# Patient Record
Sex: MALE | Race: WHITE | NOT HISPANIC OR LATINO | Employment: OTHER | URBAN - METROPOLITAN AREA
[De-identification: names, ages, dates, MRNs, and addresses within clinical notes are randomized per-mention and may not be internally consistent; named-entity substitution may affect disease eponyms.]

---

## 2017-05-01 ENCOUNTER — OFFICE VISIT (OUTPATIENT)
Dept: UROLOGY | Facility: CLINIC | Age: 82
End: 2017-05-01
Payer: MEDICARE

## 2017-05-01 VITALS
DIASTOLIC BLOOD PRESSURE: 78 MMHG | HEIGHT: 69 IN | BODY MASS INDEX: 31.38 KG/M2 | HEART RATE: 78 BPM | SYSTOLIC BLOOD PRESSURE: 135 MMHG | WEIGHT: 211.88 LBS

## 2017-05-01 DIAGNOSIS — N40.1 BPH WITH URINARY OBSTRUCTION: Primary | ICD-10-CM

## 2017-05-01 DIAGNOSIS — N13.8 BPH WITH URINARY OBSTRUCTION: Primary | ICD-10-CM

## 2017-05-01 PROCEDURE — 99214 OFFICE O/P EST MOD 30 MIN: CPT | Mod: S$PBB,,, | Performed by: UROLOGY

## 2017-05-01 PROCEDURE — 99999 PR PBB SHADOW E&M-EST. PATIENT-LVL III: CPT | Mod: PBBFAC,,, | Performed by: UROLOGY

## 2017-05-01 PROCEDURE — 99213 OFFICE O/P EST LOW 20 MIN: CPT | Mod: PBBFAC | Performed by: UROLOGY

## 2017-05-01 RX ORDER — TESTOSTERONE CYPIONATE 200 MG/ML
200 INJECTION, SOLUTION INTRAMUSCULAR
Qty: 10 ML | Refills: 1 | Status: SHIPPED | OUTPATIENT
Start: 2017-05-01 | End: 2018-01-08 | Stop reason: SDUPTHER

## 2017-05-01 RX ORDER — GABAPENTIN 300 MG/1
300 CAPSULE ORAL 3 TIMES DAILY
COMMUNITY
End: 2017-08-21

## 2017-05-01 NOTE — MR AVS SNAPSHOT
[facility] - Urology 4th Floor  [address]  [place] [address]  Phone: [phone]                  Artemio Colon .   2017 8:45 AM   Office Visit    Description:  Male : 1929   Provider:  Russel Castañeda Jr., MD   Department:  [facility] - Urology 4th Floor           Reason for Visit     hypogonadism in male.           Diagnoses this Visit        Comments    BPH with urinary obstruction    -  Primary            To Do List           Goals (5 Years of Data)     None       These Medications        Disp Refills Start End    testosterone cypionate (DEPOTESTOTERONE CYPIONATE) 200 mg/mL injection 10 mL 1 2017 10/30/2017    Inject 1 mL (200 mg total) into the muscle every 28 days. - Intramuscular    Pharmacy: [facility] - [place], [place] - [address] [facility] #: [phone]         [facility] On Call     [facility] On Call Nurse Care Line -  Assistance  Unless otherwise directed by your provider, please contact [facility], our nurse care line that is available for  assistance.     Registered nurses in the [facility] provide: appointment scheduling, clinical advisement, health education, and other advisory services.  Call: [phone] (toll free)               Medications           Message regarding Medications     Verify the changes and/or additions to your medication regime listed below are the same as discussed with your clinician today.  If any of these changes or additions are incorrect, please notify your healthcare provider.        START taking these NEW medications        Refills    testosterone cypionate (DEPOTESTOTERONE CYPIONATE) 200 mg/mL injection 1    Sig: Inject 1 mL (200 mg total) into the muscle every 28 days.    Class: Print    Route: Intramuscular           Verify that the below list of medications is an accurate representation of the medications you are currently taking.  If none reported, the list may be blank. If incorrect, please  "contact your healthcare provider. Carry this list with you in case of emergency.           Current Medications     aspirin (ECOTRIN) 81 MG EC tablet Take 81 mg by mouth once daily.    gabapentin (NEURONTIN) 300 MG capsule Take 300 mg by mouth 3 (three) times daily.    hydrocodone-acetaminophen  (LORTAB)  mg per tablet Take 1 tablet by mouth every 6 (six) hours as needed for Pain.    tamsulosin (FLOMAX) 0.4 mg Cp24 Take 0.4 mg by mouth once daily.    tamsulosin (FLOMAX) 0.4 mg Cp24 Take 1 capsule (0.4 mg total) by mouth once daily.    testosterone cypionate (DEPOTESTOTERONE CYPIONATE) 200 mg/mL injection Inject 1 mL (200 mg total) into the muscle every 28 days.           Clinical Reference Information           Your Vitals Were     BP Pulse Height Weight BMI    135/78 78 5' 9" (1.753 m) 96.1 kg (211 lb 13.8 oz) 31.29 kg/m2      Blood Pressure          Most Recent Value    BP  135/78      Allergies as of 5/1/2017     Pcn [Penicillins]      Immunizations Administered on Date of Encounter - 5/1/2017     None      Orders Placed During Today's Visit     Future Labs/Procedures Expected by Expires    CBC with Auto Differential  5/1/2017 6/30/2018    Hepatic Function Panel  5/1/2017 6/30/2018    PSA  5/1/2017 6/30/2018    Testosterone  5/1/2017 6/30/2018      Language Assistance Services     ATTENTION: Language assistance services are available, free of charge. Please call [phone].      ATENCIÓN: Si habla español, tiene a brice disposición servicios gratuitos de asistencia lingüística. Llame al [phone].     [facility] Ý: N?u b?n nói Ti?ng Vi?t, có các d?ch v? h? tr? ngôn ng? mi?n phí dành cho b?n. G?i s? [phone].         [facility] 4th Floor complies with applicable Federal civil rights laws and does not discriminate on the basis of race, color, national origin, age, disability, or sex.        "

## 2017-05-01 NOTE — PROGRESS NOTES
Subjective:       Patient ID: Artemio Colon Sr. is a 88 y.o. male.    Chief Complaint: hypogonadism in male. (pt state he had sharp pain in the prostate but he was in  the hospital and was treat with x3 antibotic that seem to help the prostate he have some burning issue when voiding . he need rx  on  testosterone medication.)    HPI patient is here for prostate check.  He is voiding well.  He was recently bitten by CAT had to go through that series of shots.  He is also here for refill of IM testosterone.  He is 200 mg IM every month.  He needs his lab work done    Past Medical History:   Diagnosis Date    BPH (benign prostatic hyperplasia)     Erectile dysfunction     Pacemaker        Past Surgical History:   Procedure Laterality Date    CHOLECYSTECTOMY         History reviewed. No pertinent family history.    Social History     Social History    Marital status:      Spouse name: N/A    Number of children: N/A    Years of education: N/A     Occupational History    Not on file.     Social History Main Topics    Smoking status: Never Smoker    Smokeless tobacco: Not on file    Alcohol use No    Drug use: No    Sexual activity: Yes     Other Topics Concern    Not on file     Social History Narrative       Allergies:  Pcn [penicillins]    Medications:    Current Outpatient Prescriptions:     aspirin (ECOTRIN) 81 MG EC tablet, Take 81 mg by mouth once daily., Disp: , Rfl:     gabapentin (NEURONTIN) 300 MG capsule, Take 300 mg by mouth 3 (three) times daily., Disp: , Rfl:     hydrocodone-acetaminophen  (LORTAB)  mg per tablet, Take 1 tablet by mouth every 6 (six) hours as needed for Pain., Disp: , Rfl:     tamsulosin (FLOMAX) 0.4 mg Cp24, Take 0.4 mg by mouth once daily., Disp: , Rfl:     tamsulosin (FLOMAX) 0.4 mg Cp24, Take 1 capsule (0.4 mg total) by mouth once daily., Disp: 30 capsule, Rfl: 12    Review of Systems   Constitutional: Negative for activity change, appetite  change, chills, diaphoresis, fatigue, fever and unexpected weight change.   HENT: Negative for congestion, dental problem, hearing loss, mouth sores, postnasal drip, rhinorrhea, sinus pressure and trouble swallowing.    Eyes: Negative for pain, discharge and itching.   Respiratory: Negative for apnea, cough, choking, chest tightness, shortness of breath and wheezing.    Cardiovascular: Negative for chest pain, palpitations and leg swelling.   Gastrointestinal: Negative for abdominal distention, abdominal pain, anal bleeding, blood in stool, constipation, diarrhea, nausea, rectal pain and vomiting.   Endocrine: Negative for polydipsia and polyuria.   Genitourinary: Negative for decreased urine volume, difficulty urinating, discharge, dysuria, enuresis, flank pain, frequency, genital sores, hematuria, penile pain, penile swelling, scrotal swelling, testicular pain and urgency.   Musculoskeletal: Negative for arthralgias, back pain and myalgias.   Skin: Negative for color change, rash and wound.   Neurological: Negative for dizziness, syncope, speech difficulty, light-headedness and headaches.   Hematological: Negative for adenopathy. Does not bruise/bleed easily.   Psychiatric/Behavioral: Negative for behavioral problems, confusion, hallucinations and sleep disturbance.       Objective:      Physical Exam   Constitutional: He appears well-developed.   HENT:   Head: Normocephalic.   Cardiovascular: Normal rate.    Pulmonary/Chest: Effort normal.   Abdominal: Soft.   Genitourinary: Prostate normal.   Neurological: He is alert.   Skin: Skin is warm.     Psychiatric: He has a normal mood and affect.       Assessment:       1. BPH with urinary obstruction        Plan:       Artemio was seen today for hypogonadism in male..    Diagnoses and all orders for this visit:    BPH with urinary obstruction        associated with    BPH with urinary obstruction  -     Testosterone; Future; Expected date: 5/1/17  -     PSA; Future;  Expected date: 5/1/17  -     CBC with Auto Differential; Future; Expected date: 5/1/17  -     Hepatic Function Panel; Future; Expected date: 5/1/17    Other orders  -     testosterone cypionate (DEPOTESTOTERONE CYPIONATE) 200 mg/mL injection; Inject 1 mL (200 mg total) into the muscle every 28 days.  Dispense: 10 mL; Refill: 1     return clinic 6 months with lab work

## 2017-06-20 ENCOUNTER — OFFICE VISIT (OUTPATIENT)
Dept: CARDIOLOGY | Facility: CLINIC | Age: 82
End: 2017-06-20
Payer: MEDICARE

## 2017-06-20 ENCOUNTER — HOSPITAL ENCOUNTER (OUTPATIENT)
Dept: CARDIOLOGY | Facility: CLINIC | Age: 82
Discharge: HOME | End: 2017-06-20
Payer: MEDICARE

## 2017-06-20 ENCOUNTER — TELEPHONE (OUTPATIENT)
Dept: ELECTROPHYSIOLOGY | Facility: CLINIC | Age: 82
End: 2017-06-20

## 2017-06-20 VITALS
SYSTOLIC BLOOD PRESSURE: 127 MMHG | DIASTOLIC BLOOD PRESSURE: 71 MMHG | HEART RATE: 74 BPM | HEIGHT: 70 IN | BODY MASS INDEX: 29.29 KG/M2 | WEIGHT: 204.56 LBS

## 2017-06-20 DIAGNOSIS — Z95.0 CARDIAC PACEMAKER IN SITU: ICD-10-CM

## 2017-06-20 DIAGNOSIS — I25.10 CORONARY ARTERY DISEASE INVOLVING NATIVE CORONARY ARTERY OF NATIVE HEART WITHOUT ANGINA PECTORIS: Chronic | ICD-10-CM

## 2017-06-20 DIAGNOSIS — Z95.0 CARDIAC PACEMAKER IN SITU: Primary | ICD-10-CM

## 2017-06-20 DIAGNOSIS — I48.91 ATRIAL FIBRILLATION, UNSPECIFIED TYPE: ICD-10-CM

## 2017-06-20 DIAGNOSIS — I48.0 PAF (PAROXYSMAL ATRIAL FIBRILLATION): ICD-10-CM

## 2017-06-20 PROCEDURE — 1125F AMNT PAIN NOTED PAIN PRSNT: CPT | Mod: ,,, | Performed by: INTERNAL MEDICINE

## 2017-06-20 PROCEDURE — 93010 ELECTROCARDIOGRAM REPORT: CPT | Mod: S$PBB,,, | Performed by: INTERNAL MEDICINE

## 2017-06-20 PROCEDURE — 99214 OFFICE O/P EST MOD 30 MIN: CPT | Mod: PBBFAC,25 | Performed by: INTERNAL MEDICINE

## 2017-06-20 PROCEDURE — 99204 OFFICE O/P NEW MOD 45 MIN: CPT | Mod: S$PBB,,, | Performed by: INTERNAL MEDICINE

## 2017-06-20 PROCEDURE — 1159F MED LIST DOCD IN RCRD: CPT | Mod: ,,, | Performed by: INTERNAL MEDICINE

## 2017-06-20 PROCEDURE — 99999 PR PBB SHADOW E&M-EST. PATIENT-LVL IV: CPT | Mod: PBBFAC,,, | Performed by: INTERNAL MEDICINE

## 2017-06-20 RX ORDER — HYDROCODONE BITARTRATE AND ACETAMINOPHEN 10; 325 MG/1; MG/1
TABLET ORAL EVERY 4 HOURS PRN
COMMUNITY
End: 2024-03-20

## 2017-06-20 RX ORDER — FENTANYL 25 UG/1
1 PATCH TRANSDERMAL
COMMUNITY

## 2017-06-20 NOTE — PATIENT INSTRUCTIONS
.Mediteranian diet recommended  Attempt Graded exercise for 30 minutes a day at least 5 days a week .

## 2017-06-20 NOTE — PROGRESS NOTES
"Subjective:   Patient ID:  Artemio Colon Sr. is a 88 y.o. male who presents for  Pacemaker Check and Establish Care      HPI:   Artemio Colon Sr.presents to establish cardiovascular care having been treated at [facility] and [facility] in [place]. There is a past history of atrial fibrillation and underwent an ablation at [facility] and  has had a PPM apparently since 2002. He had a generator change in 2011.  . Near that time he had cardiac cath performed with high grade lesions in a first diagonal with moderate disease in his mid LAD. EF on several echos has been in normal range ( records scanned ). Artemio Colon Sr. is not exercising due to knee pain but has renovated houses. Artemio Colon Sr. denies chest pain, shortness of breath, palpitations, presyncope , or syncope. He takes aspirin but no statin with his cholesterol " always good ". ..  Review of Systems   Constitution: Negative for weakness, malaise/fatigue, weight gain and weight loss.   HENT: Negative for headaches.    Eyes: Negative for blurred vision.        Glaucoma right eye   Cardiovascular: Negative for chest pain, claudication, cyanosis, dyspnea on exertion, irregular heartbeat, leg swelling, near-syncope, orthopnea, palpitations, paroxysmal nocturnal dyspnea and syncope.   Respiratory: Negative for cough, shortness of breath and wheezing.    Musculoskeletal: Positive for joint pain. Negative for falls and myalgias.   Gastrointestinal: Positive for constipation and nausea. Negative for abdominal pain, heartburn and vomiting.   Genitourinary: Positive for nocturia.   Neurological: Negative for brief paralysis, dizziness, focal weakness, numbness and paresthesias.   Psychiatric/Behavioral: Negative for altered mental status.       Current Outpatient Prescriptions   Medication Sig    aspirin (ECOTRIN) 81 MG EC tablet Take 81 mg by mouth once daily.    fentaNYL (DURAGESIC) 25 mcg/hr Place 1 patch onto the skin every 72 hours. " "   gabapentin (NEURONTIN) 300 MG capsule Take 300 mg by mouth 3 (three) times daily.    hydrocodone-acetaminophen 10-325mg (NORCO)  mg Tab Take by mouth every 4 (four) hours as needed for Pain.    tamsulosin (FLOMAX) 0.4 mg Cp24 Take 1 capsule (0.4 mg total) by mouth once daily.    testosterone cypionate (DEPOTESTOTERONE CYPIONATE) 200 mg/mL injection Inject 1 mL (200 mg total) into the muscle every 28 days.    travoprost, benzalkonium, (TRAVATAN) 0.004 % ophthalmic solution Place 1 drop into both eyes every evening.     No current facility-administered medications for this visit.      Objective:   Physical Exam   Constitutional: He is oriented to person, place, and time. He appears well-developed. No distress.   /71 (BP Location: Left arm, Patient Position: Sitting, BP Method: Automatic)   Pulse 74   Ht 5' 10" (1.778 m)   Wt 92.8 kg (204 lb 9.4 oz)   BMI 29.36 kg/m²    HENT:   Head: Normocephalic.   Right Ear: External ear normal.   Left Ear: External ear normal.   Eyes: EOM are normal. Pupils are equal, round, and reactive to light. No scleral icterus.   Neck: Neck supple. No JVD present. No thyromegaly present.   Cardiovascular: Normal rate, regular rhythm, normal heart sounds and intact distal pulses.  PMI is not displaced.  Exam reveals no gallop and no friction rub.    No murmur heard.  Pulmonary/Chest: Effort normal and breath sounds normal. No respiratory distress. He has no wheezes. He has no rales.    Permanent pacemaker.   Abdominal: Soft. He exhibits no distension. There is no hepatosplenomegaly. There is no tenderness.   Musculoskeletal: He exhibits no edema or tenderness.   Gait normal   Neurological: He is alert and oriented to person, place, and time.   Skin: Skin is warm and dry. No rash noted.   Psychiatric: He has a normal mood and affect. His behavior is normal.       Lab Results   Component Value Date    AST 20 12/01/2016    ALT 15 12/01/2016    ALBUMIN 3.7 12/01/2016    " PROT 6.7 12/01/2016    BILITOT 0.8 12/01/2016    WBC 6.50 12/01/2016    HGB 16.1 12/01/2016    HCT 47.5 12/01/2016    MCV 94 12/01/2016     12/01/2016       Assessment:     1. Coronary artery disease involving native coronary artery of native heart without angina pectoris: Stable    2. PAF (paroxysmal atrial fibrillation) : S/P Ablation at [facility]   3. Cardiac pacemaker in situ        Plan:     Artemio was seen today for pacemaker check and establish care.    Diagnoses and all orders for this visit:    Coronary artery disease involving native coronary artery of native heart without angina pectoris  -     Lipid panel; Future; Expected date: 06/20/2017  Most likely will add low dose statin  PAF (paroxysmal atrial fibrillation)    Cardiac pacemaker in situ  -     Ambulatory Referral to Electrophysiology for follow up    Other orders  -     hydrocodone-acetaminophen 10-325mg (NORCO)  mg Tab; Take by mouth every 4 (four) hours as needed for Pain.  -     travoprost, benzalkonium, (TRAVATAN) 0.004 % ophthalmic solution; Place 1 drop into both eyes every evening.  -     fentaNYL (DURAGESIC) 25 mcg/hr; Place 1 patch onto the skin every 72 hours.

## 2017-06-21 ENCOUNTER — TELEPHONE (OUTPATIENT)
Dept: CARDIOLOGY | Facility: CLINIC | Age: 82
End: 2017-06-21

## 2017-06-21 ENCOUNTER — OFFICE VISIT (OUTPATIENT)
Dept: ELECTROPHYSIOLOGY | Facility: CLINIC | Age: 82
End: 2017-06-21
Payer: MEDICARE

## 2017-06-21 ENCOUNTER — CLINICAL SUPPORT (OUTPATIENT)
Dept: ELECTROPHYSIOLOGY | Facility: CLINIC | Age: 82
End: 2017-06-21
Payer: MEDICARE

## 2017-06-21 ENCOUNTER — HOSPITAL ENCOUNTER (OUTPATIENT)
Dept: CARDIOLOGY | Facility: CLINIC | Age: 82
Discharge: HOME | End: 2017-06-21
Payer: MEDICARE

## 2017-06-21 VITALS
HEART RATE: 82 BPM | HEIGHT: 70 IN | BODY MASS INDEX: 29.32 KG/M2 | WEIGHT: 204.81 LBS | DIASTOLIC BLOOD PRESSURE: 82 MMHG | SYSTOLIC BLOOD PRESSURE: 118 MMHG

## 2017-06-21 DIAGNOSIS — Z95.0 CARDIAC PACEMAKER IN SITU: ICD-10-CM

## 2017-06-21 DIAGNOSIS — I44.2 COMPLETE AV BLOCK: ICD-10-CM

## 2017-06-21 DIAGNOSIS — I48.0 PAF (PAROXYSMAL ATRIAL FIBRILLATION): Primary | ICD-10-CM

## 2017-06-21 DIAGNOSIS — I48.91 ATRIAL FIBRILLATION, UNSPECIFIED TYPE: ICD-10-CM

## 2017-06-21 DIAGNOSIS — I25.10 CORONARY ARTERY DISEASE INVOLVING NATIVE CORONARY ARTERY OF NATIVE HEART WITHOUT ANGINA PECTORIS: Primary | Chronic | ICD-10-CM

## 2017-06-21 PROCEDURE — 93005 ELECTROCARDIOGRAM TRACING: CPT | Mod: PBBFAC | Performed by: INTERNAL MEDICINE

## 2017-06-21 PROCEDURE — 93010 ELECTROCARDIOGRAM REPORT: CPT | Mod: S$PBB,,, | Performed by: INTERNAL MEDICINE

## 2017-06-21 PROCEDURE — 99214 OFFICE O/P EST MOD 30 MIN: CPT | Mod: S$PBB,,, | Performed by: INTERNAL MEDICINE

## 2017-06-21 PROCEDURE — 1126F AMNT PAIN NOTED NONE PRSNT: CPT | Mod: ,,, | Performed by: INTERNAL MEDICINE

## 2017-06-21 PROCEDURE — 93280 PM DEVICE PROGR EVAL DUAL: CPT | Mod: 26,S$PBB,, | Performed by: INTERNAL MEDICINE

## 2017-06-21 PROCEDURE — 99213 OFFICE O/P EST LOW 20 MIN: CPT | Mod: PBBFAC | Performed by: INTERNAL MEDICINE

## 2017-06-21 PROCEDURE — 99999 PR PBB SHADOW E&M-EST. PATIENT-LVL III: CPT | Mod: PBBFAC,,, | Performed by: INTERNAL MEDICINE

## 2017-06-21 PROCEDURE — 1159F MED LIST DOCD IN RCRD: CPT | Mod: ,,, | Performed by: INTERNAL MEDICINE

## 2017-06-21 RX ORDER — PRAVASTATIN SODIUM 40 MG/1
40 TABLET ORAL DAILY
Qty: 90 TABLET | Refills: 3 | Status: SHIPPED | OUTPATIENT
Start: 2017-06-21 | End: 2019-07-02

## 2017-06-21 NOTE — TELEPHONE ENCOUNTER
Artemio Colon Sr. called and results of lab tests reviewed. LDL in the 80s with known CAD. Will add moderate intensity statin .

## 2017-06-21 NOTE — PROGRESS NOTES
Subjective:    Patient ID:  Artemio Colon Sr. is a 88 y.o. male who presents for evaluation of Atrial Fibrillation and Pacemaker Check    Referring Cardiologist: Nicholas Tee MD    HPI  I had the pleasure of seeing Mr. Colon today in our electrophysiology clinic to establish care for his pacemaker and for his atrial fibrillation.  He recently moved here from [place] and established care in the cardiology clinic.  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 I do not know if he had a PVI or AVN ablation as he has complete AV block.  He feels well, exercises regularly.  He denies any chest pain, shortness of breath, palpitations, orthopnea or PND.  He is not on anticoagulation and when I brought it up as a discussion he stated he does not want to be on it and rathered not discuss it any further.    Device interrogation revealed stable lead parameters, underlying rhythm is sinus rhythm with complete AV block, A-paced 56%, V-paced 100%, AMS burden <0.1% but with 28 AF episodes and longest episode lasting 5 minutes and 1 NSVT episode of 4 seconds. He has 3.5 years of battery life left.    My interpretation of today's in clinic electrocardiogram is AV-paced rhythm    Review of Systems   Constitution: Negative. Negative for weakness and malaise/fatigue.   HENT: Negative.  Negative for congestion and headaches.    Eyes: Negative.  Negative for blurred vision and visual disturbance.   Cardiovascular: Negative.  Negative for chest pain, dyspnea on exertion, irregular heartbeat, near-syncope, orthopnea, palpitations, paroxysmal nocturnal dyspnea and syncope.   Respiratory: Negative.  Negative for cough and wheezing.    Endocrine: Negative.    Hematologic/Lymphatic:  Negative.  Negative for bleeding problem. Does not bruise/bleed easily.   Skin: Negative.    Musculoskeletal: Positive for arthritis and joint pain.   Gastrointestinal: Negative.    Genitourinary: Negative.    Neurological: Negative.  Negative for dizziness, focal weakness and light-headedness.   Psychiatric/Behavioral: Negative.    Allergic/Immunologic: Negative.         Objective:    Physical Exam   Constitutional: He is oriented to person, place, and time. He appears well-developed and well-nourished. No distress.   HENT:   Head: Normocephalic and atraumatic.   Eyes: Conjunctivae and EOM are normal. Right eye exhibits no discharge. Left eye exhibits no discharge.   Neck: Neck supple. No JVD present.   Cardiovascular: Normal rate, regular rhythm and normal heart sounds.  Exam reveals no gallop and no friction rub.    No murmur heard.  Pulmonary/Chest: Effort normal and breath sounds normal. No respiratory distress. He has no wheezes.   Device pocket well healed   Abdominal: Soft. Bowel sounds are normal. He exhibits no distension. There is no tenderness. There is no rebound.   Musculoskeletal: He exhibits no edema.   Neurological: He is alert and oriented to person, place, and time.   Skin: Skin is warm and dry. He is not diaphoretic.   Psychiatric: He has a normal mood and affect. His behavior is normal. Judgment and thought content normal.   Vitals reviewed.        Assessment:       1. PAF (paroxysmal atrial fibrillation)    2. Cardiac pacemaker in situ    3. Complete AV block         Plan:       In summary, Mr. 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 He is in complete AV block.  He is not on anticoagulation by choice and would not discuss it any further.  Will perform  remote device checks every 3 months.  Follow-up with me in 1 year, sooner if needed.    Thank you for allowing me to participate in the care of this patient. Please do not hesitate to call me with any questions or concerns.    Tawanda Adler MD, PhD  Cardiac Electrophysiology

## 2017-06-21 NOTE — LETTER
June 21, 2017      Nicholas Tee MD  [address]  [facility] [address]           [facility]  [address]  [facility] [address]  Phone: [phone]  Fax: [phone]          Patient: Artemio Colon Sr.   MR Number: [id number]   YOB: 1929   Date of Visit: 6/21/2017       Dear Dr. Nicholas Tee:    Thank you for referring Artemio Colon to me for evaluation. Attached you will find relevant portions of my assessment and plan of care.    If you have questions, please do not hesitate to call me. I look forward to following Artemio Colon along with you.    Sincerely,    Tawanda Adler MD    Enclosure  CC:  No Recipients    If you would like to receive this communication electronically, please contact [email or web address] or [phone] to request more information on [facility] access.    For providers and/or their staff who would like to refer a patient to [facility], please contact us through our one-stop-shop provider referral line, [facility], at [phone].    If you feel you have received this communication in error or would no longer like to receive these types of communications, please e-mail [email or web address]

## 2017-06-22 DIAGNOSIS — I48.0 PAF (PAROXYSMAL ATRIAL FIBRILLATION): Primary | ICD-10-CM

## 2017-07-27 ENCOUNTER — OFFICE VISIT (OUTPATIENT)
Dept: ALLERGY | Facility: CLINIC | Age: 82
End: 2017-07-27
Payer: MEDICARE

## 2017-07-27 VITALS
DIASTOLIC BLOOD PRESSURE: 72 MMHG | WEIGHT: 208.31 LBS | BODY MASS INDEX: 29.16 KG/M2 | SYSTOLIC BLOOD PRESSURE: 124 MMHG | HEIGHT: 71 IN

## 2017-07-27 DIAGNOSIS — K90.49 FOOD INTOLERANCE IN ADULT: ICD-10-CM

## 2017-07-27 DIAGNOSIS — J31.0 CHRONIC RHINITIS, UNSPECIFIED TYPE: Primary | ICD-10-CM

## 2017-07-27 DIAGNOSIS — G47.33 OSA (OBSTRUCTIVE SLEEP APNEA): ICD-10-CM

## 2017-07-27 DIAGNOSIS — Z91.038 HISTORY OF INSECT STING ALLERGY: ICD-10-CM

## 2017-07-27 DIAGNOSIS — Z88.0 HISTORY OF PENICILLIN ALLERGY: ICD-10-CM

## 2017-07-27 PROCEDURE — 1159F MED LIST DOCD IN RCRD: CPT | Mod: ,,, | Performed by: ALLERGY & IMMUNOLOGY

## 2017-07-27 PROCEDURE — 99999 PR PBB SHADOW E&M-EST. PATIENT-LVL III: CPT | Mod: PBBFAC,,, | Performed by: ALLERGY & IMMUNOLOGY

## 2017-07-27 PROCEDURE — 99204 OFFICE O/P NEW MOD 45 MIN: CPT | Mod: S$PBB,,, | Performed by: ALLERGY & IMMUNOLOGY

## 2017-07-27 PROCEDURE — 1125F AMNT PAIN NOTED PAIN PRSNT: CPT | Mod: ,,, | Performed by: ALLERGY & IMMUNOLOGY

## 2017-07-27 PROCEDURE — 3008F BODY MASS INDEX DOCD: CPT | Mod: ,,, | Performed by: ALLERGY & IMMUNOLOGY

## 2017-07-27 PROCEDURE — 99213 OFFICE O/P EST LOW 20 MIN: CPT | Mod: PBBFAC | Performed by: ALLERGY & IMMUNOLOGY

## 2017-07-27 RX ORDER — EPINEPHRINE 0.3 MG/.3ML
1 INJECTION SUBCUTANEOUS ONCE
Qty: 2 DEVICE | Refills: 2 | Status: SHIPPED | OUTPATIENT
Start: 2017-07-27 | End: 2017-08-21

## 2017-07-27 RX ORDER — FLUTICASONE PROPIONATE 50 MCG
2 SPRAY, SUSPENSION (ML) NASAL DAILY
Qty: 1 BOTTLE | Refills: 6 | Status: SHIPPED | OUTPATIENT
Start: 2017-07-27 | End: 2020-12-31

## 2017-07-27 NOTE — PROGRESS NOTES
Subjective:       Patient ID: Artemio Colon Sr. is a 88 y.o. male.    Chief Complaint:  Allergies (PCN allergy) and Sore Throat (nasal drip)  est care    HPI    Pt w hx chronic rhinitis, JAEL, food intolerance.  Pt w hx mult negative food immunoCAP, but intolerance (GI upset) to corn starch and corn syrup.  Also w distant hx PCN allergy. ~65 yrs ago recalls hives and neck swelling while on PCN. Has been avoiding since. Denies recurrent sinusitis or lower resp infections.    Re rhinitis sx's, he is a chronic mouth breather. Has freq c/o  Hoarseness, sinus pressure, PND, nasal irritation, assoc HA  Nasal irritation    He is on CPAP. Needs to est care w sleep med/pulm to review settings.    Denies GERD  No hx asthma    Reports distant hx resp distress w suspected wasp sting. Never rx'd epipen. Not interested in immunotherapy      Review of Systems   Constitutional: Negative for activity change, appetite change, fatigue, fever and unexpected weight change.   HENT: Positive for postnasal drip, rhinorrhea, sinus pressure and voice change. Negative for congestion, ear pain and sneezing.    Eyes: Negative for discharge, redness and itching.   Respiratory: Negative for cough, chest tightness, shortness of breath and wheezing.    Cardiovascular: Negative for chest pain.   Gastrointestinal: Negative for abdominal pain, constipation, diarrhea, nausea and vomiting.   Genitourinary: Negative for difficulty urinating.   Musculoskeletal: Negative for arthralgias, joint swelling and myalgias.   Skin: Negative for rash.   Neurological: Negative for headaches.   Hematological: Negative for adenopathy. Does not bruise/bleed easily.   Psychiatric/Behavioral: Negative for behavioral problems and sleep disturbance.        Objective:    Physical Exam   Constitutional: He is oriented to person, place, and time. He appears well-developed and well-nourished. No distress.   HENT:   Head: Normocephalic and atraumatic.   Right Ear:  External ear normal.   Left Ear: External ear normal.   Nose: Nose normal.   Mouth/Throat: Oropharynx is clear and moist. No oropharyngeal exudate.   Eyes: Conjunctivae are normal. Right eye exhibits no discharge. Left eye exhibits no discharge. No scleral icterus.   Neck: Neck supple. No thyromegaly present.   Cardiovascular: Normal rate and regular rhythm.    Pulmonary/Chest: Effort normal and breath sounds normal. No respiratory distress. He has no wheezes.   Abdominal: Soft. He exhibits no distension. There is no tenderness. There is no rebound and no guarding.   Musculoskeletal: Normal range of motion. He exhibits no edema.   Lymphadenopathy:     He has no cervical adenopathy.   Neurological: He is alert and oriented to person, place, and time.   Skin: Skin is warm. No rash noted. He is not diaphoretic. No erythema.   Psychiatric: He has a normal mood and affect. His behavior is normal. Thought content normal.       Laboratory:   none performed      Outside labs reviewed, incl neg food immunoCAPs, nl spirometry 2012    Assessment:       1. Chronic rhinitis, unspecified type    2. Food intolerance in adult    3. History of penicillin allergy    4. JAEL (obstructive sleep apnea)    5. History of insect sting allergy         Plan:       1. flonase 2 sen   2. Refer to pulm/sleep medicine   3. epipen given hx poss wasp allergy. Pt not interested in testing, immunotherapy  4. Discussed PCN skin testing. Pt defers at this time  5. Ok minimize corn exposure. Doubt that strict avoidance is necessary

## 2017-08-04 ENCOUNTER — TELEPHONE (OUTPATIENT)
Dept: NEUROLOGY | Facility: CLINIC | Age: 82
End: 2017-08-04

## 2017-08-04 NOTE — TELEPHONE ENCOUNTER
Adi called spoke to Mr. Colon regarding an appt he wants with . Adi stated to him the  doesn't see patient for neuropathy, she mostly see patients with movement disorders which is not neuropathy. Mr. Colon stated back that he looked  up the [facility] web site and liked what read about her. Adi stated that he can recommend another provider that can help him with leg neuropathy. He stated that he will do his research again and find someone on his own

## 2017-08-04 NOTE — TELEPHONE ENCOUNTER
----- Message from Dorene Holt sent at 8/4/2017  9:54 AM CDT -----  Contact: Patient cell [phone] or [phone]  Patient is calling to get a new patient appt for leg Neuropathy. Please call

## 2017-08-17 NOTE — LETTER
"              After Visit Summary   8/17/2017    Suresh Ellis    MRN: [id number]           Patient Information     Date Of Birth          1937        Visit Information        Provider Department      8/17/2017 9:00 AM Danita Bowen PA-C [facility]        Today's Diagnoses     Essential hypertension with goal blood pressure less than 140/90    -  1    Hyperlipidemia LDL goal <100        CKD (chronic kidney disease) stage 3, GFR 30-59 ml/min        Subclinical hypothyroidism          Care Instructions    Labs updated today.  Continue healthy diet and exercise.  Refills sent to pharmacy.  Return to clinic with any worsening or changes in symptoms and follow up for routine care.           Follow-ups after your visit        Follow-up notes from your care team     Return in about 6 months (around 2/17/2018), or if symptoms worsen or fail to improve.      Who to contact     If you have questions or need follow up information about today's clinic visit or your schedule please contact [facility] directly at [phone].  Normal or non-critical lab and imaging results will be communicated to you by zeroboundhart, letter or phone within 4 business days after the clinic has received the results. If you do not hear from us within 7 days, please contact the clinic through Theoremt or phone. If you have a critical or abnormal lab result, we will notify you by phone as soon as possible.  Submit refill requests through [email or web address] or call your pharmacy and they will forward the refill request to us. Please allow 3 business days for your refill to be completed.          Additional Information About Your Visit        zeroboundharT[employer] Technologies Information     [email or web address] lets you send messages to your doctor, view your test results, renew your prescriptions, schedule appointments and more. To sign up, go to [email or web address] . Click on \"Log in\" on the left side of the screen, which will take you to the Welcome " June 20, 2017      Osiel Stone MD  [address]  [facility] [address]           [facility]  [address]  [facility] [address]  Phone: [phone]          Patient: Artemio Colon Sr.   MR Number: [id number]   YOB: 1929   Date of Visit: 6/20/2017       Dear Dr. Osiel Stone:    Thank you for referring Artemio Colon to me for evaluation. Attached you will find relevant portions of my assessment and plan of care.    If you have questions, please do not hesitate to call me. I look forward to following Artemio Colon along with you.    Sincerely,    Nicholas Tee MD    Enclosure  CC:  No Recipients    If you would like to receive this communication electronically, please contact [email or web address] or [phone] to request more information on [employer] Link access.    For providers and/or their staff who would like to refer a patient to [facility], please contact us through our one-stop-shop provider referral line, [facility] , at [phone].    If you feel you have received this communication in error or would no longer like to receive these types of communications, please e-mail [email or web address]          "[email or web address]. Then click on \"Sign up Now\" on the right side of the page.     You will be asked to enter the access code listed below, as well as some personal information. Please follow the directions to create your username and password.     Your access code is: [email or web address]  Expires: 11/15/2017  9:07 AM     Your access code will  in 90 days. If you need help or a new code, please call your [place] clinic or [phone].        Care EveryWhere ID     This is your Care EveryWhere ID. This could be used by other organizations to access your [place] medical records  [id number]        Your Vitals Were     Pulse Temperature Respirations Height Pulse Oximetry BMI (Body Mass Index)    80 97.1  F (36.2  C) (Tympanic) 15 5' 10.25\" (1.784 m) 95% 22.08 kg/m2       Blood Pressure from Last 3 Encounters:   17 142/64   16 136/60   09/17/15 136/60    Weight from Last 3 Encounters:   17 155 lb (70.3 kg)   16 160 lb (72.6 kg)   09/17/15 165 lb 4 oz (75 kg)              We Performed the Following     Albumin Random Urine Quantitative     CBC with platelets differential     Comprehensive metabolic panel     Lipid panel reflex to direct LDL     TSH with free T4 reflex          Today's Medication Changes          These changes are accurate as of: 17  9:07 AM.  If you have any questions, ask your nurse or doctor.               These medicines have changed or have updated prescriptions.        Dose/Directions    amLODIPine 10 MG tablet   Commonly known as:  NORVASC   This may have changed:  See the new instructions.   Used for:  Essential hypertension with goal blood pressure less than 140/90   Changed by:  Danita Bowen PA-C        TAKE 1 TABLET(10 MG) BY MOUTH DAILY   Quantity:  90 tablet   Refills:  3       hydrochlorothiazide 25 MG tablet   Commonly known as:  HYDRODIURIL   This may have changed:  See the new instructions.   Used for:  Essential hypertension with goal blood pressure less " than 140/90   Changed by:  Danita Bowen PA-C        TAKE 1 TABLET(25 MG) BY MOUTH DAILY   Quantity:  90 tablet   Refills:  3            Where to get your medicines      These medications were sent to [facility] [address] [address] AT [facility] & [address] [place] [address]    Hours:  24-hours Phone:  [phone]     amLODIPine 10 MG tablet    hydrochlorothiazide 25 MG tablet                Primary Care Provider Office Phone # Fax #    Danita Bowen PA-C [phone]       [address]  [facility] [address]        Equal Access to Services     FAITH HSU : Hadii duarte rutledge hadasho Soomaali, waaxda luqadaha, qaybta kaalmada adeegyada, dustin mccurdyin tyrell ramírez . So [facility] [phone].    ATENCIÓN: Si habla español, tiene a barrientos disposición servicios gratuitos de asistencia lingüística. Llame al [phone].    We comply with applicable federal civil rights laws and [place] laws. We do not discriminate on the basis of race, color, national origin, age, disability sex, sexual orientation or gender identity.            Thank you!     Thank you for choosing [facility]  for your care. Our goal is always to provide you with excellent care. Hearing back from our patients is one way we can continue to improve our services. Please take a few minutes to complete the written survey that you may receive in the mail after your visit with us. Thank you!             Your Updated Medication List - Protect others around you: Learn how to safely use, store and throw away your medicines at www.disposemymeds.org.          This list is accurate as of: 8/17/17  9:07 AM.  Always use your most recent med list.                   Brand Name Dispense Instructions for use Diagnosis    amLODIPine 10 MG tablet    NORVASC    90 tablet    TAKE 1 TABLET(10 MG) BY MOUTH DAILY    Essential hypertension with  goal blood pressure less than 140/90       hydrochlorothiazide 25 MG tablet    HYDRODIURIL    90 tablet    TAKE 1 TABLET(25 MG) BY MOUTH DAILY    Essential hypertension with goal blood pressure less than 140/90       OMEGA-3 FISH OIL PO           vitamin D 2000 UNITS tablet     100 tablet    Take 2,000 Units by mouth daily    Vitamin D deficiency disease

## 2017-08-20 ENCOUNTER — PATIENT MESSAGE (OUTPATIENT)
Dept: CARDIOLOGY | Facility: CLINIC | Age: 82
End: 2017-08-20

## 2017-08-21 ENCOUNTER — OFFICE VISIT (OUTPATIENT)
Dept: INTERNAL MEDICINE | Facility: CLINIC | Age: 82
End: 2017-08-21
Payer: MEDICARE

## 2017-08-21 VITALS
WEIGHT: 210.13 LBS | DIASTOLIC BLOOD PRESSURE: 68 MMHG | SYSTOLIC BLOOD PRESSURE: 120 MMHG | BODY MASS INDEX: 30.08 KG/M2 | HEIGHT: 70 IN | HEART RATE: 68 BPM

## 2017-08-21 DIAGNOSIS — M48.061 LUMBAR SPINAL STENOSIS: ICD-10-CM

## 2017-08-21 DIAGNOSIS — N28.1 BILATERAL RENAL CYSTS: ICD-10-CM

## 2017-08-21 DIAGNOSIS — R22.9 SKIN NODULE: ICD-10-CM

## 2017-08-21 DIAGNOSIS — M48.02 CERVICAL STENOSIS OF SPINE: ICD-10-CM

## 2017-08-21 DIAGNOSIS — B35.1 ONYCHOMYCOSIS: ICD-10-CM

## 2017-08-21 DIAGNOSIS — I25.10 CORONARY ARTERY DISEASE INVOLVING NATIVE CORONARY ARTERY OF NATIVE HEART WITHOUT ANGINA PECTORIS: Chronic | ICD-10-CM

## 2017-08-21 DIAGNOSIS — H40.10X4: ICD-10-CM

## 2017-08-21 DIAGNOSIS — M79.2 NEUROPATHIC PAIN: Primary | ICD-10-CM

## 2017-08-21 PROCEDURE — 99999 PR PBB SHADOW E&M-EST. PATIENT-LVL IV: CPT | Mod: PBBFAC,,, | Performed by: INTERNAL MEDICINE

## 2017-08-21 PROCEDURE — 99214 OFFICE O/P EST MOD 30 MIN: CPT | Mod: PBBFAC | Performed by: INTERNAL MEDICINE

## 2017-08-21 PROCEDURE — 1126F AMNT PAIN NOTED NONE PRSNT: CPT | Mod: ,,, | Performed by: INTERNAL MEDICINE

## 2017-08-21 PROCEDURE — 99204 OFFICE O/P NEW MOD 45 MIN: CPT | Mod: S$PBB,,, | Performed by: INTERNAL MEDICINE

## 2017-08-21 PROCEDURE — 1159F MED LIST DOCD IN RCRD: CPT | Mod: ,,, | Performed by: INTERNAL MEDICINE

## 2017-08-21 RX ORDER — GABAPENTIN 300 MG/1
300 CAPSULE ORAL 2 TIMES DAILY
Qty: 180 CAPSULE | Refills: 3 | Status: SHIPPED | OUTPATIENT
Start: 2017-08-21 | End: 2018-02-02 | Stop reason: ALTCHOICE

## 2017-08-21 NOTE — PROGRESS NOTES
Subjective:       Patient ID: Artemio Colon Sr. is a 88 y.o. male.    Chief Complaint: Establish Care    HPI    Neuropathic pain in bilateral lower extremities. Chronic pain, has appointment for Neurology tomorrow. Had been living in [place], last Neurology gave gabapentin. Believed secondary to lumbar spinal stenosis per outside Neurology, L3-L5 stenosis. Skin is sensitive around ankles. No gout, no Rheumatoid Arthritis.    Cyst in skin in medial right leg above ankle.  No pain or tenderness.  It does not interfere with ambulation.  No overlying skin change.    Chronic pain from cervical and lumbar spinal stenosis.  Patient is following with outside pain management clinic.  Currently controlled on fentanyl and Norco as needed.    Long-standing onychomycosis of bilateral toenails.  Patient has tried over-the-counter medications but has not been using them consistently.  He has not used oral medications in the past.    Continues to follow-up with cardiology for management of his atrial fibrillation and coronary artery disease.  He does have a pacemaker in place.    testosterone treatment managed by Urology. BPH symptoms well controlled on Flomax.  Prior diagnosis of bilateral renal cysts, he has not noted any recent change in urination or is having any problems with pain.    Review of Systems   Constitutional: Negative for activity change and unexpected weight change.   HENT: Positive for hearing loss and rhinorrhea. Negative for trouble swallowing.    Eyes: Positive for visual disturbance. Negative for discharge.   Respiratory: Negative for chest tightness and wheezing.    Cardiovascular: Negative for chest pain and palpitations.   Gastrointestinal: Positive for constipation. Negative for blood in stool, diarrhea and vomiting.   Endocrine: Negative for polydipsia and polyuria.   Genitourinary: Positive for urgency. Negative for difficulty urinating and hematuria.   Musculoskeletal: Positive for arthralgias and  "neck pain. Negative for joint swelling.   Neurological: Negative for weakness and headaches.   Psychiatric/Behavioral: Negative for confusion and dysphoric mood.       Objective:      Physical Exam   Constitutional: He is oriented to person, place, and time. No distress.   HENT:   Head: Atraumatic.   Right Ear: Tympanic membrane normal.   Left Ear: Tympanic membrane normal.   Mouth/Throat: Oropharynx is clear and moist. No oropharyngeal exudate.   bilateral hearing aids   Eyes: Pupils are equal, round, and reactive to light. Right eye exhibits no discharge. Left eye exhibits no discharge.   Neck: Normal range of motion. No thyromegaly present.   Cardiovascular: Normal rate, regular rhythm and normal heart sounds.    Pulmonary/Chest: Effort normal and breath sounds normal. He has no wheezes. He has no rales.   Abdominal: Soft. There is no tenderness. There is no rigidity and no guarding.   Musculoskeletal: He exhibits no edema or tenderness.   Lymphadenopathy:     He has no cervical adenopathy.   Neurological: He is alert and oriented to person, place, and time.   Skin: Skin is warm and dry. No rash noted.   Nodule medial R ankle proximal to ankle, about 1 cm in size, no overlying skin change. onychomycosis of toenails bilaterally    Psychiatric: He has a normal mood and affect. His behavior is normal.   Nursing note and vitals reviewed.      Vitals:    08/21/17 1131   BP: 120/68   BP Location: Right arm   Patient Position: Sitting   BP Method: Large (Manual)   Pulse: 68   Weight: 95.3 kg (210 lb 1.6 oz)   Height: 5' 10" (1.778 m)     Body mass index is 30.15 kg/m².    RESULTS: Reviewed labs from last 12 months    Assessment:       1. Neuropathic pain    2. Onychomycosis    3. Open-angle glaucoma of right eye, indeterminate stage, unspecified open-angle glaucoma type    4. Lumbar spinal stenosis    5. Cervical stenosis of spine    6. Skin nodule    7. Bilateral renal cysts    8. Coronary artery disease involving " "native coronary artery of native heart without angina pectoris        Plan:   Artemio was seen today for establish care.    Diagnoses and all orders for this visit:    Neuropathic pain:  Some improvement with gabapentin.  Patient has appointment with neurology tomorrow.  We discussed increasing the dose during his clinic visit and the risks versus benefits.  Patient will discuss further with neurology tomorrow to determine if gabapentin dose should be changed.  -     gabapentin (NEURONTIN) 300 MG capsule; Take 1 capsule (300 mg total) by mouth 2 (two) times daily.    Onychomycosis:  Long-standing problem, patient is looking to treat for cosmetic benefit.  Discussed oral terbinafine and risks and benefits.  Patient would like to try consistent application of topical therapies first.  Provided printed prescription for Jublia, if patient is unable to fill he was given instructions to use over-the-counter medications:  "Try filling the prescription for efinaconazole (Jublia) at the pharmacy.  If this is not covered by insurance, I recommend Kerasal Nail for over-the-counter topical treatment for onychomycosis.  Use daily for at least 6 months and avoid applying nail polish and getting pedicures.  Kerasal is available at the major pharmacies and there's an online coupon for a $5 discount.  It is about $18 at [facility].  The "Fungi Nail" does not work on toenails.  "  -     efinaconazole 10 % Werner; Apply to affected toenails once daily    Open-angle glaucoma of right eye, indeterminate stage, unspecified open-angle glaucoma type:  Prior diagnosis, patient is currently on eyedrops.  I will refer him to optometry for further evaluation and recommendations for ophthalmology.  -     Ambulatory Referral to Optometry    Lumbar spinal stenosis:  Continue follow-up with outside pain management for control oozing fentanyl and Norco.  Likely the cause of his neuropathic pain, follow-up with neurology.    Cervical stenosis of spine:  " Pain is stable, continue follow up with Pain Mgmt.    Skin nodule:  Soft nodule in RLE, most likely sebaceous cyst, possible tendon nodule.  Not symptomatic at this time.  Patient will continue to monitor for changes, notify office if problems develop.    Bilateral renal cysts:  Prior diagnosis, kidney function has remained stable.  No plans for further surveillance at this time, notify office if any new symptoms develop.    Coronary artery disease involving native coronary artery of native heart without angina pectoris:  Long-standing problem, patient continues to follow-up with cardiology.  Continue blood pressure and cholesterol control per their service.    Return in about 6 months (around 2/21/2018). Requested outside records from prior PCP to determine if health maintenance is up to date.  Osiel Stone MD  Internal Medicine    Portions of this note were completed using Stealth10 dictation software. Please excuse typographical or syntax errors.

## 2017-08-21 NOTE — PATIENT INSTRUCTIONS
"Try filling the prescription for efinaconazole (Jublia) at the pharmacy.  If this is not covered by insurance, I recommend Kerasal Nail for over-the-counter topical treatment for onychomycosis.  Use daily for at least 6 months and avoid applying nail polish and getting pedicures.  Kerasal is available at the major pharmacies and there's an online coupon for a $5 discount.  It is about $18 at [facility].  The "Fungi Nail" does not work on toenails.    "

## 2017-08-22 ENCOUNTER — OFFICE VISIT (OUTPATIENT)
Dept: NEUROLOGY | Facility: CLINIC | Age: 82
End: 2017-08-22
Payer: MEDICARE

## 2017-08-22 VITALS
SYSTOLIC BLOOD PRESSURE: 134 MMHG | HEIGHT: 70 IN | DIASTOLIC BLOOD PRESSURE: 83 MMHG | BODY MASS INDEX: 30.02 KG/M2 | WEIGHT: 209.69 LBS | HEART RATE: 85 BPM

## 2017-08-22 DIAGNOSIS — G62.9 NEUROPATHY: ICD-10-CM

## 2017-08-22 PROCEDURE — 1126F AMNT PAIN NOTED NONE PRSNT: CPT | Mod: ,,, | Performed by: PSYCHIATRY & NEUROLOGY

## 2017-08-22 PROCEDURE — 99213 OFFICE O/P EST LOW 20 MIN: CPT | Mod: PBBFAC | Performed by: PSYCHIATRY & NEUROLOGY

## 2017-08-22 PROCEDURE — 99999 PR PBB SHADOW E&M-EST. PATIENT-LVL III: CPT | Mod: PBBFAC,,, | Performed by: PSYCHIATRY & NEUROLOGY

## 2017-08-22 PROCEDURE — 99204 OFFICE O/P NEW MOD 45 MIN: CPT | Mod: S$PBB,,, | Performed by: PSYCHIATRY & NEUROLOGY

## 2017-08-22 PROCEDURE — 1159F MED LIST DOCD IN RCRD: CPT | Mod: ,,, | Performed by: PSYCHIATRY & NEUROLOGY

## 2017-08-22 RX ORDER — LIDOCAINE 50 MG/G
1 PATCH TOPICAL DAILY
Qty: 10 PATCH | Refills: 0 | Status: SHIPPED | OUTPATIENT
Start: 2017-08-22 | End: 2018-06-25 | Stop reason: ALTCHOICE

## 2017-08-22 RX ORDER — GABAPENTIN 600 MG/1
600 TABLET ORAL DAILY
Qty: 30 TABLET | Refills: 3 | Status: SHIPPED | OUTPATIENT
Start: 2017-08-22 | End: 2018-12-20 | Stop reason: SDUPTHER

## 2017-08-22 NOTE — LETTER
August 22, 2017      Osiel Stone MD  [address]  [place] [address]           [facility]  [address]  [place] [address]  Phone: [phone]          Patient: Artemio Colon Sr.   MR Number: [id number]   YOB: 1929   Date of Visit: 8/22/2017       Dear Dr. Osiel Stone:    Thank you for referring Artemio Colon to me for evaluation. Attached you will find relevant portions of my assessment and plan of care.    If you have questions, please do not hesitate to call me. I look forward to following Artemio Colon along with you.    Sincerely,    Vic Hylton MD    Enclosure  CC:  No Recipients    If you would like to receive this communication electronically, please contact [email or web address] or [phone] to request more information on [employer] Link access.    For providers and/or their staff who would like to refer a patient to [facility], please contact us through our one-stop-shop provider referral line, [facility], at [phone].    If you feel you have received this communication in error or would no longer like to receive these types of communications, please e-mail [email or web address]

## 2017-08-23 ENCOUNTER — OFFICE VISIT (OUTPATIENT)
Dept: PULMONOLOGY | Facility: CLINIC | Age: 82
End: 2017-08-23
Payer: MEDICARE

## 2017-08-23 VITALS
WEIGHT: 210.31 LBS | OXYGEN SATURATION: 95 % | HEIGHT: 70 IN | DIASTOLIC BLOOD PRESSURE: 70 MMHG | BODY MASS INDEX: 30.11 KG/M2 | SYSTOLIC BLOOD PRESSURE: 116 MMHG | HEART RATE: 72 BPM

## 2017-08-23 DIAGNOSIS — M54.30 SCIATICA, UNSPECIFIED LATERALITY: ICD-10-CM

## 2017-08-23 DIAGNOSIS — G47.33 OSA (OBSTRUCTIVE SLEEP APNEA): Primary | ICD-10-CM

## 2017-08-23 PROCEDURE — 1159F MED LIST DOCD IN RCRD: CPT | Mod: ,,, | Performed by: INTERNAL MEDICINE

## 2017-08-23 PROCEDURE — 99204 OFFICE O/P NEW MOD 45 MIN: CPT | Mod: S$PBB,,, | Performed by: INTERNAL MEDICINE

## 2017-08-23 PROCEDURE — 99213 OFFICE O/P EST LOW 20 MIN: CPT | Mod: PBBFAC | Performed by: INTERNAL MEDICINE

## 2017-08-23 PROCEDURE — 1126F AMNT PAIN NOTED NONE PRSNT: CPT | Mod: ,,, | Performed by: INTERNAL MEDICINE

## 2017-08-23 PROCEDURE — 99999 PR PBB SHADOW E&M-EST. PATIENT-LVL III: CPT | Mod: PBBFAC,,, | Performed by: INTERNAL MEDICINE

## 2017-08-23 NOTE — PROGRESS NOTES
Artemio Colon Sr. is a 88 y.o. year old male that  presents for evaluation of neuropathy.     HPI:  I had the pleasure of seeing Mr Artemio Colon in consultation today.  Mr Colon has a medical history significant for BPH, ED, and also carries a diagnosis of lumbar stenosis, lumbar polyradiculopathy, and neuropathy.  Patient said that he was under the care of a neurologist when he ws living in [facility] who diagnosed him with neuropathy and radiculopathy several years ago. He brought with him part of his medical records which I reviewed and essentially demonstrated inconclusive findings to confirm neuropathy. Serological investigations included normal TSH, B12, and vitamin D.  Stated that he has been on gabapentin 300 mg BID for the last 6 or 7 years, but thinks that at the current dose he is noticing significant pain control. He also wears a fentanyl patch every 72  hours that provides fairly good low back pain control.  He endorses daily, severe,  mainly nocturnal feet burning pain that precludes a good night sleep. No numbness-tingling or weakness of he lower extremities.  Further, denies unsteadiness, falls, or bladder impairment. No HA, vertigo, double vision, focal weakness or numbness, slurred speech, language or vision impairment.     Past Medical History:   Diagnosis Date    BPH (benign prostatic hyperplasia)     Erectile dysfunction     Neuropathy 8/22/2017    Pacemaker      Social History     Social History    Marital status:      Spouse name: N/A    Number of children: N/A    Years of education: N/A     Occupational History    Not on file.     Social History Main Topics    Smoking status: Never Smoker    Smokeless tobacco: Not on file    Alcohol use 0.6 oz/week     1 Glasses of wine per week      Comment: weekly with meal    Drug use: No    Sexual activity: Yes     Other Topics Concern    Not on file     Social History Narrative    No narrative on file     Past  "Surgical History:   Procedure Laterality Date    CHOLECYSTECTOMY      INSERT / REPLACE / REMOVE PACEMAKER  2012    changed     No family history on file.        Review of Systems  General ROS: negative for chills, fever or weight loss  Psychological ROS: negative for hallucination, depression or suicidal ideation  Ophthalmic ROS: negative for blurry vision, photophobia or eye pain  ENT ROS: negative for epistaxis, sore throat or rhinorrhea  Respiratory ROS: no cough, shortness of breath, or wheezing  Cardiovascular ROS: no chest pain or dyspnea on exertion  Gastrointestinal ROS: no abdominal pain, change in bowel habits, or black/ bloody stools  Genito-Urinary ROS: no dysuria, trouble voiding, or hematuria  Musculoskeletal ROS: negative for gait disturbance or muscular weakness  Neurological ROS: no syncope or seizures; no ataxia  Dermatological ROS: negative for pruritis, rash and jaundice      Physical Exam:  /83   Pulse 85   Ht 5' 10" (1.778 m)   Wt 95.1 kg (209 lb 10.5 oz)   BMI 30.08 kg/m²   General appearance: alert, cooperative, no distress  Constitutional:Oriented to person, place, and time.appears well-developed and well-nourished.   HEENT: Normocephalic, atraumatic, neck symmetrical, no nasal discharge   Eyes: conjunctivae/corneas clear, PERRL, EOM's intact  Lungs: clear to auscultation bilaterally, no dullness to percussion bilaterally  Heart: regular rate and rhythm without rub; no displacement of the PMI   Abdomen: soft, non-tender; bowel sounds normoactive; no organomegaly  Extremities: extremities symmetric; no clubbing, cyanosis, or edema  Integument: Skin color, texture, turgor normal; no rashes; hair distrubution normal  Neurologic:   Mental status: alert and awake, oriented x 4, comprehension, naming, and repetition intact. No right to left confusion. Performs serial 7's without difficulty .No dysarthria.  CN 2-12: pupils 4 mm bilaterally, reactive to light. Fundi without papilledema. " Visual fields full to confrontation. EOM full without nystagmus. Face sensation normal in all distributions. Face symmetric. Hearing grossly intact. Palate elevates well. Tongue midline without atrophy or fasciculations.  Motor: 5/5 all over  Sensory: intact in all modalities.  DTR's: 1+ all over except absent ankle jerks.  Plantars: no tested.  Coordination: finger to nose and heel-knee-shin intact.  Gait: no ataxia or bradykinesia  Psychiatric: no pressured speech; normal affect; no evidence of impaired cognition     LABS:    Complete Blood Count  Lab Results   Component Value Date    RBC 5.04 12/01/2016    HGB 16.1 12/01/2016    HCT 47.5 12/01/2016    MCV 94 12/01/2016    MCH 31.9 (H) 12/01/2016    MCHC 33.9 12/01/2016    RDW 14.9 (H) 12/01/2016     12/01/2016    MPV 10.2 12/01/2016    GRAN 3.5 12/01/2016    GRAN 53.2 12/01/2016    LYMPH 1.9 12/01/2016    LYMPH 29.4 12/01/2016    MONO 0.9 12/01/2016    MONO 14.5 12/01/2016    EOS 0.2 12/01/2016    BASO 0.02 12/01/2016    EOSINOPHIL 2.3 12/01/2016    BASOPHIL 0.3 12/01/2016    DIFFMETHOD Automated 12/01/2016       Comprehensive Metabolic Panel  Lab Results   Component Value Date    PROT 6.7 12/01/2016    ALBUMIN 3.7 12/01/2016    BILITOT 0.8 12/01/2016    AST 20 12/01/2016    ALKPHOS 71 12/01/2016    ALT 15 12/01/2016       TSH  No results found for: TSH      Assessment:  87 y/o who carries a diagnosis of lumbar stenosis, lumbar polyradiculopathy, and neuropathy, presents with persistent neuropathic feet pain despite gabapentin 300 mg BID.  Neuro-exam unimpressive.      ICD-10-CM ICD-9-CM    1. Neuropathy G62.9 355.9 lidocaine (LIDODERM) 5 %      gabapentin (NEURONTIN) 600 MG tablet     The encounter diagnosis was Neuropathy.      Plan:  Options for better neuropathic pain control discussed at length with patient and decided to try a 600 mg gabapentin at night as he is not bother by neuropathic pain during the day. Also, trial of Lidocaine patch locally Q  12 hours.  Follow up 3 months.  No orders of the defined types were placed in this encounter.          Vic Hylton MD

## 2017-08-23 NOTE — LETTER
August 23, 2017      Christiano Hunter MD  [address]  [place] [address]           [facility]  [address]  [facility] [address]  Phone: [phone]          Patient: Artemio Colon Sr.   MR Number: [id number]   YOB: 1929   Date of Visit: 8/23/2017       Dear Dr. Christiano Hunter:    Thank you for referring Artemio Colon to me for evaluation. Attached you will find relevant portions of my assessment and plan of care.    If you have questions, please do not hesitate to call me. I look forward to following Artemio Colon along with you.    Sincerely,    Chuy Amador MD    Enclosure  CC:  No Recipients    If you would like to receive this communication electronically, please contact [email or web address] or [phone] to request more information on [facility] Link access.    For providers and/or their staff who would like to refer a patient to [facility], please contact us through our one-stop-shop provider referral line, [facility], at [phone].    If you feel you have received this communication in error or would no longer like to receive these types of communications, please e-mail [email or web address]

## 2017-08-23 NOTE — PATIENT INSTRUCTIONS
1.  NeilMed Sinus Rinse Regular Kit    Recipe 1/4 teaspoon of salt and 1/4 teaspoon of baking soda in distilled water.  Be sure to tilt head forward as shown in picture.            flonase or nasonex over the counter.  2 sprays per nostril daily. Be sure to tilt head forward when dispensing medication.

## 2017-08-23 NOTE — PROGRESS NOTES
Artemio Colon  was seen as a new patient at the request of  Christiano Hunter for the evaluation of forrest.    CHIEF COMPLAINT:    Chief Complaint   Patient presents with    Sleep Apnea       HISTORY OF PRESENT ILLNESS: Artemio Colon Sr. is a 88 y.o. male is here for sleep evaluation.   Patient was diagnosed with forrest 2008.  Patient is doing well with cpap at 11 cm H20.      With cpap, patient denied snoring or apnea.  Feeling rested upon awake.  No daytime somnolence.  No cataplexy.  No parasomnia.     Chronic pain in back of leg.  Worse at night or with prolonged sitting.  +chronic back pain.      [place] Sleepiness Scale score during initial sleep evaluation was 4.    SLEEP ROUTINE:  Activity the hour prior to sleep: watch tv    Bed partner:  wife  Time to bed:  10 pm   Lights off:  off  Sleep onset latency:  5 minutes        Disruptions or awakenings:    2 times (no difficulty going back to sleep)    Wakeup time:      5 am   Perceived sleep quality:  rested       Daytime naps:      60 minutes in afternoon  Weekend sleep routine:      10 pm to 5 am   Caffeine use: 1 cup of coffee in am   exercise habit:   Not regimented      PAST MEDICAL HISTORY:    Active Ambulatory Problems     Diagnosis Date Noted    Coronary artery disease involving native coronary artery of native heart without angina pectoris 06/20/2017    PAF (paroxysmal atrial fibrillation) 06/20/2017    Cardiac pacemaker in situ 06/20/2017    Complete AV block 06/21/2017    Neuropathic pain 08/21/2017    Lumbar spinal stenosis 08/21/2017    Cervical stenosis of spine 08/21/2017    Open-angle glaucoma of right eye, indeterminate stage 08/21/2017    Bilateral renal cysts 08/21/2017    Onychomycosis 08/21/2017    Neuropathy 08/22/2017     Resolved Ambulatory Problems     Diagnosis Date Noted    No Resolved Ambulatory Problems     Past Medical History:   Diagnosis Date    BPH (benign prostatic hyperplasia)     Chronic rhinitis      Erectile dysfunction     Neuropathy 8/22/2017    JAEL (obstructive sleep apnea)     Pacemaker     Symptomatic bradycardia                 PAST SURGICAL HISTORY:    Past Surgical History:   Procedure Laterality Date    CHOLECYSTECTOMY      COLECTOMY      cecum    INSERT / REPLACE / REMOVE PACEMAKER  2012    changed         FAMILY HISTORY:                History reviewed. No pertinent family history.    SOCIAL HISTORY:          Tobacco:   History   Smoking Status    Never Smoker   Smokeless Tobacco    Never Used       alcohol use:    History   Alcohol Use    0.6 oz/week    1 Glasses of wine per week     Comment: weekly with meal                 Occupation:  Former medical equipment salesman    ALLERGIES:    Review of patient's allergies indicates:   Allergen Reactions    Pcn [penicillins] Hives       CURRENT MEDICATIONS:    Current Outpatient Prescriptions   Medication Sig Dispense Refill    aspirin (ECOTRIN) 81 MG EC tablet Take 81 mg by mouth once daily.      efinaconazole 10 % Werner Apply to affected toenails once daily 8 mL 11    fentaNYL (DURAGESIC) 25 mcg/hr Place 1 patch onto the skin every 72 hours.      fluticasone (FLONASE) 50 mcg/actuation nasal spray 2 sprays by Each Nare route once daily. 1 Bottle 6    gabapentin (NEURONTIN) 300 MG capsule Take 1 capsule (300 mg total) by mouth 2 (two) times daily. 180 capsule 3    gabapentin (NEURONTIN) 600 MG tablet Take 1 tablet (600 mg total) by mouth once daily. 30 tablet 3    hydrocodone-acetaminophen 10-325mg (NORCO)  mg Tab Take by mouth every 4 (four) hours as needed for Pain.      lidocaine (LIDODERM) 5 % Place 1 patch onto the skin once daily. Remove & Discard patch within 12 hours or as directed by MD 10 patch 0    pravastatin (PRAVACHOL) 40 MG tablet Take 1 tablet (40 mg total) by mouth once daily. 90 tablet 3    tamsulosin (FLOMAX) 0.4 mg Cp24 Take 1 capsule (0.4 mg total) by mouth once daily. 30 capsule 12    testosterone  "cypionate (DEPOTESTOTERONE CYPIONATE) 200 mg/mL injection Inject 1 mL (200 mg total) into the muscle every 28 days. 10 mL 1    travoprost, benzalkonium, (TRAVATAN) 0.004 % ophthalmic solution Place 1 drop into both eyes every evening.       No current facility-administered medications for this visit.                   REVIEW OF SYSTEMS:     Sleep related symptoms as per HPI.  CONST:Denies weight gain    HEENT: Denies sinus congestion; hearing loss  PULM: Denies dyspnea  CARD:  Denies palpitations   GI:  Denies acid reflux  : Denies polyuria  NEURO: Denies headaches  PSYCH: Denies mood disturbance  HEME: Denies anemia   Otherwise, a balance of systems reviewed is negative.          PHYSICAL EXAM:  Vitals:    08/23/17 1326   BP: 116/70   Pulse: 72   SpO2: 95%   Weight: 95.4 kg (210 lb 5.1 oz)   Height: 5' 10" (1.778 m)   PainSc: 0-No pain     Body mass index is 30.18 kg/m².     GENERAL: Normal development, well groomed  HEENT:  Conjunctivae are non-erythematous; Pupils equal, round, and reactive to light; Nose is symmetrical; Nasal mucosa is pink and moist; Septum is midline; Inferior turbinates are normal; Nasal airflow is congestion; Posterior pharynx is pink; Modified Mallampati: 2; Posterior palate is normal; Tonsils +1; Uvula is normal and pink;Tongue is normal; Dentition is fair; No TMJ tenderness; Jaw opening and protrusion without click and without discomfort.  NECK: Supple. Neck circumference is 18 inches. No thyromegaly. No palpable nodes.     SKIN: On face and neck: No abrasions, no rashes, no lesions.  No subcutaneous nodules are palpable.  RESPIRATORY: Chest is clear to auscultation.  Normal chest expansion and non-labored breathing at rest.  CARDIOVASCULAR: Normal S1, S2.  No murmurs, gallops or rubs. No carotid bruits bilaterally.  EXTREMITIES: No edema. No clubbing. No cyanosis. Station normal. Gait normal.        NEURO/PSYCH: Oriented to time, place and person. Normal attention span and " concentration. Affect is full. Mood is normal.                                              DATA   psg 7/20/08 ahi of 62.2  8/2/08 optimal cpap of 11 cm H20    No results found for: TSH    ASSESSMENT    ICD-10-CM ICD-9-CM    1. JAEL (obstructive sleep apnea) G47.33 327.23 CPAP/BIPAP SUPPLIES      CANCELED: CPAP FOR HOME USE   2. Sciatica, unspecified laterality M54.30 724.3        PLAN:    Sleep Apnea - currently on cpap of 11 cm H20.  Patient requested new machine with heated tubing.  Will scan [facility] to [facility].  Patient would like to try nasal mask with chin strap to see if it will improve dry mouth.  Optimize nasal congestion.     Sciatica - proper sleep position d/w patient.      Nasal congestion - sinus irrigation and nasal steroid.      Education: During our discussion today, we talked about the etiology of obstructive sleep apnea as well as the potential ramifications of untreated sleep apnea, which could include daytime sleepiness, hypertension, heart disease and/or stroke.     Precautions: The patient was advised to abstain from driving should they feel sleepy or drowsy.       Thank you for allowing me the opportunity to participate in the care of your patient.    Patient will No Follow-up on file. with md/np.    Please cc note to  Christiano Hunter.    This is 45 minutes visit, over 50% of time spent in direct consultation with patient.

## 2017-09-05 ENCOUNTER — TELEPHONE (OUTPATIENT)
Dept: PULMONOLOGY | Facility: CLINIC | Age: 82
End: 2017-09-05

## 2017-09-05 NOTE — TELEPHONE ENCOUNTER
Spoken with dorothy from [facility] she stated that she will resend pt orders for CPAP machine and supplies to [facility]

## 2017-09-05 NOTE — TELEPHONE ENCOUNTER
----- Message from Felicia Parada sent at 9/5/2017  9:15 AM CDT -----  Contact: Self   [phone]  [place]   -   Patient needs to speak with the Dr in regards  To getting an prescription for his C Pap machine   Call back number [phone]  Thanks,

## 2017-09-10 ENCOUNTER — PATIENT MESSAGE (OUTPATIENT)
Dept: PULMONOLOGY | Facility: CLINIC | Age: 82
End: 2017-09-10

## 2017-09-10 DIAGNOSIS — G47.33 OSA (OBSTRUCTIVE SLEEP APNEA): Primary | ICD-10-CM

## 2017-09-22 ENCOUNTER — TELEPHONE (OUTPATIENT)
Dept: ELECTROPHYSIOLOGY | Facility: CLINIC | Age: 82
End: 2017-09-22

## 2017-09-22 NOTE — TELEPHONE ENCOUNTER
I called patient to have him send his remote pacemaker transmission. No answer. I left a voice message asking him to send a transmission or to give me a call.

## 2017-09-25 ENCOUNTER — CLINICAL SUPPORT (OUTPATIENT)
Dept: ELECTROPHYSIOLOGY | Facility: CLINIC | Age: 82
End: 2017-09-25
Payer: MEDICARE

## 2017-09-25 DIAGNOSIS — I48.91 ATRIAL FIBRILLATION, UNSPECIFIED TYPE: ICD-10-CM

## 2017-09-25 DIAGNOSIS — Z95.0 CARDIAC PACEMAKER IN SITU: ICD-10-CM

## 2017-09-25 PROCEDURE — 93296 REM INTERROG EVL PM/IDS: CPT | Mod: PBBFAC | Performed by: INTERNAL MEDICINE

## 2017-10-03 ENCOUNTER — PATIENT MESSAGE (OUTPATIENT)
Dept: PULMONOLOGY | Facility: CLINIC | Age: 82
End: 2017-10-03

## 2017-10-04 ENCOUNTER — DOCUMENTATION ONLY (OUTPATIENT)
Dept: INTERNAL MEDICINE | Facility: CLINIC | Age: 82
End: 2017-10-04

## 2017-10-04 NOTE — PROGRESS NOTES
Received outside records from patient, chart updated as appropriate, results will be scanned into EPIC.    Briefly, sees multiple outside providers. Results of labs of prior allergy tests are included.    Osiel Stone MD  Internal Medicine    Portions of this note were completed using [employer] dictation software. Please excuse typographical or syntax errors.

## 2017-10-10 ENCOUNTER — TELEPHONE (OUTPATIENT)
Dept: SLEEP MEDICINE | Facility: CLINIC | Age: 82
End: 2017-10-10

## 2017-10-10 NOTE — TELEPHONE ENCOUNTER
----- Message from José Manuel Gil sent at 10/10/2017  9:02 AM CDT -----  _X  1st Request  _  2nd Request  _  3rd Request        Who: Nikia([facility])    Why: Rep states she needs chart notes from 8/23 signed off.     What Number to Call Back:[phone]    When to Expect a call back: (Within 24 hours)    Please return the call at earliest convenience. Thanks!

## 2017-10-17 ENCOUNTER — PATIENT MESSAGE (OUTPATIENT)
Dept: PULMONOLOGY | Facility: CLINIC | Age: 82
End: 2017-10-17

## 2017-11-10 ENCOUNTER — PATIENT MESSAGE (OUTPATIENT)
Dept: PULMONOLOGY | Facility: CLINIC | Age: 82
End: 2017-11-10

## 2017-12-28 ENCOUNTER — TELEPHONE (OUTPATIENT)
Dept: ELECTROPHYSIOLOGY | Facility: CLINIC | Age: 82
End: 2017-12-28

## 2017-12-28 ENCOUNTER — CLINICAL SUPPORT (OUTPATIENT)
Dept: ELECTROPHYSIOLOGY | Facility: CLINIC | Age: 82
End: 2017-12-28
Attending: INTERNAL MEDICINE
Payer: MEDICARE

## 2017-12-28 DIAGNOSIS — I48.91 ATRIAL FIBRILLATION, UNSPECIFIED TYPE: ICD-10-CM

## 2017-12-28 DIAGNOSIS — Z95.0 CARDIAC PACEMAKER IN SITU: ICD-10-CM

## 2017-12-28 PROCEDURE — 93294 REM INTERROG EVL PM/LDLS PM: CPT | Mod: ,,, | Performed by: INTERNAL MEDICINE

## 2017-12-28 PROCEDURE — 93296 REM INTERROG EVL PM/IDS: CPT | Mod: PBBFAC | Performed by: INTERNAL MEDICINE

## 2017-12-28 NOTE — TELEPHONE ENCOUNTER
Patient was due to send a remote pacemaker transmission yesterday. I called because I did not receive his pacemaker transmission yesterday. I asked him to please send a transmission today. Patient stated he will send it today.

## 2018-01-05 ENCOUNTER — LAB VISIT (OUTPATIENT)
Dept: LAB | Facility: HOSPITAL | Age: 83
End: 2018-01-05
Attending: UROLOGY
Payer: MEDICARE

## 2018-01-05 DIAGNOSIS — N13.8 BPH WITH URINARY OBSTRUCTION: ICD-10-CM

## 2018-01-05 DIAGNOSIS — N40.1 BPH WITH URINARY OBSTRUCTION: ICD-10-CM

## 2018-01-05 LAB
ALBUMIN SERPL BCP-MCNC: 4.5 G/DL
ALP SERPL-CCNC: 79 U/L
ALT SERPL W/O P-5'-P-CCNC: 29 U/L
AST SERPL-CCNC: 29 U/L
BASOPHILS # BLD AUTO: 0.04 K/UL
BASOPHILS NFR BLD: 0.5 %
BILIRUB DIRECT SERPL-MCNC: 0.6 MG/DL
BILIRUB SERPL-MCNC: 1.4 MG/DL
COMPLEXED PSA SERPL-MCNC: 4.7 NG/ML
DIFFERENTIAL METHOD: ABNORMAL
EOSINOPHIL # BLD AUTO: 0.2 K/UL
EOSINOPHIL NFR BLD: 2.4 %
ERYTHROCYTE [DISTWIDTH] IN BLOOD BY AUTOMATED COUNT: 14.3 %
HCT VFR BLD AUTO: 53.6 %
HGB BLD-MCNC: 17.8 G/DL
LYMPHOCYTES # BLD AUTO: 3 K/UL
LYMPHOCYTES NFR BLD: 39.8 %
MCH RBC QN AUTO: 32 PG
MCHC RBC AUTO-ENTMCNC: 33.2 G/DL
MCV RBC AUTO: 96 FL
MONOCYTES # BLD AUTO: 0.9 K/UL
MONOCYTES NFR BLD: 11.7 %
NEUTROPHILS # BLD AUTO: 3.4 K/UL
NEUTROPHILS NFR BLD: 45.2 %
PLATELET # BLD AUTO: 190 K/UL
PMV BLD AUTO: 10.6 FL
PROT SERPL-MCNC: 7.8 G/DL
RBC # BLD AUTO: 5.57 M/UL
TESTOST SERPL-MCNC: 395 NG/DL
WBC # BLD AUTO: 7.51 K/UL

## 2018-01-05 PROCEDURE — 84153 ASSAY OF PSA TOTAL: CPT

## 2018-01-05 PROCEDURE — 84403 ASSAY OF TOTAL TESTOSTERONE: CPT

## 2018-01-05 PROCEDURE — 80076 HEPATIC FUNCTION PANEL: CPT

## 2018-01-05 PROCEDURE — 36415 COLL VENOUS BLD VENIPUNCTURE: CPT

## 2018-01-05 PROCEDURE — 85025 COMPLETE CBC W/AUTO DIFF WBC: CPT

## 2018-01-08 ENCOUNTER — OFFICE VISIT (OUTPATIENT)
Dept: UROLOGY | Facility: CLINIC | Age: 83
End: 2018-01-08
Payer: MEDICARE

## 2018-01-08 VITALS
WEIGHT: 210.13 LBS | BODY MASS INDEX: 30.08 KG/M2 | HEIGHT: 70 IN | HEART RATE: 91 BPM | DIASTOLIC BLOOD PRESSURE: 78 MMHG | SYSTOLIC BLOOD PRESSURE: 125 MMHG

## 2018-01-08 DIAGNOSIS — N13.8 BPH WITH URINARY OBSTRUCTION: ICD-10-CM

## 2018-01-08 DIAGNOSIS — E29.1 MALE HYPOGONADISM: Primary | ICD-10-CM

## 2018-01-08 DIAGNOSIS — N40.1 BPH WITH URINARY OBSTRUCTION: ICD-10-CM

## 2018-01-08 DIAGNOSIS — E78.5 DYSLIPIDEMIA: ICD-10-CM

## 2018-01-08 PROCEDURE — 99999 PR PBB SHADOW E&M-EST. PATIENT-LVL III: CPT | Mod: PBBFAC,,, | Performed by: NURSE PRACTITIONER

## 2018-01-08 PROCEDURE — 99213 OFFICE O/P EST LOW 20 MIN: CPT | Mod: PBBFAC | Performed by: NURSE PRACTITIONER

## 2018-01-08 PROCEDURE — 99214 OFFICE O/P EST MOD 30 MIN: CPT | Mod: S$PBB,,, | Performed by: NURSE PRACTITIONER

## 2018-01-08 RX ORDER — TESTOSTERONE CYPIONATE 200 MG/ML
200 INJECTION, SOLUTION INTRAMUSCULAR
Qty: 10 ML | Refills: 1 | Status: SHIPPED | OUTPATIENT
Start: 2018-01-08 | End: 2019-02-14

## 2018-01-08 RX ORDER — TAMSULOSIN HYDROCHLORIDE 0.4 MG/1
0.4 CAPSULE ORAL DAILY
Qty: 30 CAPSULE | Refills: 12 | Status: SHIPPED | OUTPATIENT
Start: 2018-01-08 | End: 2018-12-20 | Stop reason: SDUPTHER

## 2018-01-08 NOTE — PROGRESS NOTES
Subjective:       Patient ID: Artemio Colon Sr. is a 88 y.o. male.    Chief Complaint: Follow-up    Artemio Colon Sr. is a 88 y.o. Male with history of BPH and low T.  He takes Testosterone 200mg IM every 28 days at home.   He was last seen in clinic with Dr. Castañeda 05/01/2017.    He is here today for medication refill.  He states he ran out; he has noticed a decrease in his energy level.  He denies any urinary complaints as long as he takes his Flomax.      Normal CBC; LFT's on 01/05/2018                    PSA                  4.7 (H)             01/05/2018                  Past Medical History:  No date: BPH (benign prostatic hyperplasia)  No date: Chronic rhinitis  No date: Erectile dysfunction  8/22/2017: Neuropathy  No date: JAEL (obstructive sleep apnea)  No date: Pacemaker  No date: Symptomatic bradycardia      Comment: s/p pacemaker    Past Surgical History:  No date: CHOLECYSTECTOMY  No date: COLECTOMY      Comment: cecum  2012: INSERT / REPLACE / REMOVE PACEMAKER      Comment: changed    No family history on file.      Social History    Marital status:              Spouse name:                       Years of education:                 Number of children:               Occupational History    None on file    Social History Main Topics    Smoking status: Never Smoker                                                                Smokeless tobacco: Never Used                        Alcohol use: Yes           0.6 oz/week       Glasses of wine: 1 per week       Comment: weekly with meal    Drug use: No              Sexual activity: Yes                  Other Topics            Concern    None on file    Social History Narrative    None on file        Allergies:  Pcn (penicillins)    Medications:  Current Outpatient Prescriptions:   aspirin (ECOTRIN) 81 MG EC tablet, Take 81 mg by mouth once daily., Disp: , Rfl:   efinaconazole 10 % Werner, Apply to affected toenails once daily, Disp: 8  mL, Rfl: 11  fentaNYL (DURAGESIC) 25 mcg/hr, Place 1 patch onto the skin every 72 hours., Disp: , Rfl:   fluticasone (FLONASE) 50 mcg/actuation nasal spray, 2 sprays by Each Nare route once daily., Disp: 1 Bottle, Rfl: 6  gabapentin (NEURONTIN) 300 MG capsule, Take 1 capsule (300 mg total) by mouth 2 (two) times daily., Disp: 180 capsule, Rfl: 3  gabapentin (NEURONTIN) 600 MG tablet, Take 1 tablet (600 mg total) by mouth once daily., Disp: 30 tablet, Rfl: 3  hydrocodone-acetaminophen 10-325mg (NORCO)  mg Tab, Take by mouth every 4 (four) hours as needed for Pain., Disp: , Rfl:   lidocaine (LIDODERM) 5 %, Place 1 patch onto the skin once daily. Remove & Discard patch within 12 hours or as directed by MD, Disp: 10 patch, Rfl: 0  pravastatin (PRAVACHOL) 40 MG tablet, Take 1 tablet (40 mg total) by mouth once daily., Disp: 90 tablet, Rfl: 3  tamsulosin (FLOMAX) 0.4 mg Cp24, Take 1 capsule (0.4 mg total) by mouth once daily., Disp: 30 capsule, Rfl: 12  testosterone cypionate (DEPOTESTOTERONE CYPIONATE) 200 mg/mL injection, Inject 1 mL (200 mg total) into the muscle every 28 days., Disp: 10 mL, Rfl: 1  travoprost, benzalkonium, (TRAVATAN) 0.004 % ophthalmic solution, Place 1 drop into both eyes every evening., Disp: , Rfl:           Review of Systems   Constitutional: Negative.  Negative for activity change, appetite change and fever.        Decreased energy level     HENT: Negative.  Negative for facial swelling and trouble swallowing.    Eyes: Negative.    Respiratory: Negative.  Negative for shortness of breath.    Cardiovascular: Negative.  Negative for chest pain and palpitations.   Gastrointestinal: Negative for abdominal pain, constipation, diarrhea, nausea and vomiting.   Genitourinary: Negative for difficulty urinating, dysuria, enuresis, flank pain, frequency, genital sores, hematuria, nocturia, penile pain, scrotal swelling, testicular pain and urgency.        FOS is good.  Pleased with how he  urinates.     Musculoskeletal: Negative for back pain, gait problem and neck stiffness.   Skin: Negative.  Negative for wound.   Neurological: Negative for dizziness, tremors, seizures, syncope, speech difficulty, light-headedness and headaches.   Hematological: Does not bruise/bleed easily.   Psychiatric/Behavioral: Negative for confusion and hallucinations. The patient is not nervous/anxious.        Objective:      Physical Exam   Nursing note and vitals reviewed.  Constitutional: He is oriented to person, place, and time. He appears well-developed and well-nourished.  Non-toxic appearance. He does not have a sickly appearance.   Urine dipped clear of infection in the office today.     HENT:   Head: Normocephalic and atraumatic.   Right Ear: External ear normal.   Left Ear: External ear normal.   Nose: Nose normal.   Mouth/Throat: Mucous membranes are normal.   Eyes: Conjunctivae and lids are normal. No scleral icterus.   Neck: Trachea normal, normal range of motion and full passive range of motion without pain. Neck supple. No JVD present. No tracheal deviation present.   Cardiovascular: Normal rate, regular rhythm, S1 normal and S2 normal.    Pulmonary/Chest: Effort normal and breath sounds normal. No respiratory distress. He exhibits no tenderness.   Abdominal: Soft. Normal appearance and bowel sounds are normal. There is no hepatosplenomegaly. There is no tenderness. There is no rebound, no guarding and no CVA tenderness.   Genitourinary: Rectum normal, testes normal and penis normal. Rectal exam shows no external hemorrhoid, no mass and no tenderness. Prostate is enlarged. Prostate is not tender. Right testis shows no swelling and no tenderness. Left testis shows no swelling and no tenderness. No penile tenderness.   Musculoskeletal: Normal range of motion.   Neurological: He is alert and oriented to person, place, and time. He has normal strength.   Skin: Skin is warm, dry and intact.     Psychiatric: He has  a normal mood and affect. His behavior is normal. Judgment and thought content normal.       Assessment:       1. Male hypogonadism    2. BPH with urinary obstruction    3. Dyslipidemia        Plan:         I spent 30 minutes with the patient of which more than half was spent in direct consultation with the patient in regards to our treatment and plan.    Education and recommendations of today's plan of care including home remedies.  We discussed diet modifications.  Injection technique.  Discussed giving blood to help prevent polycythemia.  Reviewed his labs.  Refilled his Testosterone  Continue f/u with PCP  RTC 6 months with full labs and exam

## 2018-01-16 ENCOUNTER — OFFICE VISIT (OUTPATIENT)
Dept: ELECTROPHYSIOLOGY | Facility: CLINIC | Age: 83
End: 2018-01-16
Payer: MEDICARE

## 2018-01-16 ENCOUNTER — CLINICAL SUPPORT (OUTPATIENT)
Dept: ELECTROPHYSIOLOGY | Facility: CLINIC | Age: 83
End: 2018-01-16
Attending: INTERNAL MEDICINE
Payer: MEDICARE

## 2018-01-16 VITALS
WEIGHT: 209.69 LBS | BODY MASS INDEX: 29.35 KG/M2 | DIASTOLIC BLOOD PRESSURE: 66 MMHG | HEIGHT: 71 IN | SYSTOLIC BLOOD PRESSURE: 114 MMHG

## 2018-01-16 DIAGNOSIS — I44.2 COMPLETE AV BLOCK: Primary | ICD-10-CM

## 2018-01-16 DIAGNOSIS — I48.0 PAF (PAROXYSMAL ATRIAL FIBRILLATION): ICD-10-CM

## 2018-01-16 DIAGNOSIS — Z95.0 CARDIAC PACEMAKER IN SITU: ICD-10-CM

## 2018-01-16 DIAGNOSIS — R06.02 SHORTNESS OF BREATH: ICD-10-CM

## 2018-01-16 DIAGNOSIS — I48.91 ATRIAL FIBRILLATION, UNSPECIFIED TYPE: ICD-10-CM

## 2018-01-16 DIAGNOSIS — G47.33 OSA (OBSTRUCTIVE SLEEP APNEA): ICD-10-CM

## 2018-01-16 DIAGNOSIS — I25.10 CORONARY ARTERY DISEASE INVOLVING NATIVE CORONARY ARTERY OF NATIVE HEART WITHOUT ANGINA PECTORIS: Chronic | ICD-10-CM

## 2018-01-16 PROCEDURE — 93280 PM DEVICE PROGR EVAL DUAL: CPT | Mod: PBBFAC | Performed by: INTERNAL MEDICINE

## 2018-01-16 PROCEDURE — 99213 OFFICE O/P EST LOW 20 MIN: CPT | Mod: PBBFAC | Performed by: INTERNAL MEDICINE

## 2018-01-16 PROCEDURE — 99999 PR PBB SHADOW E&M-EST. PATIENT-LVL III: CPT | Mod: PBBFAC,,, | Performed by: INTERNAL MEDICINE

## 2018-01-16 PROCEDURE — 99214 OFFICE O/P EST MOD 30 MIN: CPT | Mod: S$PBB,,, | Performed by: INTERNAL MEDICINE

## 2018-01-16 PROCEDURE — 93010 ELECTROCARDIOGRAM REPORT: CPT | Mod: ,,, | Performed by: INTERNAL MEDICINE

## 2018-01-16 PROCEDURE — 93005 ELECTROCARDIOGRAM TRACING: CPT | Mod: PBBFAC,59 | Performed by: INTERNAL MEDICINE

## 2018-01-16 NOTE — PROGRESS NOTES
Subjective:    Patient ID:  Artemio Colon Sr. is a 88 y.o. male who presents for follow-up of PAF (paroxysmal atrial fibrillation)    Referring Cardiologist: Nicholas Tee MD    HPI  Prior Hx:  I had the pleasure of seeing Mr. Colon today in our electrophysiology clinic for follow-up for his pacemaker and for his atrial fibrillation.  He recently moved here from [place] and established care in the cardiology clinic.  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 He has CAD (cath 2012 rx with medical therapy) and preserved EF (60-65% 1/2015). I do not know if he had a PVI or AVN ablation as he has complete AV block.  He feels well, exercises regularly.  He denies any chest pain, shortness of breath, palpitations, orthopnea or PND.  He is not on anticoagulation and when I brought it up as a discussion he stated he does not want to be on it and rathered not discuss it any further.     Device interrogation revealed stable lead parameters, underlying rhythm is sinus rhythm with complete AV block, A-paced 56%, V-paced 100%, AMS burden <0.1% but with 28 AF episodes and longest episode lasting 5 minutes and 1 NSVT episode of 4 seconds. He has 3.5 years of battery life left.    Interim Hx:  Mr. Colon presents today to discuss what he perceives are arrhythmias. Noted shortness of breath and low energy for a few days ~1 week ago but resolved on its own. Noted some orthopnea. No light-headedness.. Recent remote interrogation noted <0.1% AMS burden with longest episode 3 min and 52 seconds. No AF noted in the past 2 weeks. He is 61% A and 100% V paced. His V-threshold was 1.75 @ 0.4 on first visit with me. Recent remote auto-capture threshold was 2.25 @ 0.4. It has slowly  increased over the past year and a half with stable impedance. Checked in clinic with bipolar threshold of 1.5 @ 0.4 sitting up and unipolar threshold of 1.25@ 0.4 also sitting up. Lying down his capture threshold was 2.25 @ 0.4. Battery life still ~2 years. Of note he also takes testosterone for low-T. He also has JAEL and is rx with CPAP.     My interpretation of today's in clinic electrocardiogram is AV-paced rhythm with PACs    Review of Systems   Constitution: Negative for fever, weakness and malaise/fatigue.   HENT: Negative.  Negative for congestion.    Eyes: Negative for blurred vision and visual disturbance.   Cardiovascular: Positive for orthopnea (resolved). Negative for chest pain, dyspnea on exertion, irregular heartbeat, leg swelling, near-syncope, palpitations, paroxysmal nocturnal dyspnea and syncope.   Respiratory: Negative for cough and shortness of breath.    Hematologic/Lymphatic: Negative for bleeding problem.   Skin: Negative.    Musculoskeletal: Negative.    Gastrointestinal: Negative for bloating, abdominal pain, hematochezia and melena.   Neurological: Negative for dizziness and light-headedness.        Objective:    Physical Exam   Constitutional: He is oriented to person, place, and time. He appears well-developed and well-nourished. No distress.   HENT:   Head: Normocephalic and atraumatic.   Eyes: Conjunctivae are normal. Right eye exhibits no discharge.   Neck: Neck supple. No JVD present.   Cardiovascular: Normal rate, regular rhythm and normal heart sounds.  Exam reveals no gallop and no friction rub.    No murmur heard.  Pulmonary/Chest: Effort normal and breath sounds normal. No respiratory distress. He has no wheezes. He has no rales.   PPM pocket well healed. No obvious collaterals around pocket.   Abdominal: Soft. Bowel sounds are normal. He exhibits no distension. There is no tenderness.   Musculoskeletal: He exhibits no edema.   Neurological: He is alert and oriented to person,  place, and time.   Skin: Skin is warm and dry. He is not diaphoretic.   Psychiatric: He has a normal mood and affect. His behavior is normal. Judgment and thought content normal.         Assessment:       1. Complete AV block    2. Cardiac pacemaker in situ    3. Coronary artery disease involving native coronary artery of native heart without angina pectoris    4. PAF (paroxysmal atrial fibrillation)    5. JAEL (obstructive sleep apnea)    6. Shortness of breath         Plan:       In summary, Mr. 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 He is in complete AV block.  He also has JAEL, low testosterone, and CAD with preserved LVEF by ECHO in 2015. He is not on anticoagulation by choice and would not discuss it any further. His arrhythmia burden is exceedingly low. I do not think that is the cause of his symptoms. Could be related to low-T or JAEL. I do think it is worthwhile updating his ECHO to see if he could have developed a cardiomyopathy since 2015. If his EF<50% will need to discuss upgrade to CRT-P at which point would also need to discuss potential RV lead revision. Will get updated remote check every month for 3 months to see capture threshold.  Will call with results and arrange follow-up as needed. If EF is low would also order stress test. Otherwise RTC in 6 months.     Thank you for allowing me to participate in the care of this patient. Please do not hesitate to call me with any questions or concerns.     Tawanda Adler MD, PhD  Cardiac Electrophysiology

## 2018-01-22 ENCOUNTER — HOSPITAL ENCOUNTER (OUTPATIENT)
Dept: CARDIOLOGY | Facility: CLINIC | Age: 83
Discharge: HOME | End: 2018-01-22
Attending: INTERNAL MEDICINE
Payer: MEDICARE

## 2018-01-22 DIAGNOSIS — R06.02 SHORTNESS OF BREATH: ICD-10-CM

## 2018-01-22 DIAGNOSIS — I44.2 COMPLETE AV BLOCK: ICD-10-CM

## 2018-01-22 DIAGNOSIS — Z95.0 CARDIAC PACEMAKER IN SITU: ICD-10-CM

## 2018-01-22 LAB
AORTIC VALVE REGURGITATION: ABNORMAL
DIASTOLIC DYSFUNCTION: YES
ESTIMATED PA SYSTOLIC PRESSURE: 29.83
MITRAL VALVE MOBILITY: NORMAL
MITRAL VALVE REGURGITATION: ABNORMAL
RETIRED EF AND QEF - SEE NOTES: 40 (ref 55–65)
TRICUSPID VALVE REGURGITATION: ABNORMAL

## 2018-01-22 PROCEDURE — 93306 TTE W/DOPPLER COMPLETE: CPT | Mod: PBBFAC | Performed by: INTERNAL MEDICINE

## 2018-01-23 ENCOUNTER — TELEPHONE (OUTPATIENT)
Dept: ELECTROPHYSIOLOGY | Facility: CLINIC | Age: 83
End: 2018-01-23

## 2018-01-23 DIAGNOSIS — I50.22 CHRONIC SYSTOLIC CONGESTIVE HEART FAILURE: ICD-10-CM

## 2018-01-23 NOTE — TELEPHONE ENCOUNTER
Discussed ECHO result with patient. He has a newly depressed LVEF of 40-45% with NYHA class II symptoms in setting of known CAD medically treated and 100% RV pacing. Recommend SPECT stress test and left arm venogram. Will have clinic staff help arrange.

## 2018-01-24 ENCOUNTER — TELEPHONE (OUTPATIENT)
Dept: ELECTROPHYSIOLOGY | Facility: CLINIC | Age: 83
End: 2018-01-24

## 2018-01-24 ENCOUNTER — PATIENT MESSAGE (OUTPATIENT)
Dept: ELECTROPHYSIOLOGY | Facility: CLINIC | Age: 83
End: 2018-01-24

## 2018-01-24 DIAGNOSIS — I25.10 CORONARY ARTERY DISEASE INVOLVING NATIVE CORONARY ARTERY OF NATIVE HEART WITHOUT ANGINA PECTORIS: Primary | ICD-10-CM

## 2018-01-29 ENCOUNTER — TELEPHONE (OUTPATIENT)
Dept: ELECTROPHYSIOLOGY | Facility: CLINIC | Age: 83
End: 2018-01-29

## 2018-01-29 DIAGNOSIS — R06.02 SHORTNESS OF BREATH: ICD-10-CM

## 2018-01-29 DIAGNOSIS — I50.22 CHRONIC SYSTOLIC CONGESTIVE HEART FAILURE: Primary | ICD-10-CM

## 2018-01-30 ENCOUNTER — TELEPHONE (OUTPATIENT)
Dept: ELECTROPHYSIOLOGY | Facility: CLINIC | Age: 83
End: 2018-01-30

## 2018-01-30 ENCOUNTER — PATIENT MESSAGE (OUTPATIENT)
Dept: ELECTROPHYSIOLOGY | Facility: CLINIC | Age: 83
End: 2018-01-30

## 2018-01-30 DIAGNOSIS — R06.02 SHORTNESS OF BREATH: Primary | ICD-10-CM

## 2018-01-30 NOTE — TELEPHONE ENCOUNTER
Dr. Adler has recommended that you have a Venogram of the blood vessels in your chest.  This is a test that requires you to have an IV catheter placed in a vein in one or both of your arms. IV contrast dye will be injected into the IVs while you are under an X-Ray machine in the EP lab. This allows the doctor to visualize any blockages in the blood vessels in your chest prior to performing any procedures.    You have been scheduled for a Venogram on 2/2/2018.   Please report to the Cardiology Waiting Area on the 3rd floor of the HOSPITAL at 2 PM. (Do not report to the clinic)    Directions for Reporting to Cardiology Waiting Area in the Hospital  If you park in the Parking Garage:  Take elevators to the 2nd floor  Walk up ramp and turn right by Gold Elevators  Take elevator to the 3rd floor  Upon exiting the elevator, turn away from the clinic areas  Walk long kumar around to front of hospital to area with windows overlooking [facility]  Check in at Reception Desk  OR  If family is dropping you off:  Have them drop you off at the front of the Hospital  (Near the ER, where all the flags are hung).  Take the E elevators to the 3rd floor.  Check in at the Reception Desk in the waiting room.    To prepare for the procedure:  You may take your usual morning medication.    What to expect after the procedure:  You will be returned to your room after the procedure. You will be watched for any reactions to the IV contrast. As long as you continue to do well, your IV(s) will be removed from your arm(s). You will be able to eat and drink, and will be discharged within the hour after the procedure is complete.    For further information, questions, or concerns, please contact the Arrhythmia Clinic at [phone]

## 2018-01-31 ENCOUNTER — TELEPHONE (OUTPATIENT)
Dept: CARDIOLOGY | Facility: CLINIC | Age: 83
End: 2018-01-31

## 2018-01-31 ENCOUNTER — TELEPHONE (OUTPATIENT)
Dept: ELECTROPHYSIOLOGY | Facility: CLINIC | Age: 83
End: 2018-01-31

## 2018-01-31 ENCOUNTER — PATIENT MESSAGE (OUTPATIENT)
Dept: MEDSURG UNIT | Facility: HOSPITAL | Age: 83
End: 2018-01-31

## 2018-01-31 RX ORDER — SODIUM CHLORIDE 9 MG/ML
INJECTION, SOLUTION INTRAVENOUS CONTINUOUS
Status: CANCELLED | OUTPATIENT
Start: 2018-01-31

## 2018-01-31 NOTE — TELEPHONE ENCOUNTER
----- Message from Radha Ratliff sent at 1/31/2018 11:54 AM CST -----  Contact: patient  Please call pt at [phone]. Patient has questions regarding his future Venogram with Dr Adler    Thank you

## 2018-02-01 ENCOUNTER — CLINICAL SUPPORT (OUTPATIENT)
Dept: CARDIOLOGY | Facility: CLINIC | Age: 83
End: 2018-02-01
Payer: MEDICARE

## 2018-02-01 ENCOUNTER — LAB VISIT (OUTPATIENT)
Dept: LAB | Facility: HOSPITAL | Age: 83
End: 2018-02-01
Attending: INTERNAL MEDICINE
Payer: MEDICARE

## 2018-02-01 DIAGNOSIS — R06.02 SHORTNESS OF BREATH: ICD-10-CM

## 2018-02-01 LAB
ANION GAP SERPL CALC-SCNC: 8 MMOL/L
BUN SERPL-MCNC: 18 MG/DL
CALCIUM SERPL-MCNC: 8.7 MG/DL
CHLORIDE SERPL-SCNC: 107 MMOL/L
CO2 SERPL-SCNC: 26 MMOL/L
CREAT SERPL-MCNC: 0.9 MG/DL
DIASTOLIC DYSFUNCTION: NO
EST. GFR  (AFRICAN AMERICAN): >60 ML/MIN/1.73 M^2
EST. GFR  (NON AFRICAN AMERICAN): >60 ML/MIN/1.73 M^2
GLUCOSE SERPL-MCNC: 88 MG/DL
POTASSIUM SERPL-SCNC: 4.1 MMOL/L
SODIUM SERPL-SCNC: 141 MMOL/L

## 2018-02-01 PROCEDURE — 78452 HT MUSCLE IMAGE SPECT MULT: CPT | Mod: PBBFAC | Performed by: INTERNAL MEDICINE

## 2018-02-01 PROCEDURE — 93016 CV STRESS TEST SUPVJ ONLY: CPT | Mod: S$PBB,,, | Performed by: INTERNAL MEDICINE

## 2018-02-01 PROCEDURE — 93018 CV STRESS TEST I&R ONLY: CPT | Mod: S$PBB,,, | Performed by: INTERNAL MEDICINE

## 2018-02-01 PROCEDURE — 36415 COLL VENOUS BLD VENIPUNCTURE: CPT

## 2018-02-01 PROCEDURE — 80048 BASIC METABOLIC PNL TOTAL CA: CPT

## 2018-02-02 ENCOUNTER — HOSPITAL ENCOUNTER (OUTPATIENT)
Facility: HOSPITAL | Age: 83
Discharge: HOME | End: 2018-02-02
Attending: INTERNAL MEDICINE | Admitting: INTERNAL MEDICINE
Payer: MEDICARE

## 2018-02-02 ENCOUNTER — ANESTHESIA (OUTPATIENT)
Dept: MEDSURG UNIT | Facility: HOSPITAL | Age: 83
End: 2018-02-02

## 2018-02-02 ENCOUNTER — ANESTHESIA EVENT (OUTPATIENT)
Dept: MEDSURG UNIT | Facility: HOSPITAL | Age: 83
End: 2018-02-02

## 2018-02-02 ENCOUNTER — OFFICE VISIT (OUTPATIENT)
Dept: OPHTHALMOLOGY | Facility: CLINIC | Age: 83
End: 2018-02-02
Payer: MEDICARE

## 2018-02-02 DIAGNOSIS — Z96.1 PSEUDOPHAKIA OF BOTH EYES: ICD-10-CM

## 2018-02-02 DIAGNOSIS — I50.9 CHF (CONGESTIVE HEART FAILURE): ICD-10-CM

## 2018-02-02 DIAGNOSIS — H40.1134 PRIMARY OPEN ANGLE GLAUCOMA (POAG) OF BOTH EYES, INDETERMINATE STAGE: Primary | ICD-10-CM

## 2018-02-02 DIAGNOSIS — I50.22 CHRONIC SYSTOLIC CONGESTIVE HEART FAILURE: ICD-10-CM

## 2018-02-02 DIAGNOSIS — H21.561 AFFERENT PUPILLARY DEFECT, RIGHT: ICD-10-CM

## 2018-02-02 DIAGNOSIS — Z95.0 CARDIAC PACEMAKER IN SITU: Primary | ICD-10-CM

## 2018-02-02 PROCEDURE — 25500020 PHARM REV CODE 255

## 2018-02-02 PROCEDURE — 99999 PR PBB SHADOW E&M-EST. PATIENT-LVL I: CPT | Mod: PBBFAC,,, | Performed by: OPHTHALMOLOGY

## 2018-02-02 PROCEDURE — 76514 ECHO EXAM OF EYE THICKNESS: CPT | Mod: 26,S$PBB,, | Performed by: OPHTHALMOLOGY

## 2018-02-02 PROCEDURE — 36005 INJECTION EXT VENOGRAPHY: CPT | Mod: LT,,, | Performed by: INTERNAL MEDICINE

## 2018-02-02 PROCEDURE — 92004 COMPRE OPH EXAM NEW PT 1/>: CPT | Mod: S$PBB,,, | Performed by: OPHTHALMOLOGY

## 2018-02-02 PROCEDURE — 92020 GONIOSCOPY: CPT | Mod: PBBFAC | Performed by: OPHTHALMOLOGY

## 2018-02-02 PROCEDURE — 76514 ECHO EXAM OF EYE THICKNESS: CPT | Mod: PBBFAC | Performed by: OPHTHALMOLOGY

## 2018-02-02 PROCEDURE — 75820 VEIN X-RAY ARM/LEG: CPT | Mod: 26,,, | Performed by: INTERNAL MEDICINE

## 2018-02-02 PROCEDURE — 36005 INJECTION EXT VENOGRAPHY: CPT

## 2018-02-02 PROCEDURE — 92250 FUNDUS PHOTOGRAPHY W/I&R: CPT | Mod: PBBFAC | Performed by: OPHTHALMOLOGY

## 2018-02-02 PROCEDURE — 75820 VEIN X-RAY ARM/LEG: CPT

## 2018-02-02 PROCEDURE — 99211 OFF/OP EST MAY X REQ PHY/QHP: CPT | Mod: PBBFAC,25 | Performed by: OPHTHALMOLOGY

## 2018-02-02 PROCEDURE — 92020 GONIOSCOPY: CPT | Mod: S$PBB,,, | Performed by: OPHTHALMOLOGY

## 2018-02-02 RX ORDER — TIMOLOL MALEATE 5 MG/ML
1 SOLUTION/ DROPS OPHTHALMIC DAILY
COMMUNITY
End: 2018-02-02

## 2018-02-02 RX ORDER — TRAVOPROST OPHTHALMIC SOLUTION 0.04 MG/ML
1 SOLUTION OPHTHALMIC NIGHTLY
COMMUNITY
End: 2018-02-02 | Stop reason: SDUPTHER

## 2018-02-02 RX ORDER — BRIMONIDINE TARTRATE AND TIMOLOL MALEATE 2; 5 MG/ML; MG/ML
1 SOLUTION OPHTHALMIC 2 TIMES DAILY
Qty: 15 ML | Refills: 3 | Status: SHIPPED | OUTPATIENT
Start: 2018-02-02 | End: 2018-02-06

## 2018-02-02 RX ORDER — TRAVOPROST OPHTHALMIC SOLUTION 0.04 MG/ML
1 SOLUTION OPHTHALMIC NIGHTLY
Qty: 7.5 ML | Refills: 3 | Status: SHIPPED | OUTPATIENT
Start: 2018-02-02 | End: 2021-05-06

## 2018-02-02 NOTE — PROGRESS NOTES
HPI     Glaucoma    Additional comments: glaucoma eval           Comments   Pt here for glaucoma evaluation. Pt recently moved here from        Last edited by Lexy Johnson MA on 2/2/2018  8:30 AM. (History)            Assessment /Plan     For exam results, see Encounter Report.    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 Intol to a different PG gtt - burning         LASERS    none        GLAUCOMA SURGERIES    none        OTHER EYE SURGERIES    PC IOL ou - in [place] about 2013         CDR    0.95 od // 0.75 w/ sup notch os         Tbase    ? Off gtts   (outside notes on gtts 10-19 od // 10-18 os )         Tmax    ? Off gtts  (( on gtts outside notes 19 od // 18 os)            Ttarget    ?             HVF    ? test 20? to  20? - ? od // ? os        Gonio    +2-3 od // +3 os         CCT    494 / 500 (thin ou)         OCT    ? test 20? to 20/ - RNFL - ? od // ? os        HRT    ? test 20? to 20? - MR - ? od // ? os        Disc photos    2018     - Ttoday    18/14  - Test done today    Establish care / gonio / DFE / photos     2. Trace APD od     3. PC IOL ou    2013 in [place]     PLAN   IOP too high od for CDR   Change timolol to combigan bid ou   Cont travatan ou   Photos today     F/U with HVF / DFE / OCT (start with 24-2 ss ou - but may need to  adjust program )

## 2018-02-02 NOTE — INTERVAL H&P NOTE
The patient has been examined and the H&P has been reviewed:    I concur with the findings and no changes have occurred since H&P was written. Patient presents for left and right arm venograms in preparation for upgrade to CRT-P from dcPPM for pacing mediated cardiomyopathy. Recent stress test showed no ischemia.    Anesthesia/Surgery risks, benefits and alternative options discussed and understood by patient/family.          There are no hospital problems to display for this patient.

## 2018-02-02 NOTE — H&P (VIEW-ONLY)
Subjective:    Patient ID:  Artemio Colon Sr. is a 88 y.o. male who presents for follow-up of PAF (paroxysmal atrial fibrillation)    Referring Cardiologist: Nicholas Tee MD    HPI  Prior Hx:  I had the pleasure of seeing Mr. Colon today in our electrophysiology clinic for follow-up for his pacemaker and for his atrial fibrillation.  He recently moved here from [place] and established care in the cardiology clinic.  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 He has CAD (cath 2012 rx with medical therapy) and preserved EF (60-65% 1/2015). I do not know if he had a PVI or AVN ablation as he has complete AV block.  He feels well, exercises regularly.  He denies any chest pain, shortness of breath, palpitations, orthopnea or PND.  He is not on anticoagulation and when I brought it up as a discussion he stated he does not want to be on it and rathered not discuss it any further.     Device interrogation revealed stable lead parameters, underlying rhythm is sinus rhythm with complete AV block, A-paced 56%, V-paced 100%, AMS burden <0.1% but with 28 AF episodes and longest episode lasting 5 minutes and 1 NSVT episode of 4 seconds. He has 3.5 years of battery life left.    Interim Hx:  Mr. Colon presents today to discuss what he perceives are arrhythmias. Noted shortness of breath and low energy for a few days ~1 week ago but resolved on its own. Noted some orthopnea. No light-headedness.. Recent remote interrogation noted <0.1% AMS burden with longest episode 3 min and 52 seconds. No AF noted in the past 2 weeks. He is 61% A and 100% V paced. His V-threshold was 1.75 @ 0.4 on first visit with me. Recent remote auto-capture threshold was 2.25 @ 0.4. It has slowly  increased over the past year and a half with stable impedance. Checked in clinic with bipolar threshold of 1.5 @ 0.4 sitting up and unipolar threshold of 1.25@ 0.4 also sitting up. Lying down his capture threshold was 2.25 @ 0.4. Battery life still ~2 years. Of note he also takes testosterone for low-T. He also has JAEL and is rx with CPAP.     My interpretation of today's in clinic electrocardiogram is AV-paced rhythm with PACs    Review of Systems   Constitution: Negative for fever, weakness and malaise/fatigue.   HENT: Negative.  Negative for congestion.    Eyes: Negative for blurred vision and visual disturbance.   Cardiovascular: Positive for orthopnea (resolved). Negative for chest pain, dyspnea on exertion, irregular heartbeat, leg swelling, near-syncope, palpitations, paroxysmal nocturnal dyspnea and syncope.   Respiratory: Negative for cough and shortness of breath.    Hematologic/Lymphatic: Negative for bleeding problem.   Skin: Negative.    Musculoskeletal: Negative.    Gastrointestinal: Negative for bloating, abdominal pain, hematochezia and melena.   Neurological: Negative for dizziness and light-headedness.        Objective:    Physical Exam   Constitutional: He is oriented to person, place, and time. He appears well-developed and well-nourished. No distress.   HENT:   Head: Normocephalic and atraumatic.   Eyes: Conjunctivae are normal. Right eye exhibits no discharge.   Neck: Neck supple. No JVD present.   Cardiovascular: Normal rate, regular rhythm and normal heart sounds.  Exam reveals no gallop and no friction rub.    No murmur heard.  Pulmonary/Chest: Effort normal and breath sounds normal. No respiratory distress. He has no wheezes. He has no rales.   PPM pocket well healed. No obvious collaterals around pocket.   Abdominal: Soft. Bowel sounds are normal. He exhibits no distension. There is no tenderness.   Musculoskeletal: He exhibits no edema.   Neurological: He is alert and oriented to person,  place, and time.   Skin: Skin is warm and dry. He is not diaphoretic.   Psychiatric: He has a normal mood and affect. His behavior is normal. Judgment and thought content normal.         Assessment:       1. Complete AV block    2. Cardiac pacemaker in situ    3. Coronary artery disease involving native coronary artery of native heart without angina pectoris    4. PAF (paroxysmal atrial fibrillation)    5. JAEL (obstructive sleep apnea)    6. Shortness of breath         Plan:       In summary, Mr. 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 He is in complete AV block.  He also has JAEL, low testosterone, and CAD with preserved LVEF by ECHO in 2015. He is not on anticoagulation by choice and would not discuss it any further. His arrhythmia burden is exceedingly low. I do not think that is the cause of his symptoms. Could be related to low-T or JAEL. I do think it is worthwhile updating his ECHO to see if he could have developed a cardiomyopathy since 2015. If his EF<50% will need to discuss upgrade to CRT-P at which point would also need to discuss potential RV lead revision. Will get updated remote check every month for 3 months to see capture threshold.  Will call with results and arrange follow-up as needed. If EF is low would also order stress test. Otherwise RTC in 6 months.     Thank you for allowing me to participate in the care of this patient. Please do not hesitate to call me with any questions or concerns.     Tawanda Adler MD, PhD  Cardiac Electrophysiology

## 2018-02-02 NOTE — DISCHARGE SUMMARY
[facility]  Discharge Note  Short Stay    Admit Date: 2/2/2018    Discharge Date and Time: 2/2/2018     Attending Physician: Tawanda Adler MD     Discharge Provider: Tawanda Adler    Diagnoses:  Active Hospital Problems    Diagnosis  POA    Cardiac pacemaker in situ [Z95.0]  Yes      Resolved Hospital Problems    Diagnosis Date Resolved POA   No resolved problems to display.       Discharged Condition: good    Hospital Course: Patient was admitted for an outpatient procedure and tolerated the procedure well with no complications. Left subclavian and axillary veins widely patent.    Final Diagnoses: Same as principal problem.     Follow-up: Will be arranged after his device upgrade is performed.    Disposition: Home or Self Care    Follow up/Patient Instructions:    Medications:  Reconciled Home Medications:   Current Discharge Medication List      CONTINUE these medications which have NOT CHANGED    Details   aspirin (ECOTRIN) 81 MG EC tablet Take 81 mg by mouth once daily.      brimonidine-timolol (COMBIGAN) 0.2-0.5 % Drop Place 1 drop into both eyes 2 (two) times daily. This replaces th timolol  Qty: 15 mL, Refills: 3      efinaconazole 10 % Werner Apply to affected toenails once daily  Qty: 8 mL, Refills: 11    Associated Diagnoses: Onychomycosis      fentaNYL (DURAGESIC) 25 mcg/hr Place 1 patch onto the skin every 72 hours.      fluticasone (FLONASE) 50 mcg/actuation nasal spray 2 sprays by Each Nare route once daily.  Qty: 1 Bottle, Refills: 6      gabapentin (NEURONTIN) 600 MG tablet Take 1 tablet (600 mg total) by mouth once daily.  Qty: 30 tablet, Refills: 3    Associated Diagnoses: Neuropathy      hydrocodone-acetaminophen 10-325mg (NORCO)  mg Tab Take by mouth every 4 (four) hours as needed for Pain.      lidocaine (LIDODERM) 5 % Place 1 patch onto the skin once daily. Remove & Discard patch within 12 hours or as directed by MD  Qty: 10 patch, Refills: 0    Associated Diagnoses: Neuropathy       pravastatin (PRAVACHOL) 40 MG tablet Take 1 tablet (40 mg total) by mouth once daily.  Qty: 90 tablet, Refills: 3    Associated Diagnoses: Coronary artery disease involving native coronary artery of native heart without angina pectoris      tamsulosin (FLOMAX) 0.4 mg Cp24 Take 1 capsule (0.4 mg total) by mouth once daily.  Qty: 30 capsule, Refills: 12    Associated Diagnoses: BPH with urinary obstruction; Male hypogonadism      testosterone cypionate (DEPOTESTOTERONE CYPIONATE) 200 mg/mL injection Inject 1 mL (200 mg total) into the muscle every 28 days.  Qty: 10 mL, Refills: 1    Associated Diagnoses: BPH with urinary obstruction; Male hypogonadism      travoprost (TRAVATAN Z) 0.004 % Drop Place 1 drop into both eyes every evening.  Qty: 7.5 mL, Refills: 3         STOP taking these medications       timolol maleate 0.5% (TIMOPTIC) 0.5 % Drop Comments:   Reason for Stopping:               Discharge Procedure Orders  Activity as tolerated     Notify your health care provider if you experience any of the following:  temperature >100.4     Notify your health care provider if you experience any of the following:  persistent nausea and vomiting or diarrhea     Notify your health care provider if you experience any of the following:  severe uncontrolled pain     Notify your health care provider if you experience any of the following:  redness, tenderness, or signs of infection (pain, swelling, redness, odor or green/yellow discharge around incision site)     Notify your health care provider if you experience any of the following:  difficulty breathing or increased cough     Notify your health care provider if you experience any of the following:  severe persistent headache     Notify your health care provider if you experience any of the following:  worsening rash     Notify your health care provider if you experience any of the following:  increased confusion or weakness     Notify your health care provider if you  experience any of the following:  persistent dizziness, light-headedness, or visual disturbances           Discharge Procedure Orders (must include Diet, Follow-up, Activity):    Discharge Procedure Orders (must include Diet, Follow-up, Activity)  Activity as tolerated     Notify your health care provider if you experience any of the following:  temperature >100.4     Notify your health care provider if you experience any of the following:  persistent nausea and vomiting or diarrhea     Notify your health care provider if you experience any of the following:  severe uncontrolled pain     Notify your health care provider if you experience any of the following:  redness, tenderness, or signs of infection (pain, swelling, redness, odor or green/yellow discharge around incision site)     Notify your health care provider if you experience any of the following:  difficulty breathing or increased cough     Notify your health care provider if you experience any of the following:  severe persistent headache     Notify your health care provider if you experience any of the following:  worsening rash     Notify your health care provider if you experience any of the following:  increased confusion or weakness     Notify your health care provider if you experience any of the following:  persistent dizziness, light-headedness, or visual disturbances

## 2018-02-06 ENCOUNTER — PATIENT MESSAGE (OUTPATIENT)
Dept: ELECTROPHYSIOLOGY | Facility: CLINIC | Age: 83
End: 2018-02-06

## 2018-02-06 ENCOUNTER — TELEPHONE (OUTPATIENT)
Dept: ELECTROPHYSIOLOGY | Facility: CLINIC | Age: 83
End: 2018-02-06

## 2018-02-06 ENCOUNTER — PATIENT MESSAGE (OUTPATIENT)
Dept: OPHTHALMOLOGY | Facility: CLINIC | Age: 83
End: 2018-02-06

## 2018-02-06 DIAGNOSIS — H40.1132 PRIMARY OPEN ANGLE GLAUCOMA (POAG) OF BOTH EYES, MODERATE STAGE: Primary | ICD-10-CM

## 2018-02-06 DIAGNOSIS — I50.22 CHRONIC SYSTOLIC CONGESTIVE HEART FAILURE: Primary | ICD-10-CM

## 2018-02-06 RX ORDER — TIMOLOL MALEATE 5 MG/ML
1 SOLUTION/ DROPS OPHTHALMIC 2 TIMES DAILY
COMMUNITY
Start: 2018-02-06 | End: 2018-02-06 | Stop reason: SDUPTHER

## 2018-02-06 RX ORDER — TIMOLOL MALEATE 5 MG/ML
1 SOLUTION/ DROPS OPHTHALMIC 2 TIMES DAILY
Qty: 15 ML | Refills: 3 | Status: SHIPPED | OUTPATIENT
Start: 2018-02-06 | End: 2021-05-06

## 2018-02-06 RX ORDER — BRIMONIDINE TARTRATE 2 MG/ML
1 SOLUTION/ DROPS OPHTHALMIC EVERY 8 HOURS
COMMUNITY
End: 2018-02-06 | Stop reason: SDUPTHER

## 2018-02-06 RX ORDER — BRIMONIDINE TARTRATE 2 MG/ML
1 SOLUTION/ DROPS OPHTHALMIC 2 TIMES DAILY
Qty: 30 ML | Refills: 3 | Status: SHIPPED | OUTPATIENT
Start: 2018-02-06 | End: 2020-06-16

## 2018-02-06 NOTE — TELEPHONE ENCOUNTER
Post-Procedure Patient Discharge Instructions  Pacemaker/Defibrillator  Wound Care   If you are discharged with a standard dressing over the incision, you may remove the dressing after 24 hours.    If you are discharged with an AQUACEL dressing, you should keep it on until your follow-up appointment in 1-2 weeks.   If there are Steri-strips (strips of tape) over your incision, leave them on until your follow-up appointment. They may begin to fall off on their own, which is normal. If there is Dermabond (clear glue) over your incision, do not scrub it off. It acts as a barrier and will eventually disappear.   You will be discharged with 5 days of oral antibiotics. Please take the full prescription until it is gone.   Do not get the incision wet for 48 hours following the procedure. You may sponge bath during this period, working around the incision. After 48 hours, you may shower, but you should still try to keep this area as dry as possible, and avoid direct water contact to the incision (allow the water to hit back of your shoulder rather than directly on the incision). Gently pat the incision dry if it does get wet.   You may take regular showers after 2 weeks, unless otherwise indicated at your follow-up visit.   Do not submerge the incision in a tub, pool, or body of water for 6 weeks.   Avoid using deodorants, powders, creams, lotions, etc. on your incision for 6 weeks.   If you notice unusual swelling, redness, drainage, have more device site pain, chest pain, shortness of breath, or have a fever greater than 100 degrees, call our device clinic immediately: [phone] or [phone] during normal office hours. You may call [phone] after-hours or on weekends and ask for the electrophysiologist on call.  Activity    If you only had a battery/generator change performed, there are no postoperative activity restrictions.    If this is your first device or if you had new wires added to your  existing device, then you will be discharged in a sling which you should wear continuously for 48 hours. After that, the sling should remain off during the day but should be worn at night for another 2-4 weeks (depending on how active a sleeper you are).   Do not raise your device-side arm above your shoulder for 6 weeks. Do not lift more than 5-10 lbs with your device-side arm for 6 weeks.    If you were driving prior to the procedure, you may resume driving after your first follow-up appointment (1-2 weeks). If you have a history of passing out or a history of certain arrhythmias, there may be driving restrictions unrelated to the procedure. Please clarify with your physician if this is the case.   No heavy activity with the affected arm for 6 weeks (eg. tennis, golf, bowling, aerobics, mowing the lawn, etc.).   Avoid rough contact at the device site for 6 weeks.   You may participate in sexual activity unless otherwise instructed.   You may return to work within 3-5 days unless told otherwise, provided you adhere to the above activity restrictions.  Long-Term Instructions   Keep your pacemaker or defibrillator identification card with you at all times.   If you have a defibrillator and you get shocked by the device: If you receive one shock and you feel ok, you may call [phone] or [phone] during normal office hours. You may call [phone] after-hours. If you receive one shock and you do not feel well, call Emergency Medical Services. They will take you to the nearest emergency room.   If you have a defibrillator and you get more than one shock from the device or multiple shocks in a short period of time: Call Emergency Medical Services. They will take you to the nearest emergency room.   Appliances: You may operate any electrical device in your home, including microwaves.   Security Systems: Electromagnetic security systems can be located in the workplace, courthouses, or other  high-security areas. Exposure to this type of security system has been shown to cause interference in some cases. Interference may be related to the duration of exposure and/or the distance between the device and the security device. You should be aware of the location of security systems, move through them at a normal pace, and avoid leaning or standing too close.   Metal Detectors at Airports: Metal detectors at airports can potentially interfere with pacemakers or defibrillators, although this is unlikely. Metal detectors will likely be triggered by your device and therefore at places such as airports  it will be important for you to carry your identification card for the pacemaker/defibrillator. Airport personnel will likely prefer to do a manual search.   Cellular Phones: It is unlikely that using a cellular phone will interfere with your device. It should be used with the hand opposite to the side where your device was implanted. The phone should not be carried in the shirt pocket on the same side as the device.   Specific Work Conditions: Patients who work near high-voltage lines, transmitting towers, large motors, welding equipment, or powerful magnets should discuss their specific situation with their physician. In general, remain at least two feet from external electrical equipment, verify that the equipment is properly grounded, and wear insulated gloves when using electrical devices. Leave the immediate location if lightheadedness or other symptoms develop.   MRI: Some pacemakers and defibrillators are safe in MRI scanners, while others are not. Please consult with your physician to see if you have an MRI-compatible device.   Surgery: Should you require surgery in the future, some electrosurgical devices can interfere with your device function. You should discuss this with your surgeon before any operation.   Radiation Therapy: If you ever require radiation therapy in the future, care must be taken  to avoid irradiating the device.  Long-Term Follow-Up   Your device has the ability to transmit device information from home to the doctors office using a home monitoring system.   This remote system takes the place of a doctors visit. Your device will be checked from home every 3-6 months. Every 6-12 months, you will be asked to come into the office for an in-office check.   Your device should last in the range of 6-12 years. This depends on many factors including how often it paces the heart.   When the battery is low, a generator change will be performed. This is a same-day procedure with no post-op activity restrictions, unless one of the pacemaker or defibrillator leads needs to be replaced at that time, or a new lead is added to your existing system.

## 2018-02-06 NOTE — TELEPHONE ENCOUNTER
PACEMAKER UPGRADE EDUCATION CHECKLIST    2/14/2018 Labwork at 8:20am  PRE - PROCEDURE LABS HAVE BEEN ORDERED FOR YOU @ [facility]  BE SURE TO ARRIVE AT YOUR SCHEDULED TIME FOR THIS LAB WORK!  (YOU DO NOT HAVE TO FAST FOR THIS LABWORK!!!!)    2/16/2018 @ 6 AM Pacemaker Upgrade  REPORT TO CARDIOLOGY WAITING ROOM ON 3RD FLOOR OF HOSPITAL (DO NOT REPORT TO CLINIC)  Directions for Reporting to Cardiology Waiting Area in the Hospital  If you park in the Parking Garage:  Take elevators to the 2nd floor  Walk up ramp and turn right by Gold Elevators  Take elevator to the 3rd floor  Upon exiting the elevator, turn away from the clinic areas  Walk long kumar around to front of hospital to area with windows overlooking [facility]  Check in at Reception Desk  OR  If family is dropping you off:  Have them drop you off at the front of the Hospital  (Near the ER, where all the flags are hung).  Take the E elevators to the 3rd floor.  Check in at the Reception Desk in the waiting room.        WASH YOUR CHEST WITH HIBICLENS OR AN ANTIBACTERIAL SOAP (SUCH AS DIAL) ON THE NIGHT BEFORE AND THE MORNING OF YOUR PROCEDURE.    DO NOT EAT OR DRINK ANYTHING AFTER: 12 MN ON THE NIGHT BEFORE YOUR PROCEDURE    MEDICATIONS  YOU MAY TAKE  USUAL MORNING MEDICATIONS WITH A SIP OF WATER    YOU WILL BE SPENDING THE NIGHT AFTER YOUR PROCEDURE  YOU WILL NEED SOMEONE TO DRIVE YOU HOME THE DAY AFTER YOUR PROCEDURE    YOUR PAIN DURING YOUR PROCEDURE WILL BE MANAGED BY THE ANESTHESIA TEAM    THE ABOVE INSTRUCTIONS WERE GIVEN TO THE PATIENT VERBALLY AND THEY VERBALIZED UNDERSTANDING. THEY DO NOT REQUIRE ANY SPECIAL NEEDS AND DO NOT HAVE ANY LEARNING BARRIERS.     Any need to reschedule or cancel procedures, or any questions regarding your procedures should be addressed directly with the Arrhythmia Department Nurses at the following phone number: [phone]

## 2018-02-13 NOTE — PROCEDURES
Venogram    Procedure note dictated in CVIS but did not transfer to EPIC.    10cc of iodinated contrast injected into left arm vein via PIV. Fluoroscopic views noted left axillary/subclavian system widely patent.    No complications  No blood loss    Schedule upgrade to CRT-P with RV lead revision.

## 2018-02-14 ENCOUNTER — LAB VISIT (OUTPATIENT)
Dept: LAB | Facility: HOSPITAL | Age: 83
End: 2018-02-14
Attending: INTERNAL MEDICINE
Payer: MEDICARE

## 2018-02-14 DIAGNOSIS — I50.22 CHRONIC SYSTOLIC CONGESTIVE HEART FAILURE: ICD-10-CM

## 2018-02-14 LAB
ANION GAP SERPL CALC-SCNC: 8 MMOL/L
APTT BLDCRRT: 27.8 SEC
BASOPHILS # BLD AUTO: 0.03 K/UL
BASOPHILS NFR BLD: 0.4 %
BUN SERPL-MCNC: 23 MG/DL
CALCIUM SERPL-MCNC: 9.4 MG/DL
CHLORIDE SERPL-SCNC: 105 MMOL/L
CO2 SERPL-SCNC: 26 MMOL/L
CREAT SERPL-MCNC: 1 MG/DL
DIFFERENTIAL METHOD: ABNORMAL
EOSINOPHIL # BLD AUTO: 0.2 K/UL
EOSINOPHIL NFR BLD: 2.1 %
ERYTHROCYTE [DISTWIDTH] IN BLOOD BY AUTOMATED COUNT: 13 %
EST. GFR  (AFRICAN AMERICAN): >60 ML/MIN/1.73 M^2
EST. GFR  (NON AFRICAN AMERICAN): >60 ML/MIN/1.73 M^2
GLUCOSE SERPL-MCNC: 97 MG/DL
HCT VFR BLD AUTO: 46.5 %
HGB BLD-MCNC: 16 G/DL
IMM GRANULOCYTES # BLD AUTO: 0.04 K/UL
IMM GRANULOCYTES NFR BLD AUTO: 0.5 %
INR PPP: 1
LYMPHOCYTES # BLD AUTO: 2.2 K/UL
LYMPHOCYTES NFR BLD: 26.7 %
MCH RBC QN AUTO: 32.1 PG
MCHC RBC AUTO-ENTMCNC: 34.4 G/DL
MCV RBC AUTO: 93 FL
MONOCYTES # BLD AUTO: 0.9 K/UL
MONOCYTES NFR BLD: 10.4 %
NEUTROPHILS # BLD AUTO: 4.9 K/UL
NEUTROPHILS NFR BLD: 59.9 %
NRBC BLD-RTO: 0 /100 WBC
PLATELET # BLD AUTO: 183 K/UL
PMV BLD AUTO: 10.2 FL
POTASSIUM SERPL-SCNC: 4.1 MMOL/L
PROTHROMBIN TIME: 10.7 SEC
RBC # BLD AUTO: 4.98 M/UL
SODIUM SERPL-SCNC: 139 MMOL/L
WBC # BLD AUTO: 8.15 K/UL

## 2018-02-14 PROCEDURE — 36415 COLL VENOUS BLD VENIPUNCTURE: CPT

## 2018-02-14 PROCEDURE — 80048 BASIC METABOLIC PNL TOTAL CA: CPT

## 2018-02-14 PROCEDURE — 85610 PROTHROMBIN TIME: CPT

## 2018-02-14 PROCEDURE — 85025 COMPLETE CBC W/AUTO DIFF WBC: CPT

## 2018-02-14 PROCEDURE — 85730 THROMBOPLASTIN TIME PARTIAL: CPT

## 2018-02-15 ENCOUNTER — ANESTHESIA EVENT (OUTPATIENT)
Dept: MEDSURG UNIT | Facility: HOSPITAL | Age: 83
End: 2018-02-15
Payer: MEDICARE

## 2018-02-15 ENCOUNTER — TELEPHONE (OUTPATIENT)
Dept: ELECTROPHYSIOLOGY | Facility: CLINIC | Age: 83
End: 2018-02-15

## 2018-02-15 NOTE — TELEPHONE ENCOUNTER
Spoke with patient to review pre-op instructions for PPM upgrade tomorrow. Advised to fast after 12mn and be here for 6am. He is due to change his Fentanyl patch tomorrow. Advised to wait until after the procedure. He verbalizes understanding. He has Hibiclens at home and will use it to shower this evening. He voiced understanding of all instructions and states he had no other questions or concerns.

## 2018-02-16 ENCOUNTER — HOSPITAL ENCOUNTER (OUTPATIENT)
Facility: HOSPITAL | Age: 83
Discharge: HOME | End: 2018-02-17
Attending: INTERNAL MEDICINE | Admitting: INTERNAL MEDICINE
Payer: MEDICARE

## 2018-02-16 ENCOUNTER — ANESTHESIA (OUTPATIENT)
Dept: MEDSURG UNIT | Facility: HOSPITAL | Age: 83
End: 2018-02-16
Payer: MEDICARE

## 2018-02-16 ENCOUNTER — SURGERY (OUTPATIENT)
Age: 83
End: 2018-02-16

## 2018-02-16 DIAGNOSIS — I42.9 CARDIOMYOPATHY: ICD-10-CM

## 2018-02-16 DIAGNOSIS — I42.9 CARDIOMYOPATHY, UNSPECIFIED TYPE: Primary | ICD-10-CM

## 2018-02-16 DIAGNOSIS — I25.10 ATHEROSCLEROTIC HEART DISEASE OF NATIVE CORONARY ARTERY WITHOUT ANGINA PECTORIS: ICD-10-CM

## 2018-02-16 DIAGNOSIS — I49.9 ARRHYTHMIA: ICD-10-CM

## 2018-02-16 DIAGNOSIS — I42.8 OTHER CARDIOMYOPATHIES: ICD-10-CM

## 2018-02-16 LAB
ABO + RH BLD: NORMAL
BLD GP AB SCN CELLS X3 SERPL QL: NORMAL

## 2018-02-16 PROCEDURE — 93010 ELECTROCARDIOGRAM REPORT: CPT | Mod: ,,, | Performed by: INTERNAL MEDICINE

## 2018-02-16 PROCEDURE — 37000008 HC ANESTHESIA 1ST 15 MINUTES: Performed by: INTERNAL MEDICINE

## 2018-02-16 PROCEDURE — 63600175 PHARM REV CODE 636 W HCPCS: Performed by: NURSE PRACTITIONER

## 2018-02-16 PROCEDURE — 37000009 HC ANESTHESIA EA ADD 15 MINS: Performed by: INTERNAL MEDICINE

## 2018-02-16 PROCEDURE — 93005 ELECTROCARDIOGRAM TRACING: CPT | Mod: 59

## 2018-02-16 PROCEDURE — 63600175 PHARM REV CODE 636 W HCPCS

## 2018-02-16 PROCEDURE — 27100025 HC TUBING, SET FLUID WARMER: Performed by: NURSE ANESTHETIST, CERTIFIED REGISTERED

## 2018-02-16 PROCEDURE — 25000003 PHARM REV CODE 250: Performed by: NURSE PRACTITIONER

## 2018-02-16 PROCEDURE — 27100006 EP LAB PROCEDURE

## 2018-02-16 PROCEDURE — 27200117 EP LAB PROCEDURE

## 2018-02-16 PROCEDURE — 25000003 PHARM REV CODE 250: Performed by: NURSE ANESTHETIST, CERTIFIED REGISTERED

## 2018-02-16 PROCEDURE — D9220A PRA ANESTHESIA: Mod: CRNA,,, | Performed by: NURSE ANESTHETIST, CERTIFIED REGISTERED

## 2018-02-16 PROCEDURE — 33225 L VENTRIC PACING LEAD ADD-ON: CPT | Mod: ,,, | Performed by: INTERNAL MEDICINE

## 2018-02-16 PROCEDURE — 25500020 PHARM REV CODE 255: Performed by: NURSE ANESTHETIST, CERTIFIED REGISTERED

## 2018-02-16 PROCEDURE — 25000242 PHARM REV CODE 250 ALT 637 W/ HCPCS: Performed by: STUDENT IN AN ORGANIZED HEALTH CARE EDUCATION/TRAINING PROGRAM

## 2018-02-16 PROCEDURE — 25500020 PHARM REV CODE 255

## 2018-02-16 PROCEDURE — 33229 REMV&REPLC PM GEN MULT LEADS: CPT

## 2018-02-16 PROCEDURE — 93010 ELECTROCARDIOGRAM REPORT: CPT | Mod: 76,,, | Performed by: INTERNAL MEDICINE

## 2018-02-16 PROCEDURE — 63600175 PHARM REV CODE 636 W HCPCS: Performed by: INTERNAL MEDICINE

## 2018-02-16 PROCEDURE — 25000003 PHARM REV CODE 250: Performed by: STUDENT IN AN ORGANIZED HEALTH CARE EDUCATION/TRAINING PROGRAM

## 2018-02-16 PROCEDURE — 86901 BLOOD TYPING SEROLOGIC RH(D): CPT

## 2018-02-16 PROCEDURE — 25000003 PHARM REV CODE 250: Performed by: INTERNAL MEDICINE

## 2018-02-16 PROCEDURE — 63600175 PHARM REV CODE 636 W HCPCS: Performed by: NURSE ANESTHETIST, CERTIFIED REGISTERED

## 2018-02-16 PROCEDURE — 25000003 PHARM REV CODE 250

## 2018-02-16 PROCEDURE — D9220A PRA ANESTHESIA: Mod: ANES,,, | Performed by: ANESTHESIOLOGY

## 2018-02-16 PROCEDURE — 33229 REMV&REPLC PM GEN MULT LEADS: CPT | Mod: ,,, | Performed by: INTERNAL MEDICINE

## 2018-02-16 RX ORDER — SODIUM CHLORIDE 9 MG/ML
INJECTION, SOLUTION INTRAVENOUS CONTINUOUS
Status: DISCONTINUED | OUTPATIENT
Start: 2018-02-16 | End: 2018-02-16

## 2018-02-16 RX ORDER — LIDOCAINE HCL/PF 100 MG/5ML
SYRINGE (ML) INTRAVENOUS
Status: DISCONTINUED | OUTPATIENT
Start: 2018-02-16 | End: 2018-02-16

## 2018-02-16 RX ORDER — FLUTICASONE PROPIONATE 50 MCG
2 SPRAY, SUSPENSION (ML) NASAL DAILY
Status: DISCONTINUED | OUTPATIENT
Start: 2018-02-16 | End: 2018-02-17 | Stop reason: HOSPADM

## 2018-02-16 RX ORDER — IODIXANOL 320 MG/ML
INJECTION, SOLUTION INTRAVASCULAR
Status: DISCONTINUED | OUTPATIENT
Start: 2018-02-16 | End: 2018-02-16

## 2018-02-16 RX ORDER — FENTANYL 25 UG/1
1 PATCH TRANSDERMAL
Status: DISCONTINUED | OUTPATIENT
Start: 2018-02-16 | End: 2018-02-17 | Stop reason: HOSPADM

## 2018-02-16 RX ORDER — PRAVASTATIN SODIUM 40 MG/1
40 TABLET ORAL DAILY
Status: DISCONTINUED | OUTPATIENT
Start: 2018-02-16 | End: 2018-02-17 | Stop reason: HOSPADM

## 2018-02-16 RX ORDER — TAMSULOSIN HYDROCHLORIDE 0.4 MG/1
0.4 CAPSULE ORAL DAILY
Status: DISCONTINUED | OUTPATIENT
Start: 2018-02-16 | End: 2018-02-17 | Stop reason: HOSPADM

## 2018-02-16 RX ORDER — ONDANSETRON 2 MG/ML
INJECTION INTRAMUSCULAR; INTRAVENOUS
Status: DISCONTINUED | OUTPATIENT
Start: 2018-02-16 | End: 2018-02-16

## 2018-02-16 RX ORDER — ASPIRIN 81 MG/1
81 TABLET ORAL DAILY
Status: DISCONTINUED | OUTPATIENT
Start: 2018-02-16 | End: 2018-02-17 | Stop reason: HOSPADM

## 2018-02-16 RX ORDER — HYDROCODONE BITARTRATE AND ACETAMINOPHEN 10; 325 MG/1; MG/1
1 TABLET ORAL EVERY 6 HOURS PRN
Status: DISCONTINUED | OUTPATIENT
Start: 2018-02-16 | End: 2018-02-17 | Stop reason: HOSPADM

## 2018-02-16 RX ORDER — SODIUM CHLORIDE 9 MG/ML
INJECTION, SOLUTION INTRAVENOUS CONTINUOUS PRN
Status: DISCONTINUED | OUTPATIENT
Start: 2018-02-16 | End: 2018-02-16

## 2018-02-16 RX ORDER — SODIUM CHLORIDE 0.9 % (FLUSH) 0.9 %
3 SYRINGE (ML) INJECTION
Status: DISCONTINUED | OUTPATIENT
Start: 2018-02-16 | End: 2018-02-17 | Stop reason: HOSPADM

## 2018-02-16 RX ORDER — PROPOFOL 10 MG/ML
VIAL (ML) INTRAVENOUS
Status: DISCONTINUED | OUTPATIENT
Start: 2018-02-16 | End: 2018-02-16

## 2018-02-16 RX ORDER — PROPOFOL 10 MG/ML
VIAL (ML) INTRAVENOUS CONTINUOUS PRN
Status: DISCONTINUED | OUTPATIENT
Start: 2018-02-16 | End: 2018-02-16

## 2018-02-16 RX ORDER — BRIMONIDINE TARTRATE 2 MG/ML
1 SOLUTION/ DROPS OPHTHALMIC 2 TIMES DAILY
Status: DISCONTINUED | OUTPATIENT
Start: 2018-02-16 | End: 2018-02-17 | Stop reason: HOSPADM

## 2018-02-16 RX ORDER — ACETAMINOPHEN 325 MG/1
650 TABLET ORAL EVERY 6 HOURS PRN
Status: DISCONTINUED | OUTPATIENT
Start: 2018-02-16 | End: 2018-02-17 | Stop reason: HOSPADM

## 2018-02-16 RX ORDER — FENTANYL 25 UG/1
1 PATCH TRANSDERMAL
Status: DISCONTINUED | OUTPATIENT
Start: 2018-02-18 | End: 2018-02-16

## 2018-02-16 RX ORDER — FENTANYL CITRATE 50 UG/ML
25 INJECTION, SOLUTION INTRAMUSCULAR; INTRAVENOUS EVERY 5 MIN PRN
Status: DISCONTINUED | OUTPATIENT
Start: 2018-02-16 | End: 2018-02-16

## 2018-02-16 RX ORDER — PHENYLEPHRINE HYDROCHLORIDE 10 MG/ML
INJECTION INTRAVENOUS
Status: DISCONTINUED | OUTPATIENT
Start: 2018-02-16 | End: 2018-02-16

## 2018-02-16 RX ORDER — FENTANYL CITRATE 50 UG/ML
INJECTION, SOLUTION INTRAMUSCULAR; INTRAVENOUS
Status: DISCONTINUED | OUTPATIENT
Start: 2018-02-16 | End: 2018-02-16

## 2018-02-16 RX ORDER — TIMOLOL MALEATE 5 MG/ML
1 SOLUTION/ DROPS OPHTHALMIC 2 TIMES DAILY
Status: DISCONTINUED | OUTPATIENT
Start: 2018-02-16 | End: 2018-02-17 | Stop reason: HOSPADM

## 2018-02-16 RX ORDER — TRAVOPROST OPHTHALMIC SOLUTION 0.04 MG/ML
1 SOLUTION OPHTHALMIC NIGHTLY
Status: DISCONTINUED | OUTPATIENT
Start: 2018-02-16 | End: 2018-02-17 | Stop reason: HOSPADM

## 2018-02-16 RX ORDER — KETAMINE HYDROCHLORIDE 100 MG/ML
INJECTION, SOLUTION INTRAMUSCULAR; INTRAVENOUS
Status: DISCONTINUED | OUTPATIENT
Start: 2018-02-16 | End: 2018-02-16

## 2018-02-16 RX ADMIN — ACETAMINOPHEN 650 MG: 325 TABLET, FILM COATED ORAL at 01:02

## 2018-02-16 RX ADMIN — PROPOFOL 50 MCG/KG/MIN: 10 INJECTION, EMULSION INTRAVENOUS at 08:02

## 2018-02-16 RX ADMIN — FENTANYL CITRATE 25 MCG: 50 INJECTION, SOLUTION INTRAMUSCULAR; INTRAVENOUS at 10:02

## 2018-02-16 RX ADMIN — KETAMINE HYDROCHLORIDE 20 MG: 100 INJECTION, SOLUTION, CONCENTRATE INTRAMUSCULAR; INTRAVENOUS at 09:02

## 2018-02-16 RX ADMIN — KETAMINE HYDROCHLORIDE 20 MG: 100 INJECTION, SOLUTION, CONCENTRATE INTRAMUSCULAR; INTRAVENOUS at 08:02

## 2018-02-16 RX ADMIN — PROPOFOL 20 MG: 10 INJECTION, EMULSION INTRAVENOUS at 08:02

## 2018-02-16 RX ADMIN — FLUTICASONE PROPIONATE 100 MCG: 50 SPRAY, METERED NASAL at 01:02

## 2018-02-16 RX ADMIN — HYDROCODONE BITARTRATE AND ACETAMINOPHEN 1 TABLET: 10; 325 TABLET ORAL at 10:02

## 2018-02-16 RX ADMIN — PRAVASTATIN SODIUM 40 MG: 40 TABLET ORAL at 04:02

## 2018-02-16 RX ADMIN — IODIXANOL 10 ML: 320 INJECTION, SOLUTION INTRAVASCULAR at 09:02

## 2018-02-16 RX ADMIN — LIDOCAINE HYDROCHLORIDE 60 MG: 20 INJECTION, SOLUTION INTRAVENOUS at 08:02

## 2018-02-16 RX ADMIN — KETAMINE HYDROCHLORIDE 20 MG: 100 INJECTION, SOLUTION, CONCENTRATE INTRAMUSCULAR; INTRAVENOUS at 10:02

## 2018-02-16 RX ADMIN — PROPOFOL 10 MG: 10 INJECTION, EMULSION INTRAVENOUS at 10:02

## 2018-02-16 RX ADMIN — SODIUM CHLORIDE, SODIUM GLUCONATE, SODIUM ACETATE, POTASSIUM CHLORIDE, MAGNESIUM CHLORIDE, SODIUM PHOSPHATE, DIBASIC, AND POTASSIUM PHOSPHATE: .53; .5; .37; .037; .03; .012; .00082 INJECTION, SOLUTION INTRAVENOUS at 08:02

## 2018-02-16 RX ADMIN — TAMSULOSIN HYDROCHLORIDE 0.4 MG: 0.4 CAPSULE ORAL at 01:02

## 2018-02-16 RX ADMIN — ONDANSETRON 4 MG: 2 INJECTION INTRAMUSCULAR; INTRAVENOUS at 10:02

## 2018-02-16 RX ADMIN — PHENYLEPHRINE HYDROCHLORIDE 100 MCG: 10 INJECTION INTRAVENOUS at 08:02

## 2018-02-16 RX ADMIN — TRAVOPROST 1 DROP: 0.04 SOLUTION/ DROPS OPHTHALMIC at 08:02

## 2018-02-16 RX ADMIN — VANCOMYCIN HYDROCHLORIDE 1500 MG: 1 INJECTION, POWDER, LYOPHILIZED, FOR SOLUTION INTRAVENOUS at 08:02

## 2018-02-16 RX ADMIN — FENTANYL 1 PATCH: 25 PATCH, EXTENDED RELEASE TRANSDERMAL at 02:02

## 2018-02-16 RX ADMIN — ASPIRIN 81 MG: 81 TABLET, COATED ORAL at 01:02

## 2018-02-16 RX ADMIN — SODIUM CHLORIDE: 0.9 INJECTION, SOLUTION INTRAVENOUS at 08:02

## 2018-02-16 NOTE — ASSESSMENT & PLAN NOTE
Plan for CRT-P in the setting of decreasing EF from pacing mediated cardiomyopathy  Nuc on 2/1 negative for ischemia  Not on any anticoagulants

## 2018-02-16 NOTE — PROGRESS NOTES
Patient admitted to recovery see [facility] for complete assessment pacu bcg's maintained safety measures verified patient instructed on pain scale and patient verbalize

## 2018-02-16 NOTE — ANESTHESIA PREPROCEDURE EVALUATION
02/16/2018  Artemio Colon . is a 88 y.o., male presenting for PPM upgrade.    Past Medical History:   Diagnosis Date    BPH (benign prostatic hyperplasia)     Chronic rhinitis     Erectile dysfunction     Glaucoma     Neuropathy 8/22/2017    JAEL (obstructive sleep apnea)     Pacemaker     Symptomatic bradycardia     s/p pacemaker     Past Surgical History:   Procedure Laterality Date    CATARACT EXTRACTION W/  INTRAOCULAR LENS IMPLANT Bilateral 2012    done in [place]    CHOLECYSTECTOMY      COLECTOMY      cecum    INSERT / REPLACE / REMOVE PACEMAKER  2012    changed     Review of patient's allergies indicates:   Allergen Reactions    Pcn [penicillins] Hives     No current facility-administered medications on file prior to encounter.      Current Outpatient Prescriptions on File Prior to Encounter   Medication Sig Dispense Refill    aspirin (ECOTRIN) 81 MG EC tablet Take 81 mg by mouth once daily.      efinaconazole 10 % Werner Apply to affected toenails once daily 8 mL 11    fentaNYL (DURAGESIC) 25 mcg/hr Place 1 patch onto the skin every 72 hours.      fluticasone (FLONASE) 50 mcg/actuation nasal spray 2 sprays by Each Nare route once daily. 1 Bottle 6    gabapentin (NEURONTIN) 600 MG tablet Take 1 tablet (600 mg total) by mouth once daily. 30 tablet 3    hydrocodone-acetaminophen 10-325mg (NORCO)  mg Tab Take by mouth every 4 (four) hours as needed for Pain.      pravastatin (PRAVACHOL) 40 MG tablet Take 1 tablet (40 mg total) by mouth once daily. 90 tablet 3    tamsulosin (FLOMAX) 0.4 mg Cp24 Take 1 capsule (0.4 mg total) by mouth once daily. 30 capsule 12    travoprost (TRAVATAN Z) 0.004 % Drop Place 1 drop into both eyes every evening. 7.5 mL 3    lidocaine (LIDODERM) 5 % Place 1 patch onto the skin once daily. Remove & Discard patch within 12 hours or as directed  by MD 10 patch 0    testosterone cypionate (DEPOTESTOTERONE CYPIONATE) 200 mg/mL injection Inject 1 mL (200 mg total) into the muscle every 28 days. 10 mL 1     Lab Results   Component Value Date    WBC 8.15 02/14/2018    HGB 16.0 02/14/2018    HCT 46.5 02/14/2018    MCV 93 02/14/2018     02/14/2018     BMP  Lab Results   Component Value Date     02/14/2018    K 4.1 02/14/2018     02/14/2018    CO2 26 02/14/2018    BUN 23 02/14/2018    CREATININE 1.0 02/14/2018    CALCIUM 9.4 02/14/2018    ANIONGAP 8 02/14/2018    ESTGFRAFRICA >60.0 02/14/2018    EGFRNONAA >60.0 02/14/2018       TTE 01/22/2018:  CONCLUSIONS     1 - Eccentric hypertrophy.     2 - Mildly to moderately depressed left ventricular systolic function (EF 40-45%).     3 - Low normal right ventricular systolic function .     4 - Impaired LV relaxation, normal LAP (grade 1 diastolic dysfunction).     5 - Biatrial enlargement.     6 - Moderate tricuspid regurgitation.     7 - The estimated PA systolic pressure is 30 mmHg.       Anesthesia Evaluation    I have reviewed the Patient Summary Reports.     I have reviewed the Medications.     Review of Systems  Anesthesia Hx:  No problems with previous Anesthesia   Denies Personal Hx of Anesthesia complications.   Cardiovascular:   Exercise tolerance: poor CAD   CHF    Pulmonary:   Sleep Apnea    Renal/:  Renal/ Normal     Hepatic/GI:   Denies GERD.    Musculoskeletal:   Arthritis   Spine Disorders: lumbar and cervical Degenerative disease    Neurological:   Patient reports painful cervical extension as well as numbness/tingling of the 4th and 5th digits (left hand) with leftward rotation       Physical Exam  General:  Obesity    Airway/Jaw/Neck:  Airway Findings: Mouth Opening: Normal Tongue: Normal  General Airway Assessment: Adult  Mallampati: II  TM Distance: Normal, at least 6 cm  Jaw/Neck Findings:  Neck ROM: Extension Decreased, Mod.  Neck Findings:  Girth Increased  Patient reports  that leftward rotation often results in numbness of his 4th and 5th digit      Dental:  Dental Findings: In tact        Mental Status:  Mental Status Findings:  Cooperative, Alert and Oriented         Anesthesia Plan  Type of Anesthesia, risks & benefits discussed:  Anesthesia Type:  MAC  Patient's Preference:   Intra-op Monitoring Plan: standard ASA monitors  Intra-op Monitoring Plan Comments:   Post Op Pain Control Plan: per primary service following discharge from PACU and IV/PO Opioids PRN  Post Op Pain Control Plan Comments:   Induction:   IV  Beta Blocker:  Patient is not currently on a Beta-Blocker (No further documentation required).       Informed Consent: Patient understands risks and agrees with Anesthesia plan.  Questions answered. Anesthesia consent signed with patient.  ASA Score: 3     Day of Surgery Review of History & Physical:    H&P update referred to the surgeon.     Anesthesia Plan Notes: IV sedation, avoid benzos for delirium prevention.        Ready For Surgery From Anesthesia Perspective.

## 2018-02-16 NOTE — SUBJECTIVE & OBJECTIVE
Past Medical History:   Diagnosis Date    BPH (benign prostatic hyperplasia)     Chronic rhinitis     Erectile dysfunction     Glaucoma     Neuropathy 8/22/2017    JAEL (obstructive sleep apnea)     Pacemaker     Symptomatic bradycardia     s/p pacemaker       Past Surgical History:   Procedure Laterality Date    CATARACT EXTRACTION W/  INTRAOCULAR LENS IMPLANT Bilateral 2012    done in [place]    CHOLECYSTECTOMY      COLECTOMY      cecum    INSERT / REPLACE / REMOVE PACEMAKER  2012    changed       Review of patient's allergies indicates:   Allergen Reactions    Pcn [penicillins] Hives       No current facility-administered medications on file prior to encounter.      Current Outpatient Prescriptions on File Prior to Encounter   Medication Sig    aspirin (ECOTRIN) 81 MG EC tablet Take 81 mg by mouth once daily.    efinaconazole 10 % Werner Apply to affected toenails once daily    fentaNYL (DURAGESIC) 25 mcg/hr Place 1 patch onto the skin every 72 hours.    fluticasone (FLONASE) 50 mcg/actuation nasal spray 2 sprays by Each Nare route once daily.    gabapentin (NEURONTIN) 600 MG tablet Take 1 tablet (600 mg total) by mouth once daily.    hydrocodone-acetaminophen 10-325mg (NORCO)  mg Tab Take by mouth every 4 (four) hours as needed for Pain.    pravastatin (PRAVACHOL) 40 MG tablet Take 1 tablet (40 mg total) by mouth once daily.    tamsulosin (FLOMAX) 0.4 mg Cp24 Take 1 capsule (0.4 mg total) by mouth once daily.    travoprost (TRAVATAN Z) 0.004 % Drop Place 1 drop into both eyes every evening.    lidocaine (LIDODERM) 5 % Place 1 patch onto the skin once daily. Remove & Discard patch within 12 hours or as directed by MD    testosterone cypionate (DEPOTESTOTERONE CYPIONATE) 200 mg/mL injection Inject 1 mL (200 mg total) into the muscle every 28 days.     Family History     None        Social History Main Topics    Smoking status: Never Smoker    Smokeless tobacco: Never Used    Alcohol  use 0.6 oz/week     1 Glasses of wine per week      Comment: weekly with meal    Drug use: No    Sexual activity: Yes     Review of Systems   Constitution: Negative for chills, diaphoresis, fever and weight loss.   HENT: Negative for congestion, hoarse voice and sore throat.    Eyes: Negative for blurred vision and double vision.   Respiratory: Negative for shortness of breath.    Endocrine: Negative for cold intolerance and heat intolerance.   Hematologic/Lymphatic: Does not bruise/bleed easily.   Gastrointestinal: Negative for abdominal pain, constipation, diarrhea, nausea and vomiting.   Genitourinary: Negative for dysuria.   Neurological: Negative for focal weakness and sensory change.     Objective:     Vital Signs (Most Recent):  Temp: 97.5 °F (36.4 °C) (02/16/18 0500)  Pulse: 75 (02/16/18 0500)  Resp: 16 (02/16/18 0500)  BP: (!) 150/82 (02/16/18 0616)  SpO2: 96 % (02/16/18 0500) Vital Signs (24h Range):  Temp:  [97.5 °F (36.4 °C)] 97.5 °F (36.4 °C)  Pulse:  [75] 75  Resp:  [16] 16  SpO2:  [96 %] 96 %  BP: (142-150)/(82-88) 150/82     Weight: 94.8 kg (209 lb)  Body mass index is 29.15 kg/m².    SpO2: 96 %       Physical Exam   Constitutional: He is oriented to person, place, and time. He appears well-developed and well-nourished.   HENT:   Head: Normocephalic and atraumatic.   Eyes: EOM are normal. Pupils are equal, round, and reactive to light.   Neck: No JVD present.   Cardiovascular: Normal rate and regular rhythm.  Exam reveals no gallop and no friction rub.    No murmur heard.  Pulmonary/Chest: Effort normal and breath sounds normal. No respiratory distress. He has no wheezes.   Abdominal: Soft. Bowel sounds are normal. He exhibits no distension.   Musculoskeletal: He exhibits no edema.   Neurological: He is alert and oriented to person, place, and time.   Skin: Skin is warm and dry.   Psychiatric: He has a normal mood and affect.       Significant Labs:   Recent Lab Results     None           Significant Imaging: Echocardiogram:   2D echo with color flow doppler:   Results for orders placed or performed during the hospital encounter of 01/22/18   2D Echo w/ Color Flow Doppler   Result Value Ref Range    EF 40 (A) 55 - 65    Mitral Valve Regurgitation TRIVIAL     Diastolic Dysfunction Yes (A)     Aortic Valve Regurgitation TRIVIAL     Est. PA Systolic Pressure 29.83     Mitral Valve Mobility NORMAL     Tricuspid Valve Regurgitation MODERATE (A)

## 2018-02-16 NOTE — NURSING
Ambulated on unit with wife at side.  Denies pain or SOB.  Verbalized understanding of procedure.  Will continue to monitor.

## 2018-02-16 NOTE — TRANSFER OF CARE
"Anesthesia Transfer of Care Note    Patient: Artemio Colon .    Procedure(s) Performed: Procedure(s) (LRB):  [id number] (N/A)    Patient location: PACU    Anesthesia Type: MAC and general    Transport from OR: Transported from OR on room air with adequate spontaneous ventilation    Post pain: adequate analgesia    Post assessment: no apparent anesthetic complications and tolerated procedure well    Post vital signs: stable    Level of consciousness: awake, alert and oriented    Nausea/Vomiting: no nausea/vomiting    Complications: none    Transfer of care protocol was followed      Last vitals:   Visit Vitals  BP (!) 150/82 (BP Location: Left arm, Patient Position: Lying)   Pulse 75   Temp 36.4 °C (97.5 °F)   Resp 16   Ht 5' 11" (1.803 m)   Wt 94.8 kg (209 lb)   SpO2 96%   BMI 29.15 kg/m²     "

## 2018-02-16 NOTE — PROGRESS NOTES
Patient c/o some incisional discomfort and also patient stated he has chronic pain and uses fentanyl patch q 72 hours and he took it off this am and is due for one today also I called his pre op nurse roseann and she said this is what he told her also. I called dr. craft about this and he will place order in computer and also I called pharmacy about patient needing this medication.

## 2018-02-16 NOTE — PROGRESS NOTES
Called patient's wife and updated on patient location with the permission of patient also called for ekg and chest xray.  And reported to lunch relief norman rocha.

## 2018-02-16 NOTE — H&P
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 He underwent venogram on 2/2/18 with widely patent left subclavian and axillary veins.       Past Medical History:   Diagnosis Date    BPH (benign prostatic hyperplasia)     Chronic rhinitis     Erectile dysfunction     Glaucoma     Neuropathy 8/22/2017    JAEL (obstructive sleep apnea)     Pacemaker     Symptomatic bradycardia     s/p pacemaker       Past Surgical History:   Procedure Laterality Date    CATARACT EXTRACTION W/  INTRAOCULAR LENS IMPLANT Bilateral 2012    done in [place]    CHOLECYSTECTOMY      COLECTOMY      cecum    INSERT / REPLACE / REMOVE PACEMAKER  2012    changed       Review of patient's allergies indicates:   Allergen Reactions    Pcn [penicillins] Hives       No current facility-administered medications on file prior to encounter.      Current Outpatient Prescriptions on File Prior to Encounter   Medication Sig    aspirin (ECOTRIN) 81 MG EC tablet Take 81 mg by mouth once daily.    efinaconazole 10 % Werner Apply to affected toenails once daily    fentaNYL (DURAGESIC) 25 mcg/hr Place 1 patch onto the skin every 72 hours.    fluticasone (FLONASE) 50 mcg/actuation nasal spray 2 sprays by Each Nare route once daily.    gabapentin (NEURONTIN) 600 MG tablet Take 1  tablet (600 mg total) by mouth once daily.    hydrocodone-acetaminophen 10-325mg (NORCO)  mg Tab Take by mouth every 4 (four) hours as needed for Pain.    pravastatin (PRAVACHOL) 40 MG tablet Take 1 tablet (40 mg total) by mouth once daily.    tamsulosin (FLOMAX) 0.4 mg Cp24 Take 1 capsule (0.4 mg total) by mouth once daily.    travoprost (TRAVATAN Z) 0.004 % Drop Place 1 drop into both eyes every evening.    lidocaine (LIDODERM) 5 % Place 1 patch onto the skin once daily. Remove & Discard patch within 12 hours or as directed by MD    testosterone cypionate (DEPOTESTOTERONE CYPIONATE) 200 mg/mL injection Inject 1 mL (200 mg total) into the muscle every 28 days.     Family History     None        Social History Main Topics    Smoking status: Never Smoker    Smokeless tobacco: Never Used    Alcohol use 0.6 oz/week     1 Glasses of wine per week      Comment: weekly with meal    Drug use: No    Sexual activity: Yes     Review of Systems   Constitution: Negative for chills, diaphoresis, fever and weight loss.   HENT: Negative for congestion, hoarse voice and sore throat.    Eyes: Negative for blurred vision and double vision.   Respiratory: Negative for shortness of breath.    Endocrine: Negative for cold intolerance and heat intolerance.   Hematologic/Lymphatic: Does not bruise/bleed easily.   Gastrointestinal: Negative for abdominal pain, constipation, diarrhea, nausea and vomiting.   Genitourinary: Negative for dysuria.   Neurological: Negative for focal weakness and sensory change.     Objective:     Vital Signs (Most Recent):  Temp: 97.5 °F (36.4 °C) (02/16/18 0500)  Pulse: 75 (02/16/18 0500)  Resp: 16 (02/16/18 0500)  BP: (!) 150/82 (02/16/18 0616)  SpO2: 96 % (02/16/18 0500) Vital Signs (24h Range):  Temp:  [97.5 °F (36.4 °C)] 97.5 °F (36.4 °C)  Pulse:  [75] 75  Resp:  [16] 16  SpO2:  [96 %] 96 %  BP: (142-150)/(82-88) 150/82     Weight: 94.8 kg (209 lb)  Body mass index is 29.15 kg/m².    SpO2:  96 %       Physical Exam   Constitutional: He is oriented to person, place, and time. He appears well-developed and well-nourished.   HENT:   Head: Normocephalic and atraumatic.   Eyes: EOM are normal. Pupils are equal, round, and reactive to light.   Neck: No JVD present.   Cardiovascular: Normal rate and regular rhythm.  Exam reveals no gallop and no friction rub.    No murmur heard.  Pulmonary/Chest: Effort normal and breath sounds normal. No respiratory distress. He has no wheezes.   Abdominal: Soft. Bowel sounds are normal. He exhibits no distension.   Musculoskeletal: He exhibits no edema.   Neurological: He is alert and oriented to person, place, and time.   Skin: Skin is warm and dry.   Psychiatric: He has a normal mood and affect.       Significant Labs:   Recent Lab Results     None          Significant Imaging: Echocardiogram:   2D echo with color flow doppler:   Results for orders placed or performed during the hospital encounter of 01/22/18   2D Echo w/ Color Flow Doppler   Result Value Ref Range    EF 40 (A) 55 - 65    Mitral Valve Regurgitation TRIVIAL     Diastolic Dysfunction Yes (A)     Aortic Valve Regurgitation TRIVIAL     Est. PA Systolic Pressure 29.83     Mitral Valve Mobility NORMAL     Tricuspid Valve Regurgitation MODERATE (A)      Assessment and Plan:     * Cardiomyopathy    Plan for CRT-P in the setting of decreasing EF from pacing mediated cardiomyopathy  Nuc on 2/1 negative for ischemia  Not on any anticoagulants            Arsenio Denson MD  Cardiac Electrophysiology  [facility]

## 2018-02-16 NOTE — PROGRESS NOTES
Patient received vancomycin ivpb in the case I spoke with Marifer jacobs and she stated it was given in procedure by elier austin and it was not charted in the record marifer said it was charted in cvis so I put a note in to pharmacy that patient received a dose in procedure at 740am and to adjust times as needed.

## 2018-02-16 NOTE — HPI
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 He underwent venogram on 2/2/18 with widely patent left subclavian and axillary veins.

## 2018-02-16 NOTE — NURSING TRANSFER
Nursing Transfer Note      2/16/2018     Transfer To: [id number]    Transfer via stretcher    Transfer with cardiac monitoring    Transported by rn    Medicines sent: iv vanco    Chart send with patient: Yes    Notified: nurse    Patient reassessed at: see epic (date, time)

## 2018-02-16 NOTE — HPI
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 He underwent venogram on 2/2/18 with widely patent left subclavian and axillary veins.

## 2018-02-16 NOTE — PLAN OF CARE
Problem: Patient Care Overview  Goal: Plan of Care Review  Outcome: Ongoing (interventions implemented as appropriate)  Received report from DIXIE Olivo. Patient s/p PPM upgrade, AAOx3. VSS, no c/o pain or discomfort at this time, resp even and unlabored.  Pressure dressing to L CW is CDI. No active bleeding. No hematoma noted. Post procedure protocol reviewed with patient. Understanding verbalized. Family members called to bedside. Nurse call bell within reach. Will continue to monitor per post procedure protocol.

## 2018-02-17 VITALS
BODY MASS INDEX: 29.26 KG/M2 | HEIGHT: 71 IN | OXYGEN SATURATION: 94 % | TEMPERATURE: 98 F | WEIGHT: 209 LBS | HEART RATE: 73 BPM | DIASTOLIC BLOOD PRESSURE: 68 MMHG | SYSTOLIC BLOOD PRESSURE: 116 MMHG | RESPIRATION RATE: 20 BRPM

## 2018-02-17 PROCEDURE — 25000003 PHARM REV CODE 250: Performed by: STUDENT IN AN ORGANIZED HEALTH CARE EDUCATION/TRAINING PROGRAM

## 2018-02-17 RX ORDER — DOXYCYCLINE HYCLATE 100 MG
100 TABLET ORAL EVERY 12 HOURS
Qty: 10 TABLET | Refills: 0 | Status: SHIPPED | OUTPATIENT
Start: 2018-02-17 | End: 2018-02-22

## 2018-02-17 RX ORDER — DOXYCYCLINE HYCLATE 100 MG
100 TABLET ORAL EVERY 12 HOURS
Status: DISCONTINUED | OUTPATIENT
Start: 2018-02-17 | End: 2018-02-17 | Stop reason: HOSPADM

## 2018-02-17 RX ADMIN — HYDROCODONE BITARTRATE AND ACETAMINOPHEN 1 TABLET: 10; 325 TABLET ORAL at 05:02

## 2018-02-17 NOTE — PLAN OF CARE
Problem: Patient Care Overview  Goal: Plan of Care Review  Outcome: Ongoing (interventions implemented as appropriate)  Plan of care discussed with patient. All questions answered. Dressing to L CW cdi. Sling to L arm. No complaints of pain. Vss. Plan for dc home this am.

## 2018-02-17 NOTE — PROGRESS NOTES
Patient discharged per MD orders. Instructions given on medications, wound care, activity, signs of infection, when to call MD, and follow up appointments. Pt verbalized understanding. Telemetry and PIV removed. Patient and family waiting for transport.

## 2018-02-17 NOTE — DISCHARGE INSTRUCTIONS
- Wear your sling for the first 24 hours after your discharge and avoid getting your wound wet.  - You may shower in 48 hours, but do not let beam of shower hit site directly and no scrubbing in area  - For the first 6 weeks, while your implanted leads become permanently fixed, we ask that you avoid raising your left elbow above your shoulder and lifting greater than 5-10 lbs, and strongly urge that you wear your sling at night time during that time while you sleep to avoid excessive movement.  - We will schedule a visit to our wound clinic 1 week after your procedure for close follow up, in the interim period please keep your wound as clean & dry as possible, If you notice any discharge,worsening tenderness or discomfort from the area please call our clinic ASAP.  - Please avoid lifting more then 5 lbs with the involved side.  - No driving for 1 week and for 4 weeks if patient uses Left arm to make turns  - You have received antibiotics during your hospital stay and will continue these on discharge for 5 days.   - You may use over the counter medications for pain relief, if that is not sufficient your physician may have prescribed you additional pain medication-take as instructed  - Please resume your home medication.   - Follow with  ___ in 3 months post procedure, we will call you with an appointment.

## 2018-02-17 NOTE — PLAN OF CARE
Problem: Patient Care Overview  Goal: Plan of Care Review  Outcome: Ongoing (interventions implemented as appropriate)  Cont on Vanc.  L chest CDI, sling/swaddler on.  AV paced with a rate of 60s to 70s.  Will cont to monitor.

## 2018-02-17 NOTE — ASSESSMENT & PLAN NOTE
S/p RV lead and LV lead for CRT-P on 2/16/18  CXR negative for pneumo  BI-V pacing noted on tele  Pressure dressing removed, Aquacell to remain in place until wound clinic f/u  Activity instructions provided  Doxycycline BID for 5 days  1 week f/u in EP clinic for device interrogation and wound check  3 month follow up with Dr. Adler for routine f/u

## 2018-02-17 NOTE — DISCHARGE SUMMARY
[facility]  Cardiac Electrophysiology  Discharge Summary      Patient Name: Artemio Colon Sr.  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 He underwent venogram on 2/2/18 with widely patent left subclavian and axillary veins.       Procedure(s) (LRB):  [id number] (N/A)     Indwelling Lines/Drains at time of discharge:  Lines/Drains/Airways          No matching active lines, drains, or airways          Hospital Course:  Patient underwent successful RV lead revision and CS lead placement.    Consults: None    Significant Diagnostic Studies: Labs: All labs within the past 24 hours have been reviewed    Pending Diagnostic Studies:     None          Final Active Diagnoses:    Diagnosis Date Noted POA    PRINCIPAL PROBLEM:  Cardiomyopathy [I42.9] 02/16/2018 Yes      Problems Resolved During this Admission:    Diagnosis Date Noted Date Resolved POA     * Cardiomyopathy    S/p RV lead and LV lead for CRT-P on 2/16/18  CXR negative for pneumo  BI-V pacing noted on tele  Pressure dressing removed, Aquacell to remain in place until wound clinic f/u  Activity instructions provided  Doxycycline BID for 5 days  1 week f/u in EP clinic for device interrogation and wound check  3 month follow up with Dr. Adler for routine f/u             Discharged Condition: good    Disposition: Home or Self Care    Follow Up:  Follow-up Information     Femi Alcala - Arrhythmia In 1 week.    Specialty:  Cardiology  Why:  For wound re-check  Contact information:  Jennifer Alcala  [facility] [address] [phone]  Additional information:  Clinic [place] - 3rd Floor           Tawanda Adler MD In 3 months.    Specialties:  Cardiology, Electrophysiology  Contact information:  Jennifer ALCALA  [facility] [id number]  [phone]                 Patient Instructions:   Provided    Medications:  Reconciled Home Medications:   Discharge Medication List as of 2/17/2018  8:57 AM      START taking these medications    Details   doxycycline (VIBRA-TABS) 100 MG tablet Take 1 tablet (100 mg total) by mouth every 12 (twelve) hours., Starting Sat 2/17/2018, Until Thu 2/22/2018, Normal         CONTINUE these medications which have NOT CHANGED    Details   aspirin (ECOTRIN) 81 MG EC tablet Take 81 mg by mouth once daily., Until Discontinued, Historical Med      brimonidine 0.2% (ALPHAGAN) 0.2 % Drop Place 1 drop into both eyes 2 (two) times daily., Starting Tue 2/6/2018, Normal      efinaconazole 10 % Werner Apply to affected toenails once daily, Print      fentaNYL (DURAGESIC) 25 mcg/hr Place 1 patch onto the skin every 72 hours., Historical Med      fluticasone (FLONASE) 50 mcg/actuation nasal spray 2 sprays by Each Nare route once daily., Starting Thu 7/27/2017, Normal      gabapentin (NEURONTIN) 600 MG tablet Take 1 tablet (600 mg total) by mouth once daily., Starting Tue 8/22/2017, Until Wed 8/22/2018, Normal      hydrocodone-acetaminophen 10-325mg (NORCO)  mg Tab Take by mouth every 4 (four) hours as needed for Pain., Historical Med      lidocaine (LIDODERM) 5 % Place 1 patch onto the skin once daily. Remove & Discard patch within 12 hours or as directed by MD, Starting Tue 8/22/2017, Normal      pravastatin (PRAVACHOL) 40 MG tablet Take 1 tablet (40 mg  total) by mouth once daily., Starting Wed 6/21/2017, Until Thu 6/21/2018, Normal      tamsulosin (FLOMAX) 0.4 mg Cp24 Take 1 capsule (0.4 mg total) by mouth once daily., Starting Mon 1/8/2018, Normal      testosterone cypionate (DEPOTESTOTERONE CYPIONATE) 200 mg/mL injection Inject 1 mL (200 mg total) into the muscle every 28 days., Starting Mon 1/8/2018, Until Mon 7/9/2018, Print      timolol maleate 0.5% (TIMOPTIC) 0.5 % Drop Place 1 drop into both eyes 2 (two) times daily., Starting Tue 2/6/2018, Normal      travoprost (TRAVATAN Z) 0.004 % Drop Place 1 drop into both eyes every evening., Starting Fri 2/2/2018, Normal             Time spent on the discharge of patient: 30 minutes    Arsenio Denson MD  Cardiac Electrophysiology  [facility]

## 2018-02-18 ENCOUNTER — PATIENT MESSAGE (OUTPATIENT)
Dept: ELECTROPHYSIOLOGY | Facility: CLINIC | Age: 83
End: 2018-02-18

## 2018-02-20 ENCOUNTER — CLINICAL SUPPORT (OUTPATIENT)
Dept: ELECTROPHYSIOLOGY | Facility: CLINIC | Age: 83
End: 2018-02-20
Attending: INTERNAL MEDICINE
Payer: MEDICARE

## 2018-02-20 DIAGNOSIS — I48.91 ATRIAL FIBRILLATION, UNSPECIFIED TYPE: ICD-10-CM

## 2018-02-20 DIAGNOSIS — Z95.0 CARDIAC PACEMAKER IN SITU: ICD-10-CM

## 2018-02-20 PROCEDURE — 93281 PM DEVICE PROGR EVAL MULTI: CPT | Mod: PBBFAC | Performed by: INTERNAL MEDICINE

## 2018-02-26 ENCOUNTER — CLINICAL SUPPORT (OUTPATIENT)
Dept: ELECTROPHYSIOLOGY | Facility: CLINIC | Age: 83
End: 2018-02-26
Attending: INTERNAL MEDICINE
Payer: MEDICARE

## 2018-02-26 DIAGNOSIS — I48.91 ATRIAL FIBRILLATION, UNSPECIFIED TYPE: ICD-10-CM

## 2018-02-26 DIAGNOSIS — Z95.0 CARDIAC PACEMAKER IN SITU: ICD-10-CM

## 2018-02-26 PROCEDURE — 93281 PM DEVICE PROGR EVAL MULTI: CPT | Mod: PBBFAC | Performed by: INTERNAL MEDICINE

## 2018-03-02 ENCOUNTER — PATIENT MESSAGE (OUTPATIENT)
Dept: OPHTHALMOLOGY | Facility: CLINIC | Age: 83
End: 2018-03-02

## 2018-04-16 ENCOUNTER — TELEPHONE (OUTPATIENT)
Dept: ELECTROPHYSIOLOGY | Facility: CLINIC | Age: 83
End: 2018-04-16

## 2018-04-16 NOTE — TELEPHONE ENCOUNTER
Called pt to discuss apt scheduled for tomorrow AM. Per d/c instructions follow up in 3 months. Apt scheduled after 2 months. Explained could reschedule to May. Pt insists to keep apt tomorrow. Denies questions/concerns. Confirmed apts tomorrow AM.

## 2018-04-17 ENCOUNTER — HOSPITAL ENCOUNTER (OUTPATIENT)
Dept: CARDIOLOGY | Facility: CLINIC | Age: 83
Discharge: HOME | End: 2018-04-17
Payer: MEDICARE

## 2018-04-17 ENCOUNTER — OFFICE VISIT (OUTPATIENT)
Dept: ELECTROPHYSIOLOGY | Facility: CLINIC | Age: 83
End: 2018-04-17
Payer: MEDICARE

## 2018-04-17 ENCOUNTER — CLINICAL SUPPORT (OUTPATIENT)
Dept: ELECTROPHYSIOLOGY | Facility: CLINIC | Age: 83
End: 2018-04-17
Attending: INTERNAL MEDICINE
Payer: MEDICARE

## 2018-04-17 VITALS
WEIGHT: 211.63 LBS | BODY MASS INDEX: 29.63 KG/M2 | SYSTOLIC BLOOD PRESSURE: 114 MMHG | HEIGHT: 71 IN | HEART RATE: 69 BPM | DIASTOLIC BLOOD PRESSURE: 64 MMHG

## 2018-04-17 DIAGNOSIS — Z95.0 CARDIAC PACEMAKER IN SITU: ICD-10-CM

## 2018-04-17 DIAGNOSIS — I50.22 CHRONIC SYSTOLIC CONGESTIVE HEART FAILURE: ICD-10-CM

## 2018-04-17 DIAGNOSIS — G47.33 OSA (OBSTRUCTIVE SLEEP APNEA): ICD-10-CM

## 2018-04-17 DIAGNOSIS — I48.0 PAF (PAROXYSMAL ATRIAL FIBRILLATION): ICD-10-CM

## 2018-04-17 DIAGNOSIS — I44.2 COMPLETE AV BLOCK: Primary | ICD-10-CM

## 2018-04-17 DIAGNOSIS — I48.91 ATRIAL FIBRILLATION, UNSPECIFIED TYPE: ICD-10-CM

## 2018-04-17 PROCEDURE — 93005 ELECTROCARDIOGRAM TRACING: CPT | Mod: PBBFAC,59 | Performed by: INTERNAL MEDICINE

## 2018-04-17 PROCEDURE — 99024 POSTOP FOLLOW-UP VISIT: CPT | Mod: S$PBB,,, | Performed by: INTERNAL MEDICINE

## 2018-04-17 PROCEDURE — 93010 ELECTROCARDIOGRAM REPORT: CPT | Mod: S$PBB,,, | Performed by: INTERNAL MEDICINE

## 2018-04-17 PROCEDURE — 99213 OFFICE O/P EST LOW 20 MIN: CPT | Mod: PBBFAC,25 | Performed by: INTERNAL MEDICINE

## 2018-04-17 PROCEDURE — 93281 PM DEVICE PROGR EVAL MULTI: CPT | Mod: PBBFAC | Performed by: INTERNAL MEDICINE

## 2018-04-17 PROCEDURE — 99999 PR PBB SHADOW E&M-EST. PATIENT-LVL III: CPT | Mod: PBBFAC,,, | Performed by: INTERNAL MEDICINE

## 2018-04-17 NOTE — PROGRESS NOTES
Subjective:    Patient ID:  Artemio Colon Sr. is a 89 y.o. male who presents for follow-up of Complete AV block    Referring Cardiologist: Nicholas Tee MD    HPI    Prior Hx:  I had the pleasure of seeing Mr. Colon today in our electrophysiology clinic for follow-up for his pacemaker and for his atrial fibrillation.  He recently moved here from [place] and established care in the cardiology clinic.  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 He has CAD (cath 2012 rx with medical therapy) and preserved EF (60-65% 1/2015). I do not know if he had a PVI or AVN ablation as he has complete AV block.  He feels well, exercises regularly.  He denies any chest pain, shortness of breath, palpitations, orthopnea or PND.  He is not on anticoagulation and when I brought it up as a discussion he stated he does not want to be on it and rathered not discuss it any further.     Mr. Colon presented 1/2018 noting shortness of breath and low energy. Recent remote interrogation noted <0.1% AMS burden with longest episode 3 min and 52 seconds. No AF noted. He wass 61% A and 100% V paced. His V-threshold was 1.75 @ 0.4 on first visit with me. Recent remote auto-capture threshold was 2.25 @ 0.4. It has slowly increased over the past year and a half with stable impedance. Checked in clinic with bipolar threshold of 1.5 @ 0.4 sitting up and unipolar threshold of 1.25@ 0.4 also sitting up. Lying down his capture threshold was 2.25 @ 0.4. ECHO noted LVEF of 40-45% and stress test was negative. We discussed upgrade to CRT-P which he underwent 2/2018.    Interim Hx:  Mr. Colon presents for post upgrade check. He feels well. At time of implant RV lead capture threshold was even  higher so we elected to cap it and implant a new RV lead. Device interrogation notes no AF, sinus rhythm/kashif with PACs and complete AV block, 6 sec of AMS x 2, stable lead parameters. Notes rare phrenic nerve stim that is treated with position movement. He notes that he had a hard time mentally coming out of anesthesia during his recent procedure.     My interpretation of today's in clinic electrocardiogram is sinus with PACs and biv pacing, QRS width is ~165msec.    Review of Systems   Constitution: Negative for fever, weakness and malaise/fatigue.   HENT: Negative for congestion and sore throat.    Eyes: Negative for blurred vision and visual disturbance.   Cardiovascular: Negative for chest pain, dyspnea on exertion, irregular heartbeat, leg swelling, orthopnea, palpitations and paroxysmal nocturnal dyspnea.   Respiratory: Negative for cough and shortness of breath.    Hematologic/Lymphatic: Negative for bleeding problem. Does not bruise/bleed easily.   Skin: Negative.    Musculoskeletal: Negative.    Gastrointestinal: Negative for abdominal pain, hematemesis and hematochezia.   Neurological: Negative for dizziness and focal weakness.        Objective:    Physical Exam   Constitutional: He is oriented to person, place, and time. He appears well-developed and well-nourished. No distress.   HENT:   Head: Normocephalic and atraumatic.   Eyes: Conjunctivae are normal. Right eye exhibits no discharge. Left eye exhibits no discharge.   Neck: Neck supple. No JVD present.   Cardiovascular: Normal rate, regular rhythm and normal heart sounds.  Exam reveals no gallop and no friction rub.    No murmur heard.  Pulmonary/Chest: Effort normal and breath sounds normal. No respiratory distress. He has no wheezes. He has no rales.   Pacemaker site well healed   Abdominal: Soft. Bowel sounds are normal. He exhibits no distension. There is no tenderness.   Musculoskeletal: He exhibits no edema.   Neurological: He is alert and  oriented to person, place, and time.   Skin: Skin is warm and dry. He is not diaphoretic.   Psychiatric: He has a normal mood and affect. His behavior is normal. Judgment and thought content normal.   Vitals reviewed.        Assessment:       1. Complete AV block    2. Cardiac pacemaker in situ    3. PAF (paroxysmal atrial fibrillation)    4. JAEL (obstructive sleep apnea)    5. Chronic systolic congestive heart failure, pacing induced now s/p upgrade to CRT-P 2/2018         Plan:       In summary, Mr. 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 He is in complete AV block.  He also has JAEL, low testosterone, and CAD with preserved LVEF by ECHO in 2015. He is not on anticoagulation by choice and would not discuss it any further. 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 He is now s/p upgrade to CRT-P and RV lead revision for increasing RV lead threshold. He is doing well with symptomatic improvement. RTC in 4 months with echo, sooner prn. Continue remote checks every 3 months.    Thank you for allowing me to participate in the care of this patient. Please do not hesitate to call me with any questions or concerns.    Tawanda Adler MD, PhD  Cardiac Electrophysiology

## 2018-05-04 ENCOUNTER — OFFICE VISIT (OUTPATIENT)
Dept: OPHTHALMOLOGY | Facility: CLINIC | Age: 83
End: 2018-05-04
Payer: MEDICARE

## 2018-05-04 ENCOUNTER — CLINICAL SUPPORT (OUTPATIENT)
Dept: OPHTHALMOLOGY | Facility: CLINIC | Age: 83
End: 2018-05-04
Payer: MEDICARE

## 2018-05-04 DIAGNOSIS — H21.561 AFFERENT PUPILLARY DEFECT, RIGHT: ICD-10-CM

## 2018-05-04 DIAGNOSIS — Z96.1 PSEUDOPHAKIA OF BOTH EYES: ICD-10-CM

## 2018-05-04 DIAGNOSIS — H40.1113 PRIMARY OPEN-ANGLE GLAUCOMA, RIGHT EYE, SEVERE STAGE: Primary | ICD-10-CM

## 2018-05-04 DIAGNOSIS — H40.1134 PRIMARY OPEN ANGLE GLAUCOMA (POAG) OF BOTH EYES, INDETERMINATE STAGE: ICD-10-CM

## 2018-05-04 DIAGNOSIS — H40.1122 PRIMARY OPEN-ANGLE GLAUCOMA, LEFT EYE, MODERATE STAGE: ICD-10-CM

## 2018-05-04 PROCEDURE — 92083 EXTENDED VISUAL FIELD XM: CPT | Mod: PBBFAC | Performed by: OPHTHALMOLOGY

## 2018-05-04 PROCEDURE — 99999 PR PBB SHADOW E&M-EST. PATIENT-LVL II: CPT | Mod: PBBFAC,,, | Performed by: OPHTHALMOLOGY

## 2018-05-04 PROCEDURE — 99212 OFFICE O/P EST SF 10 MIN: CPT | Mod: PBBFAC,25 | Performed by: OPHTHALMOLOGY

## 2018-05-04 PROCEDURE — 92133 CPTRZD OPH DX IMG PST SGM ON: CPT | Mod: PBBFAC | Performed by: OPHTHALMOLOGY

## 2018-05-04 PROCEDURE — 92014 COMPRE OPH EXAM EST PT 1/>: CPT | Mod: S$PBB,,, | Performed by: OPHTHALMOLOGY

## 2018-05-04 NOTE — PROGRESS NOTES
HPI     DLS: 2/02/18    Pt here for HVF review;  Pt states his OD seems to be numb and his not sure if its related to the   drops that he is putting in.     Meds: Travatan Z qhs ou             Brimonidine bid ou             T 1/2 bid ou    1. Primary open angle glaucoma (POAG) of both eyes, indeterminate stage  2. Afferent pupillary defect, right  3. Pseudophakia, both eyes         Last edited by Isabelle Marquez on 5/4/2018 10:35 AM. (History)            Assessment /Plan     For exam results, see Encounter Report.    Primary open-angle glaucoma, right eye, severe stage    Primary open-angle glaucoma, left eye, moderate stage    Afferent pupillary defect, right    Pseudophakia of both eyes    Primary open angle glaucoma (POAG) of both eyes, indeterminate stage  -     Posterior Segment OCT Optic Nerve- Both eyes  -     May Visual Field - OU - Extended - Both Eyes      New pt to [facility] 2/2018   Pts daughter is an optometrist in [place]   Pt recently moved back to [place] - was in [place] post [place] for > 10 years   H/O glaucoma for > 20 years   Old pt of Dr Bhatia at [facility]   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 Intol to a different PG gtt - burning         LASERS    none        GLAUCOMA SURGERIES    none        OTHER EYE SURGERIES    PC IOL ou - in [place] about 2013         CDR    0.95 od // 0.8 w/ sup notch os         Tbase    ? Off gtts   (outside notes on gtts 10-19 od // 10-18 os )         Tmax    ? Off gtts  (( on gtts outside notes 19 od // 18 os)            Ttarget    ?             HVF    1 test 2018 to  2018 - extinguished to ss (stim III)  od // dense IAD w/ split  fix  os        Gonio    +2-3 od // +3 os         CCT    494 / 500 (thin ou)         OCT    1 test 2018 to 2018  - RNFL - dec thru out  od // dec TS os        HRT    ? test 20? to 20? - MR - ? od // ? os        Disc photos    2018     - Ttoday    14/12  - Test done today    HVF / OCT     2. +  APD od     3. PC IOL ou    2013 in [place]     PLAN   IOP better with change in gtts (19/14--> 14/12)    combigan bid ou - could not afford  Cont timolol bid ou   Brimonidine ou bid    Cont travatan ou q hs     F/U with HRT - 3 months // re check gonio    In future try a 24-2 stim V od // but cont with 24-2 ss os    Pt does have the name of his old eye doctor in [place] - he has passed    - but he had parteners - so will try and get old records - especially and old HVF's

## 2018-05-07 ENCOUNTER — PATIENT MESSAGE (OUTPATIENT)
Dept: OPHTHALMOLOGY | Facility: CLINIC | Age: 83
End: 2018-05-07

## 2018-05-24 ENCOUNTER — TELEPHONE (OUTPATIENT)
Dept: INTERNAL MEDICINE | Facility: CLINIC | Age: 83
End: 2018-05-24

## 2018-05-24 NOTE — TELEPHONE ENCOUNTER
Please call the patient to schedule a follow up visit with me in 1-2 months. No labs needed. Okay to overbook.

## 2018-05-25 NOTE — TELEPHONE ENCOUNTER
Spoke with pt and scheduled for pt on 6/25/18 @ 11:30am    Thank you,    Home Russell MA  Internal Medicine

## 2018-06-08 ENCOUNTER — OFFICE VISIT (OUTPATIENT)
Dept: CARDIOLOGY | Facility: CLINIC | Age: 83
End: 2018-06-08
Payer: MEDICARE

## 2018-06-08 VITALS
HEART RATE: 39 BPM | DIASTOLIC BLOOD PRESSURE: 66 MMHG | SYSTOLIC BLOOD PRESSURE: 143 MMHG | HEIGHT: 71 IN | WEIGHT: 205.25 LBS | BODY MASS INDEX: 28.73 KG/M2

## 2018-06-08 DIAGNOSIS — I25.10 CORONARY ARTERY DISEASE INVOLVING NATIVE CORONARY ARTERY OF NATIVE HEART WITHOUT ANGINA PECTORIS: Primary | Chronic | ICD-10-CM

## 2018-06-08 DIAGNOSIS — E78.5 DYSLIPIDEMIA: ICD-10-CM

## 2018-06-08 DIAGNOSIS — Z95.0 CARDIAC PACEMAKER IN SITU: ICD-10-CM

## 2018-06-08 DIAGNOSIS — I42.0 DILATED CARDIOMYOPATHY: ICD-10-CM

## 2018-06-08 DIAGNOSIS — I48.0 PAF (PAROXYSMAL ATRIAL FIBRILLATION): ICD-10-CM

## 2018-06-08 PROCEDURE — 99999 PR PBB SHADOW E&M-EST. PATIENT-LVL IV: CPT | Mod: PBBFAC,,, | Performed by: INTERNAL MEDICINE

## 2018-06-08 PROCEDURE — 93005 ELECTROCARDIOGRAM TRACING: CPT | Mod: PBBFAC | Performed by: INTERNAL MEDICINE

## 2018-06-08 PROCEDURE — 99214 OFFICE O/P EST MOD 30 MIN: CPT | Mod: PBBFAC | Performed by: INTERNAL MEDICINE

## 2018-06-08 PROCEDURE — 99214 OFFICE O/P EST MOD 30 MIN: CPT | Mod: S$PBB,,, | Performed by: INTERNAL MEDICINE

## 2018-06-08 PROCEDURE — 93010 ELECTROCARDIOGRAM REPORT: CPT | Mod: S$PBB,,, | Performed by: INTERNAL MEDICINE

## 2018-06-08 NOTE — PROGRESS NOTES
Subjective:   Patient ID:  Artemio Colon Sr. is a 89 y.o. male who presents for follow-up of Coronary artery disease involving native coronary artery of  (1 yr fu)      HPI:   Artemio Colon Sr. presents for follow up of coronary artery disease having medically managed disease in a diagonal branch with moderate disease in his mid LAD. Recent negative nuc stress test.  He had an ablation for PAFib at [place] with no sx episodes. He had PPM for heart block with recent upgrade to CRT due to drop in EF. [place] Artemio Colon Sr. denies chest pain, shortness of breath, palpitations, presyncope , or syncope. Artemio Colon Sr. has dyslipidemia  on moderate intensity statin therapy..  Review of Systems   Constitution: Negative for weakness, malaise/fatigue, weight gain and weight loss.   Eyes: Negative for blurred vision.   Cardiovascular: Negative for chest pain, claudication, cyanosis, dyspnea on exertion, irregular heartbeat, leg swelling, near-syncope, orthopnea, palpitations, paroxysmal nocturnal dyspnea and syncope.   Respiratory: Negative for cough, shortness of breath and wheezing.    Musculoskeletal: Positive for joint pain. Negative for falls and myalgias.   Gastrointestinal: Positive for diarrhea. Negative for abdominal pain, heartburn, nausea and vomiting.   Genitourinary: Negative for nocturia.   Neurological: Negative for brief paralysis, dizziness, focal weakness, headaches, numbness and paresthesias.   Psychiatric/Behavioral: Negative for altered mental status.       Current Outpatient Prescriptions   Medication Sig    aspirin (ECOTRIN) 81 MG EC tablet Take 81 mg by mouth once daily.    brimonidine 0.2% (ALPHAGAN) 0.2 % Drop Place 1 drop into both eyes 2 (two) times daily.    efinaconazole 10 % Werner Apply to affected toenails once daily    fentaNYL (DURAGESIC) 25 mcg/hr Place 1 patch onto the skin every 72 hours.     "fluticasone (FLONASE) 50 mcg/actuation nasal spray 2 sprays by Each Nare route once daily.    gabapentin (NEURONTIN) 600 MG tablet Take 1 tablet (600 mg total) by mouth once daily. (Patient taking differently: Take 300 mg by mouth once daily. )    hydrocodone-acetaminophen 10-325mg (NORCO)  mg Tab Take by mouth every 4 (four) hours as needed for Pain.    lidocaine (LIDODERM) 5 % Place 1 patch onto the skin once daily. Remove & Discard patch within 12 hours or as directed by MD    pravastatin (PRAVACHOL) 40 MG tablet Take 1 tablet (40 mg total) by mouth once daily.    tamsulosin (FLOMAX) 0.4 mg Cp24 Take 1 capsule (0.4 mg total) by mouth once daily.    testosterone cypionate (DEPOTESTOTERONE CYPIONATE) 200 mg/mL injection Inject 1 mL (200 mg total) into the muscle every 28 days.    timolol maleate 0.5% (TIMOPTIC) 0.5 % Drop Place 1 drop into both eyes 2 (two) times daily.    travoprost (TRAVATAN Z) 0.004 % Drop Place 1 drop into both eyes every evening.     No current facility-administered medications for this visit.      Objective:   Physical Exam   Constitutional: He is oriented to person, place, and time. He appears well-developed. No distress.   BP (!) 143/66 (BP Location: Left arm, Patient Position: Sitting, BP Method: X-Large (Automatic))   Pulse (!) 39   Ht 5' 10.5" (1.791 m)   Wt 93.1 kg (205 lb 4 oz)   BMI 29.03 kg/m²    HENT:   Head: Normocephalic.   Right Ear: External ear normal.   Left Ear: External ear normal.   Eyes: EOM are normal. Pupils are equal, round, and reactive to light. No scleral icterus.   Neck: Neck supple. No JVD present. No thyromegaly present.   Cardiovascular: Normal rate, regular rhythm, normal heart sounds and intact distal pulses.  Frequent extrasystoles are present. PMI is not displaced.  Exam reveals no gallop and no friction rub.    No murmur heard.  Bigeminy    Pulmonary/Chest: Effort normal and breath sounds normal. No respiratory distress. He has no wheezes. " He has no rales.   permanent pacemaker upper chest.   Abdominal: Soft. He exhibits no distension. There is no hepatosplenomegaly. There is no tenderness.   Musculoskeletal: He exhibits no edema or tenderness.   Gait normal   Neurological: He is alert and oriented to person, place, and time.   Skin: Skin is warm and dry. No rash noted.   Psychiatric: He has a normal mood and affect. His behavior is normal.       Lab Results   Component Value Date     02/14/2018    K 4.1 02/14/2018     02/14/2018    CO2 26 02/14/2018    BUN 23 02/14/2018    CREATININE 1.0 02/14/2018    GLU 97 02/14/2018    AST 29 01/05/2018    ALT 29 01/05/2018    ALBUMIN 4.5 01/05/2018    PROT 7.8 01/05/2018    BILITOT 1.4 (H) 01/05/2018    WBC 8.15 02/14/2018    HGB 16.0 02/14/2018    HCT 46.5 02/14/2018    MCV 93 02/14/2018     02/14/2018    CHOL 154 06/21/2017    HDL 54 06/21/2017    LDLCALC 85.4 06/21/2017    TRIG 73 06/21/2017       Assessment:     1. Coronary artery disease involving native coronary artery of native heart without angina pectoris : Stable with recent negative nuc stress test.   2. Dilated cardiomyopathy : Mild-Compensated   3. Cardiac pacemaker in situ: CRT with Bigeminy present    4. PAF (paroxysmal atrial fibrillation) : No sx episodes post ablation   5. Dyslipidemia :  on moderate intensity statin therapy.       Plan:     Artemio was seen today for coronary artery disease involving native coronary artery of .    Diagnoses and all orders for this visit:    Coronary artery disease involving native coronary artery of native heart without angina pectoris  -     EKG 12-lead; Future    Dilated cardiomyopathy    Cardiac pacemaker in situ  -     EKG 12-lead; Future    PAF (paroxysmal atrial fibrillation)    Dyslipidemia  -     Lipid panel; Future; Expected date: 06/08/2018    .Mediteranian diet recommended  .Working up to Graded exercise for 30 minutes a day at least 5 days a week suggested.

## 2018-06-25 ENCOUNTER — TELEPHONE (OUTPATIENT)
Dept: INTERNAL MEDICINE | Facility: CLINIC | Age: 83
End: 2018-06-25

## 2018-06-25 ENCOUNTER — OFFICE VISIT (OUTPATIENT)
Dept: INTERNAL MEDICINE | Facility: CLINIC | Age: 83
End: 2018-06-25
Payer: MEDICARE

## 2018-06-25 VITALS
HEART RATE: 64 BPM | DIASTOLIC BLOOD PRESSURE: 76 MMHG | BODY MASS INDEX: 29.32 KG/M2 | WEIGHT: 209.44 LBS | HEIGHT: 71 IN | SYSTOLIC BLOOD PRESSURE: 138 MMHG

## 2018-06-25 DIAGNOSIS — E66.3 OVERWEIGHT: ICD-10-CM

## 2018-06-25 DIAGNOSIS — G47.33 OBSTRUCTIVE SLEEP APNEA ON CPAP: ICD-10-CM

## 2018-06-25 DIAGNOSIS — M79.2 NEUROPATHIC PAIN: Primary | ICD-10-CM

## 2018-06-25 DIAGNOSIS — J30.2 SEASONAL ALLERGIC RHINITIS, UNSPECIFIED TRIGGER: ICD-10-CM

## 2018-06-25 DIAGNOSIS — I10 ESSENTIAL HYPERTENSION: ICD-10-CM

## 2018-06-25 PROCEDURE — 99213 OFFICE O/P EST LOW 20 MIN: CPT | Mod: PBBFAC | Performed by: INTERNAL MEDICINE

## 2018-06-25 PROCEDURE — 99214 OFFICE O/P EST MOD 30 MIN: CPT | Mod: S$PBB,,, | Performed by: INTERNAL MEDICINE

## 2018-06-25 PROCEDURE — 99999 PR PBB SHADOW E&M-EST. PATIENT-LVL III: CPT | Mod: PBBFAC,,, | Performed by: INTERNAL MEDICINE

## 2018-06-25 NOTE — TELEPHONE ENCOUNTER
----- Message from Osiel Stone MD sent at 6/25/2018 12:26 PM CDT -----  Regarding: Please contact for health coaching  Rafy Darnell,    Please contact this patient to set up an appointment to meet for Health Coaching. Their major goal is weight loss. Please let me know if there are other questions. Thanks!    Osiel

## 2018-06-25 NOTE — PROGRESS NOTES
Subjective:       Patient ID: Artemio Colon Sr. is a 89 y.o. male.    Chief Complaint: Follow-up    [facility]    Last visit with me 08/2017. Since then seen by Neurology, Pulmonary, Urology, Cardiology, Ophthalmology. Had admissions for Cardiology in [place]. Upcoming lab and Cardiology and Ophthalmology appointment.     Some seasonal allergies today, using Zyrtec. No fever or chills. occasionally Flonase as well. Some nausea that he attributes to drip.    reports procedure from EP Cardiology went well except for the anesthesia. otherwise getting along well.     Reports having trouble with weight loss. Has been encouraged by Cardiology.    Planning on seeing Sleep Med again next few mo. Neuropathy is about the same, not worse. Medication helps a little to sleep at night.    Has upcoming traveling, planning on seeing doctors for follow up after November.    Review of Systems    As per HPI    Objective:      Physical Exam   Constitutional: No distress.    man whose Body mass index is 29.63 kg/m².    HENT:   Right Ear: Tympanic membrane normal. No middle ear effusion.   Left Ear: Tympanic membrane normal.  No middle ear effusion.   Mouth/Throat: Posterior oropharyngeal erythema present. No oropharyngeal exudate or posterior oropharyngeal edema.   Neck: No thyromegaly present.   Cardiovascular: Normal rate, regular rhythm and normal heart sounds.   Occasional extrasystoles are present. Exam reveals no gallop and no friction rub.    No murmur heard.  Pulmonary/Chest: Effort normal and breath sounds normal. No stridor. He has no wheezes. He has no rales.   Abdominal: Soft. There is no tenderness. There is no guarding.   Lymphadenopathy:     He has no cervical adenopathy.   Psychiatric: He has a normal mood and affect. His behavior is normal.   Nursing note and vitals reviewed.      Vitals:    06/25/18 1133 06/25/18 1203   BP: (!) 148/76 138/76   BP Location: Right arm Right arm   Patient Position: Sitting Sitting  "  BP Method: Large (Manual) Large (Manual)   Pulse: 64    Weight: 95 kg (209 lb 7 oz)    Height: 5' 10.5" (1.791 m)      Body mass index is 29.63 kg/m².    I have personally checked the blood pressure and documented my findings.     RESULTS: Reviewed labs from last 12 months    Assessment:       1. Neuropathic pain    2. Obstructive sleep apnea on CPAP    3. Essential hypertension    4. Seasonal allergic rhinitis, unspecified trigger    5. Overweight        Plan:   Artemio was seen today for follow-up.    Diagnoses and all orders for this visit:    Neuropathic pain:  Prior diagnosis, persistent but stable, sufficiently controlled on current management. No changes at this time, will continue to monitor. continue follow up with Neurology, pt will call to schedule follow up.    Obstructive sleep apnea on CPAP:  Prior diagnosis, well controlled on current management. No changes at this time, will continue to monitor. continue follow up with Sleep Med, pt will call to schedule himself.    Essential hypertension:  Prior diagnosis, well controlled on current management. No changes at this time, will continue to monitor.     Seasonal allergic rhinitis, unspecified trigger:  Prior diagnosis, active but not severe. Encourage daily use of antihistamine and nasal steroid. If symptoms persist or worsen please notify the office.    Overweight:  Pt having difficulty losing weight, refer to Health  for evaluation and recommendations.    Follow-up in about 6 months (around 12/25/2018) for follow up visit.  Osiel Stone MD  Internal Medicine    Portions of this note were completed using medical dictation software. Please excuse typographical or syntax errors that were missed on review.    "

## 2018-06-25 NOTE — TELEPHONE ENCOUNTER
..Patient referred by Dr. Stone for Health coaching (weight loss, lifestyle changes).  Called patient and gave an explanation about Health Coaching program and invited participation.   Patient states he would like to participate.  Set appointment for 7/24/18 at 0900.   Gave direct contact number to call if he/ she has any questions [phone].   Carie Hamilton RN  Health

## 2018-06-25 NOTE — PATIENT INSTRUCTIONS
When you return from travels please schedule follow up with Neurology Dr Hylton and Sleep Med Dr Amador.

## 2018-06-28 ENCOUNTER — LAB VISIT (OUTPATIENT)
Dept: LAB | Facility: HOSPITAL | Age: 83
End: 2018-06-28
Attending: INTERNAL MEDICINE
Payer: MEDICARE

## 2018-06-28 DIAGNOSIS — E78.5 DYSLIPIDEMIA: ICD-10-CM

## 2018-06-28 LAB
CHOLEST SERPL-MCNC: 151 MG/DL
CHOLEST/HDLC SERPL: 3 {RATIO}
HDLC SERPL-MCNC: 50 MG/DL
HDLC SERPL: 33.1 %
LDLC SERPL CALC-MCNC: 86 MG/DL
NONHDLC SERPL-MCNC: 101 MG/DL
TRIGL SERPL-MCNC: 75 MG/DL

## 2018-06-28 PROCEDURE — 36415 COLL VENOUS BLD VENIPUNCTURE: CPT

## 2018-06-28 PROCEDURE — 80061 LIPID PANEL: CPT

## 2018-07-24 ENCOUNTER — CLINICAL SUPPORT (OUTPATIENT)
Dept: ELECTROPHYSIOLOGY | Facility: CLINIC | Age: 83
End: 2018-07-24
Attending: INTERNAL MEDICINE
Payer: MEDICARE

## 2018-07-24 ENCOUNTER — CLINICAL SUPPORT (OUTPATIENT)
Dept: INTERNAL MEDICINE | Facility: CLINIC | Age: 83
End: 2018-07-24
Payer: MEDICARE

## 2018-07-24 VITALS — BODY MASS INDEX: 29.44 KG/M2 | WEIGHT: 208.13 LBS

## 2018-07-24 DIAGNOSIS — Z95.0 CARDIAC PACEMAKER IN SITU: ICD-10-CM

## 2018-07-24 DIAGNOSIS — I48.91 ATRIAL FIBRILLATION, UNSPECIFIED TYPE: ICD-10-CM

## 2018-07-24 PROCEDURE — 93296 REM INTERROG EVL PM/IDS: CPT | Mod: PBBFAC | Performed by: INTERNAL MEDICINE

## 2018-07-24 PROCEDURE — 93294 REM INTERROG EVL PM/LDLS PM: CPT | Mod: ,,, | Performed by: INTERNAL MEDICINE

## 2018-07-24 NOTE — PROGRESS NOTES
Date:  7/24/18                    Time: 0900  Initial Health  Contact - [x]Clinic visit  []  Phone Visit  ASSEMENT: Information obtained through combination of chart review prior to seeing patient, patient self-report.   Primary Referral diagnosis/reason for referral:    Weight loss       (See physician progress note for complete list of diagnoses.)  PCP:   Dr. Stone     Referent :  [x] PCP       [] Transition Navigator    []  E.DEdelmira    []  APRN  []  Other:     Supports:   Wife       Preferred Learning Style  (may be a combination)  [x]  Visual (pictures/ video)     [x] Auditory/ Listening   [x]  Reading    []  Hands-on/ Demonstration   []  1:1    []  Group        [x]  Alone       []  No preference   []  Combination  []  Other:         Presenting issues from patients point of view:  []  Improve nutrition/increase healthy choices.                    [x]  Improve /maintain current functioning.  []  Obtain and maintain healthy blood pressure.                  []  Stop smoking.  [x]  Improve weight management.                                       []  Lower cholesterol.  []  Improve blood glucose management.                            []  Improve breathing.  [x]  Increase energy level.                                                      []  Increase/maintain independence.   []  Improve sleep.                                                                []  Decrease stress.  []  Improve pain management.                                             []  Improve mood.  []  Other:                  Pt. 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  []  Tries to modify meals so more healthy.  []  Exercises by        []  Takes BP        times a       (insert frequency)  []  Weighs self          (how often)  [x]  Takes all medications as prescribed.  [x]  Rarely misses health care appointments.  [x]  Sleeps 8 hours without waking more than twice.  [x]  Consistently wears/uses functioning aids as recommended  (ie:  Contacts [] , glasses [x] , hearing  Aid [x] , prosthesis [] ,  Walker [] ,  Wheelchair []  etc.)  []  Regularly engages in stress management activities I.e.:  []  Quit smoking   [x]  Purposefully educates self about health issues.  []  Pt did bring glucose log with him/her.  []  Other  (explain)           []  Comments:       []  Reported Blood Sugar Readings:          Health screenings   Last PCP visit: 6/25/18            PSA:    1/5/18   Colon: 8 yrs ago   Lipids: 6/28/18   CMP: 2/14/18            Eye Exam:   5/7/18      Strengths:  [x]  Confident                       [x]  Determined/persistent           [x]  Enthusiastic         []  Good problem solving           []  Good self-control                   [x]  Hard working        [x]  Hopeful/optimistic                  [x]  Intelligent                                [x]  Self aware  []  Creative                                 [x]  Flexible          [x]  Sense of humor                     []  Open/willing          []  Spiritual                                  [x] Values health    []  Successful overcoming difficult challenges in the past.     []  Pos support network  [x]   Health literate (The degree to which individuals have the capacity to obtain, process, and    understand basic health information and services needed to make appropriate health decisions.- Dept. of Health and Human services.)  []   Other Comments:             Change Markers  Scale 0-10 with 10 representing most Ready 0 least ready, 10 most important 0 least important 10 most confident 0 least confident.     [] NA at this time.   Readiness:   10  ;  Importance:   8  ;  Confidence: 8       INTERVENTIONS:  [x] Given an explanation about health coaching program and  invited participation.  [x] Listened reflectively; provided support, encouragement, and validation; respected  and maintained patient self-direction; explored pros/cons of change; personalized health risks of maintaining the status quo; and facilitated recognition of discrepancy between current behaviors and patients goals and values.  [x] Assessed stage of change and employed appropriate motivational interviewing strategies to facilitate movement toward the next stage of change and healthy behavior modification.  [x] Normalized occurrence of relapse, helped patient recognize outcome of new self-learning as a result of the relapse such as triggers, and helped focus on factors to reduce chances of returning to previous behaviors .  [x] Used readiness scale ratings to guide assessment of importance and confidence of successfully reaching goals.  [x] Assisted patient to develop goal, action plan, and address potential barriers to goal     achievement.  [] Patient was given a new (insert glucose meter or other supplies), taught to use, and      successfully demonstrated ability to carry out essential steps of process.    [] Rx for ( monitor/supplies, pen needles, etc.) was obtained.  [x] Provided educational review, written materials/handouts (Meal Planning Counting Carbs, Healthy Plate, 10 Rules for Eating Safely for Life, 10 Tips to Cutting Your Cravings, Probiotics).   [x] Topics discussed/provided:    GI of foods and how it affects your metabolism and helps you burn fat, carb counting    [x] Facilitated completion of needed exams and screenings by reviewing Diabetic/Health Maintenance Report Card with patient.  [x] Provided pt with my business card.  [x] Informed of/reviewed how to access health  and after hours assistance.  [x] Discussed, planned, and scheduled future contacts.  [x]Challenges/Barriers identified discussed as identified by patient.  [x] Needs identified by this Health :    Education and  support     [] Other:            For additional care management support patient was referred to:        []  Community resources for                        []  Medication assistance programs               []  Dietician              []  Diabetes  [place]                                        []  Mental health services                           []                  []  Diabetes [place]                                              []  Home health services                                []  Complex case management              []  PCP/covering provider due to                 []  Emergency Dept.                                  []  GYN              []  Optometrist/ Ophthalmologist                          []  Podiatrist                                                      [x]  N/A        []  Other:           RESPONSE/IMPRESSION  Behavior is consistent with the following stage of change:  []  Pre-contemplation  []  Contemplation  [x]  Preparation  []  Action  []  Maintenance  []  Relapse    Level of participation:  []  Refused to participate  []  Guarded / resistant  []  Passive participant  [x]  Active participant/interactive  [x]  Interested/receptive  [x]  Self-motivated  []  Other          Goal developed by patient today: 10/10   Less sugar (coffee, snacks, fruits)  Aware of size of portions   Will weight weekly  Aiming for under 200 lbs in 2 mo and 190 lb by December     Plan (needed actions to accomplish goal):          [x] Pt will work on action plan as stated above.        [x] Pt will follow up with Health  on   10/16/18 at 0900 will be in [place] for few months     [x] Health  will remain available.  [x] Health  will maintain regular contacts with patient to educate, support, encourage; help problem-solve; assist with development of self-advocacy/increasing independent health management; and provide resources as needed.  [] Pt has declined Health  Program at this time. Provided pt  with Health  Program information should they decide to participate at a later time.        [] Pt to facilitate contact with Health        [] Health  to facilitate contact with patient.        [] Other:          Notes:   Met with patient he is interested in losing weight to get under 200 lbs.   His best reasons are due to age to be healthier and to fit better in clothes.    Goals set by patient and will continue to work with patient to assist to reach his goals.     Best Method of Contact:  [x] email:   [x] Phone:   [] Mail  [] Office     Carie Hamilton RN

## 2018-08-06 ENCOUNTER — TELEPHONE (OUTPATIENT)
Dept: ELECTROPHYSIOLOGY | Facility: CLINIC | Age: 83
End: 2018-08-06

## 2018-08-06 ENCOUNTER — CLINICAL SUPPORT (OUTPATIENT)
Dept: OPHTHALMOLOGY | Facility: CLINIC | Age: 83
End: 2018-08-06
Attending: OPHTHALMOLOGY
Payer: MEDICARE

## 2018-08-06 ENCOUNTER — HOSPITAL ENCOUNTER (OUTPATIENT)
Dept: CARDIOLOGY | Facility: CLINIC | Age: 83
Discharge: HOME | End: 2018-08-06
Attending: INTERNAL MEDICINE
Payer: MEDICARE

## 2018-08-06 DIAGNOSIS — Z95.0 CARDIAC PACEMAKER IN SITU: ICD-10-CM

## 2018-08-06 DIAGNOSIS — I50.22 CHRONIC SYSTOLIC CONGESTIVE HEART FAILURE: ICD-10-CM

## 2018-08-06 DIAGNOSIS — H40.1113 PRIMARY OPEN-ANGLE GLAUCOMA, RIGHT EYE, SEVERE STAGE: Primary | ICD-10-CM

## 2018-08-06 DIAGNOSIS — I44.2 COMPLETE AV BLOCK: ICD-10-CM

## 2018-08-06 DIAGNOSIS — H40.1113 PRIMARY OPEN-ANGLE GLAUCOMA, RIGHT EYE, SEVERE STAGE: ICD-10-CM

## 2018-08-06 DIAGNOSIS — H21.561 AFFERENT PUPILLARY DEFECT, RIGHT: ICD-10-CM

## 2018-08-06 DIAGNOSIS — Z96.1 PSEUDOPHAKIA OF BOTH EYES: ICD-10-CM

## 2018-08-06 DIAGNOSIS — H40.1122 PRIMARY OPEN-ANGLE GLAUCOMA, LEFT EYE, MODERATE STAGE: ICD-10-CM

## 2018-08-06 LAB
AORTIC VALVE REGURGITATION: NORMAL
ESTIMATED PA SYSTOLIC PRESSURE: 30.46
RETIRED EF AND QEF - SEE NOTES: 63 (ref 55–65)
TRICUSPID VALVE REGURGITATION: NORMAL

## 2018-08-06 PROCEDURE — 92133 CPTRZD OPH DX IMG PST SGM ON: CPT | Mod: 26,S$PBB,, | Performed by: OPHTHALMOLOGY

## 2018-08-06 PROCEDURE — 92133 CPTRZD OPH DX IMG PST SGM ON: CPT | Mod: PBBFAC

## 2018-08-06 PROCEDURE — 99212 OFFICE O/P EST SF 10 MIN: CPT | Mod: PBBFAC | Performed by: OPHTHALMOLOGY

## 2018-08-06 PROCEDURE — 99999 PR PBB SHADOW E&M-EST. PATIENT-LVL II: CPT | Mod: PBBFAC,,, | Performed by: OPHTHALMOLOGY

## 2018-08-06 PROCEDURE — 93306 TTE W/DOPPLER COMPLETE: CPT | Mod: PBBFAC | Performed by: INTERNAL MEDICINE

## 2018-08-06 PROCEDURE — 92012 INTRM OPH EXAM EST PATIENT: CPT | Mod: S$PBB,,, | Performed by: OPHTHALMOLOGY

## 2018-08-06 NOTE — PROGRESS NOTES
HPI     DLS: 5/04/18    Pt here for HRT review;    Meds: Travatan Z qhs ou             Brimonidine bid ou             T 1/2 bid ou    1. Primary open angle glaucoma (POAG) of both eyes, indeterminate stage   2. Afferent pupillary defect, right   3. Pseudophakia, both eyes       Last edited by Isabelle Marquez on 8/6/2018  8:07 AM. (History)            Assessment /Plan     For exam results, see Encounter Report.    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 Intol to a different PG gtt - burning         LASERS    none        GLAUCOMA SURGERIES    none        OTHER EYE SURGERIES    PC IOL ou - in [place] about 2013         CDR    0.95 od // 0.8 w/ sup notch os         Tbase    ? Off gtts (on gtts 8-25 od and 8-18 os - (outside notes))          Tmax    From outside notes - on gtts - 25 od/ 18 os - 11/23/2011        Ttarget    ?             HVF -  6 outside test 2008 to 2015 - dense IALT od // full os ([facility])      ++ VF prog OU from 2015 ([place])  to 2018 ([place])    1 test 2018 to  2018 -  extinguished to ss (stim III)  od // dense IAD w/ split fix  os        Gonio    +2-3 od // +3 os         CCT    494 / 500 (thin ou)         OCT    1 test 2018 to 2018  - RNFL - dec thru out  od // dec TS os        HRT    1 test 2018 to 2018 - MR -  Dec TI /N/NI, bord G, TSod // bord TS os /// CDR 0.78 od // 0.55 os        Disc photos    2018     - Ttoday    10/11  - Test done today    HRT    2. +  APD od     3. PC IOL ou    2013 in [place]     PLAN   IOP doing well   Cont timolol bid ou   Brimonidine ou bid    Cont travatan ou q hs     F/U with 4 months IOP check - gonio (( monitor [facility] 24-2 stim V od and 24-2 ss os visit after next - in 6-8 months ))   In future try a 24-2 stim V od // but cont with 24-2 ss os    Pt does have the name of his old eye doctor in [place] - he has passed    - but he had parteners - old records and old F's - received  - 8/6/2018  - reviewed

## 2018-08-23 ENCOUNTER — PATIENT MESSAGE (OUTPATIENT)
Dept: UROLOGY | Facility: CLINIC | Age: 83
End: 2018-08-23

## 2018-08-23 ENCOUNTER — PATIENT MESSAGE (OUTPATIENT)
Dept: ELECTROPHYSIOLOGY | Facility: CLINIC | Age: 83
End: 2018-08-23

## 2018-08-23 ENCOUNTER — OFFICE VISIT (OUTPATIENT)
Dept: UROLOGY | Facility: CLINIC | Age: 83
End: 2018-08-23
Payer: MEDICARE

## 2018-08-23 VITALS
DIASTOLIC BLOOD PRESSURE: 75 MMHG | WEIGHT: 199.06 LBS | HEIGHT: 71 IN | BODY MASS INDEX: 27.87 KG/M2 | SYSTOLIC BLOOD PRESSURE: 130 MMHG | HEART RATE: 64 BPM

## 2018-08-23 DIAGNOSIS — E29.1 MALE HYPOGONADISM: Primary | ICD-10-CM

## 2018-08-23 DIAGNOSIS — I50.22 CHRONIC SYSTOLIC CONGESTIVE HEART FAILURE: ICD-10-CM

## 2018-08-23 PROCEDURE — 99999 PR PBB SHADOW E&M-EST. PATIENT-LVL III: CPT | Mod: PBBFAC,,, | Performed by: UROLOGY

## 2018-08-23 PROCEDURE — 99213 OFFICE O/P EST LOW 20 MIN: CPT | Mod: PBBFAC | Performed by: UROLOGY

## 2018-08-23 PROCEDURE — 99214 OFFICE O/P EST MOD 30 MIN: CPT | Mod: S$PBB,,, | Performed by: UROLOGY

## 2018-08-23 NOTE — PROGRESS NOTES
CHIEF COMPLAINT:    Mr. Colon is a 89 y.o. male presenting with hypogonadism.    PRESENTING ILLNESS:    Artemio Colon Sr. is a 89 y.o. male with a significant heart history including CHF with dilated cardiomyopathy who has a history of hypogonadism.  He's been on TRT via injections.  He has more energy while on TRT.      He has nocturia x 1.  Is on flomax and is pleased with how he voids.  No hematuria.  No dysuria.    REVIEW OF SYSTEMS:    Artemio Colon Sr. denies headache, blurred vision, fever, nausea, vomiting, chills, abdominal pain, bleeding per rectum, cough, SOB, recent loss of consciousness, recent mental status changes, seizures, dizziness, or upper or lower extremity weakness.    DEXTER  1. 2  2. 2  3. 4  4. 4  5. 4      PATIENT HISTORY:    Past Medical History:   Diagnosis Date    BPH (benign prostatic hyperplasia)     Chronic rhinitis     Erectile dysfunction     Glaucoma     Neuropathy 8/22/2017    JAEL (obstructive sleep apnea)     Pacemaker     Symptomatic bradycardia     s/p pacemaker       Past Surgical History:   Procedure Laterality Date    CATARACT EXTRACTION W/  INTRAOCULAR LENS IMPLANT Bilateral 2012    done in [place]    CHOLECYSTECTOMY      COLECTOMY      cecum    INSERT / REPLACE / REMOVE PACEMAKER  2012    changed       Family History   Problem Relation Age of Onset    Amblyopia Neg Hx     Blindness Neg Hx     Cataracts Neg Hx     Glaucoma Neg Hx     Macular degeneration Neg Hx     Retinal detachment Neg Hx     Strabismus Neg Hx        Social History     Socioeconomic History    Marital status:      Spouse name: Not on file    Number of children: Not on file    Years of education: Not on file    Highest education level: Not on file   Social Needs    Financial resource strain: Not on file    Food insecurity - worry: Not on file    Food insecurity - inability: Not on file    Transportation needs - medical: Not on file    Transportation  needs - non-medical: Not on file   Occupational History    Not on file   Tobacco Use    Smoking status: Never Smoker    Smokeless tobacco: Never Used   Substance and Sexual Activity    Alcohol use: Yes     Alcohol/week: 0.6 oz     Types: 1 Glasses of wine per week     Comment: weekly with meal    Drug use: No    Sexual activity: Yes   Other Topics Concern    Not on file   Social History Narrative    Not on file       Allergies:  Pcn [penicillins]    Medications:    Current Outpatient Medications:     aspirin (ECOTRIN) 81 MG EC tablet, Take 81 mg by mouth once daily., Disp: , Rfl:     brimonidine 0.2% (ALPHAGAN) 0.2 % Drop, Place 1 drop into both eyes 2 (two) times daily., Disp: 30 mL, Rfl: 3    efinaconazole 10 % Werner, Apply to affected toenails once daily, Disp: 8 mL, Rfl: 11    fentaNYL (DURAGESIC) 25 mcg/hr, Place 1 patch onto the skin every 72 hours., Disp: , Rfl:     fluticasone (FLONASE) 50 mcg/actuation nasal spray, 2 sprays by Each Nare route once daily., Disp: 1 Bottle, Rfl: 6    gabapentin (NEURONTIN) 600 MG tablet, Take 1 tablet (600 mg total) by mouth once daily. (Patient taking differently: Take 300 mg by mouth once daily. ), Disp: 30 tablet, Rfl: 3    hydrocodone-acetaminophen 10-325mg (NORCO)  mg Tab, Take by mouth every 4 (four) hours as needed for Pain., Disp: , Rfl:     pravastatin (PRAVACHOL) 40 MG tablet, Take 1 tablet (40 mg total) by mouth once daily., Disp: 90 tablet, Rfl: 3    tamsulosin (FLOMAX) 0.4 mg Cp24, Take 1 capsule (0.4 mg total) by mouth once daily., Disp: 30 capsule, Rfl: 12    testosterone cypionate (DEPOTESTOTERONE CYPIONATE) 200 mg/mL injection, Inject 1 mL (200 mg total) into the muscle every 28 days., Disp: 10 mL, Rfl: 1    timolol maleate 0.5% (TIMOPTIC) 0.5 % Drop, Place 1 drop into both eyes 2 (two) times daily., Disp: 15 mL, Rfl: 3    travoprost (TRAVATAN Z) 0.004 % Drop, Place 1 drop into both eyes every evening., Disp: 7.5 mL, Rfl: 3    PHYSICAL  EXAMINATION:    The patient generally appears in good health, is appropriately interactive, and is in no apparent distress.     Eyes: anicteric sclerae, moist conjunctivae; no lid-lag; PERRLA     HENT: Atraumatic; oropharynx clear with moist mucous membranes and no mucosal ulcerations;normal hard and soft palate.  No evidence of lymphadenopathy.    Neck: Trachea midline.  No thyromegaly.    Musculoskeletal: No abnormal gait.    Skin: No lesions.    Mental: Cooperative with normal affect.  Is oriented to time, place, and person.    Neuro: Grossly intact.    Chest: Normal inspiratory effort.   No accessory muscles.  No audible wheezes.  Respirations symmetric on inspiration and expiration.    Heart: Regular rhythm.      Abdomen:  Soft, non-tender. No masses or organomegaly. Bladder is not palpable. No evidence of flank discomfort. No evidence of inguinal hernia.    Genitourinary: The penis is circumcised with no evidence of plaques or induration. The urethral meatus is normal. The testes, epididymides, and cord structures are normal in size and contour bilaterally. The scrotum is normal in size and contour.    Normal anal sphincter tone. No rectal mass.    The prostate is 50 g. Normal landmarks. Lateral sulci. Median furrow intact.  No nodularity or induration. Seminal vesicles are normal.    Extremities: No clubbing, cyanosis, or edema      LABS:      Lab Results   Component Value Date    PSADIAG 4.7 (H) 01/05/2018    PSADIAG 4.8 (H) 12/01/2016       IMPRESSION:    Encounter Diagnoses   Name Primary?    Male hypogonadism Yes    Chronic systolic congestive heart failure, pacing induced now s/p upgrade to CRT-P 2/2018          PLAN:    1. Discussed that he has CHF/dilated cardiomyopathy.  TRT is contraindicated in him.  2. Continue flomax.    3. RTC 1 year to see an LINA.  4. I spent 25 minutes with the patient of which more than half was spent in direct consultation with the patient in regards to our treatment and  plan.     5.Will discuss with his cardiologist the TRT per his request.    Copy to:

## 2018-09-06 ENCOUNTER — PATIENT MESSAGE (OUTPATIENT)
Dept: OPHTHALMOLOGY | Facility: CLINIC | Age: 83
End: 2018-09-06

## 2018-10-16 ENCOUNTER — CLINICAL SUPPORT (OUTPATIENT)
Dept: INTERNAL MEDICINE | Facility: CLINIC | Age: 83
End: 2018-10-16
Payer: MEDICARE

## 2018-10-16 VITALS — BODY MASS INDEX: 26.66 KG/M2 | WEIGHT: 191.13 LBS

## 2018-10-16 NOTE — PROGRESS NOTES
Health  Follow-Up Note   [x] Office  [] Phone  Notes from previous session reviewed.   [x] Previous Session Goals unchanged.   [] Patient/Caregiver Change in Goals.  Goals added or changed by Patient/Caregiver since program participation:  1.     Continue same plan  2. Decreased sugar and carbs more salads and fish occas steak     Additional/Changed support that patient/caregiver has experienced/sought?  (Indicate readiness, support from family, friends, others, community groups, etc)  1.  Wife    Additional/Changed obstacles that could prevent patient/caregiver from reaching their goals?  1.  none since made past first few weeks, initial 2 weeks hard    Feedback provided:  1.  Praised for great job  2. States he feels better and has more energy doing well    Diagnostic values/Desriptors for follow-up as needed for chronic condition(s)   Weight: 86.7 kg 191.1 lb down 17 lbs since 7/24/18 -(12 weeks).    Interventions:   1. Health  listened reflectively, validated thoughts and feelings, offered support and encouragement.   2. Allowed patient to express themselves in a non-biased atmosphere.  3. Health  assisted pt to problem-solve obstacles such as being in a challenging environment and dealing with these challenges.   4. Motivational Interviewed interventions utilized (OARS).   5. Patient responded favorably to interventions and remained actively engaged in the session.   6. Health  will remain available and connected for patient by phone and/or office visits.   7. Positive reinforcement, emotional support and encouragement provided.   8. Focused Education: MI    Plan:  [x] Pt will work on goals as stated above.   [x] Pt will contact Health  for any questions, concerns or needs.  [x] Pt will follow up with Health  in office on  1/15/19 at 0900.      [] Pt will follow up with Health  on phone in:        [x] Health  will remain available.   [] Health  will contact patient  by phone in:        [] Health  will consult:        [] Health  will inform Provider via EPIC messaging.     Impression:  1. Behavior is consistent with    Action   Stage of Change.   2. Participation level:       [x] Receptive      [x] Interactive      [] Guarded and Resistant      [x] Self Motivated      [] Refused/Declined to participate   3. [x] Pt voiced understanding of all information presented.       [] Pt voiced needing further information/education. This will be arranged.       [x] Pt would benefit from further education/information as identified by this health . This will be arranged.     Carie Hamilton RN HC

## 2018-10-23 ENCOUNTER — PATIENT MESSAGE (OUTPATIENT)
Dept: UROLOGY | Facility: CLINIC | Age: 83
End: 2018-10-23

## 2018-10-24 ENCOUNTER — PATIENT MESSAGE (OUTPATIENT)
Dept: INTERNAL MEDICINE | Facility: CLINIC | Age: 83
End: 2018-10-24

## 2018-10-25 ENCOUNTER — TELEPHONE (OUTPATIENT)
Dept: INTERNAL MEDICINE | Facility: CLINIC | Age: 83
End: 2018-10-25

## 2018-10-25 NOTE — TELEPHONE ENCOUNTER
----- Message from Nguyen Rojas sent at 10/25/2018  7:35 AM CDT -----  Contact: [facility]  Message from [facility], System Message sent at 10/24/2018  7:15 PM CDT -----    Appointment Request From: Artemio Colon Sr.    With Provider: Osiel Stone MD [[facility] - [facility] Medicine]    Preferred Date Range: 10/25/2018 - 10/31/2018    Preferred Times: Any time    Reason for visit: Existing Patient    Comments:  bloodwork indicates some positive results .

## 2018-10-26 ENCOUNTER — PATIENT MESSAGE (OUTPATIENT)
Dept: INTERNAL MEDICINE | Facility: CLINIC | Age: 83
End: 2018-10-26

## 2018-10-26 NOTE — TELEPHONE ENCOUNTER
Please call the patient to get a bit more information.  The last results I have listed in my system are from August 2018.  Does he have more recent results from an outside clinic that he is asking about?

## 2018-10-30 ENCOUNTER — CLINICAL SUPPORT (OUTPATIENT)
Dept: ELECTROPHYSIOLOGY | Facility: CLINIC | Age: 83
End: 2018-10-30
Payer: MEDICARE

## 2018-10-30 DIAGNOSIS — Z95.0 CARDIAC PACEMAKER IN SITU: Primary | ICD-10-CM

## 2018-10-30 DIAGNOSIS — Z95.0 CARDIAC PACEMAKER IN SITU: ICD-10-CM

## 2018-10-30 PROCEDURE — 93294 REM INTERROG EVL PM/LDLS PM: CPT | Mod: ,,, | Performed by: INTERNAL MEDICINE

## 2018-10-30 PROCEDURE — 93296 REM INTERROG EVL PM/IDS: CPT | Mod: PBBFAC | Performed by: INTERNAL MEDICINE

## 2018-11-07 ENCOUNTER — HOSPITAL ENCOUNTER (OUTPATIENT)
Dept: CARDIOLOGY | Facility: CLINIC | Age: 83
Discharge: HOME | End: 2018-11-07
Payer: MEDICARE

## 2018-11-07 ENCOUNTER — CLINICAL SUPPORT (OUTPATIENT)
Dept: CARDIOLOGY | Facility: HOSPITAL | Age: 83
End: 2018-11-07
Attending: INTERNAL MEDICINE
Payer: MEDICARE

## 2018-11-07 ENCOUNTER — OFFICE VISIT (OUTPATIENT)
Dept: ELECTROPHYSIOLOGY | Facility: CLINIC | Age: 83
End: 2018-11-07
Payer: MEDICARE

## 2018-11-07 VITALS
BODY MASS INDEX: 27.16 KG/M2 | HEART RATE: 67 BPM | WEIGHT: 194 LBS | SYSTOLIC BLOOD PRESSURE: 114 MMHG | DIASTOLIC BLOOD PRESSURE: 62 MMHG | HEIGHT: 71 IN

## 2018-11-07 DIAGNOSIS — I48.0 PAF (PAROXYSMAL ATRIAL FIBRILLATION): ICD-10-CM

## 2018-11-07 DIAGNOSIS — I50.22 CHRONIC SYSTOLIC CONGESTIVE HEART FAILURE: Primary | ICD-10-CM

## 2018-11-07 DIAGNOSIS — Z45.018: ICD-10-CM

## 2018-11-07 DIAGNOSIS — I25.10 CORONARY ARTERY DISEASE INVOLVING NATIVE CORONARY ARTERY OF NATIVE HEART WITHOUT ANGINA PECTORIS: Chronic | ICD-10-CM

## 2018-11-07 DIAGNOSIS — Z45.018: Primary | ICD-10-CM

## 2018-11-07 DIAGNOSIS — I44.2 CHB (COMPLETE HEART BLOCK): ICD-10-CM

## 2018-11-07 DIAGNOSIS — Z95.0 CARDIAC PACEMAKER IN SITU: ICD-10-CM

## 2018-11-07 DIAGNOSIS — I44.2 COMPLETE AV BLOCK: ICD-10-CM

## 2018-11-07 DIAGNOSIS — G47.33 OBSTRUCTIVE SLEEP APNEA ON CPAP: ICD-10-CM

## 2018-11-07 PROCEDURE — 99213 OFFICE O/P EST LOW 20 MIN: CPT | Mod: PBBFAC,25 | Performed by: INTERNAL MEDICINE

## 2018-11-07 PROCEDURE — 93281 PM DEVICE PROGR EVAL MULTI: CPT

## 2018-11-07 PROCEDURE — 93010 ELECTROCARDIOGRAM REPORT: CPT | Mod: S$PBB,,, | Performed by: INTERNAL MEDICINE

## 2018-11-07 PROCEDURE — 93281 PM DEVICE PROGR EVAL MULTI: CPT | Mod: 26,,, | Performed by: INTERNAL MEDICINE

## 2018-11-07 PROCEDURE — 99999 PR PBB SHADOW E&M-EST. PATIENT-LVL III: CPT | Mod: PBBFAC,,, | Performed by: INTERNAL MEDICINE

## 2018-11-07 PROCEDURE — 99213 OFFICE O/P EST LOW 20 MIN: CPT | Mod: S$PBB,,, | Performed by: INTERNAL MEDICINE

## 2018-11-07 PROCEDURE — 93005 ELECTROCARDIOGRAM TRACING: CPT | Mod: PBBFAC | Performed by: INTERNAL MEDICINE

## 2018-11-07 NOTE — PROGRESS NOTES
Subjective:    Patient ID:  Artemio Colon Sr. is a 89 y.o. male who presents for follow-up of Complete AV block    Referring Cardiologist: Nicholas Tee MD    HPI    Prior Hx:  I had the pleasure of seeing Mr. Colon today in our electrophysiology clinic for follow-up for his pacemaker and for his atrial fibrillation.  He recently moved here from [place] and established care in the cardiology clinic.  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 He has CAD (cath 2012 rx with medical therapy) and preserved EF (60-65% 1/2015). I do not know if he had a PVI or AVN ablation as he has complete AV block.  He feels well, exercises regularly.  He denies any chest pain, shortness of breath, palpitations, orthopnea or PND.  He is not on anticoagulation and when I brought it up as a discussion he stated he does not want to be on it and rathered not discuss it any further.     Mr. Colon presented 1/2018 noting shortness of breath and low energy. Recent remote interrogation noted <0.1% AMS burden with longest episode 3 min and 52 seconds. No AF noted. He wass 61% A and 100% V paced. His V-threshold was 1.75 @ 0.4 on first visit with me. Recent remote auto-capture threshold was 2.25 @ 0.4. It has slowly increased over the past year and a half with stable impedance. Checked in clinic with bipolar threshold of 1.5 @ 0.4 sitting up and unipolar threshold of 1.25@ 0.4 also sitting up. Lying down his capture threshold was 2.25 @ 0.4. ECHO noted LVEF of 40-45% and stress test was negative. We discussed upgrade to CRT-P which he underwent 2/2018.    Mr. Colon presents for post upgrade check 4/2018. At time of implant RV lead capture threshold was even higher so we elected to  cap it and implant a new RV lead. Device interrogation noted no AF, sinus rhythm/kashif with PACs and complete AV block, 6 sec of AMS x 2, stable lead parameters. Noted rare phrenic nerve stim that is treated with position movement. He notes that he had a hard time mentally coming out of anesthesia during his recent procedure.    Interim Hx:  Mr. Colon presents for 6 month post-CRT upgrade evaluation. ECHO 8/2018 noted LVEF was 60-65%. He feels well. Device interrogation from 10/30/2018 notes no AF, 59% RA and 100% BiV pacing. QRS duration is significantly prolonged compared to prior.     My interpretation of today's in clinic electrocardiogram is sinus with PACs and ventricular pacing with QRS duration of 200msec.    In clinic device interrogation notes slightly increased (2 @ 0.6ms) with small threshold-setting window. Changed settings to simultaneous RV-LV activation. Output increased to 3.5 @ 0.6ms. Repeat ECG notes 100% BiV pacing with QRS duration of 156ms.    Review of Systems   Constitution: Negative for fever, weakness and malaise/fatigue.   HENT: Negative for congestion and sore throat.    Eyes: Negative for blurred vision and visual disturbance.   Cardiovascular: Negative for chest pain, dyspnea on exertion, irregular heartbeat, leg swelling, orthopnea, palpitations and paroxysmal nocturnal dyspnea.   Respiratory: Negative for cough and shortness of breath.    Hematologic/Lymphatic: Negative for bleeding problem. Does not bruise/bleed easily.   Skin: Negative.    Musculoskeletal: Negative.    Gastrointestinal: Negative for abdominal pain, hematemesis and hematochezia.   Neurological: Negative for dizziness and focal weakness.        Objective:    Physical Exam   Constitutional: He is oriented to person, place, and time. He appears well-developed and well-nourished. No distress.   HENT:   Head: Normocephalic and atraumatic.   Eyes: Conjunctivae are normal. Right eye exhibits no discharge. Left eye  exhibits no discharge.   Neck: Neck supple. No JVD present.   Cardiovascular: Normal rate, regular rhythm and normal heart sounds. Exam reveals no gallop and no friction rub.   No murmur heard.  Pulmonary/Chest: Effort normal and breath sounds normal. No respiratory distress. He has no wheezes. He has no rales.   Pacemaker site well healed   Abdominal: Soft. Bowel sounds are normal. He exhibits no distension. There is no tenderness.   Musculoskeletal: He exhibits no edema.   Neurological: He is alert and oriented to person, place, and time.   Skin: Skin is warm and dry. He is not diaphoretic.   Psychiatric: He has a normal mood and affect. His behavior is normal. Judgment and thought content normal.   Vitals reviewed.        Assessment:       1. Chronic systolic congestive heart failure, pacing induced now s/p upgrade to CRT-P 2/2018    2. Cardiac pacemaker in situ    3. Complete AV block    4. Coronary artery disease involving native coronary artery of native heart without angina pectoris    5. PAF (paroxysmal atrial fibrillation)    6. Obstructive sleep apnea on CPAP         Plan:       In summary, Mr. 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 He is in complete AV block.  He also has JAEL, low testosterone, and CAD with preserved LVEF by ECHO in 2015. He is not on anticoagulation by choice and would not discuss it any further. 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 He is now s/p upgrade to CRT-P and RV lead revision for increasing RV lead threshold. He is doing well with symptomatic improvement. His EF has normalized with biventricular pacing. Continue remote checks every 3 months. RTC  in 6 months, sooner if needed    Return for LV lead threshold check in 2 months.    Thank you for allowing me to participate in the care of this patient. Please do not hesitate to call me with any questions or concerns.    Tawanda Adler MD, PhD  Cardiac Electrophysiology

## 2018-11-08 ENCOUNTER — TELEPHONE (OUTPATIENT)
Dept: ELECTROPHYSIOLOGY | Facility: CLINIC | Age: 83
End: 2018-11-08

## 2018-11-08 DIAGNOSIS — I44.2 CHB (COMPLETE HEART BLOCK): ICD-10-CM

## 2018-11-08 DIAGNOSIS — Z95.0 CARDIAC PACEMAKER IN SITU: Primary | ICD-10-CM

## 2018-11-08 DIAGNOSIS — I50.22 CHRONIC SYSTOLIC CONGESTIVE HEART FAILURE: Primary | ICD-10-CM

## 2018-11-08 NOTE — TELEPHONE ENCOUNTER
Called & advised pt of 2m device apt date & time.     Pt confirmed apt time & advised he saw it on his [facility].

## 2018-11-08 NOTE — TELEPHONE ENCOUNTER
----- Message from Gracie Davalos RN sent at 11/8/2018  6:35 AM CST -----  1/8/19 @ 9am    ----- Message -----  From: Stephanie Eller MA  Sent: 11/7/2018   2:04 PM  To: DIXIE Ruano,    Pt saw Vitor today.  Vitor asked for pt to come back in 2 months to have device checked again.  Since we are unable to schedule the device apts now, can you please schedule a device check around January 7th, or somewhere up in there, for me please, & let me know when you do so, so I can call patient to let him know the date & time.  I told patient we would be getting back with him tomorrow.    Thanks for your help!  Stephanie

## 2018-12-20 DIAGNOSIS — E29.1 MALE HYPOGONADISM: ICD-10-CM

## 2018-12-20 DIAGNOSIS — N13.8 BPH WITH URINARY OBSTRUCTION: ICD-10-CM

## 2018-12-20 DIAGNOSIS — N40.1 BPH WITH URINARY OBSTRUCTION: ICD-10-CM

## 2018-12-20 DIAGNOSIS — G62.9 NEUROPATHY: ICD-10-CM

## 2018-12-20 RX ORDER — GABAPENTIN 600 MG/1
600 TABLET ORAL DAILY
Qty: 30 TABLET | Refills: 3 | Status: SHIPPED | OUTPATIENT
Start: 2018-12-20 | End: 2018-12-21 | Stop reason: SDUPTHER

## 2018-12-20 RX ORDER — TAMSULOSIN HYDROCHLORIDE 0.4 MG/1
0.4 CAPSULE ORAL DAILY
Qty: 90 CAPSULE | Refills: 3 | Status: SHIPPED | OUTPATIENT
Start: 2018-12-20 | End: 2019-04-02 | Stop reason: SDUPTHER

## 2018-12-21 RX ORDER — GABAPENTIN 600 MG/1
600 TABLET ORAL DAILY
Qty: 30 TABLET | Refills: 3 | Status: SHIPPED | OUTPATIENT
Start: 2018-12-21 | End: 2019-10-02 | Stop reason: SDUPTHER

## 2019-01-07 ENCOUNTER — PATIENT MESSAGE (OUTPATIENT)
Dept: ADMINISTRATIVE | Facility: OTHER | Age: 84
End: 2019-01-07

## 2019-01-08 ENCOUNTER — CLINICAL SUPPORT (OUTPATIENT)
Dept: CARDIOLOGY | Facility: HOSPITAL | Age: 84
End: 2019-01-08
Attending: INTERNAL MEDICINE
Payer: MEDICARE

## 2019-01-08 DIAGNOSIS — I50.22 CHRONIC SYSTOLIC CONGESTIVE HEART FAILURE: ICD-10-CM

## 2019-01-08 DIAGNOSIS — I44.2 CHB (COMPLETE HEART BLOCK): ICD-10-CM

## 2019-01-08 DIAGNOSIS — Z95.0 CARDIAC PACEMAKER IN SITU: ICD-10-CM

## 2019-01-08 PROCEDURE — 93281 PM DEVICE PROGR EVAL MULTI: CPT | Mod: 26,,, | Performed by: INTERNAL MEDICINE

## 2019-01-08 PROCEDURE — 93281 PM DEVICE PROGR EVAL MULTI: CPT

## 2019-01-08 PROCEDURE — 93281 CARDIAC DEVICE CHECK - IN CLINIC (CUPID ONLY): ICD-10-PCS | Mod: 26,,, | Performed by: INTERNAL MEDICINE

## 2019-01-09 ENCOUNTER — PATIENT MESSAGE (OUTPATIENT)
Dept: ADMINISTRATIVE | Facility: OTHER | Age: 84
End: 2019-01-09

## 2019-01-10 ENCOUNTER — TELEPHONE (OUTPATIENT)
Dept: ELECTROPHYSIOLOGY | Facility: CLINIC | Age: 84
End: 2019-01-10

## 2019-01-10 NOTE — TELEPHONE ENCOUNTER
Request received from [facility] asking if [employer] rep is necessary to be present for surgical procedure and if magnet can be used.      Request to be sent to Device team for answer.

## 2019-01-28 ENCOUNTER — TELEPHONE (OUTPATIENT)
Dept: CARDIOLOGY | Facility: HOSPITAL | Age: 84
End: 2019-01-28

## 2019-01-29 ENCOUNTER — CLINICAL SUPPORT (OUTPATIENT)
Dept: CARDIOLOGY | Facility: HOSPITAL | Age: 84
End: 2019-01-29
Attending: INTERNAL MEDICINE
Payer: MEDICARE

## 2019-01-29 DIAGNOSIS — Z95.0 CARDIAC PACEMAKER IN SITU: ICD-10-CM

## 2019-01-29 DIAGNOSIS — I48.91 ATRIAL FIBRILLATION, UNSPECIFIED TYPE: ICD-10-CM

## 2019-01-29 PROCEDURE — 93296 REM INTERROG EVL PM/IDS: CPT

## 2019-02-01 DIAGNOSIS — Z95.0 CARDIAC PACEMAKER IN SITU: Primary | ICD-10-CM

## 2019-02-01 DIAGNOSIS — I48.0 PAROXYSMAL ATRIAL FIBRILLATION: ICD-10-CM

## 2019-02-01 DIAGNOSIS — I44.2 CHB (COMPLETE HEART BLOCK): ICD-10-CM

## 2019-02-07 ENCOUNTER — CLINICAL SUPPORT (OUTPATIENT)
Dept: INTERNAL MEDICINE | Facility: CLINIC | Age: 84
End: 2019-02-07
Payer: MEDICARE

## 2019-02-07 VITALS — WEIGHT: 194 LBS | BODY MASS INDEX: 27.44 KG/M2

## 2019-02-07 PROCEDURE — 99211 OFF/OP EST MAY X REQ PHY/QHP: CPT | Mod: PBBFAC

## 2019-02-07 PROCEDURE — 99999 PR PBB SHADOW E&M-EST. PATIENT-LVL I: CPT | Mod: PBBFAC,,,

## 2019-02-07 PROCEDURE — 99999 PR PBB SHADOW E&M-EST. PATIENT-LVL I: ICD-10-PCS | Mod: PBBFAC,,,

## 2019-02-07 NOTE — PROGRESS NOTES
Health  Follow-Up Note   [x] Office  [] Phone  Notes from previous session reviewed.   [x] Previous Session Goals unchanged.   [] Patient/Caregiver Change in Goals.  Goals added or changed by Patient/Caregiver since program participation:   1. Start yoga        Additional/Changed support that patient/caregiver has experienced/sought?  (Indicate readiness, support from family, friends, others, community groups, etc)  1.  Wife    Additional/Changed obstacles that could prevent patient/caregiver from reaching their goals?  1.  Holidays    Feedback provided:  1.  Praised for continued effort and determination    Diagnostic values/Desriptors for follow-up as needed for chronic condition(s)   Weight: 88 kg 194 lb gain 3 lbs, total loss 14 lbs    Interventions:   1. Health  listened reflectively, validated thoughts and feelings, offered support and encouragement.   2. Allowed patient to express themselves in a non-biased atmosphere.  3. Health  assisted pt to problem-solve obstacles such as being in a challenging environment and dealing with these challenges.   4. Motivational Interviewed interventions utilized (OARS).   5. Patient responded favorably to interventions and remained actively engaged in the session.   6. Health  will remain available and connected for patient by phone and/or office visits.   7. Positive reinforcement, emotional support and encouragement provided.   8. Focused Education: MI    Plan:  [x] Pt will work on goals as stated above.   [x] Pt will contact Health  for any questions, concerns or needs.  [x] Pt will follow up with Health  in office on  4/11/19 at 0900.  [] Pt will follow up with Health  on phone in:        [x] Health  will remain available.   [] Health  will contact patient by phone in:        [] Health  will consult:        [] Health  will inform Provider via EPIC messaging.     Impression:  1. Behavior is consistent with Action  Stage of Change.   2. Participation level:       [x] Receptive      [x] Interactive      [] Guarded and Resistant      [x] Self Motivated      [] Refused/Declined to participate   3. [x] Pt voiced understanding of all information presented.       [] Pt voiced needing further information/education. This will be arranged.       [x] Pt would benefit from further education/information as identified by this health . This will be arranged.     Carie Hamilton RN HC

## 2019-02-14 ENCOUNTER — HOSPITAL ENCOUNTER (INPATIENT)
Facility: HOSPITAL | Age: 84
LOS: 2 days | Discharge: HOME | DRG: 390 | End: 2019-02-16
Attending: EMERGENCY MEDICINE | Admitting: SURGERY
Payer: MEDICARE

## 2019-02-14 ENCOUNTER — TELEPHONE (OUTPATIENT)
Dept: UROLOGY | Facility: CLINIC | Age: 84
End: 2019-02-14

## 2019-02-14 DIAGNOSIS — Z46.59 ENCOUNTER FOR NASOGASTRIC (NG) TUBE PLACEMENT: ICD-10-CM

## 2019-02-14 DIAGNOSIS — K56.609 SMALL BOWEL OBSTRUCTION: ICD-10-CM

## 2019-02-14 LAB
BACTERIA #/AREA URNS AUTO: NORMAL /HPF
BASOPHILS # BLD AUTO: 0.05 K/UL
BASOPHILS NFR BLD: 0.3 %
BILIRUB UR QL STRIP: NEGATIVE
BUN SERPL-MCNC: 21 MG/DL (ref 6–30)
CHLORIDE SERPL-SCNC: 102 MMOL/L (ref 95–110)
CLARITY UR REFRACT.AUTO: CLEAR
COLOR UR AUTO: YELLOW
CREAT SERPL-MCNC: 1 MG/DL (ref 0.5–1.4)
DIFFERENTIAL METHOD: ABNORMAL
EOSINOPHIL # BLD AUTO: 0 K/UL
EOSINOPHIL NFR BLD: 0.1 %
ERYTHROCYTE [DISTWIDTH] IN BLOOD BY AUTOMATED COUNT: 13.2 %
GLUCOSE SERPL-MCNC: 128 MG/DL (ref 70–110)
GLUCOSE UR QL STRIP: NEGATIVE
HCT VFR BLD AUTO: 59.7 %
HCT VFR BLD CALC: 63 %PCV (ref 36–54)
HGB BLD-MCNC: 19.4 G/DL
HGB UR QL STRIP: ABNORMAL
HYALINE CASTS UR QL AUTO: 0 /LPF
IMM GRANULOCYTES # BLD AUTO: 0.11 K/UL
IMM GRANULOCYTES NFR BLD AUTO: 0.7 %
KETONES UR QL STRIP: ABNORMAL
LACTATE SERPL-SCNC: 1.4 MMOL/L
LEUKOCYTE ESTERASE UR QL STRIP: NEGATIVE
LYMPHOCYTES # BLD AUTO: 0.7 K/UL
LYMPHOCYTES NFR BLD: 4.4 %
MCH RBC QN AUTO: 31.6 PG
MCHC RBC AUTO-ENTMCNC: 32.5 G/DL
MCV RBC AUTO: 97 FL
MICROSCOPIC COMMENT: NORMAL
MONOCYTES # BLD AUTO: 1.2 K/UL
MONOCYTES NFR BLD: 7.7 %
NEUTROPHILS # BLD AUTO: 14 K/UL
NEUTROPHILS NFR BLD: 86.8 %
NITRITE UR QL STRIP: NEGATIVE
NRBC BLD-RTO: 0 /100 WBC
PH UR STRIP: 5 [PH] (ref 5–8)
PLATELET # BLD AUTO: 204 K/UL
PMV BLD AUTO: 10.4 FL
POC IONIZED CALCIUM: 1.02 MMOL/L (ref 1.06–1.42)
POC TCO2 (MEASURED): 26 MMOL/L (ref 23–29)
POTASSIUM BLD-SCNC: 5.1 MMOL/L (ref 3.5–5.1)
PROT UR QL STRIP: ABNORMAL
RBC # BLD AUTO: 6.13 M/UL
RBC #/AREA URNS AUTO: 1 /HPF (ref 0–4)
SAMPLE: ABNORMAL
SODIUM BLD-SCNC: 137 MMOL/L (ref 136–145)
SP GR UR STRIP: 1.01 (ref 1–1.03)
SQUAMOUS #/AREA URNS AUTO: 0 /HPF
URN SPEC COLLECT METH UR: ABNORMAL
WBC # BLD AUTO: 16.08 K/UL
WBC #/AREA URNS AUTO: 2 /HPF (ref 0–5)

## 2019-02-14 PROCEDURE — 99285 PR EMERGENCY DEPT VISIT,LEVEL V: ICD-10-PCS | Mod: ,,, | Performed by: EMERGENCY MEDICINE

## 2019-02-14 PROCEDURE — 25000003 PHARM REV CODE 250: Performed by: STUDENT IN AN ORGANIZED HEALTH CARE EDUCATION/TRAINING PROGRAM

## 2019-02-14 PROCEDURE — 96375 TX/PRO/DX INJ NEW DRUG ADDON: CPT

## 2019-02-14 PROCEDURE — 25000003 PHARM REV CODE 250: Performed by: EMERGENCY MEDICINE

## 2019-02-14 PROCEDURE — 99285 EMERGENCY DEPT VISIT HI MDM: CPT | Mod: ,,, | Performed by: EMERGENCY MEDICINE

## 2019-02-14 PROCEDURE — 96374 THER/PROPH/DIAG INJ IV PUSH: CPT

## 2019-02-14 PROCEDURE — 25500020 PHARM REV CODE 255: Performed by: EMERGENCY MEDICINE

## 2019-02-14 PROCEDURE — 85025 COMPLETE CBC W/AUTO DIFF WBC: CPT

## 2019-02-14 PROCEDURE — 63600175 PHARM REV CODE 636 W HCPCS: Performed by: STUDENT IN AN ORGANIZED HEALTH CARE EDUCATION/TRAINING PROGRAM

## 2019-02-14 PROCEDURE — 81001 URINALYSIS AUTO W/SCOPE: CPT

## 2019-02-14 PROCEDURE — 99285 EMERGENCY DEPT VISIT HI MDM: CPT | Mod: 25

## 2019-02-14 PROCEDURE — 20600001 HC STEP DOWN PRIVATE ROOM

## 2019-02-14 PROCEDURE — 63600175 PHARM REV CODE 636 W HCPCS: Performed by: EMERGENCY MEDICINE

## 2019-02-14 PROCEDURE — 96361 HYDRATE IV INFUSION ADD-ON: CPT

## 2019-02-14 PROCEDURE — A4216 STERILE WATER/SALINE, 10 ML: HCPCS | Performed by: STUDENT IN AN ORGANIZED HEALTH CARE EDUCATION/TRAINING PROGRAM

## 2019-02-14 PROCEDURE — 96372 THER/PROPH/DIAG INJ SC/IM: CPT | Mod: 59

## 2019-02-14 PROCEDURE — 83605 ASSAY OF LACTIC ACID: CPT

## 2019-02-14 RX ORDER — SODIUM CHLORIDE 9 MG/ML
INJECTION, SOLUTION INTRAVENOUS CONTINUOUS
Status: DISCONTINUED | OUTPATIENT
Start: 2019-02-14 | End: 2019-02-16

## 2019-02-14 RX ORDER — MORPHINE SULFATE 2 MG/ML
2 INJECTION, SOLUTION INTRAMUSCULAR; INTRAVENOUS EVERY 4 HOURS PRN
Status: DISCONTINUED | OUTPATIENT
Start: 2019-02-14 | End: 2019-02-16 | Stop reason: HOSPADM

## 2019-02-14 RX ORDER — LIDOCAINE HYDROCHLORIDE 20 MG/ML
5 SOLUTION OROPHARYNGEAL
Status: COMPLETED | OUTPATIENT
Start: 2019-02-14 | End: 2019-02-14

## 2019-02-14 RX ORDER — TAMSULOSIN HYDROCHLORIDE 0.4 MG/1
0.4 CAPSULE ORAL DAILY
Status: DISCONTINUED | OUTPATIENT
Start: 2019-02-15 | End: 2019-02-14

## 2019-02-14 RX ORDER — FLUTICASONE PROPIONATE 50 MCG
2 SPRAY, SUSPENSION (ML) NASAL DAILY
Status: DISCONTINUED | OUTPATIENT
Start: 2019-02-15 | End: 2019-02-16 | Stop reason: HOSPADM

## 2019-02-14 RX ORDER — MORPHINE SULFATE 4 MG/ML
4 INJECTION, SOLUTION INTRAMUSCULAR; INTRAVENOUS
Status: COMPLETED | OUTPATIENT
Start: 2019-02-14 | End: 2019-02-14

## 2019-02-14 RX ORDER — RAMELTEON 8 MG/1
8 TABLET ORAL NIGHTLY PRN
Status: DISCONTINUED | OUTPATIENT
Start: 2019-02-14 | End: 2019-02-15

## 2019-02-14 RX ORDER — ANASTROZOLE 1 MG/1
1 TABLET ORAL
COMMUNITY
End: 2019-04-02

## 2019-02-14 RX ORDER — ONDANSETRON 2 MG/ML
4 INJECTION INTRAMUSCULAR; INTRAVENOUS
Status: COMPLETED | OUTPATIENT
Start: 2019-02-14 | End: 2019-02-14

## 2019-02-14 RX ORDER — GABAPENTIN 250 MG/5ML
600 SOLUTION ORAL NIGHTLY
Status: DISCONTINUED | OUTPATIENT
Start: 2019-02-14 | End: 2019-02-15

## 2019-02-14 RX ORDER — HYDROMORPHONE HYDROCHLORIDE 1 MG/ML
1 INJECTION, SOLUTION INTRAMUSCULAR; INTRAVENOUS; SUBCUTANEOUS EVERY 4 HOURS PRN
Status: DISCONTINUED | OUTPATIENT
Start: 2019-02-14 | End: 2019-02-16 | Stop reason: HOSPADM

## 2019-02-14 RX ORDER — PRAVASTATIN SODIUM 40 MG/1
40 TABLET ORAL DAILY
Status: DISCONTINUED | OUTPATIENT
Start: 2019-02-15 | End: 2019-02-16 | Stop reason: HOSPADM

## 2019-02-14 RX ORDER — SODIUM CHLORIDE 0.9 % (FLUSH) 0.9 %
3 SYRINGE (ML) INJECTION EVERY 8 HOURS
Status: DISCONTINUED | OUTPATIENT
Start: 2019-02-14 | End: 2019-02-16 | Stop reason: HOSPADM

## 2019-02-14 RX ORDER — TAMSULOSIN HYDROCHLORIDE 0.4 MG/1
0.4 CAPSULE ORAL DAILY
Status: DISCONTINUED | OUTPATIENT
Start: 2019-02-15 | End: 2019-02-16 | Stop reason: HOSPADM

## 2019-02-14 RX ORDER — PRAVASTATIN SODIUM 10 MG/1
40 TABLET ORAL DAILY
Status: DISCONTINUED | OUTPATIENT
Start: 2019-02-15 | End: 2019-02-14

## 2019-02-14 RX ORDER — ENOXAPARIN SODIUM 100 MG/ML
40 INJECTION SUBCUTANEOUS EVERY 24 HOURS
Status: DISCONTINUED | OUTPATIENT
Start: 2019-02-14 | End: 2019-02-16 | Stop reason: HOSPADM

## 2019-02-14 RX ORDER — TRAVOPROST OPHTHALMIC SOLUTION 0.04 MG/ML
1 SOLUTION OPHTHALMIC NIGHTLY
Status: DISCONTINUED | OUTPATIENT
Start: 2019-02-14 | End: 2019-02-16 | Stop reason: HOSPADM

## 2019-02-14 RX ORDER — ONDANSETRON 2 MG/ML
4 INJECTION INTRAMUSCULAR; INTRAVENOUS EVERY 6 HOURS PRN
Status: DISCONTINUED | OUTPATIENT
Start: 2019-02-14 | End: 2019-02-16 | Stop reason: HOSPADM

## 2019-02-14 RX ORDER — BRIMONIDINE TARTRATE 2 MG/ML
1 SOLUTION/ DROPS OPHTHALMIC 2 TIMES DAILY
Status: DISCONTINUED | OUTPATIENT
Start: 2019-02-14 | End: 2019-02-16 | Stop reason: HOSPADM

## 2019-02-14 RX ORDER — TIMOLOL MALEATE 5 MG/ML
1 SOLUTION/ DROPS OPHTHALMIC 2 TIMES DAILY
Status: DISCONTINUED | OUTPATIENT
Start: 2019-02-14 | End: 2019-02-16 | Stop reason: HOSPADM

## 2019-02-14 RX ADMIN — TIMOLOL MALEATE 1 DROP: 5 SOLUTION/ DROPS OPHTHALMIC at 09:02

## 2019-02-14 RX ADMIN — IOHEXOL 100 ML: 350 INJECTION, SOLUTION INTRAVENOUS at 01:02

## 2019-02-14 RX ADMIN — Medication 3 ML: at 10:02

## 2019-02-14 RX ADMIN — ENOXAPARIN SODIUM 40 MG: 100 INJECTION SUBCUTANEOUS at 06:02

## 2019-02-14 RX ADMIN — GABAPENTIN 600 MG: 250 SOLUTION ORAL at 09:02

## 2019-02-14 RX ADMIN — HYDROMORPHONE HYDROCHLORIDE 1 MG: 1 INJECTION, SOLUTION INTRAMUSCULAR; INTRAVENOUS; SUBCUTANEOUS at 09:02

## 2019-02-14 RX ADMIN — SODIUM CHLORIDE 1000 ML: 0.9 INJECTION, SOLUTION INTRAVENOUS at 01:02

## 2019-02-14 RX ADMIN — SODIUM CHLORIDE: 0.9 INJECTION, SOLUTION INTRAVENOUS at 04:02

## 2019-02-14 RX ADMIN — MORPHINE SULFATE 4 MG: 4 INJECTION INTRAVENOUS at 03:02

## 2019-02-14 RX ADMIN — ONDANSETRON 4 MG: 2 INJECTION INTRAMUSCULAR; INTRAVENOUS at 01:02

## 2019-02-14 RX ADMIN — LIDOCAINE HYDROCHLORIDE 5 ML: 20 SOLUTION ORAL; TOPICAL at 03:02

## 2019-02-14 NOTE — ED NOTES
Patient identifiers verified and correct for Mr Colon  C/C: Nausea, vomiting, diarrhea  APPEARANCE: awake and alert in NAD.  SKIN: warm, dry and intact. No breakdown or bruising.  MUSCULOSKELETAL: Patient moving all extremities spontaneously, no obvious swelling or deformities noted. Ambulates independently.  RESPIRATORY: Denies shortness of breath.Respirations unlabored.   CARDIAC: Denies CP, 2+ distal pulses; no peripheral edema  ABDOMEN: ABdomen large, round, positive nausea, positive vomiting, positive diarrhea  : voids spontaneously, denies difficulty  Neurologic: AAO x 4; follows commands equal strength in all extremities; denies numbness/tingling. Denies dizziness Positive gen weakness

## 2019-02-14 NOTE — TELEPHONE ENCOUNTER
I spoke with patient's wife   Who agreed to see JOSE ANTONIO Boudreaux today. For dysuria/hematuria.

## 2019-02-14 NOTE — CONSULTS
Consult  Surgery      SUBJECTIVE:     Reason for Consult: SBO    History of Present Illness: Artemio Colon Sr. is a 89 y.o. 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 Of note he reports eating some cold cuts that had been sitting in his fridge at 0200 today.   He has a history of SBO about 3 years ago, managed conservatively, then he underwent robo ANA electively at [facility].     He reports his abdominal pain and nausea have resolved somewhat in the ED. Still having bowel function and passing flatus.     PMhx: CAD, BPH, as above  SxHx: cholecystectomy, partial colectomy for cecal polyp in 90s, Robotic ANA 3 years ago at , pacemaker placement  All: PCN  Takes ASA 81, otherwise denies anticoagulant use    No current facility-administered medications on file prior to encounter.      Current Outpatient Medications on File Prior to Encounter   Medication Sig    anastrozole (ARIMIDEX) 1 mg Tab Take 1 mg by mouth Every 3 (three) days.    aspirin (ECOTRIN) 81 MG EC tablet Take 81 mg by mouth once daily.    fentaNYL (DURAGESIC) 25 mcg/hr Place 1 patch onto the skin every 72 hours.    gabapentin (NEURONTIN) 600 MG tablet Take 1 tablet (600 mg total) by mouth once daily. (Patient taking differently: Take 600 mg by mouth every evening. )    hydrocodone-acetaminophen 10-325mg (NORCO)  mg Tab Take by mouth every 4 (four) hours as needed for Pain.    prasterone, DHEA, (DHEA ORAL) Take by mouth every evening. 1 cap at bedtime    pravastatin (PRAVACHOL) 40 MG tablet Take 1 tablet (40 mg total) by mouth once daily.    tamsulosin (FLOMAX) 0.4 mg Cap Take 1 capsule (0.4 mg total) by mouth once daily.    testosterone cypionate 200 mg/mL Kit Inject into the muscle Every 3 (three) days. 10 ml once every 3 days    UNABLE TO FIND medication name: HCG 12,000 iu vial once every 3 days     brimonidine 0.2% (ALPHAGAN) 0.2 % Drop Place 1 drop into both eyes 2 (two) times daily.    efinaconazole 10 % Werner Apply to affected toenails once daily    fluticasone (FLONASE) 50 mcg/actuation nasal spray 2 sprays by Each Nare route once daily.    timolol maleate 0.5% (TIMOPTIC) 0.5 % Drop Place 1 drop into both eyes 2 (two) times daily.    travoprost (TRAVATAN Z) 0.004 % Drop Place 1 drop into both eyes every evening.    [DISCONTINUED] testosterone cypionate (DEPOTESTOTERONE CYPIONATE) 200 mg/mL injection Inject 1 mL (200 mg total) into the muscle every 28 days.       Review of patient's allergies indicates:   Allergen Reactions    Pcn [penicillins] Hives       Past Medical History:   Diagnosis Date    AV block     BPH (benign prostatic hyperplasia)     Chronic rhinitis     Coronary artery disease     Erectile dysfunction     Glaucoma     Neuropathy 8/22/2017    JAEL (obstructive sleep apnea)     Pacemaker     Symptomatic bradycardia     s/p pacemaker     Past Surgical History:   Procedure Laterality Date    CATARACT EXTRACTION W/  INTRAOCULAR LENS IMPLANT Bilateral 2012    done in [place]    CHOLECYSTECTOMY      COLECTOMY      cecum    INSERT / REPLACE / REMOVE PACEMAKER  2012    changed    PTRFYBWLO-WOVLOLNYL-PYGXHFVXFFOTG N/A 2/16/2018    [facility]    VENOGRAM N/A 2/2/2018    Per[facility] at [facility] CATH LAB     Family History   Problem Relation Age of Onset    Amblyopia Neg Hx     Blindness Neg Hx     Cataracts Neg Hx     Glaucoma Neg Hx     Macular degeneration Neg Hx     Retinal detachment Neg Hx     Strabismus Neg Hx      Social History     Tobacco Use    Smoking status: Never Smoker    Smokeless tobacco: Never Used   Substance Use Topics    Alcohol use: Yes     Alcohol/week: 0.6 oz     Types: 1 Glasses of wine per week     Comment: weekly with meal    Drug use: No        Review of Systems  Otherwise negative except as  listed above    OBJECTIVE:     Vital Signs (Most Recent)  Temp: 98.7 °F (37.1 °C) (02/14/19 1144)  Pulse: 90 (02/14/19 1144)  Resp: 18 (02/14/19 1144)  BP: (!) 141/87 (02/14/19 1144)  SpO2: 95 % (02/14/19 1144)    Physical Exam  A&O, NAD  RRR  No Respiratory Distress  ABD: soft, non tender, non distended, no palpable hernias    Laboratory  CBC:   Recent Labs   Lab 02/14/19  1247 02/14/19  1250   WBC 16.08*  --    RBC 6.13  --    HGB 19.4*  --    HCT 59.7* 63*     --    MCV 97  --    MCH 31.6*  --    MCHC 32.5  --      CMP: No results for input(s): GLU, CALCIUM, ALBUMIN, PROT, NA, K, CO2, CL, BUN, CREATININE, ALKPHOS, ALT, AST, BILITOT in the last 168 hours.    Lactate 1.4    Diagnostic Results:  CT: Reviewed  Impression       1. Multiple loops of dilated small bowel with decompressed bowel just distal to the anastomotic site concerning for obstruction at prior surgical site.  Barium enema may be helpful for further evaluation of the anastomotic site.  2. Colonic diverticulosis.  3. Right-sided nonobstructing nephrolithiasis.  4. Prostatomegaly.     ASSESSMENT/PLAN:     A/P:  Artemio Colon . is a 89 y.o. male with pSBO starting this morning. Possible transition point near the previous surgical site    - Admit to surgery  - NPO, NGT  - mIVF  - likely gastrografin challenge tomorrow   - home meds via NG    Artemio Sterling MD   Pager: [phone]  General Surgery PGY-II  [facility]

## 2019-02-14 NOTE — ED TRIAGE NOTES
Patient states he woke up this morning with vomiting (x6) and diarrhea (x6) Positive burning with urination, denies fever. Duragesic patch in place, mult testosterone meds 2 days PTA

## 2019-02-14 NOTE — ED NOTES
The patient is awake, alert and cooperative with a calm affect, patient is aware of environment, airway is open and patent, respirations are spontaneous, normal effort and rate noted, skin warm and dry, moves all extremities well, appearance: no apparent distress noted, bed placed in low position, side rails up x 2, call light is within reach of patient or family, explanation of care provided to family and patient, alarms set and turned on for monitor, pulse ox, plan of care: family to bedside, observe and reassure, position of comfort, patient offers no complaints at this time, awaiting admit bed, will continue to monitor.

## 2019-02-14 NOTE — ED PROVIDER NOTES
Encounter Date: 2/14/2019    SCRIBE #1 NOTE: I, Radha Ku, am scribing for, and in the presence of,  Dr. Hurd. I have scribed the entire note.       History     Chief Complaint   Patient presents with    Abdominal Pain     with v/d x 6 hours.      Time patient was seen by the provider: 11:50 AM      The patient is a 89 y.o. male with co-morbidities including: glaucoma, pacemaker, who presents to the ED with a complaint of abdominal pain that began at 5:00 AM this morning. Patient ate some questionable chicken at 2:00 AM today. He woke up with diarrhea, describes as watery soft stools. He had 8 episodes of vomiting, consisting of his food then green bile. Denies blood in the vomit. Rates pain 7/10 in severity. He notes of nausea, light-headedness, general weakness. Denies fever, congestion, sore throat, cough, chest pain, SOB. No sick contacts.         The history is provided by the patient and medical records.     Review of patient's allergies indicates:   Allergen Reactions    Pcn [penicillins] Hives     Past Medical History:   Diagnosis Date    AV block     BPH (benign prostatic hyperplasia)     Chronic rhinitis     Coronary artery disease     Erectile dysfunction     Glaucoma     Neuropathy 8/22/2017    JAEL (obstructive sleep apnea)     Pacemaker     Symptomatic bradycardia     s/p pacemaker     Past Surgical History:   Procedure Laterality Date    CATARACT EXTRACTION W/  INTRAOCULAR LENS IMPLANT Bilateral 2012    done in [place]    CHOLECYSTECTOMY      COLECTOMY      cecum    INSERT / REPLACE / REMOVE PACEMAKER  2012    changed    [id number] N/A 2/16/2018    Performed by [facility]ler [facility] at [facility] CATH LAB    VENOGRAM N/A 2/2/2018    Performed by Tawanda Adler MD at [facility] CATH LAB     Family History   Problem Relation Age of Onset    Amblyopia Neg Hx     Blindness Neg Hx     Cataracts Neg Hx     Glaucoma Neg Hx     Macular degeneration Neg Hx     Retinal  detachment Neg Hx     Strabismus Neg Hx      Social History     Tobacco Use    Smoking status: Never Smoker    Smokeless tobacco: Never Used   Substance Use Topics    Alcohol use: Yes     Alcohol/week: 0.6 oz     Types: 1 Glasses of wine per week     Comment: weekly with meal    Drug use: No     Review of Systems   Constitutional: Negative for fever.   HENT: Negative for sore throat.    Respiratory: Negative for shortness of breath.    Cardiovascular: Negative for chest pain.   Gastrointestinal: Positive for abdominal pain, diarrhea, nausea and vomiting.   Genitourinary: Negative for dysuria.   Musculoskeletal: Negative for back pain.   Skin: Negative for rash.   Neurological: Positive for light-headedness. Negative for weakness.   Hematological: Does not bruise/bleed easily.       Physical Exam     Initial Vitals [02/14/19 1144]   BP Pulse Resp Temp SpO2   (!) 141/87 90 18 98.7 °F (37.1 °C) 95 %      MAP       --         Physical Exam    Nursing note and vitals reviewed.  Constitutional: He appears well-developed and well-nourished. He is not diaphoretic. No distress.   HENT:   Head: Normocephalic and atraumatic.   Mouth/Throat: Oropharynx is clear and moist. Mucous membranes are dry.   Neck: Normal range of motion. Neck supple. No JVD present.   Cardiovascular: Normal rate, regular rhythm, normal heart sounds and intact distal pulses.   Pulmonary/Chest: Breath sounds normal. No respiratory distress. He has no wheezes. He has no rhonchi. He has no rales.   Abdominal: Soft. He exhibits no distension. There is tenderness. There is no rebound and no guarding.   Bilateral lower quadrant tenderness   Musculoskeletal: Normal range of motion. He exhibits no edema or tenderness.   No calf tenderness.   Neurological: He is alert and oriented to person, place, and time. He has normal strength. No cranial nerve deficit or sensory deficit.   Skin: Skin is warm and dry.         ED Course   Procedures  Labs Reviewed   CBC  W/ AUTO DIFFERENTIAL - Abnormal; Notable for the following components:       Result Value    WBC 16.08 (*)     Hemoglobin 19.4 (*)     Hematocrit 59.7 (*)     MCH 31.6 (*)     Immature Granulocytes 0.7 (*)     Gran # (ANC) 14.0 (*)     Immature Grans (Abs) 0.11 (*)     Lymph # 0.7 (*)     Mono # 1.2 (*)     Gran% 86.8 (*)     Lymph% 4.4 (*)     All other components within normal limits   URINALYSIS, REFLEX TO URINE CULTURE - Abnormal; Notable for the following components:    Protein, UA 2+ (*)     Ketones, UA Trace (*)     Occult Blood UA 1+ (*)     All other components within normal limits    Narrative:     Preferred Collection Type->Urine, Clean Catch   ISTAT PROCEDURE - Abnormal; Notable for the following components:    POC Glucose 128 (*)     POC Ionized Calcium 1.02 (*)     POC Hematocrit 63 (*)     All other components within normal limits   LACTIC ACID, PLASMA   URINALYSIS MICROSCOPIC    Narrative:     Preferred Collection Type->Urine, Clean Catch          Imaging Results          X-Ray Abdomen AP 1 View (In process)                CT Abdomen Pelvis With Contrast (Final result)  Result time 02/14/19 13:53:45    Final result by Reyes Mullen MD (02/14/19 13:53:45)                 Impression:      1. Multiple loops of dilated small bowel with decompressed bowel just distal to the anastomotic site concerning for obstruction at prior surgical site.  Barium enema may be helpful for further evaluation of the anastomotic site.  2. Colonic diverticulosis.  3. Right-sided nonobstructing nephrolithiasis.  4. Prostatomegaly.    Electronically signed by resident: Nicola Nicole  Date:    02/14/2019  Time:    13:29    Electronically signed by: Reyes Mullen MD  Date:    02/14/2019  Time:    13:53             Narrative:    EXAMINATION:  CT ABDOMEN PELVIS WITH CONTRAST    CLINICAL HISTORY:  Nausea, vomiting, diarrhea    TECHNIQUE:  5.0 mm axial CT images of the abdomen and pelvis were obtained via helical, multi detector  CT technique.  100 cc Omnipaque 350 intravenous contrast material was administered.    COMPARISON:  None.    FINDINGS:  Heart/Vasculature: No cardiomegaly or pericardial effusion.  Aorta is normal in caliber with mild atherosclerotic calcification along the course of the aorta.  Transvenous catheter leads extending into the right atrium and right ventricle.    Lung Bases: Symmetrically expanded and clear.    Liver: Normal in size and attenuation without focal hepatic abnormality.    Gallbladder: Normal appearance without evidence for cholecystitis.    Bile Ducts: No intra or extrahepatic biliary ductal dilation.    Pancreas: Fatty involution of the pancreas.  No mass lesion or peripancreatic inflammatory change.    Spleen: Normal.    Adrenals: Normal.    Kidneys/Ureters: Normal in size and location.  Multiple simple renal cysts bilaterally.  Multiple hypodense lesions, too small to characterize favored to represent simple renal cysts.  Nonobstructing renal stone within the right kidney measuring approximately 0.9 cm.  Bladder demonstrates smooth contours without bladder wall thickening.    Reproductive organs: Prostate is enlarged.    GI tract/Mesentery: Postsurgical changes of bowel.  Visualized loops of small and large bowel are normal in caliber without evidence for inflammation or obstruction.  Multiple loops of dilated small bowel measuring up to 3.2 cm with decompression distally just beyond the anastomosis site.  Scattered diverticula with no evidence of diverticulitis.    Peritoneal Space: No abdominopelvic ascites, intraperitoneal free air or significant adenopathy.    Abdominal wall/Extraperitoneal soft tissues: Small fat containing umbilical hernia.    Bones: Degenerative changes.  No acute fracture or bony destructive process.                              X-Rays:   Independently Interpreted Readings:   Abdomen: concerning for small bowel obstruction      Medical Decision Making:   History:   Old Medical  Records: I decided to obtain old medical records.  Initial Assessment:   This is an emergent evaluation of 89 y.o. Male patient presenting with abdominal pain, nausea, vomiting, diarrhea.   Differential Diagnosis:   My differential diagnosis includes but is not limited to: acute gastroenteritis, electrolyte derangement, food poisoning, acute diverticulitis, SBO  Independently Interpreted Test(s):   I have ordered and independently interpreted X-rays - see prior notes.  Clinical Tests:   Lab Tests: Ordered and Reviewed  Radiological Study: Ordered and Reviewed  ED Management:  -labs   -IV fluids  -Antiemetics   -CT     2:10 PM   Labs revealed leukocytes of 1600. Lactate normal at 1.4. CMP WNL.   CT concerning for small bowel obstruction. General surgery consulted.     2:36 PM  General surgery contacted, requesting NG tube. Order placed.     Pt admitted to general surgery for further treatment and evaluation.       Other:   I have discussed this case with another health care provider.       <> Summary of the Discussion: General surgery            Scribe Attestation:   Scribe #1: I performed the above scribed service and the documentation accurately describes the services I performed. I attest to the accuracy of the note.    Attending Attestation:           Physician Attestation for Scribe:      Comments: I, Dr. Lizz Hurd, personally performed the services described in this documentation. All medical record entries made by the scribe were at my direction and in my presence.  I have reviewed the chart and agree that the record reflects my personal performance and is accurate and complete. Lizz Hurd MD.                 Clinical Impression:   The encounter diagnosis was Small bowel obstruction.      Disposition:   Disposition: Admitted                        Lizz Hurd MD  02/14/19 5491

## 2019-02-14 NOTE — ED NOTES
The patient is awake, alert and cooperative with a calm affect, patient is aware of environment, airway is open and patent, respirations are spontaneous, normal effort and rate noted, skin warm and dry, moves all extremities well, appearance: no apparent distress noted, bed placed in low position, side rails up x 2, call light is within reach of patient or family, explanation of care provided to family and patient, alarms set and turned on for monitor, pulse ox, plan of care: family to bedside, observe and reassure, position of comfort, patient offers no complaints at this time, will continue to monitor.

## 2019-02-15 LAB
ALBUMIN SERPL BCP-MCNC: 3.6 G/DL
ALP SERPL-CCNC: 78 U/L
ALT SERPL W/O P-5'-P-CCNC: 14 U/L
ANION GAP SERPL CALC-SCNC: 10 MMOL/L
AST SERPL-CCNC: 17 U/L
BASOPHILS # BLD AUTO: 0.09 K/UL
BASOPHILS NFR BLD: 0.8 %
BILIRUB SERPL-MCNC: 1 MG/DL
BUN SERPL-MCNC: 12 MG/DL
CALCIUM SERPL-MCNC: 9.3 MG/DL
CHLORIDE SERPL-SCNC: 104 MMOL/L
CO2 SERPL-SCNC: 28 MMOL/L
CREAT SERPL-MCNC: 1.1 MG/DL
DIFFERENTIAL METHOD: ABNORMAL
EOSINOPHIL # BLD AUTO: 0.1 K/UL
EOSINOPHIL NFR BLD: 0.9 %
ERYTHROCYTE [DISTWIDTH] IN BLOOD BY AUTOMATED COUNT: 13.3 %
EST. GFR  (AFRICAN AMERICAN): >60 ML/MIN/1.73 M^2
EST. GFR  (NON AFRICAN AMERICAN): 59.2 ML/MIN/1.73 M^2
GLUCOSE SERPL-MCNC: 80 MG/DL
HCT VFR BLD AUTO: 56.2 %
HGB BLD-MCNC: 17.7 G/DL
IMM GRANULOCYTES # BLD AUTO: 0.08 K/UL
IMM GRANULOCYTES NFR BLD AUTO: 0.7 %
LYMPHOCYTES # BLD AUTO: 1.7 K/UL
LYMPHOCYTES NFR BLD: 14.8 %
MAGNESIUM SERPL-MCNC: 2 MG/DL
MCH RBC QN AUTO: 30.9 PG
MCHC RBC AUTO-ENTMCNC: 31.5 G/DL
MCV RBC AUTO: 98 FL
MONOCYTES # BLD AUTO: 1.3 K/UL
MONOCYTES NFR BLD: 11.4 %
NEUTROPHILS # BLD AUTO: 8.1 K/UL
NEUTROPHILS NFR BLD: 71.4 %
NRBC BLD-RTO: 0 /100 WBC
PHOSPHATE SERPL-MCNC: 2.5 MG/DL
PLATELET # BLD AUTO: 231 K/UL
PMV BLD AUTO: 10.5 FL
POTASSIUM SERPL-SCNC: 4.7 MMOL/L
PROT SERPL-MCNC: 6.3 G/DL
RBC # BLD AUTO: 5.73 M/UL
SODIUM SERPL-SCNC: 142 MMOL/L
WBC # BLD AUTO: 11.4 K/UL

## 2019-02-15 PROCEDURE — 25000003 PHARM REV CODE 250: Performed by: STUDENT IN AN ORGANIZED HEALTH CARE EDUCATION/TRAINING PROGRAM

## 2019-02-15 PROCEDURE — 85025 COMPLETE CBC W/AUTO DIFF WBC: CPT

## 2019-02-15 PROCEDURE — 63600175 PHARM REV CODE 636 W HCPCS: Performed by: STUDENT IN AN ORGANIZED HEALTH CARE EDUCATION/TRAINING PROGRAM

## 2019-02-15 PROCEDURE — 83735 ASSAY OF MAGNESIUM: CPT

## 2019-02-15 PROCEDURE — 36415 COLL VENOUS BLD VENIPUNCTURE: CPT

## 2019-02-15 PROCEDURE — 99222 1ST HOSP IP/OBS MODERATE 55: CPT | Mod: AI,,, | Performed by: SURGERY

## 2019-02-15 PROCEDURE — 25000242 PHARM REV CODE 250 ALT 637 W/ HCPCS: Performed by: STUDENT IN AN ORGANIZED HEALTH CARE EDUCATION/TRAINING PROGRAM

## 2019-02-15 PROCEDURE — A4216 STERILE WATER/SALINE, 10 ML: HCPCS | Performed by: STUDENT IN AN ORGANIZED HEALTH CARE EDUCATION/TRAINING PROGRAM

## 2019-02-15 PROCEDURE — 25500020 PHARM REV CODE 255: Performed by: STUDENT IN AN ORGANIZED HEALTH CARE EDUCATION/TRAINING PROGRAM

## 2019-02-15 PROCEDURE — 99222 PR INITIAL HOSPITAL CARE,LEVL II: ICD-10-PCS | Mod: AI,,, | Performed by: SURGERY

## 2019-02-15 PROCEDURE — C9113 INJ PANTOPRAZOLE SODIUM, VIA: HCPCS | Performed by: STUDENT IN AN ORGANIZED HEALTH CARE EDUCATION/TRAINING PROGRAM

## 2019-02-15 PROCEDURE — 84100 ASSAY OF PHOSPHORUS: CPT

## 2019-02-15 PROCEDURE — 20600001 HC STEP DOWN PRIVATE ROOM

## 2019-02-15 PROCEDURE — 80053 COMPREHEN METABOLIC PANEL: CPT

## 2019-02-15 RX ORDER — PANTOPRAZOLE SODIUM 40 MG/10ML
40 INJECTION, POWDER, LYOPHILIZED, FOR SOLUTION INTRAVENOUS EVERY 24 HOURS
Status: DISCONTINUED | OUTPATIENT
Start: 2019-02-15 | End: 2019-02-16 | Stop reason: HOSPADM

## 2019-02-15 RX ORDER — GABAPENTIN 300 MG/1
600 CAPSULE ORAL NIGHTLY
Status: DISCONTINUED | OUTPATIENT
Start: 2019-02-15 | End: 2019-02-16 | Stop reason: HOSPADM

## 2019-02-15 RX ORDER — FENTANYL 25 UG/1
1 PATCH TRANSDERMAL
Status: DISCONTINUED | OUTPATIENT
Start: 2019-02-15 | End: 2019-02-16 | Stop reason: HOSPADM

## 2019-02-15 RX ADMIN — GABAPENTIN 600 MG: 300 CAPSULE ORAL at 09:02

## 2019-02-15 RX ADMIN — TAMSULOSIN HYDROCHLORIDE 0.4 MG: 0.4 CAPSULE ORAL at 09:02

## 2019-02-15 RX ADMIN — DIATRIZOATE MEGLUMINE AND DIATRIZOATE SODIUM 60 ML: 660; 100 LIQUID ORAL; RECTAL at 08:02

## 2019-02-15 RX ADMIN — PRAVASTATIN SODIUM 40 MG: 40 TABLET ORAL at 09:02

## 2019-02-15 RX ADMIN — Medication 3 ML: at 02:02

## 2019-02-15 RX ADMIN — ENOXAPARIN SODIUM 40 MG: 100 INJECTION SUBCUTANEOUS at 04:02

## 2019-02-15 RX ADMIN — PANTOPRAZOLE SODIUM 40 MG: 40 INJECTION, POWDER, FOR SOLUTION INTRAVENOUS at 09:02

## 2019-02-15 RX ADMIN — TIMOLOL MALEATE 1 DROP: 5 SOLUTION/ DROPS OPHTHALMIC at 09:02

## 2019-02-15 RX ADMIN — FLUTICASONE PROPIONATE 100 MCG: 50 SPRAY, METERED NASAL at 09:02

## 2019-02-15 RX ADMIN — Medication 3 ML: at 06:02

## 2019-02-15 RX ADMIN — HYDROMORPHONE HYDROCHLORIDE 1 MG: 1 INJECTION, SOLUTION INTRAMUSCULAR; INTRAVENOUS; SUBCUTANEOUS at 04:02

## 2019-02-15 RX ADMIN — Medication 3 ML: at 09:02

## 2019-02-15 RX ADMIN — HYDROMORPHONE HYDROCHLORIDE 1 MG: 1 INJECTION, SOLUTION INTRAMUSCULAR; INTRAVENOUS; SUBCUTANEOUS at 09:02

## 2019-02-15 RX ADMIN — FENTANYL 1 PATCH: 25 PATCH TRANSDERMAL at 11:02

## 2019-02-15 NOTE — ED NOTES
The patient is awake, alert and cooperative with a calm affect, patient is aware of environment, airway is open and patent, respirations are spontaneous, normal effort and rate noted, skin warm and dry, moves all extremities well, appearance: no apparent distress noted, bed placed in low position, side rails up x 2, call light is within reach of patient or family, explanation of care provided to family and patient, alarms set and turned on for monitor, pulse ox, plan of care: family to bedside, observe and reassure, position of comfort, patient offers no complaints at this time, awaiting transport to the floor for admit, will continue to monitor.

## 2019-02-15 NOTE — ASSESSMENT & PLAN NOTE
88 y/o M with pSBO.     - Gastrografin challenge today  - mIVF  - NPO, NGT  - Add Protonix   - home meds via NGT  - PRN pain and antiemetics  - OOB

## 2019-02-15 NOTE — PLAN OF CARE
Problem: Adult Inpatient Plan of Care  Goal: Plan of Care Review  Plan of care reviewed with pt/spouse verbalized understanding, vss, patient able to make needs known, patient ambulatory to bathroom independently, call-light within reach, will continue to monitor.

## 2019-02-15 NOTE — PROGRESS NOTES
[facility]  General Surgery  Progress Note    Subjective:     History of Present Illness:  No notes on file    Post-Op Info:  * No surgery found *         Interval History:   NAEON. + flatus. 0 BM  AFVSS    Medications:  Continuous Infusions:   sodium chloride 0.9% 125 mL/hr at 02/14/19 1656     Scheduled Meds:   brimonidine 0.2%  1 drop Both Eyes BID    enoxaparin  40 mg Subcutaneous Daily    fluticasone  2 spray Each Nare Daily    gabapentin  600 mg Per NG tube QHS    gastrograffin  60 mL Oral Once    pantoprozole (PROTONIX) IV  40 mg Intravenous Daily    pravastatin  40 mg Per NG tube Daily    sodium chloride 0.9%  3 mL Intravenous Q8H    tamsulosin  0.4 mg Per NG tube Daily    timolol maleate 0.5%  1 drop Both Eyes BID    travoprost  1 drop Both Eyes QHS     PRN Meds:HYDROmorphone, morphine, ondansetron, promethazine (PHENERGAN) IVPB, ramelteon     Review of patient's allergies indicates:   Allergen Reactions    Pcn [penicillins] Hives     Objective:     Vital Signs (Most Recent):  Temp: 98.6 °F (37 °C) (02/15/19 0415)  Pulse: 69 (02/15/19 0415)  Resp: 18 (02/15/19 0415)  BP: 133/69 (02/15/19 0415)  SpO2: (!) 93 % (02/15/19 0415) Vital Signs (24h Range):  Temp:  [98.2 °F (36.8 °C)-98.8 °F (37.1 °C)] 98.6 °F (37 °C)  Pulse:  [69-90] 69  Resp:  [18] 18  SpO2:  [92 %-95 %] 93 %  BP: (115-151)/(62-87) 133/69     Weight: 86.2 kg (189 lb 16 oz)  Body mass index is 27.26 kg/m².    Intake/Output - Last 3 Shifts       02/13 0700 - 02/14 0659 02/14 0700 - 02/15 0659    I.V. (mL/kg)  1416.7 (16.4)    IV Piggyback  1000    Total Intake(mL/kg)  2416.7 (28)    Urine (mL/kg/hr)  200    Drains  200    Total Output  400    Net  +2016.7          Stool Occurrence  0 x          Physical Exam  A&O, NAD  NG in place with bilious + red output  RRR  No Resp Distress  ABD: soft nontender nondistended    Significant Labs:  CBC:   Recent Labs   Lab 02/15/19  0341   WBC 11.40   RBC 5.73   HGB 17.7   HCT  56.2*      MCV 98   MCH 30.9   MCHC 31.5*     CMP:   Recent Labs   Lab 02/15/19  0341   GLU 80   CALCIUM 9.3   ALBUMIN 3.6   PROT 6.3      K 4.7   CO2 28      BUN 12   CREATININE 1.1   ALKPHOS 78   ALT 14   AST 17   BILITOT 1.0       Significant Diagnostics:  I have reviewed all pertinent imaging results/findings within the past 24 hours.    Assessment/Plan:     * Small bowel obstruction    89 y/o M with pSBO.     - Gastrografin challenge today  - mIVF  - NPO, NGT  - Add Protonix   - home meds via NGT  - PRN pain and antiemetics  - OOB     Dyslipidemia    Home meds         Artemio Sterling MD  General Surgery  [facility]

## 2019-02-15 NOTE — PLAN OF CARE
Problem: Adult Inpatient Plan of Care  Goal: Plan of Care Review  Outcome: Ongoing (interventions implemented as appropriate)  Pt alert and oriented medicated x2 with 1 mg of dilaudid for pain management with effective results. Pt continues to be NPO and NG-tube remains in place with substantial output IV fluids continue and are tolerated well  IV sites are free of s/s of infection and remain patent with NS flushes 0 oxygen was needed during the night 0 N/V during the shift. Pt is d/t have a gastric study done today

## 2019-02-15 NOTE — HPI
Artemio Colon . is a 89 y.o. 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 Of note he reports eating some cold cuts that had been sitting in his fridge at 0200 today.   He has a history of SBO about 3 years ago, managed conservatively, then he underwent robo ANA electively at [facility].      He reports his abdominal pain and nausea have resolved somewhat in the ED. Still having bowel function and passing flatus.      PMhx: CAD, BPH, as above  SxHx: cholecystectomy, partial colectomy for cecal polyp in 90s, Robotic ANA 3 years ago at , pacemaker placement  All: PCN  Takes ASA 81, otherwise denies anticoagulant use

## 2019-02-15 NOTE — PLAN OF CARE
Pt awake in bed/spouse at BS/lives in 2 story condo with 4 steps with railings/CM put name and number to call if needed/cm will FU with pt   needed/CM explained & gave the [employer] Health Packet to pt.          02/15/19 9299   Discharge Assessment   Assessment Type Discharge Planning Assessment   Confirmed/corrected address and phone number on facesheet? Yes   Assessment information obtained from? Patient;Caregiver   Expected Length of Stay (days) 2   Communicated expected length of stay with patient/caregiver yes  (TBD)   Prior to hospitilization cognitive status: Alert/Oriented;No Deficits   Prior to hospitalization functional status: Independent   Current cognitive status: Alert/Oriented;No Deficits   Current Functional Status: Independent   Facility Arrived From: [facility]   Lives With spouse   Able to Return to Prior Arrangements (TBD)   Is patient able to care for self after discharge? Unable to determine at this time (comments)   Who are your caregiver(s) and their phone number(s)? (Yaneth Colon Spouse [phone]   )   Patient's perception of discharge disposition home or selfcare   Readmission Within the Last 30 Days no previous admission in last 30 days   Patient currently being followed by outpatient case management? No   Patient currently receives any other outside agency services? No   Equipment Currently Used at Home none   Do you have any problems affording any of your prescribed medications? No   Is the patient taking medications as prescribed? yes   Does the patient have transportation home? Yes   Transportation Anticipated family or friend will provide   Dialysis Name and Scheduled days (N/A)   Does the patient receive services at the Coumadin Clinic? No   Discharge Plan A Home with family   Discharge Plan B Home with family   Patient/Family in Agreement with Plan yes    needed/CM explained & gave the [employer] Health Packet to pt.

## 2019-02-15 NOTE — PLAN OF CARE
Pt awake in bed/spouse at BS/lives in 2 story condo with 4 steps and railings. Currently no needs determined CM put name and number to call if  Needed. CM explained & gave the [facility] Packet to pt.         02/15/19 9425   Discharge Assessment   Assessment Type Discharge Planning Assessment   Confirmed/corrected address and phone number on facesheet? Yes   Assessment information obtained from? Patient;Caregiver   Expected Length of Stay (days) (TBD)   Communicated expected length of stay with patient/caregiver no  (TBD)   Prior to hospitilization cognitive status: Alert/Oriented;No Deficits   Prior to hospitalization functional status: Independent   Current cognitive status: Alert/Oriented;No Deficits   Current Functional Status: Independent   Facility Arrived From: [facility]   Lives With spouse   Able to Return to Prior Arrangements yes   Is patient able to care for self after discharge? Yes   Who are your caregiver(s) and their phone number(s)? (Yaneth Colon Spouse [phone]   )   Patient's perception of discharge disposition home or selfcare   Readmission Within the Last 30 Days no previous admission in last 30 days   Patient currently being followed by outpatient case management? No   Patient currently receives any other outside agency services? No   Equipment Currently Used at Home none   Do you have any problems affording any of your prescribed medications? No   Is the patient taking medications as prescribed? yes   Does the patient have transportation home? Yes   Transportation Anticipated family or friend will provide   Dialysis Name and Scheduled days (N/A)   Does the patient receive services at the Coumadin Clinic? No   Discharge Plan A Home with family   Discharge Plan B Home with family   Patient/Family in Agreement with Plan yes

## 2019-02-15 NOTE — SUBJECTIVE & OBJECTIVE
Interval History:   NAEON. + flatus. 0 BM  AFVSS    Medications:  Continuous Infusions:   sodium chloride 0.9% 125 mL/hr at 02/14/19 1656     Scheduled Meds:   brimonidine 0.2%  1 drop Both Eyes BID    enoxaparin  40 mg Subcutaneous Daily    fluticasone  2 spray Each Nare Daily    gabapentin  600 mg Per NG tube QHS    gastrograffin  60 mL Oral Once    pantoprozole (PROTONIX) IV  40 mg Intravenous Daily    pravastatin  40 mg Per NG tube Daily    sodium chloride 0.9%  3 mL Intravenous Q8H    tamsulosin  0.4 mg Per NG tube Daily    timolol maleate 0.5%  1 drop Both Eyes BID    travoprost  1 drop Both Eyes QHS     PRN Meds:HYDROmorphone, morphine, ondansetron, promethazine (PHENERGAN) IVPB, ramelteon     Review of patient's allergies indicates:   Allergen Reactions    Pcn [penicillins] Hives     Objective:     Vital Signs (Most Recent):  Temp: 98.6 °F (37 °C) (02/15/19 0415)  Pulse: 69 (02/15/19 0415)  Resp: 18 (02/15/19 0415)  BP: 133/69 (02/15/19 0415)  SpO2: (!) 93 % (02/15/19 0415) Vital Signs (24h Range):  Temp:  [98.2 °F (36.8 °C)-98.8 °F (37.1 °C)] 98.6 °F (37 °C)  Pulse:  [69-90] 69  Resp:  [18] 18  SpO2:  [92 %-95 %] 93 %  BP: (115-151)/(62-87) 133/69     Weight: 86.2 kg (189 lb 16 oz)  Body mass index is 27.26 kg/m².    Intake/Output - Last 3 Shifts       02/13 0700 - 02/14 0659 02/14 0700 - 02/15 0659    I.V. (mL/kg)  1416.7 (16.4)    IV Piggyback  1000    Total Intake(mL/kg)  2416.7 (28)    Urine (mL/kg/hr)  200    Drains  200    Total Output  400    Net  +2016.7          Stool Occurrence  0 x          Physical Exam  A&O, NAD  NG in place with bilious + red output  RRR  No Resp Distress  ABD: soft nontender nondistended    Significant Labs:  CBC:   Recent Labs   Lab 02/15/19  0341   WBC 11.40   RBC 5.73   HGB 17.7   HCT 56.2*      MCV 98   MCH 30.9   MCHC 31.5*     CMP:   Recent Labs   Lab 02/15/19  0341   GLU 80   CALCIUM 9.3   ALBUMIN 3.6   PROT 6.3      K 4.7   CO2 28       BUN 12   CREATININE 1.1   ALKPHOS 78   ALT 14   AST 17   BILITOT 1.0       Significant Diagnostics:  I have reviewed all pertinent imaging results/findings within the past 24 hours.

## 2019-02-16 VITALS
BODY MASS INDEX: 27.2 KG/M2 | HEIGHT: 70 IN | HEART RATE: 72 BPM | RESPIRATION RATE: 16 BRPM | SYSTOLIC BLOOD PRESSURE: 146 MMHG | DIASTOLIC BLOOD PRESSURE: 75 MMHG | WEIGHT: 190 LBS | TEMPERATURE: 98 F | OXYGEN SATURATION: 97 %

## 2019-02-16 PROBLEM — K56.609 SMALL BOWEL OBSTRUCTION: Status: RESOLVED | Noted: 2019-02-14 | Resolved: 2019-02-16

## 2019-02-16 LAB
ALBUMIN SERPL BCP-MCNC: 3.1 G/DL
ALP SERPL-CCNC: 65 U/L
ALT SERPL W/O P-5'-P-CCNC: 11 U/L
ANION GAP SERPL CALC-SCNC: 11 MMOL/L
AST SERPL-CCNC: 15 U/L
BASOPHILS # BLD AUTO: 0.06 K/UL
BASOPHILS NFR BLD: 0.6 %
BILIRUB SERPL-MCNC: 1.1 MG/DL
BUN SERPL-MCNC: 11 MG/DL
CALCIUM SERPL-MCNC: 8.8 MG/DL
CHLORIDE SERPL-SCNC: 107 MMOL/L
CO2 SERPL-SCNC: 23 MMOL/L
CREAT SERPL-MCNC: 1 MG/DL
DIFFERENTIAL METHOD: ABNORMAL
EOSINOPHIL # BLD AUTO: 0.1 K/UL
EOSINOPHIL NFR BLD: 1.3 %
ERYTHROCYTE [DISTWIDTH] IN BLOOD BY AUTOMATED COUNT: 13.3 %
EST. GFR  (AFRICAN AMERICAN): >60 ML/MIN/1.73 M^2
EST. GFR  (NON AFRICAN AMERICAN): >60 ML/MIN/1.73 M^2
GLUCOSE SERPL-MCNC: 66 MG/DL
HCT VFR BLD AUTO: 52.6 %
HGB BLD-MCNC: 16.9 G/DL
IMM GRANULOCYTES # BLD AUTO: 0.05 K/UL
IMM GRANULOCYTES NFR BLD AUTO: 0.5 %
LYMPHOCYTES # BLD AUTO: 1.9 K/UL
LYMPHOCYTES NFR BLD: 18.8 %
MAGNESIUM SERPL-MCNC: 1.7 MG/DL
MCH RBC QN AUTO: 32.2 PG
MCHC RBC AUTO-ENTMCNC: 32.1 G/DL
MCV RBC AUTO: 100 FL
MONOCYTES # BLD AUTO: 1.3 K/UL
MONOCYTES NFR BLD: 12.9 %
NEUTROPHILS # BLD AUTO: 6.7 K/UL
NEUTROPHILS NFR BLD: 65.9 %
NRBC BLD-RTO: 0 /100 WBC
PHOSPHATE SERPL-MCNC: 1.8 MG/DL
PLATELET # BLD AUTO: 205 K/UL
PMV BLD AUTO: 10.7 FL
POTASSIUM SERPL-SCNC: 4 MMOL/L
PROT SERPL-MCNC: 5.8 G/DL
RBC # BLD AUTO: 5.25 M/UL
SODIUM SERPL-SCNC: 141 MMOL/L
WBC # BLD AUTO: 10.17 K/UL

## 2019-02-16 PROCEDURE — 83735 ASSAY OF MAGNESIUM: CPT

## 2019-02-16 PROCEDURE — 25000003 PHARM REV CODE 250: Performed by: STUDENT IN AN ORGANIZED HEALTH CARE EDUCATION/TRAINING PROGRAM

## 2019-02-16 PROCEDURE — 36415 COLL VENOUS BLD VENIPUNCTURE: CPT

## 2019-02-16 PROCEDURE — 80053 COMPREHEN METABOLIC PANEL: CPT

## 2019-02-16 PROCEDURE — C9113 INJ PANTOPRAZOLE SODIUM, VIA: HCPCS | Performed by: STUDENT IN AN ORGANIZED HEALTH CARE EDUCATION/TRAINING PROGRAM

## 2019-02-16 PROCEDURE — 84100 ASSAY OF PHOSPHORUS: CPT

## 2019-02-16 PROCEDURE — 63600175 PHARM REV CODE 636 W HCPCS: Performed by: STUDENT IN AN ORGANIZED HEALTH CARE EDUCATION/TRAINING PROGRAM

## 2019-02-16 PROCEDURE — 85025 COMPLETE CBC W/AUTO DIFF WBC: CPT

## 2019-02-16 RX ORDER — SODIUM,POTASSIUM PHOSPHATES 280-250MG
2 POWDER IN PACKET (EA) ORAL
Status: DISCONTINUED | OUTPATIENT
Start: 2019-02-16 | End: 2019-02-16 | Stop reason: HOSPADM

## 2019-02-16 RX ADMIN — TAMSULOSIN HYDROCHLORIDE 0.4 MG: 0.4 CAPSULE ORAL at 08:02

## 2019-02-16 RX ADMIN — POTASSIUM & SODIUM PHOSPHATES POWDER PACK 280-160-250 MG 2 PACKET: 280-160-250 PACK at 10:02

## 2019-02-16 RX ADMIN — BRIMONIDINE TARTRATE 1 DROP: 2 SOLUTION/ DROPS OPHTHALMIC at 08:02

## 2019-02-16 RX ADMIN — PANTOPRAZOLE SODIUM 40 MG: 40 INJECTION, POWDER, FOR SOLUTION INTRAVENOUS at 08:02

## 2019-02-16 NOTE — HOSPITAL COURSE
The patient was admitted from the ED with a small bowel obstruction. An NGT was placed, he was kept NPO and started on IVF. He started passing flatus on HD# 1. He was given gastrografin challenge and had contrast in his colon on the final KUB and had a BM the evening of HD#1. His NGT was removed and he was started on clears the following day. He tolerated that and was then advanced to a regular diet. He was having bowel movements, and tolerating a diet. He met goals for discharge and will follow up with his PCP as needed.

## 2019-02-16 NOTE — PLAN OF CARE
Problem: Adult Inpatient Plan of Care  Goal: Plan of Care Review  Outcome: Ongoing (interventions implemented as appropriate)  Care Plan reviewed and acknowledged.  Pt is free from any new or additional falls/injury/skin breakdown.  Pt had no major complaints that were not handled with PRN/scheduled medication.  VSS; no signs of distress; will continue to monitor.

## 2019-02-16 NOTE — PROGRESS NOTES
[facility]  General Surgery  Progress Note    Subjective:     History of Present Illness:  Artemio Colon Sr. is a 89 y.o. 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 Of note he reports eating some cold cuts that had been sitting in his fridge at 0200 today.   He has a history of SBO about 3 years ago, managed conservatively, then he underwent robo ANA electively at [facility].      He reports his abdominal pain and nausea have resolved somewhat in the ED. Still having bowel function and passing flatus.      PMhx: CAD, BPH, as above  SxHx: cholecystectomy, partial colectomy for cecal polyp in 90s, Robotic ANA 3 years ago at , pacemaker placement  All: PCN  Takes ASA 81, otherwise denies anticoagulant use    Post-Op Info:  * No surgery found *         Interval History: No issues overnight. Had 1 BM thought not documented. Tolerating diet. No pain. Belly feels fine. Desires to go home.    Medications:  Continuous Infusions:  Scheduled Meds:   brimonidine 0.2%  1 drop Both Eyes BID    enoxaparin  40 mg Subcutaneous Daily    fentaNYL  1 patch Transdermal Q72H    fluticasone  2 spray Each Nare Daily    gabapentin  600 mg Oral QHS    pantoprozole (PROTONIX) IV  40 mg Intravenous Daily    potassium, sodium phosphates  2 packet Oral QID (AC & HS)    pravastatin  40 mg Per NG tube Daily    sodium chloride 0.9%  3 mL Intravenous Q8H    tamsulosin  0.4 mg Per NG tube Daily    timolol maleate 0.5%  1 drop Both Eyes BID    travoprost  1 drop Both Eyes QHS     PRN Meds:HYDROmorphone, morphine, ondansetron, promethazine (PHENERGAN) IVPB     Review of patient's allergies indicates:   Allergen Reactions    Pcn [penicillins] Hives     Objective:     Vital Signs (Most Recent):  Temp: 98.1 °F (36.7 °C) (02/16/19 1210)  Pulse: 72 (02/16/19 1210)  Resp: 16 (02/16/19  0824)  BP: (!) 146/75 (02/16/19 1210)  SpO2: 97 % (02/16/19 1210) Vital Signs (24h Range):  Temp:  [97.9 °F (36.6 °C)-99.1 °F (37.3 °C)] 98.1 °F (36.7 °C)  Pulse:  [61-75] 72  Resp:  [16-18] 16  SpO2:  [93 %-97 %] 97 %  BP: (136-156)/(74-80) 146/75     Weight: 86.2 kg (189 lb 16 oz)  Body mass index is 27.26 kg/m².    Intake/Output - Last 3 Shifts       02/14 0700 - 02/15 0659 02/15 0700 - 02/16 0659 02/16 0700 - 02/17 0659    P.O.  540 360    I.V. (mL/kg) 1416.7 (16.4) 3094.7 (35.9)     IV Piggyback 1000      Total Intake(mL/kg) 2416.7 (28) 3634.7 (42.2) 360 (4.2)    Urine (mL/kg/hr) 200 375 (0.2)     Drains 200 600     Total Output 400 975     Net +2016.7 +2659.7 +360           Urine Occurrence  1 x 2 x    Stool Occurrence 0 x            Physical Exam   Constitutional: He is oriented to person, place, and time. He appears well-developed and well-nourished.   HENT:   Head: Normocephalic and atraumatic.   Neck: Normal range of motion. Neck supple.   Cardiovascular: Normal rate and regular rhythm.   Pulmonary/Chest: Effort normal and breath sounds normal.   Abdominal: Soft. Bowel sounds are normal. He exhibits no distension. There is no tenderness.   Well-healed surgical scars   Neurological: He is alert and oriented to person, place, and time.   Skin: Skin is warm and dry.       Significant Labs:  CBC:   Recent Labs   Lab 02/16/19  0415   WBC 10.17   RBC 5.25   HGB 16.9   HCT 52.6      *   MCH 32.2*   MCHC 32.1     BMP:   Recent Labs   Lab 02/16/19 0415   GLU 66*      K 4.0      CO2 23   BUN 11   CREATININE 1.0   CALCIUM 8.8   MG 1.7       Significant Diagnostics:  None    Assessment/Plan:     Dyslipidemia    -Continue home meds         Small bowel obstruction    -Continue regular diet  -Home meds  -DC IVF  -Discharge home today             Sergio Dickson MD  General Surgery  [facility]

## 2019-02-16 NOTE — PROGRESS NOTES
Pt disharged to home. Discharge instructions given verbally and hard copy provided to patient and significant other. Left AC 20 g IV d/c'd with cath tip in place. Pt remains free of falls. No acute pain or distress noted. Patient choose to walk out with use of a wheelchair.

## 2019-02-16 NOTE — DISCHARGE SUMMARY
[facility]  General Surgery  Discharge Summary      Patient Name: Artemio Colon Sr.  MRN: [id number]  Admission Date: 2/14/2019  Hospital Length of Stay: 2 days  Discharge Date and Time: 2/16/2019  1:11 PM  Attending Physician: Dr. Corcoran  Discharging Provider: Sergio Dickson MD  Primary Care Provider: Osiel Stone MD    HPI:   Artemio Colon Sr. is a 89 y.o. 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 Of note he reports eating some cold cuts that had been sitting in his fridge at 0200 today.   He has a history of SBO about 3 years ago, managed conservatively, then he underwent robo ANA electively at [facility].      He reports his abdominal pain and nausea have resolved somewhat in the ED. Still having bowel function and passing flatus.      PMhx: CAD, BPH, as above  SxHx: cholecystectomy, partial colectomy for cecal polyp in 90s, Robotic ANA 3 years ago at , pacemaker placement  All: PCN  Takes ASA 81, otherwise denies anticoagulant use    * No surgery found *      Indwelling Lines/Drains at time of discharge:   Lines/Drains/Airways          None        Hospital Course: The patient was admitted from the ED with a small bowel obstruction. An NGT was placed, he was kept NPO and started on IVF. He started passing flatus on HD# 1. He was given gastrografin challenge and had contrast in his colon on the final KUB and had a BM the evening of HD#1. His NGT was removed and he was started on clears the following day. He tolerated that and was then advanced to a regular diet. He was having bowel movements, and tolerating a diet. He met goals for discharge and will follow up with his PCP as needed.    Consults:   Consults (From admission, onward)        Status Ordering Provider     Inpatient consult to General surgery  Once     Provider:  (Not yet assigned)     Completed PRITESH KRAUS          Significant Diagnostic Studies: Labs:   BMP:   Recent Labs   Lab 02/15/19  0341 02/16/19  0415   GLU 80 66*    141   K 4.7 4.0    107   CO2 28 23   BUN 12 11   CREATININE 1.1 1.0   CALCIUM 9.3 8.8   MG 2.0 1.7    and CMP   Recent Labs   Lab 02/15/19  0341 02/16/19  0415    141   K 4.7 4.0    107   CO2 28 23   GLU 80 66*   BUN 12 11   CREATININE 1.1 1.0   CALCIUM 9.3 8.8   PROT 6.3 5.8*   ALBUMIN 3.6 3.1*   BILITOT 1.0 1.1*   ALKPHOS 78 65   AST 17 15   ALT 14 11   ANIONGAP 10 11   ESTGFRAFRICA >60.0 >60.0   EGFRNONAA 59.2* >60.0       Pending Diagnostic Studies:     Procedure Component Value Units Date/Time    X-Ray Abdomen AP 1 View [[id number]]     Order Status:  Sent Lab Status:  No result         Final Active Diagnoses:    Diagnosis Date Noted POA    Dyslipidemia [E78.5] 06/08/2018 Yes      Problems Resolved During this Admission:    Diagnosis Date Noted Date Resolved POA    PRINCIPAL PROBLEM:  Small bowel obstruction [K56.609] 02/14/2019 02/16/2019 Yes      Discharged Condition: good    Disposition: Home or Self Care    Follow Up:  Follow-up Information     Osiel Stone MD.    Specialty:  Internal Medicine  Why:  As needed  Contact information:  [facility] [address]  [phone]                 Patient Instructions:      Diet Adult Regular     Notify your health care provider if you experience any of the following:  persistent nausea and vomiting or diarrhea     Notify your health care provider if you experience any of the following:  severe uncontrolled pain     Notify your health care provider if you experience any of the following:  redness, tenderness, or signs of infection (pain, swelling, redness, odor or green/yellow discharge around incision site)     Notify your health care provider if you experience any of the following:  difficulty breathing or increased cough     Notify your health care provider if you experience any of  the following:  persistent dizziness, light-headedness, or visual disturbances     No dressing needed     Activity as tolerated     Medications:  Reconciled Home Medications:      Medication List      CHANGE how you take these medications    gabapentin 600 MG tablet  Commonly known as:  NEURONTIN  Take 1 tablet (600 mg total) by mouth once daily.  What changed:  when to take this     testosterone cypionate 200 mg/mL Kit  Inject into the muscle Every 3 (three) days. 10 ml once every 3 days  What changed:  Another medication with the same name was removed. Continue taking this medication, and follow the directions you see here.        CONTINUE taking these medications    anastrozole 1 mg Tab  Commonly known as:  ARIMIDEX  Take 1 mg by mouth Every 3 (three) days.     aspirin 81 MG EC tablet  Commonly known as:  ECOTRIN  Take 81 mg by mouth once daily.     brimonidine 0.2% 0.2 % Drop  Commonly known as:  ALPHAGAN  Place 1 drop into both eyes 2 (two) times daily.     DHEA ORAL  Take by mouth every evening. 1 cap at bedtime     efinaconazole 10 % [place]  Commonly known as:  JUBLIA  Apply to affected toenails once daily     fentaNYL 25 mcg/hr  Commonly known as:  DURAGESIC  Place 1 patch onto the skin every 72 hours.     fluticasone 50 mcg/actuation nasal spray  Commonly known as:  FLONASE  2 sprays by Each Nare route once daily.     HYDROcodone-acetaminophen  mg per tablet  Commonly known as:  NORCO  Take by mouth every 4 (four) hours as needed for Pain.     pravastatin 40 MG tablet  Commonly known as:  PRAVACHOL  Take 1 tablet (40 mg total) by mouth once daily.     tamsulosin 0.4 mg Cap  Commonly known as:  FLOMAX  Take 1 capsule (0.4 mg total) by mouth once daily.     timolol maleate 0.5% 0.5 % Drop  Commonly known as:  TIMOPTIC  Place 1 drop into both eyes 2 (two) times daily.     travoprost 0.004 % ophthalmic solution  Commonly known as:  TRAVATAN Z  Place 1 drop into both eyes every evening.     UNABLE TO  FIND  medication name: HCG 12,000 iu vial once every 3 days          Time spent on the discharge of patient: 15 minutes    Sergio Dickson MD  General Surgery  [facility]

## 2019-02-16 NOTE — SUBJECTIVE & OBJECTIVE
Interval History: No issues overnight. Had 1 BM thought not documented. Tolerating diet. No pain. Belly feels fine. Desires to go home.    Medications:  Continuous Infusions:  Scheduled Meds:   brimonidine 0.2%  1 drop Both Eyes BID    enoxaparin  40 mg Subcutaneous Daily    fentaNYL  1 patch Transdermal Q72H    fluticasone  2 spray Each Nare Daily    gabapentin  600 mg Oral QHS    pantoprozole (PROTONIX) IV  40 mg Intravenous Daily    potassium, sodium phosphates  2 packet Oral QID (AC & HS)    pravastatin  40 mg Per NG tube Daily    sodium chloride 0.9%  3 mL Intravenous Q8H    tamsulosin  0.4 mg Per NG tube Daily    timolol maleate 0.5%  1 drop Both Eyes BID    travoprost  1 drop Both Eyes QHS     PRN Meds:HYDROmorphone, morphine, ondansetron, promethazine (PHENERGAN) IVPB     Review of patient's allergies indicates:   Allergen Reactions    Pcn [penicillins] Hives     Objective:     Vital Signs (Most Recent):  Temp: 98.1 °F (36.7 °C) (02/16/19 1210)  Pulse: 72 (02/16/19 1210)  Resp: 16 (02/16/19 0824)  BP: (!) 146/75 (02/16/19 1210)  SpO2: 97 % (02/16/19 1210) Vital Signs (24h Range):  Temp:  [97.9 °F (36.6 °C)-99.1 °F (37.3 °C)] 98.1 °F (36.7 °C)  Pulse:  [61-75] 72  Resp:  [16-18] 16  SpO2:  [93 %-97 %] 97 %  BP: (136-156)/(74-80) 146/75     Weight: 86.2 kg (189 lb 16 oz)  Body mass index is 27.26 kg/m².    Intake/Output - Last 3 Shifts       02/14 0700 - 02/15 0659 02/15 0700 - 02/16 0659 02/16 0700 - 02/17 0659    P.O.  540 360    I.V. (mL/kg) 1416.7 (16.4) 3094.7 (35.9)     IV Piggyback 1000      Total Intake(mL/kg) 2416.7 (28) 3634.7 (42.2) 360 (4.2)    Urine (mL/kg/hr) 200 375 (0.2)     Drains 200 600     Total Output 400 975     Net +2016.7 +2659.7 +360           Urine Occurrence  1 x 2 x    Stool Occurrence 0 x            Physical Exam   Constitutional: He is oriented to person, place, and time. He appears well-developed and well-nourished.   HENT:   Head: Normocephalic and atraumatic.    Neck: Normal range of motion. Neck supple.   Cardiovascular: Normal rate and regular rhythm.   Pulmonary/Chest: Effort normal and breath sounds normal.   Abdominal: Soft. Bowel sounds are normal. He exhibits no distension. There is no tenderness.   Well-healed surgical scars   Neurological: He is alert and oriented to person, place, and time.   Skin: Skin is warm and dry.       Significant Labs:  CBC:   Recent Labs   Lab 02/16/19  0415   WBC 10.17   RBC 5.25   HGB 16.9   HCT 52.6      *   MCH 32.2*   MCHC 32.1     BMP:   Recent Labs   Lab 02/16/19  0415   GLU 66*      K 4.0      CO2 23   BUN 11   CREATININE 1.0   CALCIUM 8.8   MG 1.7       Significant Diagnostics:  None

## 2019-02-19 ENCOUNTER — PATIENT OUTREACH (OUTPATIENT)
Dept: ADMINISTRATIVE | Facility: CLINIC | Age: 84
End: 2019-02-19

## 2019-02-20 NOTE — PHYSICIAN QUERY
PT Name: Artemio Colon .  MR #: [id number]     Physician Query Form - Bowel Obstruction Clarification     CDS/: Brandy E Capley               Contact information:  Spectralink:  [phone]  This form is a permanent document in the medical record.     Query Date: February 20, 2019    By submitting this query, we are merely seeking further clarification of documentation to reflect the severity of illness of your patient. Please utilize your independent clinical judgment when addressing the question(s) below.    The Medical record reflects the following:     Indicators   Supporting Clinical Findings Location in Medical Record   X Bowel obstruction, intestinal obstruction, LBO or SBO documented   PRINCIPAL PROBLEM:  Small bowel obstruction   Date noted: 02/14/2019   Date resolved: 02/16/2019      Discharge summary 2/16   X Radiology findings Impression    1. Multiple loops of dilated small bowel with decompressed bowel just distal to the anastomotic site concerning for obstruction at prior surgical site.  Barium enema may be helpful for further evaluation of the anastomotic site.  2. Colonic diverticulosis.  3. Right-sided nonobstructing nephrolithiasis.  4. Prostatomegaly.    Impression    Multiple dilated loops of small bowel with Gastrografin reaching the distal colon, suggesting partial obstruction.   CT 2/14                Abd xray 2/15   X Treatment/Medication An NGT was placed, he was kept NPO and started on IVF.   He started passing flatus on HD# 1.   He was given gastrografin challenge and had contrast in his colon on the final KUB and had a BM the evening of HD#1. His NGT was removed and he was started on clears the following day. He tolerated that and was then advanced to a regular diet.    Discharge summary 2/16    Procedure/Surgery     X Other SxHx:   cholecystectomy, partial colectomy for cecal polyp in 90s, Robotic ANA 3 years ago at    Discharge summary 2/16     Provider, please  further specify the bowel obstruction diagnosis:  [ x  ] Partial or incomplete intestinal obstruction, due to adhesions   [   ] Partial or incomplete intestinal obstruction, due to other (please specify): ____________   [   ] Partial or incomplete intestinal obstruction, unknown or unspecified etiology   [   ] Other intestinal condition (please specify): _____________________   [   ]  Clinically Undetermined     Please document in your progress notes daily for the duration of treatment until resolved, and include in your discharge summary.

## 2019-02-27 ENCOUNTER — HOSPITAL ENCOUNTER (OUTPATIENT)
Facility: HOSPITAL | Age: 84
Discharge: HOME | End: 2019-03-02
Attending: EMERGENCY MEDICINE | Admitting: HOSPITALIST
Payer: MEDICARE

## 2019-02-27 DIAGNOSIS — K92.2 LOWER GI BLEED: Primary | ICD-10-CM

## 2019-02-27 PROBLEM — K57.91 DIVERTICULOSIS OF INTESTINE WITH BLEEDING: Status: ACTIVE | Noted: 2019-02-27

## 2019-02-27 PROBLEM — N40.0 BENIGN PROSTATIC HYPERPLASIA WITHOUT LOWER URINARY TRACT SYMPTOMS: Status: ACTIVE | Noted: 2019-02-27

## 2019-02-27 LAB
ABO + RH BLD: NORMAL
ALBUMIN SERPL BCP-MCNC: 4.1 G/DL
ALP SERPL-CCNC: 72 U/L
ALT SERPL W/O P-5'-P-CCNC: 16 U/L
ANION GAP SERPL CALC-SCNC: 8 MMOL/L
AST SERPL-CCNC: 25 U/L
BASOPHILS # BLD AUTO: 0.09 K/UL
BASOPHILS NFR BLD: 0.9 %
BILIRUB SERPL-MCNC: 0.7 MG/DL
BLD GP AB SCN CELLS X3 SERPL QL: NORMAL
BUN SERPL-MCNC: 20 MG/DL
CALCIUM SERPL-MCNC: 9.9 MG/DL
CHLORIDE SERPL-SCNC: 104 MMOL/L
CO2 SERPL-SCNC: 26 MMOL/L
CREAT SERPL-MCNC: 1 MG/DL
DIFFERENTIAL METHOD: ABNORMAL
EOSINOPHIL # BLD AUTO: 0.2 K/UL
EOSINOPHIL NFR BLD: 1.5 %
ERYTHROCYTE [DISTWIDTH] IN BLOOD BY AUTOMATED COUNT: 13.5 %
EST. GFR  (AFRICAN AMERICAN): >60 ML/MIN/1.73 M^2
EST. GFR  (NON AFRICAN AMERICAN): >60 ML/MIN/1.73 M^2
GLUCOSE SERPL-MCNC: 105 MG/DL
HCT VFR BLD AUTO: 48 %
HCT VFR BLD AUTO: 52.9 %
HGB BLD-MCNC: 14.8 G/DL
HGB BLD-MCNC: 17.4 G/DL
IMM GRANULOCYTES # BLD AUTO: 0.08 K/UL
IMM GRANULOCYTES NFR BLD AUTO: 0.8 %
INR PPP: 1.1
LYMPHOCYTES # BLD AUTO: 2 K/UL
LYMPHOCYTES NFR BLD: 19.7 %
MCH RBC QN AUTO: 31.5 PG
MCHC RBC AUTO-ENTMCNC: 32.9 G/DL
MCV RBC AUTO: 96 FL
MONOCYTES # BLD AUTO: 1.2 K/UL
MONOCYTES NFR BLD: 11.1 %
NEUTROPHILS # BLD AUTO: 6.8 K/UL
NEUTROPHILS NFR BLD: 66 %
NRBC BLD-RTO: 0 /100 WBC
PLATELET # BLD AUTO: 249 K/UL
PMV BLD AUTO: 10.2 FL
POTASSIUM SERPL-SCNC: 4.2 MMOL/L
PROT SERPL-MCNC: 7.3 G/DL
PROTHROMBIN TIME: 10.9 SEC
RBC # BLD AUTO: 5.53 M/UL
SODIUM SERPL-SCNC: 138 MMOL/L
WBC # BLD AUTO: 10.33 K/UL

## 2019-02-27 PROCEDURE — 99220 PR INITIAL OBSERVATION CARE,LEVL III: ICD-10-PCS | Mod: ,,, | Performed by: HOSPITALIST

## 2019-02-27 PROCEDURE — 93010 EKG 12-LEAD: ICD-10-PCS | Mod: ,,, | Performed by: INTERNAL MEDICINE

## 2019-02-27 PROCEDURE — 85014 HEMATOCRIT: CPT | Mod: 91

## 2019-02-27 PROCEDURE — 25000003 PHARM REV CODE 250: Performed by: EMERGENCY MEDICINE

## 2019-02-27 PROCEDURE — 93010 ELECTROCARDIOGRAM REPORT: CPT | Mod: ,,, | Performed by: INTERNAL MEDICINE

## 2019-02-27 PROCEDURE — 99220 PR INITIAL OBSERVATION CARE,LEVL III: CPT | Mod: ,,, | Performed by: HOSPITALIST

## 2019-02-27 PROCEDURE — 80053 COMPREHEN METABOLIC PANEL: CPT

## 2019-02-27 PROCEDURE — 99285 EMERGENCY DEPT VISIT HI MDM: CPT | Mod: 25

## 2019-02-27 PROCEDURE — G0378 HOSPITAL OBSERVATION PER HR: HCPCS

## 2019-02-27 PROCEDURE — 86850 RBC ANTIBODY SCREEN: CPT

## 2019-02-27 PROCEDURE — 85610 PROTHROMBIN TIME: CPT

## 2019-02-27 PROCEDURE — 85018 HEMOGLOBIN: CPT

## 2019-02-27 PROCEDURE — 25000003 PHARM REV CODE 250: Performed by: HOSPITALIST

## 2019-02-27 PROCEDURE — 85025 COMPLETE CBC W/AUTO DIFF WBC: CPT

## 2019-02-27 PROCEDURE — 96361 HYDRATE IV INFUSION ADD-ON: CPT

## 2019-02-27 PROCEDURE — 93005 ELECTROCARDIOGRAM TRACING: CPT

## 2019-02-27 PROCEDURE — 99284 EMERGENCY DEPT VISIT MOD MDM: CPT | Mod: ,,, | Performed by: EMERGENCY MEDICINE

## 2019-02-27 PROCEDURE — 96374 THER/PROPH/DIAG INJ IV PUSH: CPT

## 2019-02-27 PROCEDURE — 63600175 PHARM REV CODE 636 W HCPCS: Performed by: EMERGENCY MEDICINE

## 2019-02-27 PROCEDURE — 99284 PR EMERGENCY DEPT VISIT,LEVEL IV: ICD-10-PCS | Mod: ,,, | Performed by: EMERGENCY MEDICINE

## 2019-02-27 PROCEDURE — 36415 COLL VENOUS BLD VENIPUNCTURE: CPT

## 2019-02-27 PROCEDURE — C9113 INJ PANTOPRAZOLE SODIUM, VIA: HCPCS | Performed by: EMERGENCY MEDICINE

## 2019-02-27 RX ORDER — PANTOPRAZOLE SODIUM 40 MG/10ML
40 INJECTION, POWDER, LYOPHILIZED, FOR SOLUTION INTRAVENOUS
Status: COMPLETED | OUTPATIENT
Start: 2019-02-27 | End: 2019-02-27

## 2019-02-27 RX ORDER — FENTANYL 25 UG/1
1 PATCH TRANSDERMAL
Status: DISCONTINUED | OUTPATIENT
Start: 2019-02-27 | End: 2019-03-02 | Stop reason: HOSPADM

## 2019-02-27 RX ORDER — PANTOPRAZOLE SODIUM 40 MG/10ML
40 INJECTION, POWDER, LYOPHILIZED, FOR SOLUTION INTRAVENOUS DAILY
Status: DISCONTINUED | OUTPATIENT
Start: 2019-02-28 | End: 2019-03-02 | Stop reason: HOSPADM

## 2019-02-27 RX ORDER — TIMOLOL MALEATE 5 MG/ML
1 SOLUTION/ DROPS OPHTHALMIC 2 TIMES DAILY
Status: DISCONTINUED | OUTPATIENT
Start: 2019-02-27 | End: 2019-03-02 | Stop reason: HOSPADM

## 2019-02-27 RX ORDER — TRAVOPROST OPHTHALMIC SOLUTION 0.04 MG/ML
1 SOLUTION OPHTHALMIC NIGHTLY
Status: DISCONTINUED | OUTPATIENT
Start: 2019-02-27 | End: 2019-03-02 | Stop reason: HOSPADM

## 2019-02-27 RX ORDER — SODIUM CHLORIDE 9 MG/ML
1000 INJECTION, SOLUTION INTRAVENOUS
Status: COMPLETED | OUTPATIENT
Start: 2019-02-27 | End: 2019-02-27

## 2019-02-27 RX ORDER — BRIMONIDINE TARTRATE 2 MG/ML
1 SOLUTION/ DROPS OPHTHALMIC 2 TIMES DAILY
Status: DISCONTINUED | OUTPATIENT
Start: 2019-02-27 | End: 2019-03-02 | Stop reason: HOSPADM

## 2019-02-27 RX ORDER — SODIUM CHLORIDE 0.9 % (FLUSH) 0.9 %
3 SYRINGE (ML) INJECTION
Status: DISCONTINUED | OUTPATIENT
Start: 2019-02-27 | End: 2019-03-02 | Stop reason: HOSPADM

## 2019-02-27 RX ORDER — TAMSULOSIN HYDROCHLORIDE 0.4 MG/1
0.4 CAPSULE ORAL DAILY
Status: DISCONTINUED | OUTPATIENT
Start: 2019-02-28 | End: 2019-03-02 | Stop reason: HOSPADM

## 2019-02-27 RX ORDER — IBUPROFEN 200 MG
24 TABLET ORAL
Status: DISCONTINUED | OUTPATIENT
Start: 2019-02-27 | End: 2019-03-02 | Stop reason: HOSPADM

## 2019-02-27 RX ORDER — ACETAMINOPHEN 325 MG/1
650 TABLET ORAL
Status: DISCONTINUED | OUTPATIENT
Start: 2019-02-27 | End: 2019-02-27

## 2019-02-27 RX ORDER — PANTOPRAZOLE SODIUM 40 MG/10ML
40 INJECTION, POWDER, LYOPHILIZED, FOR SOLUTION INTRAVENOUS ONCE
Status: DISCONTINUED | OUTPATIENT
Start: 2019-02-27 | End: 2019-02-27

## 2019-02-27 RX ORDER — IBUPROFEN 200 MG
16 TABLET ORAL
Status: DISCONTINUED | OUTPATIENT
Start: 2019-02-27 | End: 2019-03-02 | Stop reason: HOSPADM

## 2019-02-27 RX ORDER — HYDROCODONE BITARTRATE AND ACETAMINOPHEN 10; 325 MG/1; MG/1
1 TABLET ORAL
Status: COMPLETED | OUTPATIENT
Start: 2019-02-27 | End: 2019-02-27

## 2019-02-27 RX ORDER — GABAPENTIN 300 MG/1
600 CAPSULE ORAL NIGHTLY
Status: DISCONTINUED | OUTPATIENT
Start: 2019-02-27 | End: 2019-03-02 | Stop reason: HOSPADM

## 2019-02-27 RX ORDER — ONDANSETRON 8 MG/1
8 TABLET, ORALLY DISINTEGRATING ORAL EVERY 8 HOURS PRN
Status: DISCONTINUED | OUTPATIENT
Start: 2019-02-27 | End: 2019-03-02 | Stop reason: HOSPADM

## 2019-02-27 RX ORDER — SODIUM CHLORIDE 0.9 % (FLUSH) 0.9 %
5 SYRINGE (ML) INJECTION
Status: DISCONTINUED | OUTPATIENT
Start: 2019-02-27 | End: 2019-02-27

## 2019-02-27 RX ORDER — GLUCAGON 1 MG
1 KIT INJECTION
Status: DISCONTINUED | OUTPATIENT
Start: 2019-02-27 | End: 2019-03-02 | Stop reason: HOSPADM

## 2019-02-27 RX ORDER — HYDROCODONE BITARTRATE AND ACETAMINOPHEN 5; 325 MG/1; MG/1
1 TABLET ORAL EVERY 4 HOURS PRN
Status: DISCONTINUED | OUTPATIENT
Start: 2019-02-27 | End: 2019-03-02 | Stop reason: HOSPADM

## 2019-02-27 RX ADMIN — FENTANYL 1 PATCH: 25 PATCH, EXTENDED RELEASE TRANSDERMAL at 04:02

## 2019-02-27 RX ADMIN — GABAPENTIN 600 MG: 300 CAPSULE ORAL at 08:02

## 2019-02-27 RX ADMIN — PANTOPRAZOLE SODIUM 40 MG: 40 INJECTION, POWDER, FOR SOLUTION INTRAVENOUS at 02:02

## 2019-02-27 RX ADMIN — SODIUM CHLORIDE 500 ML: 0.9 INJECTION, SOLUTION INTRAVENOUS at 01:02

## 2019-02-27 RX ADMIN — SODIUM CHLORIDE 1000 ML: 0.9 INJECTION, SOLUTION INTRAVENOUS at 02:02

## 2019-02-27 RX ADMIN — TIMOLOL MALEATE 1 DROP: 5 SOLUTION/ DROPS OPHTHALMIC at 08:02

## 2019-02-27 RX ADMIN — HYDROCODONE BITARTRATE AND ACETAMINOPHEN 1 TABLET: 10; 325 TABLET ORAL at 03:02

## 2019-02-27 NOTE — ASSESSMENT & PLAN NOTE
Lab Results   Component Value Date    HGB 17.4 02/27/2019     H/H stable  Hemodynamically stable  Intravascular resuscitation/support with IVFs and pRBCs as needed.  Serial H/H's transfuse as needed.  Discontinue ASA  No evidence of coagulopathy   Platelets WNL  Maintain IV access with 2 large bore IVs  Keep NPO   Plan for GI consult.

## 2019-02-27 NOTE — ED NOTES
Pt transported to room 7099 A via wheel chair with escort Pt has 1 L NS infusing at wide open rate with 300 ml left TBA  Pt condition stable on leaving the ED, pt belongings are with pt and pt's family notified of room number

## 2019-02-27 NOTE — ED NOTES
Hourly rounding complete. Patient resting in stretcher and is in NAD at this time. Pt is awake alert and oriented x4, VSS, respirations even and unlabored. Patient aware of POC. Bed low and locked with side rails up x2, call bell in pt reach. Pt voices no needs at this time.

## 2019-02-27 NOTE — ED TRIAGE NOTES
"Artemio Colon Sr., a 89 y.o. male presents to the ED via personal transportation with CC blood in stool x1 occurrence this morning, reports seeing dark black and bright red blood, also c/o LLQ aching onset "few days ago", denies N/V CP or SOB. No other complaints at this time.    Patient identifiers verified verbally with patient and correct for Artemio Colon Sr..    LOC/ APPEARANCE: The patient is AAOx4. Pt is speaking appropriately, no slurred speech. Pt changed into hospital gown. Continuous cardiac monitor, cont pulse ox, and auto BP cuff applied to patient. Pt is clean and well groomed. No JVD visible. Pt updated on POC. Bed low and locked with side rails up x2, call bell in pt reach.  SKIN: Skin is warm dry and intact, and color is consistent with ethnicity. Capillary refill <3 seconds. No breakdown or brusing visible. Mucus membranes moist, acyanotic.  RESPIRATORY: Airway is open and patent. Respirations-spontaneous, unlabored, regular rate, equal bilaterally on inspiration and expiration. No accessory muscle use noted. Lungs clear to auscultation in all fields bilaterally anterior and posterior.   CARDIAC: Patient has regular heart rate. No peripheral edema noted, and patient has no c/o chest pain. Peripheral pulses present equal and strong throughout.  ABDOMEN: Soft and non-tender to palpation with no distention noted. C/o LLQ abdominal pain. Normoactive bowel sounds x4 quadrants. tPt reports blood in stool. Pt reports normal appetite.   NEUROLOGIC: Eyes open spontaneously and facial expression symmetrical. Pt behavior appropriate to situation, and pt follows commands. Pt reports sensation present in all extremities when touched with a finger, denies any numbness or tingling. PERRLA  MUSCULOSKELETAL: Spontaneous movement noted to all extremities. Hand  equal and leg strength strong +5 bilaterally.   : No complaints of frequency, burning, urgency or blood in the urine. No complaints " of incontinence.

## 2019-02-27 NOTE — ASSESSMENT & PLAN NOTE
No current symptoms of CP or BOYLE  Holding ASA due to above  Not on statin or BB  Follow up with cardiology.

## 2019-02-27 NOTE — ASSESSMENT & PLAN NOTE
Cont Flomax to treat  Monitor urine output  Bladder scan as needed  I/O cath for post void residual >300 cc

## 2019-02-27 NOTE — MEDICAL/APP STUDENT
History     Chief Complaint   Patient presents with    Rectal Bleeding     black stool with red blood this am. On baby aspirin     HPI     Mr Colon is a 89M with a history of bowel cancer s/p removal of cecum in approximately 1990, diverticulitis, CAD, AV block, bradycardia s/p pacemaker.    He had a recent admission for SBO 12 days ago, treated with bowel rest, NG decompression, and IV fluids.  He also has a history of SBO about 3 years ago, managed conservatively, then he underwent robo ANA electively at [facility].     This morning he had a sudden urge to defecate, and when he went to the bathroom he passed bright red blood and coffee grounds mixed in with normal looking stool. It filled the toilet bowl.    The patient has 3/10 stabbing pain in his left lower quadrant that comes and goes. There was no pain with defecation.  He reports that he usually has constipation with straining to defecate, which he attributes to his hydrocodone and fentanyl which he takes for back pain.  He denies nausea, vomiting, fever, and chills. He is not lightheaded or dizzy. He has 7 pounds of weight loss which he says is intentional.   His last colonoscopy was 5-6 years ago.    The abdominal CT from 2 weeks ago showed dilated small bowel just proximal to the anastomotic site concerning for obstruction at prior surgical site. The CT also showed colonic diverticulosis.    1:06 PM  Patient passed more bright red blood, he showed this to the nurse and . It was enough to fill the whole toilet bowl with red blood.  He is now feeling some lightheadedness.    Past Medical History:   Diagnosis Date    AV block     BPH (benign prostatic hyperplasia)     Chronic rhinitis     Coronary artery disease     Erectile dysfunction     Glaucoma     Neuropathy 8/22/2017    JAEL (obstructive sleep apnea)     Pacemaker     Symptomatic bradycardia     s/p pacemaker       Past Surgical History:   Procedure Laterality Date     "CATARACT EXTRACTION W/  INTRAOCULAR LENS IMPLANT Bilateral 2012    done in [place]    CHOLECYSTECTOMY      COLECTOMY      cecum    INSERT / REPLACE / REMOVE PACEMAKER  2012    changed    [id number] N/A 2/16/2018    Performed [facility] CATH LAB    VENOGRAM N/A 2/2/2018    Performed by [facility] at [facility] CATH LAB       Family History   Problem Relation Age of Onset    Amblyopia Neg Hx     Blindness Neg Hx     Cataracts Neg Hx     Glaucoma Neg Hx     Macular degeneration Neg Hx     Retinal detachment Neg Hx     Strabismus Neg Hx        Social History     Tobacco Use    Smoking status: Never Smoker    Smokeless tobacco: Never Used   Substance Use Topics    Alcohol use: Yes     Alcohol/week: 0.6 oz     Types: 1 Glasses of wine per week     Comment: weekly with meal    Drug use: No       Review of Systems   Constitutional: Positive for fatigue. Negative for activity change, chills, fever and unexpected weight change.   HENT: Negative for congestion and rhinorrhea.    Respiratory: Negative for cough, chest tightness and shortness of breath.    Cardiovascular: Negative for chest pain.   Gastrointestinal: Positive for abdominal pain, anal bleeding, blood in stool, constipation and rectal pain. Negative for abdominal distention, diarrhea, nausea and vomiting.   Genitourinary: Negative for difficulty urinating and dysuria.   Musculoskeletal: Positive for back pain.   Neurological: Positive for headaches. Negative for dizziness, syncope and light-headedness.   Psychiatric/Behavioral: Negative for agitation and confusion.       Physical Exam   BP (!) 154/81   Pulse 81   Temp 98 °F (36.7 °C) (Oral)   Resp 18   Ht 5' 10" (1.778 m)   Wt 86.6 kg (191 lb)   SpO2 96%   BMI 27.41 kg/m²     Physical Exam    Constitutional: He appears well-developed and well-nourished. He is not diaphoretic. No distress.   Cardiovascular: Normal rate, regular rhythm and normal heart " sounds.   No murmur heard.  Pulmonary/Chest: Breath sounds normal. No respiratory distress. He has no wheezes. He has no rales.   Abdominal: Soft. He exhibits no distension and no mass. There is tenderness (mild tenderness in lower abdomen). There is no rebound and no guarding.   Genitourinary: Rectal exam shows guaiac positive stool. Guaiac positive stool. : Acceptable.  Genitourinary Comments: Red blood and coffee grounds visible on PARMJIT, External hemorrhoids present   Musculoskeletal: He exhibits no edema.   Neurological: He is alert and oriented to person, place, and time.   Psychiatric: He has a normal mood and affect.         ED Course     1:06 PM  Patient passed more bright red blood, he showed this to the nurse and . It was enough to fill the whole toilet bowl with red blood.  He is now feeling some lightheadedness.

## 2019-02-27 NOTE — ED NOTES
Hourly rounding complete. Patient resting in stretcher and is in NAD at this time. Pt is awake alert and oriented x4, VSS, respirations even and unlabored. Family at bedside, pt and family aware of POC. Patient repositioned self in stretcher for comfort, provided ice chips and urinal per request. Bed low and locked with side rails up x2, call bell in pt reach. Pt voices no needs at this time.   Left message for pt to call back.

## 2019-02-27 NOTE — H&P
[facility] Hospital Medicine  History & Physical    Patient Name: Artemio Colon Sr.  MRN: [id number]  Admission Date: 2/27/2019  Attending Physician: Linsey Maria MD   Primary Care Provider: Osiel Stone MD    [facility] Medicine Team: [facility] Linsey Ashton MD     Patient information was obtained from patient and ER records.     Subjective:     Principal Problem:Diverticulosis of intestine with bleeding    Chief Complaint:   Chief Complaint   Patient presents with    Rectal Bleeding     black stool with red blood this am. On baby aspirin        HPI: History of Present Illness:  Patient is a 89 y.o. male who has a past medical history of AV block s/p pacemaker placement, benign prostatic hyperplasia, Chronic rhinitis, Coronary artery disease, Erectile dysfunction, Glaucoma, Neuropathy, obstructive sleep apnea presented with complaints of bright red blood per rectum and melena. Symptoms have been present for approximately 1 day. The symptoms are gradually worsening. Patient did complain of lower abdominal pain earlier but on my assessment he currently denies.  He has not used nonsteroidal anti-inflammatory drugs on a regular bases; he is not anticoagulated. The patient currently denies significant abdominal pain or discomfort. There is not a past history of gastrointestinal bleeding. He reports that he usually has constipation with straining to defecate, which he attributes to his hydrocodone and fentanyl which he takes for back pain. He denies nausea, vomiting, fever, and chills. He is not lightheaded or dizzy. He has 7 pounds of weight loss which he says is intentional. His last colonoscopy was 5-6 years ago. He had a recent admission for small bowel obstruction earlier this month that was treated with conservative medical management. The abdominal CT from 2 weeks ago showed dilated small bowel just proximal to the anastomotic site concerning for obstruction at prior surgical site.  The CT also showed colonic diverticulosis. He also has a history of SBO about 3 years ago, managed conservatively, then he underwent robo ANA electively at [facility]. Patient currently denies any fever/chills, chest pain, dyspnea, weakness, numbness, abdominal pain, nausea/vomiting, dysuria/hematuria.     Past Medical History:   Diagnosis Date    AV block     BPH (benign prostatic hyperplasia)     Chronic rhinitis     Coronary artery disease     Erectile dysfunction     Glaucoma     Neuropathy 8/22/2017    JAEL (obstructive sleep apnea)     Pacemaker     Symptomatic bradycardia     s/p pacemaker       Past Surgical History:   Procedure Laterality Date    CATARACT EXTRACTION W/  INTRAOCULAR LENS IMPLANT Bilateral 2012    done in [place]    CHOLECYSTECTOMY      COLECTOMY      cecum    INSERT / REPLACE / REMOVE PACEMAKER  2012    changed    [id number] N/A 2/16/2018    Performed [facility] at [facility] CATH LAB    VENOGRAM N/A 2/2/2018    Performed by Tawanda Adler MD at [facility] CATH LAB       Review of patient's allergies indicates:   Allergen Reactions    Pcn [penicillins] Hives       No current facility-administered medications on file prior to encounter.      Current Outpatient Medications on File Prior to Encounter   Medication Sig    anastrozole (ARIMIDEX) 1 mg Tab Take 1 mg by mouth Every 3 (three) days.    aspirin (ECOTRIN) 81 MG EC tablet Take 81 mg by mouth once daily.    brimonidine 0.2% (ALPHAGAN) 0.2 % Drop Place 1 drop into both eyes 2 (two) times daily.    efinaconazole 10 % Werner Apply to affected toenails once daily    fentaNYL (DURAGESIC) 25 mcg/hr Place 1 patch onto the skin every 72 hours.    fluticasone (FLONASE) 50 mcg/actuation nasal spray 2 sprays by Each Nare route once daily.    gabapentin (NEURONTIN) 600 MG tablet Take 1 tablet (600 mg total) by mouth once daily. (Patient taking differently: Take 600 mg by mouth every evening. )     hydrocodone-acetaminophen 10-325mg (NORCO)  mg Tab Take by mouth every 4 (four) hours as needed for Pain.    prasterone, DHEA, (DHEA ORAL) Take by mouth every evening. 1 cap at bedtime    tamsulosin (FLOMAX) 0.4 mg Cap Take 1 capsule (0.4 mg total) by mouth once daily.    testosterone cypionate 200 mg/mL Kit Inject into the muscle Every 3 (three) days. 10 ml once every 3 days    timolol maleate 0.5% (TIMOPTIC) 0.5 % Drop Place 1 drop into both eyes 2 (two) times daily.    travoprost (TRAVATAN Z) 0.004 % Drop Place 1 drop into both eyes every evening.    UNABLE TO FIND medication name: HCG 12,000 iu vial once every 3 days    pravastatin (PRAVACHOL) 40 MG tablet Take 1 tablet (40 mg total) by mouth once daily.     Family History     None        Tobacco Use    Smoking status: Never Smoker    Smokeless tobacco: Never Used   Substance and Sexual Activity    Alcohol use: Yes     Alcohol/week: 0.6 oz     Types: 1 Glasses of wine per week     Comment: weekly with meal    Drug use: No    Sexual activity: Yes     Review of Systems   Constitutional: Negative for chills, fatigue and fever.   HENT: Negative for congestion, facial swelling, hearing loss and trouble swallowing.    Eyes: Negative for photophobia and visual disturbance.   Respiratory: Negative for chest tightness, shortness of breath and wheezing.    Cardiovascular: Negative for chest pain, palpitations and leg swelling.   Gastrointestinal: Positive for blood in stool and constipation. Negative for abdominal pain, diarrhea, nausea and vomiting.   Endocrine: Negative.    Genitourinary: Negative.    Musculoskeletal: Negative for back pain, joint swelling and myalgias.   Skin: Negative.    Allergic/Immunologic: Negative.    Neurological: Negative for dizziness, facial asymmetry, speech difficulty, weakness and numbness.   Hematological: Negative.    Psychiatric/Behavioral: Negative for agitation, confusion and dysphoric mood. The patient is not  nervous/anxious.      Objective:     Vital Signs (Most Recent):  Temp: 98.1 °F (36.7 °C) (02/27/19 1500)  Pulse: 70 (02/27/19 1500)  Resp: 12 (02/27/19 1500)  BP: (!) 152/73 (02/27/19 1430)  SpO2: 98 % (02/27/19 1500) Vital Signs (24h Range):  Temp:  [98 °F (36.7 °C)-98.1 °F (36.7 °C)] 98.1 °F (36.7 °C)  Pulse:  [70-81] 70  Resp:  [12-20] 12  SpO2:  [96 %-98 %] 98 %  BP: (134-154)/(62-81) 152/73     Weight: 86.6 kg (191 lb)  Body mass index is 27.41 kg/m².    Physical Exam   Constitutional: He is oriented to person, place, and time. Vital signs are normal. He appears well-developed and well-nourished.  Non-toxic appearance. He does not appear ill. No distress.   HENT:   Head: Normocephalic and atraumatic.   Eyes: Conjunctivae and EOM are normal. Pupils are equal, round, and reactive to light. No scleral icterus.   Neck: Normal range of motion and full passive range of motion without pain. Neck supple. No JVD present. Carotid bruit is not present. No thyromegaly present.   Cardiovascular: Normal rate, regular rhythm, S1 normal, S2 normal, normal heart sounds and normal pulses. Exam reveals no gallop, no S3, no S4 and no friction rub.   No murmur heard.  Pulmonary/Chest: Effort normal and breath sounds normal. No accessory muscle usage. No tachypnea. No respiratory distress. He has no decreased breath sounds. He has no wheezes. He has no rhonchi. He has no rales.   Abdominal: Soft. Normal appearance and bowel sounds are normal. He exhibits no shifting dullness, no distension, no abdominal bruit and no ascites. There is no hepatosplenomegaly. There is no tenderness. There is no rigidity and no guarding.   Musculoskeletal: Normal range of motion. He exhibits no edema or tenderness.   Neurological: He is alert and oriented to person, place, and time. He has normal strength. He is not disoriented. No cranial nerve deficit or sensory deficit. GCS eye subscore is 4. GCS verbal subscore is 5. GCS motor subscore is 6.   Skin:  Skin is warm, dry and intact. No abrasion and no lesion noted.   Psychiatric: He has a normal mood and affect. His behavior is normal. Judgment and thought content normal. His mood appears not anxious. His speech is not slurred. He is not actively hallucinating. Cognition and memory are normal. He does not exhibit a depressed mood. He is attentive.         CRANIAL NERVES     CN III, IV, VI   Pupils are equal, round, and reactive to light.  Extraocular motions are normal.      Significant Labs: All pertinent labs within the past 24 hours have been reviewed.    Significant Imaging: I have reviewed all pertinent imaging results/findings within the past 24 hours.       Assessment/Plan:     * Diverticulosis of intestine with bleeding    Lab Results   Component Value Date    HGB 17.4 02/27/2019     H/H stable  Hemodynamically stable  Intravascular resuscitation/support with IVFs and pRBCs as needed.  Serial H/H's transfuse as needed.  Discontinue ASA  No evidence of coagulopathy   Platelets WNL  Maintain IV access with 2 large bore IVs  Keep NPO   Plan for GI consult.      Benign prostatic hyperplasia without lower urinary tract symptoms    Cont Flomax to treat  Monitor urine output  Bladder scan as needed  I/O cath for post void residual >300 cc       Dyslipidemia    Patient is not taking statin as prescribed  Follow up with PCP       Cervical stenosis of spine    Cont pain control with fentanyl patch  PT/OT       Neuropathic pain    Cont gabapentin to treat.        Complete AV block    S/p PPM in place  No active issues  Follow up as outpatient with device clinic        Coronary artery disease involving native coronary artery of native heart without angina pectoris    No current symptoms of CP or BOYLE  Holding ASA due to above  Not on statin or BB  Follow up with cardiology.          VTE Risk Mitigation (From admission, onward)        Ordered     IP VTE LOW RISK PATIENT  Once      02/27/19 1555     Place sequential  compression device  Until discontinued      02/27/19 6273     Place FRANCISCO hose  Until discontinued      02/27/19 6916             Linsey Ashton MD  Department of Hospital Medicine   [facility]

## 2019-02-27 NOTE — SUBJECTIVE & OBJECTIVE
Past Medical History:   Diagnosis Date    AV block     BPH (benign prostatic hyperplasia)     Chronic rhinitis     Coronary artery disease     Erectile dysfunction     Glaucoma     Neuropathy 8/22/2017    JAEL (obstructive sleep apnea)     Pacemaker     Symptomatic bradycardia     s/p pacemaker       Past Surgical History:   Procedure Laterality Date    CATARACT EXTRACTION W/  INTRAOCULAR LENS IMPLANT Bilateral 2012    done in [place]    CHOLECYSTECTOMY      COLECTOMY      cecum    INSERT / REPLACE / REMOVE PACEMAKER  2012    changed    [id number] N/A 2/16/2018    Performed [facility] at [facility] CATH LAB    VENOGRAM N/A 2/2/2018    Performed by Tawanda [facility]ler MD at [facility] CATH LAB       Review of patient's allergies indicates:   Allergen Reactions    Pcn [penicillins] Hives       No current facility-administered medications on file prior to encounter.      Current Outpatient Medications on File Prior to Encounter   Medication Sig    anastrozole (ARIMIDEX) 1 mg Tab Take 1 mg by mouth Every 3 (three) days.    aspirin (ECOTRIN) 81 MG EC tablet Take 81 mg by mouth once daily.    brimonidine 0.2% (ALPHAGAN) 0.2 % Drop Place 1 drop into both eyes 2 (two) times daily.    efinaconazole 10 % Werner Apply to affected toenails once daily    fentaNYL (DURAGESIC) 25 mcg/hr Place 1 patch onto the skin every 72 hours.    fluticasone (FLONASE) 50 mcg/actuation nasal spray 2 sprays by Each Nare route once daily.    gabapentin (NEURONTIN) 600 MG tablet Take 1 tablet (600 mg total) by mouth once daily. (Patient taking differently: Take 600 mg by mouth every evening. )    hydrocodone-acetaminophen 10-325mg (NORCO)  mg Tab Take by mouth every 4 (four) hours as needed for Pain.    prasterone, DHEA, (DHEA ORAL) Take by mouth every evening. 1 cap at bedtime    tamsulosin (FLOMAX) 0.4 mg Cap Take 1 capsule (0.4 mg total) by mouth once daily.    testosterone cypionate 200 mg/mL Kit  Inject into the muscle Every 3 (three) days. 10 ml once every 3 days    timolol maleate 0.5% (TIMOPTIC) 0.5 % Drop Place 1 drop into both eyes 2 (two) times daily.    travoprost (TRAVATAN Z) 0.004 % Drop Place 1 drop into both eyes every evening.    UNABLE TO FIND medication name: HCG 12,000 iu vial once every 3 days    pravastatin (PRAVACHOL) 40 MG tablet Take 1 tablet (40 mg total) by mouth once daily.     Family History     None        Tobacco Use    Smoking status: Never Smoker    Smokeless tobacco: Never Used   Substance and Sexual Activity    Alcohol use: Yes     Alcohol/week: 0.6 oz     Types: 1 Glasses of wine per week     Comment: weekly with meal    Drug use: No    Sexual activity: Yes     Review of Systems   Constitutional: Negative for chills, fatigue and fever.   HENT: Negative for congestion, facial swelling, hearing loss and trouble swallowing.    Eyes: Negative for photophobia and visual disturbance.   Respiratory: Negative for chest tightness, shortness of breath and wheezing.    Cardiovascular: Negative for chest pain, palpitations and leg swelling.   Gastrointestinal: Positive for blood in stool and constipation. Negative for abdominal pain, diarrhea, nausea and vomiting.   Endocrine: Negative.    Genitourinary: Negative.    Musculoskeletal: Negative for back pain, joint swelling and myalgias.   Skin: Negative.    Allergic/Immunologic: Negative.    Neurological: Negative for dizziness, facial asymmetry, speech difficulty, weakness and numbness.   Hematological: Negative.    Psychiatric/Behavioral: Negative for agitation, confusion and dysphoric mood. The patient is not nervous/anxious.      Objective:     Vital Signs (Most Recent):  Temp: 98.1 °F (36.7 °C) (02/27/19 1500)  Pulse: 70 (02/27/19 1500)  Resp: 12 (02/27/19 1500)  BP: (!) 152/73 (02/27/19 1430)  SpO2: 98 % (02/27/19 1500) Vital Signs (24h Range):  Temp:  [98 °F (36.7 °C)-98.1 °F (36.7 °C)] 98.1 °F (36.7 °C)  Pulse:  [70-81]  70  Resp:  [12-20] 12  SpO2:  [96 %-98 %] 98 %  BP: (134-154)/(62-81) 152/73     Weight: 86.6 kg (191 lb)  Body mass index is 27.41 kg/m².    Physical Exam   Constitutional: He is oriented to person, place, and time. Vital signs are normal. He appears well-developed and well-nourished.  Non-toxic appearance. He does not appear ill. No distress.   HENT:   Head: Normocephalic and atraumatic.   Eyes: Conjunctivae and EOM are normal. Pupils are equal, round, and reactive to light. No scleral icterus.   Neck: Normal range of motion and full passive range of motion without pain. Neck supple. No JVD present. Carotid bruit is not present. No thyromegaly present.   Cardiovascular: Normal rate, regular rhythm, S1 normal, S2 normal, normal heart sounds and normal pulses. Exam reveals no gallop, no S3, no S4 and no friction rub.   No murmur heard.  Pulmonary/Chest: Effort normal and breath sounds normal. No accessory muscle usage. No tachypnea. No respiratory distress. He has no decreased breath sounds. He has no wheezes. He has no rhonchi. He has no rales.   Abdominal: Soft. Normal appearance and bowel sounds are normal. He exhibits no shifting dullness, no distension, no abdominal bruit and no ascites. There is no hepatosplenomegaly. There is no tenderness. There is no rigidity and no guarding.   Musculoskeletal: Normal range of motion. He exhibits no edema or tenderness.   Neurological: He is alert and oriented to person, place, and time. He has normal strength. He is not disoriented. No cranial nerve deficit or sensory deficit. GCS eye subscore is 4. GCS verbal subscore is 5. GCS motor subscore is 6.   Skin: Skin is warm, dry and intact. No abrasion and no lesion noted.   Psychiatric: He has a normal mood and affect. His behavior is normal. Judgment and thought content normal. His mood appears not anxious. His speech is not slurred. He is not actively hallucinating. Cognition and memory are normal. He does not exhibit a  depressed mood. He is attentive.         CRANIAL NERVES     CN III, IV, VI   Pupils are equal, round, and reactive to light.  Extraocular motions are normal.      Significant Labs: All pertinent labs within the past 24 hours have been reviewed.    Significant Imaging: I have reviewed all pertinent imaging results/findings within the past 24 hours.

## 2019-02-27 NOTE — HPI
History of Present Illness:  Patient is a 89 y.o. male who has a past medical history of AV block s/p pacemaker placement, benign prostatic hyperplasia, Chronic rhinitis, Coronary artery disease, Erectile dysfunction, Glaucoma, Neuropathy, obstructive sleep apnea presented with complaints of bright red blood per rectum and melena. Symptoms have been present for approximately 1 day. The symptoms are gradually worsening. Patient did complain of lower abdominal pain earlier but on my assessment he currently denies.  He has not used nonsteroidal anti-inflammatory drugs on a regular bases; he is not anticoagulated. The patient currently denies significant abdominal pain or discomfort. There is not a past history of gastrointestinal bleeding. He reports that he usually has constipation with straining to defecate, which he attributes to his hydrocodone and fentanyl which he takes for back pain. He denies nausea, vomiting, fever, and chills. He is not lightheaded or dizzy. He has 7 pounds of weight loss which he says is intentional. His last colonoscopy was 5-6 years ago. He had a recent admission for small bowel obstruction earlier this month that was treated with conservative medical management. The abdominal CT from 2 weeks ago showed dilated small bowel just proximal to the anastomotic site concerning for obstruction at prior surgical site. The CT also showed colonic diverticulosis. He also has a history of SBO about 3 years ago, managed conservatively, then he underwent robo ANA electively at [facility]. Patient currently denies any fever/chills, chest pain, dyspnea, weakness, numbness, abdominal pain, nausea/vomiting, dysuria/hematuria.

## 2019-02-27 NOTE — ED NOTES
Hourly rounding complete. Patient resting in stretcher and is in NAD at this time. Pt is awake alert and oriented x4, VSS, respirations even and unlabored. Family at bedside, pt and family aware of POC. Bed low and locked with side rails up x2, call bell in pt reach. Pt voices no needs at this time.

## 2019-02-27 NOTE — ED NOTES
Hourly rounding complete. Patient resting in stretcher and is in NAD at this time. Pt is awake alert and oriented x4, VSS, respirations even and unlabored. Family at bedside, pt and family updated on POC. Bed low and locked with side rails up x2, call bell in pt reach. Pt voices no needs at this time.

## 2019-02-28 LAB
ANION GAP SERPL CALC-SCNC: 6 MMOL/L
BASOPHILS # BLD AUTO: 0.06 K/UL
BASOPHILS NFR BLD: 0.7 %
BUN SERPL-MCNC: 16 MG/DL
CALCIUM SERPL-MCNC: 8.2 MG/DL
CHLORIDE SERPL-SCNC: 106 MMOL/L
CO2 SERPL-SCNC: 25 MMOL/L
CREAT SERPL-MCNC: 0.9 MG/DL
DIFFERENTIAL METHOD: ABNORMAL
EOSINOPHIL # BLD AUTO: 0.2 K/UL
EOSINOPHIL NFR BLD: 1.8 %
ERYTHROCYTE [DISTWIDTH] IN BLOOD BY AUTOMATED COUNT: 13.5 %
EST. GFR  (AFRICAN AMERICAN): >60 ML/MIN/1.73 M^2
EST. GFR  (NON AFRICAN AMERICAN): >60 ML/MIN/1.73 M^2
GLUCOSE SERPL-MCNC: 108 MG/DL
HCT VFR BLD AUTO: 42.6 %
HGB BLD-MCNC: 12.4 G/DL
HGB BLD-MCNC: 12.5 G/DL
HGB BLD-MCNC: 13.5 G/DL
HGB BLD-MCNC: 13.7 G/DL
IMM GRANULOCYTES # BLD AUTO: 0.07 K/UL
IMM GRANULOCYTES NFR BLD AUTO: 0.8 %
LYMPHOCYTES # BLD AUTO: 1.5 K/UL
LYMPHOCYTES NFR BLD: 17.4 %
MCH RBC QN AUTO: 31.6 PG
MCHC RBC AUTO-ENTMCNC: 32.2 G/DL
MCV RBC AUTO: 98 FL
MONOCYTES # BLD AUTO: 0.7 K/UL
MONOCYTES NFR BLD: 8.6 %
NEUTROPHILS # BLD AUTO: 6 K/UL
NEUTROPHILS NFR BLD: 70.7 %
NRBC BLD-RTO: 0 /100 WBC
PLATELET # BLD AUTO: 210 K/UL
PMV BLD AUTO: 10.1 FL
POTASSIUM SERPL-SCNC: 4.5 MMOL/L
RBC # BLD AUTO: 4.33 M/UL
SODIUM SERPL-SCNC: 137 MMOL/L
WBC # BLD AUTO: 8.41 K/UL

## 2019-02-28 PROCEDURE — 80048 BASIC METABOLIC PNL TOTAL CA: CPT

## 2019-02-28 PROCEDURE — 99225 PR SUBSEQUENT OBSERVATION CARE,LEVEL II: ICD-10-PCS | Mod: ,,, | Performed by: HOSPITALIST

## 2019-02-28 PROCEDURE — G0378 HOSPITAL OBSERVATION PER HR: HCPCS

## 2019-02-28 PROCEDURE — C9113 INJ PANTOPRAZOLE SODIUM, VIA: HCPCS | Performed by: HOSPITALIST

## 2019-02-28 PROCEDURE — 27000221 HC OXYGEN, UP TO 24 HOURS

## 2019-02-28 PROCEDURE — 63600175 PHARM REV CODE 636 W HCPCS: Performed by: HOSPITALIST

## 2019-02-28 PROCEDURE — 25000003 PHARM REV CODE 250: Performed by: HOSPITALIST

## 2019-02-28 PROCEDURE — 85018 HEMOGLOBIN: CPT

## 2019-02-28 PROCEDURE — 85025 COMPLETE CBC W/AUTO DIFF WBC: CPT

## 2019-02-28 PROCEDURE — 36415 COLL VENOUS BLD VENIPUNCTURE: CPT

## 2019-02-28 PROCEDURE — 99225 PR SUBSEQUENT OBSERVATION CARE,LEVEL II: CPT | Mod: ,,, | Performed by: HOSPITALIST

## 2019-02-28 RX ADMIN — BRIMONIDINE TARTRATE 1 DROP: 2 SOLUTION OPHTHALMIC at 12:02

## 2019-02-28 RX ADMIN — TIMOLOL MALEATE 1 DROP: 5 SOLUTION/ DROPS OPHTHALMIC at 09:02

## 2019-02-28 RX ADMIN — ONDANSETRON 8 MG: 8 TABLET, ORALLY DISINTEGRATING ORAL at 04:02

## 2019-02-28 RX ADMIN — HYDROCODONE BITARTRATE AND ACETAMINOPHEN 1 TABLET: 5; 325 TABLET ORAL at 05:02

## 2019-02-28 RX ADMIN — GABAPENTIN 600 MG: 300 CAPSULE ORAL at 09:02

## 2019-02-28 RX ADMIN — PANTOPRAZOLE SODIUM 40 MG: 40 INJECTION, POWDER, FOR SOLUTION INTRAVENOUS at 09:02

## 2019-02-28 RX ADMIN — TRAVOPROST 1 DROP: 0.04 SOLUTION/ DROPS OPHTHALMIC at 10:02

## 2019-02-28 RX ADMIN — TAMSULOSIN HYDROCHLORIDE 0.4 MG: 0.4 CAPSULE ORAL at 09:02

## 2019-02-28 NOTE — SUBJECTIVE & OBJECTIVE
Interval History: Patient continues with bloody bowel movements. H/H slightly down. GI consult pending.     Review of Systems   Constitutional: Negative for chills, fatigue and fever.   HENT: Negative for congestion, facial swelling, hearing loss and trouble swallowing.    Eyes: Negative for photophobia and visual disturbance.   Respiratory: Negative for chest tightness, shortness of breath and wheezing.    Cardiovascular: Negative for chest pain, palpitations and leg swelling.   Gastrointestinal: Positive for blood in stool and constipation. Negative for abdominal pain, diarrhea, nausea and vomiting.   Endocrine: Negative.    Genitourinary: Negative.    Musculoskeletal: Negative for back pain, joint swelling and myalgias.   Skin: Negative.    Allergic/Immunologic: Negative.    Neurological: Negative for dizziness, facial asymmetry, speech difficulty, weakness and numbness.   Hematological: Negative.    Psychiatric/Behavioral: Negative for agitation, confusion and dysphoric mood. The patient is not nervous/anxious.      Objective:     Vital Signs (Most Recent):  Temp: 97.4 °F (36.3 °C) (02/28/19 0741)  Pulse: 64 (02/28/19 0741)  Resp: 14 (02/28/19 0741)  BP: 125/68 (02/28/19 0741)  SpO2: 98 % (02/28/19 0741) Vital Signs (24h Range):  Temp:  [97.4 °F (36.3 °C)-98.5 °F (36.9 °C)] 97.4 °F (36.3 °C)  Pulse:  [61-77] 64  Resp:  [12-19] 14  SpO2:  [94 %-98 %] 98 %  BP: ()/(62-73) 125/68     Weight: 88.5 kg (195 lb 3.2 oz)  Body mass index is 28.01 kg/m².    Intake/Output Summary (Last 24 hours) at 2/28/2019 1201  Last data filed at 2/28/2019 0600  Gross per 24 hour   Intake 500 ml   Output --   Net 500 ml      Physical Exam   Constitutional: He is oriented to person, place, and time. Vital signs are normal. He appears well-developed and well-nourished.  Non-toxic appearance. He does not appear ill. No distress.   HENT:   Head: Normocephalic and atraumatic.   Eyes: Conjunctivae and EOM are normal. Pupils are equal,  round, and reactive to light. No scleral icterus.   Neck: Normal range of motion and full passive range of motion without pain. Neck supple. No JVD present. Carotid bruit is not present. No thyromegaly present.   Cardiovascular: Normal rate, regular rhythm, S1 normal, S2 normal, normal heart sounds and normal pulses. Exam reveals no gallop, no S3, no S4 and no friction rub.   No murmur heard.  Pulmonary/Chest: Effort normal and breath sounds normal. No accessory muscle usage. No tachypnea. No respiratory distress. He has no decreased breath sounds. He has no wheezes. He has no rhonchi. He has no rales.   Abdominal: Soft. Normal appearance and bowel sounds are normal. He exhibits no shifting dullness, no distension, no abdominal bruit and no ascites. There is no hepatosplenomegaly. There is no tenderness. There is no rigidity and no guarding.   Musculoskeletal: Normal range of motion. He exhibits no edema or tenderness.   Neurological: He is alert and oriented to person, place, and time. He has normal strength. He is not disoriented. No cranial nerve deficit or sensory deficit. GCS eye subscore is 4. GCS verbal subscore is 5. GCS motor subscore is 6.   Skin: Skin is warm, dry and intact. No abrasion and no lesion noted.   Psychiatric: He has a normal mood and affect. His behavior is normal. Judgment and thought content normal. His mood appears not anxious. His speech is not slurred. He is not actively hallucinating. Cognition and memory are normal. He does not exhibit a depressed mood. He is attentive.       Significant Labs: All pertinent labs within the past 24 hours have been reviewed.    Significant Imaging: I have reviewed all pertinent imaging results/findings within the past 24 hours.

## 2019-02-28 NOTE — HPI
This is a 89 yo M with hx of CAD, AV block with pacemaker, BPH, and JAEL who presented to the ED yesterday for hematochezia. Patient reports that yesterday he began having bright red blood mixed with stool. After that he was having bright red blood liquid without stool per rectum. He takes a baby aspirin but not any blood thinners. Denies NSAID use. His last episode of hematochezia was this morning. He reports some mild lower abdominal discomfort. He denies any melena or hematemesis. He overall feels well. Denies any chest pain, weakness, or shortness of breath. He has never had an episode of GI bleed before. He reports he had a colonoscopy about ten years ago and was told that he no longer needed them. On arrival he was noted to have a drop in his Hg from his baseline which runs high around 17-18 down to 13.9. He is hemodynamically stable. Of note, he had a recent admission for small bowel obstruction earlier this month that was treated with conservative medical management. The abdominal CT from 2 weeks ago showed dilated small bowel just proximal to the anastomotic site concerning for obstruction at prior surgical site. The CT also showed colonic diverticulosis. He also has a history of SBO about 3 years ago, managed conservatively, then he underwent robo ANA electively at [facility].

## 2019-02-28 NOTE — PROGRESS NOTES
[facility] Hospital Medicine  Progress Note    Patient Name: Artemio Colon Sr.  MRN: [id number]  Patient Class: OP- Observation   Admission Date: 2/27/2019  Length of Stay: 0 days  Attending Physician: Linsey Maria MD  Primary Care Provider: Osiel Stone MD    [facility] Medicine Team: [facility] Linsey Ashton MD    Subjective:     Principal Problem:Diverticulosis of intestine with bleeding    HPI:  History of Present Illness:  Patient is a 89 y.o. male who has a past medical history of AV block s/p pacemaker placement, benign prostatic hyperplasia, Chronic rhinitis, Coronary artery disease, Erectile dysfunction, Glaucoma, Neuropathy, obstructive sleep apnea presented with complaints of bright red blood per rectum and melena. Symptoms have been present for approximately 1 day. The symptoms are gradually worsening. Patient did complain of lower abdominal pain earlier but on my assessment he currently denies.  He has not used nonsteroidal anti-inflammatory drugs on a regular bases; he is not anticoagulated. The patient currently denies significant abdominal pain or discomfort. There is not a past history of gastrointestinal bleeding. He reports that he usually has constipation with straining to defecate, which he attributes to his hydrocodone and fentanyl which he takes for back pain. He denies nausea, vomiting, fever, and chills. He is not lightheaded or dizzy. He has 7 pounds of weight loss which he says is intentional. His last colonoscopy was 5-6 years ago. He had a recent admission for small bowel obstruction earlier this month that was treated with conservative medical management. The abdominal CT from 2 weeks ago showed dilated small bowel just proximal to the anastomotic site concerning for obstruction at prior surgical site. The CT also showed colonic diverticulosis. He also has a history of SBO about 3 years ago, managed conservatively, then he underwent robo ANA electively at  [facility]. Patient currently denies any fever/chills, chest pain, dyspnea, weakness, numbness, abdominal pain, nausea/vomiting, dysuria/hematuria.     Hospital Course:  No notes on file    Interval History: Patient continues with bloody bowel movements. H/H slightly down. GI consult pending.     Review of Systems   Constitutional: Negative for chills, fatigue and fever.   HENT: Negative for congestion, facial swelling, hearing loss and trouble swallowing.    Eyes: Negative for photophobia and visual disturbance.   Respiratory: Negative for chest tightness, shortness of breath and wheezing.    Cardiovascular: Negative for chest pain, palpitations and leg swelling.   Gastrointestinal: Positive for blood in stool and constipation. Negative for abdominal pain, diarrhea, nausea and vomiting.   Endocrine: Negative.    Genitourinary: Negative.    Musculoskeletal: Negative for back pain, joint swelling and myalgias.   Skin: Negative.    Allergic/Immunologic: Negative.    Neurological: Negative for dizziness, facial asymmetry, speech difficulty, weakness and numbness.   Hematological: Negative.    Psychiatric/Behavioral: Negative for agitation, confusion and dysphoric mood. The patient is not nervous/anxious.      Objective:     Vital Signs (Most Recent):  Temp: 97.4 °F (36.3 °C) (02/28/19 0741)  Pulse: 64 (02/28/19 0741)  Resp: 14 (02/28/19 0741)  BP: 125/68 (02/28/19 0741)  SpO2: 98 % (02/28/19 0741) Vital Signs (24h Range):  Temp:  [97.4 °F (36.3 °C)-98.5 °F (36.9 °C)] 97.4 °F (36.3 °C)  Pulse:  [61-77] 64  Resp:  [12-19] 14  SpO2:  [94 %-98 %] 98 %  BP: ()/(62-73) 125/68     Weight: 88.5 kg (195 lb 3.2 oz)  Body mass index is 28.01 kg/m².    Intake/Output Summary (Last 24 hours) at 2/28/2019 1201  Last data filed at 2/28/2019 0600  Gross per 24 hour   Intake 500 ml   Output --   Net 500 ml      Physical Exam   Constitutional: He is oriented to person, place, and time. Vital signs are normal. He appears  well-developed and well-nourished.  Non-toxic appearance. He does not appear ill. No distress.   HENT:   Head: Normocephalic and atraumatic.   Eyes: Conjunctivae and EOM are normal. Pupils are equal, round, and reactive to light. No scleral icterus.   Neck: Normal range of motion and full passive range of motion without pain. Neck supple. No JVD present. Carotid bruit is not present. No thyromegaly present.   Cardiovascular: Normal rate, regular rhythm, S1 normal, S2 normal, normal heart sounds and normal pulses. Exam reveals no gallop, no S3, no S4 and no friction rub.   No murmur heard.  Pulmonary/Chest: Effort normal and breath sounds normal. No accessory muscle usage. No tachypnea. No respiratory distress. He has no decreased breath sounds. He has no wheezes. He has no rhonchi. He has no rales.   Abdominal: Soft. Normal appearance and bowel sounds are normal. He exhibits no shifting dullness, no distension, no abdominal bruit and no ascites. There is no hepatosplenomegaly. There is no tenderness. There is no rigidity and no guarding.   Musculoskeletal: Normal range of motion. He exhibits no edema or tenderness.   Neurological: He is alert and oriented to person, place, and time. He has normal strength. He is not disoriented. No cranial nerve deficit or sensory deficit. GCS eye subscore is 4. GCS verbal subscore is 5. GCS motor subscore is 6.   Skin: Skin is warm, dry and intact. No abrasion and no lesion noted.   Psychiatric: He has a normal mood and affect. His behavior is normal. Judgment and thought content normal. His mood appears not anxious. His speech is not slurred. He is not actively hallucinating. Cognition and memory are normal. He does not exhibit a depressed mood. He is attentive.       Significant Labs: All pertinent labs within the past 24 hours have been reviewed.    Significant Imaging: I have reviewed all pertinent imaging results/findings within the past 24 hours.    Assessment/Plan:      *  Diverticulosis of intestine with bleeding    Lab Results   Component Value Date    HGB 13.5 (L) 02/28/2019     H/H tending down  Hemodynamically stable  Intravascular resuscitation/support with IVFs and pRBCs as needed.  Serial H/H's transfuse as needed.  Discontinue ASA  No evidence of coagulopathy   Platelets WNL  Maintain IV access with 2 large bore IVs  Keep NPO   Plan for GI consult.      Benign prostatic hyperplasia without lower urinary tract symptoms    Cont Flomax to treat  Monitor urine output  Bladder scan as needed  I/O cath for post void residual >300 cc       Dyslipidemia    Patient is not taking statin as prescribed  Follow up with PCP       Cervical stenosis of spine    Cont pain control with fentanyl patch  PT/OT       Neuropathic pain    Cont gabapentin to treat.        Complete AV block    S/p PPM in place  No active issues  Follow up as outpatient with device clinic        Coronary artery disease involving native coronary artery of native heart without angina pectoris    No current symptoms of CP or BOYLE  Holding ASA due to above  Not on statin or BB  Follow up with cardiology.          VTE Risk Mitigation (From admission, onward)        Ordered     IP VTE LOW RISK PATIENT  Once      02/27/19 1555     Place sequential compression device  Until discontinued      02/27/19 1553     Place FRANCISCO hose  Until discontinued      02/27/19 1357              Linsey Ashton MD  Department of Hospital Medicine   [facility]

## 2019-02-28 NOTE — PLAN OF CARE
Problem: Adult Inpatient Plan of Care  Goal: Plan of Care Review  Outcome: Ongoing (interventions implemented as appropriate)   02/28/19 0355   Plan of Care Review   Plan of Care Reviewed With patient     Goal: Absence of Hospital-Acquired Illness or Injury    Intervention: Identify and Manage Fall Risk   02/28/19 0200   Optimize Balance and Safe Activity   Safety Promotion/Fall Prevention assistive device/personal item within reach;diversional activities provided;Fall Risk reviewed with patient/family;Fall Risk signage in place;high risk medications identified;lighting adjusted;medications reviewed;nonskid shoes/socks when out of bed;side rails raised x 2;instructed to call staff for mobility     Intervention: Prevent VTE (venous thromboembolism)   02/27/19 1930   Prevent or Manage Embolism   VTE Prevention/Management ambulation promoted;bleeding precautions maintained       Goal: Optimal Comfort and Wellbeing    Intervention: Provide Person-Centered Care   02/28/19 0355   Support Dyspnea Relief   Trust Relationship/Rapport care explained;choices provided;emotional support provided;empathic listening provided;questions answered;questions encouraged;reassurance provided;thoughts/feelings acknowledged         Problem: Infection  Goal: Infection Symptom Resolution    Intervention: Prevent or Manage Infection   02/27/19 1930 02/28/19 0355   Prevent or Manage Infection   Fever Reduction/Comfort Measures --  lightweight bedding;lightweight clothing   Infection Management aseptic technique maintained --          Problem: Fall Injury Risk  Goal: Absence of Fall and Fall-Related Injury    Intervention: Identify and Manage Contributors to Fall Injury Risk   02/28/19 0355   Manage Acute Allergic Reaction   Medication Review/Management medications reviewed;high risk medications identified   Identify and Manage Contributors to Fall Injury Risk   Self-Care Promotion independence encouraged;BADL personal objects within reach      Intervention: Promote Injury-Free Environment   02/28/19 0200 02/28/19 0355   Optimize [place] and Functional Mobility   Environmental Safety Modification --  assistive device/personal items within reach;clutter free environment maintained;lighting adjusted   Optimize Balance and Safe Activity   Safety Promotion/Fall Prevention assistive device/personal item within reach;diversional activities provided;Fall Risk reviewed with patient/family;Fall Risk signage in place;high risk medications identified;lighting adjusted;medications reviewed;nonskid shoes/socks when out of bed;side rails raised x 2;instructed to call staff for mobility --

## 2019-02-28 NOTE — PLAN OF CARE
CM met with patient for discharge planning.  Patient states he lives with his wife in a one story Condo on the first floor with three steps to enter.  Patient is independent with all ADL's.  Plan is to discharge home with spouse.      Pharmacy:    [facility] ([place]) - [place] - [address] [place] [address]  [address] [place] [address]  [place] [address]  Phone: [phone] Fax: [phone]    PCP:  Osiel Stone MD    Payor: MEDICARE / Plan: MEDICARE PART A & B / Product Type: [facility] /      02/28/19 1035   Discharge Assessment   Assessment Type Discharge Planning Assessment   Confirmed/corrected address and phone number on facesheet? Yes   Assessment information obtained from? Patient   Expected Length of Stay (days) 3   Communicated expected length of stay with patient/caregiver yes   Prior to hospitilization cognitive status: Alert/Oriented   Prior to hospitalization functional status: Independent   Current cognitive status: Alert/Oriented   Current Functional Status: Independent   Facility Arrived From: Emergency room   Lives With spouse   Able to Return to Prior Arrangements yes   Is patient able to care for self after discharge? Yes   Who are your caregiver(s) and their phone number(s)? Yaneth Colon - spouse [phone]   Patient's perception of discharge disposition home or selfcare   Readmission Within the Last 30 Days no previous admission in last 30 days   Patient currently being followed by outpatient case management? No   Patient currently receives any other outside agency services? No   Equipment Currently Used at Home none   Do you have any problems affording any of your prescribed medications? No   Is the patient taking medications as prescribed? yes   Does the patient have transportation home? Yes   Transportation Anticipated family or friend will provide   Does the patient receive services at the Coumadin Clinic? No   Discharge Plan A Home with family   Discharge Plan B Home Health   Patient/Family in  Agreement with Plan yes

## 2019-02-28 NOTE — ASSESSMENT & PLAN NOTE
89 yo M with hx of CAD, AV block with pacemaker, BPH, and JAEL who presented to the ED yesterday for hematochezia. Clinical history and presentation consistent with diverticular bleed. Given age and natural history of diverticular bleed, will continue to monitor and manage conservatively.     Recommendations:  - Transfuse if Hg<7  - IVF for resuscitation  - Continue monitoring for hematochezia  - Full liquid diet ok

## 2019-02-28 NOTE — ASSESSMENT & PLAN NOTE
Lab Results   Component Value Date    HGB 13.5 (L) 02/28/2019     H/H tending down  Hemodynamically stable  Intravascular resuscitation/support with IVFs and pRBCs as needed.  Serial H/H's transfuse as needed.  Discontinue ASA  No evidence of coagulopathy   Platelets WNL  Maintain IV access with 2 large bore IVs  Keep NPO   Plan for GI consult.

## 2019-02-28 NOTE — CONSULTS
[facility]  Gastroenterology  Consult Note    Patient Name: Artemio Colon Sr.  MRN: [id number]  Admission Date: 2/27/2019  Hospital Length of Stay: 0 days  Code Status: Full Code   Attending Provider: Linsey Maria MD   Consulting Provider: Geovani Sena MD  Primary Care Physician: Osiel Stone MD  Principal Problem:Diverticulosis of intestine with bleeding    Inpatient consult to Gastroenterology  Consult performed by: Geovani Sena MD  Consult ordered by: Linsey Maria MD        Subjective:     HPI:  This is a 89 yo M with hx of CAD, AV block with pacemaker, BPH, and JAEL who presented to the ED yesterday for hematochezia. Patient reports that yesterday he began having bright red blood mixed with stool. After that he was having bright red blood liquid without stool per rectum. He takes a baby aspirin but not any blood thinners. Denies NSAID use. His last episode of hematochezia was this morning. He reports some mild lower abdominal discomfort. He denies any melena or hematemesis. He overall feels well. Denies any chest pain, weakness, or shortness of breath. He has never had an episode of GI bleed before. He reports he had a colonoscopy about ten years ago and was told that he no longer needed them. On arrival he was noted to have a drop in his Hg from his baseline which runs high around 17-18 down to 13.9. He is hemodynamically stable. Of note, he had a recent admission for small bowel obstruction earlier this month that was treated with conservative medical management. The abdominal CT from 2 weeks ago showed dilated small bowel just proximal to the anastomotic site concerning for obstruction at prior surgical site. The CT also showed colonic diverticulosis. He also has a history of SBO about 3 years ago, managed conservatively, then he underwent [facility]bo ANA [facility] at [facility].    Past Medical History:   Diagnosis Date    AV block     BPH (benign prostatic hyperplasia)      Chronic rhinitis     Coronary artery disease     Erectile dysfunction     Glaucoma     Neuropathy 8/22/2017    JAEL (obstructive sleep apnea)     Pacemaker     Symptomatic bradycardia     s/p pacemaker       Past Surgical History:   Procedure Laterality Date    CATARACT EXTRACTION W/  INTRAOCULAR LENS IMPLANT Bilateral 2012    done in [place]    CHOLECYSTECTOMY      COLECTOMY      cecum    INSERT / REPLACE / REMOVE PACEMAKER  2012    changed    [id number] N/A 2/16/2018    [facility]    VENOGRAM N/A 2/2/2018    Per[facility] by [facility] at [facility] CATH LAB       Review of patient's allergies indicates:   Allergen Reactions    Pcn [penicillins] Hives     Family History     None        Tobacco Use    Smoking status: Never Smoker    Smokeless tobacco: Never Used   Substance and Sexual Activity    Alcohol use: Yes     Alcohol/week: 0.6 oz     Types: 1 Glasses of wine per week     Comment: weekly with meal    Drug use: No    Sexual activity: Yes     Review of Systems   Constitutional: Negative for activity change, chills and fever.   HENT: Negative for sore throat and trouble swallowing.    Respiratory: Negative for cough and shortness of breath.    Cardiovascular: Negative for chest pain and leg swelling.   Gastrointestinal: Positive for blood in stool and constipation. Negative for abdominal distention, abdominal pain, diarrhea, nausea and vomiting.   Genitourinary: Negative for decreased urine volume and difficulty urinating.   Musculoskeletal: Negative for arthralgias and back pain.   Skin: Negative for color change and pallor.   Neurological: Negative for dizziness and light-headedness.   Psychiatric/Behavioral: Negative for agitation and confusion.     Objective:     Vital Signs (Most Recent):  Temp: 98 °F (36.7 °C) (02/28/19 1232)  Pulse: 76 (02/28/19 1232)  Resp: 14 (02/28/19 1232)  BP: 108/62 (02/28/19 1232)  SpO2: 96 % (02/28/19  1232) Vital Signs (24h Range):  Temp:  [97.4 °F (36.3 °C)-98.5 °F (36.9 °C)] 98 °F (36.7 °C)  Pulse:  [61-77] 76  Resp:  [12-16] 14  SpO2:  [94 %-98 %] 96 %  BP: ()/(62-73) 108/62     Weight: 88.5 kg (195 lb 3.2 oz) (02/27/19 1738)  Body mass index is 28.01 kg/m².      Intake/Output Summary (Last 24 hours) at 2/28/2019 1341  Last data filed at 2/28/2019 0600  Gross per 24 hour   Intake 500 ml   Output --   Net 500 ml       Lines/Drains/Airways     Peripheral Intravenous Line                 Peripheral IV - Single Lumen 02/27/19 1154 Left Forearm 1 day                Physical Exam   Constitutional: He is oriented to person, place, and time. He appears well-developed and well-nourished. No distress.   HENT:   Mouth/Throat: Oropharynx is clear and moist.   Eyes: No scleral icterus.   Cardiovascular: Normal rate and regular rhythm.   Pulmonary/Chest: Effort normal and breath sounds normal.   Abdominal: Soft. Bowel sounds are normal. He exhibits no distension and no mass. There is no tenderness. There is no guarding.   Musculoskeletal: He exhibits no edema or deformity.   Neurological: He is alert and oriented to person, place, and time.   Skin: Skin is warm and dry.   Psychiatric: He has a normal mood and affect.   Vitals reviewed.      Significant Labs:  CBC:   Recent Labs   Lab 02/27/19  1153 02/27/19  1623  02/28/19  0509 02/28/19  0827 02/28/19  1256   WBC 10.33  --   --  8.41  --   --    HGB 17.4  --    < > 13.7* 13.5* 12.4*   HCT 52.9 48.0  --  42.6  --   --      --   --  210  --   --     < > = values in this interval not displayed.     CMP:   Recent Labs   Lab 02/27/19  1153 02/28/19  0509    108   CALCIUM 9.9 8.2*   ALBUMIN 4.1  --    PROT 7.3  --     137   K 4.2 4.5   CO2 26 25    106   BUN 20 16   CREATININE 1.0 0.9   ALKPHOS 72  --    ALT 16  --    AST 25  --    BILITOT 0.7  --      Coagulation:   Recent Labs   Lab 02/27/19  1153   INR 1.1     Assessment/Plan:     *  Diverticulosis of intestine with bleeding    89 yo M with hx of CAD, AV block with pacemaker, BPH, and JAEL who presented to the ED yesterday for hematochezia. Clinical history and presentation consistent with diverticular bleed. Given age and natural history of diverticular bleed, will continue to monitor and manage conservatively.     Recommendations:  - Transfuse if Hg<7  - IVF for resuscitation  - Continue monitoring for hematochezia  - Full liquid diet ok     Please notify GI team if there is a change in patient's clinical status.    Thank you for your consult. I will follow-up with patient. Please contact us if you have any additional questions.    Geovani Sena MD  Gastroenterology  [facility]

## 2019-02-28 NOTE — SUBJECTIVE & OBJECTIVE
Past Medical History:   Diagnosis Date    AV block     BPH (benign prostatic hyperplasia)     Chronic rhinitis     Coronary artery disease     Erectile dysfunction     Glaucoma     Neuropathy 8/22/2017    JAEL (obstructive sleep apnea)     Pacemaker     Symptomatic bradycardia     s/p pacemaker       Past Surgical History:   Procedure Laterality Date    CATARACT EXTRACTION W/  INTRAOCULAR LENS IMPLANT Bilateral 2012    done in [place]    CHOLECYSTECTOMY      COLECTOMY      cecum    INSERT / REPLACE / REMOVE PACEMAKER  2012    changed    [id number] N/A 2/16/2018    [facility] CATH LAB    VENOGRAM N/A 2/2/2018    Performed by [facility] at [facility] CATH LAB       Review of patient's allergies indicates:   Allergen Reactions    Pcn [penicillins] Hives     Family History     None        Tobacco Use    Smoking status: Never Smoker    Smokeless tobacco: Never Used   Substance and Sexual Activity    Alcohol use: Yes     Alcohol/week: 0.6 oz     Types: 1 Glasses of wine per week     Comment: weekly with meal    Drug use: No    Sexual activity: Yes     Review of Systems   Constitutional: Negative for activity change, chills and fever.   HENT: Negative for sore throat and trouble swallowing.    Respiratory: Negative for cough and shortness of breath.    Cardiovascular: Negative for chest pain and leg swelling.   Gastrointestinal: Positive for blood in stool and constipation. Negative for abdominal distention, abdominal pain, diarrhea, nausea and vomiting.   Genitourinary: Negative for decreased urine volume and difficulty urinating.   Musculoskeletal: Negative for arthralgias and back pain.   Skin: Negative for color change and pallor.   Neurological: Negative for dizziness and light-headedness.   Psychiatric/Behavioral: Negative for agitation and confusion.     Objective:     Vital Signs (Most Recent):  Temp: 98 °F (36.7 °C) (02/28/19 1232)  Pulse: 76  (02/28/19 1232)  Resp: 14 (02/28/19 1232)  BP: 108/62 (02/28/19 1232)  SpO2: 96 % (02/28/19 1232) Vital Signs (24h Range):  Temp:  [97.4 °F (36.3 °C)-98.5 °F (36.9 °C)] 98 °F (36.7 °C)  Pulse:  [61-77] 76  Resp:  [12-16] 14  SpO2:  [94 %-98 %] 96 %  BP: ()/(62-73) 108/62     Weight: 88.5 kg (195 lb 3.2 oz) (02/27/19 1738)  Body mass index is 28.01 kg/m².      Intake/Output Summary (Last 24 hours) at 2/28/2019 1341  Last data filed at 2/28/2019 0600  Gross per 24 hour   Intake 500 ml   Output --   Net 500 ml       Lines/Drains/Airways     Peripheral Intravenous Line                 Peripheral IV - Single Lumen 02/27/19 1154 Left Forearm 1 day                Physical Exam   Constitutional: He is oriented to person, place, and time. He appears well-developed and well-nourished. No distress.   HENT:   Mouth/Throat: Oropharynx is clear and moist.   Eyes: No scleral icterus.   Cardiovascular: Normal rate and regular rhythm.   Pulmonary/Chest: Effort normal and breath sounds normal.   Abdominal: Soft. Bowel sounds are normal. He exhibits no distension and no mass. There is no tenderness. There is no guarding.   Musculoskeletal: He exhibits no edema or deformity.   Neurological: He is alert and oriented to person, place, and time.   Skin: Skin is warm and dry.   Psychiatric: He has a normal mood and affect.   Vitals reviewed.      Significant Labs:  CBC:   Recent Labs   Lab 02/27/19  1153 02/27/19  1623  02/28/19  0509 02/28/19  0827 02/28/19  1256   WBC 10.33  --   --  8.41  --   --    HGB 17.4  --    < > 13.7* 13.5* 12.4*   HCT 52.9 48.0  --  42.6  --   --      --   --  210  --   --     < > = values in this interval not displayed.     CMP:   Recent Labs   Lab 02/27/19  1153 02/28/19  0509    108   CALCIUM 9.9 8.2*   ALBUMIN 4.1  --    PROT 7.3  --     137   K 4.2 4.5   CO2 26 25    106   BUN 20 16   CREATININE 1.0 0.9   ALKPHOS 72  --    ALT 16  --    AST 25  --    BILITOT 0.7  --       Coagulation:   Recent Labs   Lab 02/27/19  1153   INR 1.1

## 2019-03-01 LAB
BASOPHILS # BLD AUTO: 0.07 K/UL
BASOPHILS NFR BLD: 0.8 %
DIFFERENTIAL METHOD: ABNORMAL
EOSINOPHIL # BLD AUTO: 0.2 K/UL
EOSINOPHIL NFR BLD: 2.6 %
ERYTHROCYTE [DISTWIDTH] IN BLOOD BY AUTOMATED COUNT: 13.5 %
HCT VFR BLD AUTO: 31.6 %
HGB BLD-MCNC: 10 G/DL
HGB BLD-MCNC: 10.2 G/DL
HGB BLD-MCNC: 11.3 G/DL
IMM GRANULOCYTES # BLD AUTO: 0.07 K/UL
IMM GRANULOCYTES NFR BLD AUTO: 0.8 %
LYMPHOCYTES # BLD AUTO: 2.1 K/UL
LYMPHOCYTES NFR BLD: 24.9 %
MCH RBC QN AUTO: 32.1 PG
MCHC RBC AUTO-ENTMCNC: 32.3 G/DL
MCV RBC AUTO: 99 FL
MONOCYTES # BLD AUTO: 1 K/UL
MONOCYTES NFR BLD: 11.4 %
NEUTROPHILS # BLD AUTO: 5 K/UL
NEUTROPHILS NFR BLD: 59.5 %
NRBC BLD-RTO: 0 /100 WBC
PLATELET # BLD AUTO: 218 K/UL
PMV BLD AUTO: 10.6 FL
RBC # BLD AUTO: 3.18 M/UL
WBC # BLD AUTO: 8.45 K/UL

## 2019-03-01 PROCEDURE — G0378 HOSPITAL OBSERVATION PER HR: HCPCS

## 2019-03-01 PROCEDURE — C9113 INJ PANTOPRAZOLE SODIUM, VIA: HCPCS | Performed by: HOSPITALIST

## 2019-03-01 PROCEDURE — 99225 PR SUBSEQUENT OBSERVATION CARE,LEVEL II: CPT | Mod: ,,, | Performed by: HOSPITALIST

## 2019-03-01 PROCEDURE — 99225 PR SUBSEQUENT OBSERVATION CARE,LEVEL II: ICD-10-PCS | Mod: ,,, | Performed by: HOSPITALIST

## 2019-03-01 PROCEDURE — 85018 HEMOGLOBIN: CPT

## 2019-03-01 PROCEDURE — 85025 COMPLETE CBC W/AUTO DIFF WBC: CPT

## 2019-03-01 PROCEDURE — 25000003 PHARM REV CODE 250: Performed by: HOSPITALIST

## 2019-03-01 PROCEDURE — 36415 COLL VENOUS BLD VENIPUNCTURE: CPT

## 2019-03-01 PROCEDURE — 63600175 PHARM REV CODE 636 W HCPCS: Performed by: HOSPITALIST

## 2019-03-01 RX ADMIN — BRIMONIDINE TARTRATE 1 DROP: 2 SOLUTION OPHTHALMIC at 09:03

## 2019-03-01 RX ADMIN — HYDROCODONE BITARTRATE AND ACETAMINOPHEN 1 TABLET: 5; 325 TABLET ORAL at 09:03

## 2019-03-01 RX ADMIN — TAMSULOSIN HYDROCHLORIDE 0.4 MG: 0.4 CAPSULE ORAL at 09:03

## 2019-03-01 RX ADMIN — TIMOLOL MALEATE 1 DROP: 5 SOLUTION/ DROPS OPHTHALMIC at 09:03

## 2019-03-01 RX ADMIN — GABAPENTIN 600 MG: 300 CAPSULE ORAL at 09:03

## 2019-03-01 RX ADMIN — TRAVOPROST 1 DROP: 0.04 SOLUTION/ DROPS OPHTHALMIC at 09:03

## 2019-03-01 RX ADMIN — PANTOPRAZOLE SODIUM 40 MG: 40 INJECTION, POWDER, FOR SOLUTION INTRAVENOUS at 09:03

## 2019-03-01 NOTE — PLAN OF CARE
Problem: Adult Inpatient Plan of Care  Goal: Plan of Care Review  Outcome: Ongoing (interventions implemented as appropriate)   03/01/19 6731   Plan of Care Review   Plan of Care Reviewed With patient;spouse   Pt is a,a,O x 4 . Stable V/S. No C/o pain . Pt slept between care . Pt is able to turn in bed and ambulate in the room independently . Pt remains free of falls and injuries . Fall precautions maintained . H & H monitored during the day . Pt has 1 BM today formed , brown with little bright red color as pt reported .

## 2019-03-01 NOTE — ASSESSMENT & PLAN NOTE
Lab Results   Component Value Date    HGB 11.3 (L) 03/01/2019     H/H tending down  Hemodynamically stable  Intravascular resuscitation/support with IVFs and pRBCs as needed.  Serial H/H's transfuse as needed.  Discontinue ASA  No evidence of coagulopathy   Platelets WNL  Maintain IV access with 2 large bore IVs  Advance diet  Plan per GI: Sounds like diverticular bleed with known diverticulosis on CT scan. Would watch for self resolution of bleeding

## 2019-03-01 NOTE — PROGRESS NOTES
[facility] Hospital Medicine  Progress Note    Patient Name: Artemio Colon Sr.  MRN: [id number]  Patient Class: OP- Observation   Admission Date: 2/27/2019  Length of Stay: 0 days  Attending Physician: Linsey Maria MD  Primary Care Provider: Osiel Stone MD    [facility] Medicine Team: [facility] Linsey Ashton MD    Subjective:     Principal Problem:Diverticulosis of intestine with bleeding    HPI:  History of Present Illness:  Patient is a 89 y.o. male who has a past medical history of AV block s/p pacemaker placement, benign prostatic hyperplasia, Chronic rhinitis, Coronary artery disease, Erectile dysfunction, Glaucoma, Neuropathy, obstructive sleep apnea presented with complaints of bright red blood per rectum and melena. Symptoms have been present for approximately 1 day. The symptoms are gradually worsening. Patient did complain of lower abdominal pain earlier but on my assessment he currently denies.  He has not used nonsteroidal anti-inflammatory drugs on a regular bases; he is not anticoagulated. The patient currently denies significant abdominal pain or discomfort. There is not a past history of gastrointestinal bleeding. He reports that he usually has constipation with straining to defecate, which he attributes to his hydrocodone and fentanyl which he takes for back pain. He denies nausea, vomiting, fever, and chills. He is not lightheaded or dizzy. He has 7 pounds of weight loss which he says is intentional. His last colonoscopy was 5-6 years ago. He had a recent admission for small bowel obstruction earlier this month that was treated with conservative medical management. The abdominal CT from 2 weeks ago showed dilated small bowel just proximal to the anastomotic site concerning for obstruction at prior surgical site. The CT also showed colonic diverticulosis. He also has a history of SBO about 3 years ago, managed conservatively, then he underwent robo ANA electively at  [facility]. Patient currently denies any fever/chills, chest pain, dyspnea, weakness, numbness, abdominal pain, nausea/vomiting, dysuria/hematuria.     Hospital Course:  No notes on file    Interval History: Patient continues with bloody bowel movements. H/H slightly down. GI consult appreciated. Conservative management.      Review of Systems   Constitutional: Negative for chills, fatigue and fever.   HENT: Negative for congestion, facial swelling, hearing loss and trouble swallowing.    Eyes: Negative for photophobia and visual disturbance.   Respiratory: Negative for chest tightness, shortness of breath and wheezing.    Cardiovascular: Negative for chest pain, palpitations and leg swelling.   Gastrointestinal: Positive for blood in stool and constipation. Negative for abdominal pain, diarrhea, nausea and vomiting.   Endocrine: Negative.    Genitourinary: Negative.    Musculoskeletal: Negative for back pain, joint swelling and myalgias.   Skin: Negative.    Allergic/Immunologic: Negative.    Neurological: Negative for dizziness, facial asymmetry, speech difficulty, weakness and numbness.   Hematological: Negative.    Psychiatric/Behavioral: Negative for agitation, confusion and dysphoric mood. The patient is not nervous/anxious.      Objective:     Vital Signs (Most Recent):  Temp: 98.7 °F (37.1 °C) (03/01/19 0915)  Pulse: 70 (03/01/19 0915)  Resp: 18 (03/01/19 0915)  BP: 120/62 (03/01/19 0915)  SpO2: (!) 92 % (03/01/19 0915) Vital Signs (24h Range):  Temp:  [97.7 °F (36.5 °C)-98.7 °F (37.1 °C)] 98.7 °F (37.1 °C)  Pulse:  [69-90] 70  Resp:  [14-18] 18  SpO2:  [92 %-99 %] 92 %  BP: (108-120)/(58-71) 120/62     Weight: 88.5 kg (195 lb 3.2 oz)  Body mass index is 28.01 kg/m².    Intake/Output Summary (Last 24 hours) at 3/1/2019 1125  Last data filed at 3/1/2019 1000  Gross per 24 hour   Intake 650 ml   Output --   Net 650 ml      Physical Exam   Constitutional: He is oriented to person, place, and time.  Vital signs are normal. He appears well-developed and well-nourished.  Non-toxic appearance. He does not appear ill. No distress.   HENT:   Head: Normocephalic and atraumatic.   Eyes: Conjunctivae and EOM are normal. Pupils are equal, round, and reactive to light. No scleral icterus.   Neck: Normal range of motion and full passive range of motion without pain. Neck supple. No JVD present. Carotid bruit is not present. No thyromegaly present.   Cardiovascular: Normal rate, regular rhythm, S1 normal, S2 normal, normal heart sounds and normal pulses. Exam reveals no gallop, no S3, no S4 and no friction rub.   No murmur heard.  Pulmonary/Chest: Effort normal and breath sounds normal. No accessory muscle usage. No tachypnea. No respiratory distress. He has no decreased breath sounds. He has no wheezes. He has no rhonchi. He has no rales.   Abdominal: Soft. Normal appearance and bowel sounds are normal. He exhibits no shifting dullness, no distension, no abdominal bruit and no ascites. There is no hepatosplenomegaly. There is no tenderness. There is no rigidity and no guarding.   Musculoskeletal: Normal range of motion. He exhibits no edema or tenderness.   Neurological: He is alert and oriented to person, place, and time. He has normal strength. He is not disoriented. No cranial nerve deficit or sensory deficit. GCS eye subscore is 4. GCS verbal subscore is 5. GCS motor subscore is 6.   Skin: Skin is warm, dry and intact. No abrasion and no lesion noted.   Psychiatric: He has a normal mood and affect. His behavior is normal. Judgment and thought content normal. His mood appears not anxious. His speech is not slurred. He is not actively hallucinating. Cognition and memory are normal. He does not exhibit a depressed mood. He is attentive.       Significant Labs: All pertinent labs within the past 24 hours have been reviewed.    Significant Imaging: I have reviewed all pertinent imaging results/findings within the past 24  hours.    Assessment/Plan:      * Diverticulosis of intestine with bleeding    Lab Results   Component Value Date    HGB 11.3 (L) 03/01/2019     H/H tending down  Hemodynamically stable  Intravascular resuscitation/support with IVFs and pRBCs as needed.  Serial H/H's transfuse as needed.  Discontinue ASA  No evidence of coagulopathy   Platelets WNL  Maintain IV access with 2 large bore IVs  Advance diet  Plan per GI: Sounds like diverticular bleed with known diverticulosis on CT scan. Would watch for self resolution of bleeding     Benign prostatic hyperplasia without lower urinary tract symptoms    Cont Flomax to treat  Monitor urine output  Bladder scan as needed  I/O cath for post void residual >300 cc       Dyslipidemia    Patient is not taking statin as prescribed  Follow up with PCP       Cervical stenosis of spine    Cont pain control with fentanyl patch  PT/OT       Neuropathic pain    Cont gabapentin to treat.        Complete AV block    S/p PPM in place  No active issues  Follow up as outpatient with device clinic        Coronary artery disease involving native coronary artery of native heart without angina pectoris    No current symptoms of CP or BOYLE  Holding ASA due to above  Not on statin or BB  Follow up with cardiology.          VTE Risk Mitigation (From admission, onward)        Ordered     IP VTE LOW RISK PATIENT  Once      02/27/19 1555     Place sequential compression device  Until discontinued      02/27/19 1553     Place FRANCISCO hose  Until discontinued      02/27/19 1357              Linsey Ashton MD  Department of Hospital Medicine   [facility]

## 2019-03-01 NOTE — PLAN OF CARE
Problem: Adult Inpatient Plan of Care  Goal: Plan of Care Review  Outcome: Ongoing (interventions implemented as appropriate)   03/01/19 0405   Plan of Care Review   Plan of Care Reviewed With patient     Goal: Absence of Hospital-Acquired Illness or Injury    Intervention: Identify and Manage Fall Risk   03/01/19 0400   Optimize Balance and Safe Activity   Safety Promotion/Fall Prevention assistive device/personal item within reach;commode/urinal/bedpan at bedside;diversional activities provided;Fall Risk reviewed with patient/family;Fall Risk signage in place;high risk medications identified;lighting adjusted;medications reviewed;nonskid shoes/socks when out of bed;side rails raised x 2;instructed to call staff for mobility     Intervention: Prevent VTE (venous thromboembolism)   02/28/19 2012   Prevent or Manage Embolism   VTE Prevention/Management ambulation promoted;bleeding risk assessed       Goal: Optimal Comfort and Wellbeing    Intervention: Provide Person-Centered Care   02/28/19 2012   Support Dyspnea Relief   Trust Relationship/Rapport care explained;choices provided;empathic listening provided;emotional support provided;questions answered;questions encouraged;reassurance provided;thoughts/feelings acknowledged         Problem: Infection  Goal: Infection Symptom Resolution    Intervention: Prevent or Manage Infection   02/28/19 2012 03/01/19 0400 03/01/19 0405   Prevent or Manage Infection   Fever Reduction/Comfort Measures --  --  lightweight clothing;lightweight bedding   Infection Management aseptic technique maintained --  --    Manage Diarrhea   Isolation Precautions --  protective environment maintained --          Problem: Fall Injury Risk  Goal: Absence of Fall and Fall-Related Injury    Intervention: Identify and Manage Contributors to Fall Injury Risk   02/28/19 2012 03/01/19 0400   Manage Acute Allergic Reaction   Medication Review/Management medications reviewed;high risk medications  identified --    Identify and Manage Contributors to Fall Injury Risk   Self-Care Promotion --  independence encouraged;BADL personal objects within reach     Intervention: Promote Injury-Free Environment   02/28/19 2012 03/01/19 0400   Optimize [place] and Functional Mobility   Environmental Safety Modification assistive device/personal items within reach;clutter free environment maintained;lighting adjusted --    Optimize Balance and Safe Activity   Safety Promotion/Fall Prevention --  assistive device/personal item within reach;commode/urinal/bedpan at bedside;diversional activities provided;Fall Risk reviewed with patient/family;Fall Risk signage in place;high risk medications identified;lighting adjusted;medications reviewed;nonskid shoes/socks when out of bed;side rails raised x 2;instructed to call staff for mobility

## 2019-03-01 NOTE — SUBJECTIVE & OBJECTIVE
Interval History: Patient continues with bloody bowel movements. H/H slightly down. GI consult appreciated. Conservative management.      Review of Systems   Constitutional: Negative for chills, fatigue and fever.   HENT: Negative for congestion, facial swelling, hearing loss and trouble swallowing.    Eyes: Negative for photophobia and visual disturbance.   Respiratory: Negative for chest tightness, shortness of breath and wheezing.    Cardiovascular: Negative for chest pain, palpitations and leg swelling.   Gastrointestinal: Positive for blood in stool and constipation. Negative for abdominal pain, diarrhea, nausea and vomiting.   Endocrine: Negative.    Genitourinary: Negative.    Musculoskeletal: Negative for back pain, joint swelling and myalgias.   Skin: Negative.    Allergic/Immunologic: Negative.    Neurological: Negative for dizziness, facial asymmetry, speech difficulty, weakness and numbness.   Hematological: Negative.    Psychiatric/Behavioral: Negative for agitation, confusion and dysphoric mood. The patient is not nervous/anxious.      Objective:     Vital Signs (Most Recent):  Temp: 98.7 °F (37.1 °C) (03/01/19 0915)  Pulse: 70 (03/01/19 0915)  Resp: 18 (03/01/19 0915)  BP: 120/62 (03/01/19 0915)  SpO2: (!) 92 % (03/01/19 0915) Vital Signs (24h Range):  Temp:  [97.7 °F (36.5 °C)-98.7 °F (37.1 °C)] 98.7 °F (37.1 °C)  Pulse:  [69-90] 70  Resp:  [14-18] 18  SpO2:  [92 %-99 %] 92 %  BP: (108-120)/(58-71) 120/62     Weight: 88.5 kg (195 lb 3.2 oz)  Body mass index is 28.01 kg/m².    Intake/Output Summary (Last 24 hours) at 3/1/2019 1125  Last data filed at 3/1/2019 1000  Gross per 24 hour   Intake 650 ml   Output --   Net 650 ml      Physical Exam   Constitutional: He is oriented to person, place, and time. Vital signs are normal. He appears well-developed and well-nourished.  Non-toxic appearance. He does not appear ill. No distress.   HENT:   Head: Normocephalic and atraumatic.   Eyes: Conjunctivae and  EOM are normal. Pupils are equal, round, and reactive to light. No scleral icterus.   Neck: Normal range of motion and full passive range of motion without pain. Neck supple. No JVD present. Carotid bruit is not present. No thyromegaly present.   Cardiovascular: Normal rate, regular rhythm, S1 normal, S2 normal, normal heart sounds and normal pulses. Exam reveals no gallop, no S3, no S4 and no friction rub.   No murmur heard.  Pulmonary/Chest: Effort normal and breath sounds normal. No accessory muscle usage. No tachypnea. No respiratory distress. He has no decreased breath sounds. He has no wheezes. He has no rhonchi. He has no rales.   Abdominal: Soft. Normal appearance and bowel sounds are normal. He exhibits no shifting dullness, no distension, no abdominal bruit and no ascites. There is no hepatosplenomegaly. There is no tenderness. There is no rigidity and no guarding.   Musculoskeletal: Normal range of motion. He exhibits no edema or tenderness.   Neurological: He is alert and oriented to person, place, and time. He has normal strength. He is not disoriented. No cranial nerve deficit or sensory deficit. GCS eye subscore is 4. GCS verbal subscore is 5. GCS motor subscore is 6.   Skin: Skin is warm, dry and intact. No abrasion and no lesion noted.   Psychiatric: He has a normal mood and affect. His behavior is normal. Judgment and thought content normal. His mood appears not anxious. His speech is not slurred. He is not actively hallucinating. Cognition and memory are normal. He does not exhibit a depressed mood. He is attentive.       Significant Labs: All pertinent labs within the past 24 hours have been reviewed.    Significant Imaging: I have reviewed all pertinent imaging results/findings within the past 24 hours.

## 2019-03-02 VITALS
RESPIRATION RATE: 14 BRPM | HEIGHT: 70 IN | SYSTOLIC BLOOD PRESSURE: 121 MMHG | WEIGHT: 195.19 LBS | HEART RATE: 65 BPM | TEMPERATURE: 98 F | DIASTOLIC BLOOD PRESSURE: 60 MMHG | BODY MASS INDEX: 27.94 KG/M2 | OXYGEN SATURATION: 99 %

## 2019-03-02 LAB
BASOPHILS # BLD AUTO: 0.06 K/UL
BASOPHILS NFR BLD: 0.8 %
DIFFERENTIAL METHOD: ABNORMAL
EOSINOPHIL # BLD AUTO: 0.1 K/UL
EOSINOPHIL NFR BLD: 1.8 %
ERYTHROCYTE [DISTWIDTH] IN BLOOD BY AUTOMATED COUNT: 13.5 %
HCT VFR BLD AUTO: 31 %
HGB BLD-MCNC: 10 G/DL
HGB BLD-MCNC: 9.8 G/DL
IMM GRANULOCYTES # BLD AUTO: 0.07 K/UL
IMM GRANULOCYTES NFR BLD AUTO: 0.9 %
LYMPHOCYTES # BLD AUTO: 2 K/UL
LYMPHOCYTES NFR BLD: 24.5 %
MCH RBC QN AUTO: 31.5 PG
MCHC RBC AUTO-ENTMCNC: 31.6 G/DL
MCV RBC AUTO: 100 FL
MONOCYTES # BLD AUTO: 1 K/UL
MONOCYTES NFR BLD: 12.3 %
NEUTROPHILS # BLD AUTO: 4.8 K/UL
NEUTROPHILS NFR BLD: 59.7 %
NRBC BLD-RTO: 0 /100 WBC
PLATELET # BLD AUTO: 215 K/UL
PMV BLD AUTO: 10.6 FL
RBC # BLD AUTO: 3.11 M/UL
WBC # BLD AUTO: 7.97 K/UL

## 2019-03-02 PROCEDURE — 99217 PR OBSERVATION CARE DISCHARGE: ICD-10-PCS | Mod: ,,, | Performed by: HOSPITALIST

## 2019-03-02 PROCEDURE — 63600175 PHARM REV CODE 636 W HCPCS: Performed by: HOSPITALIST

## 2019-03-02 PROCEDURE — 36415 COLL VENOUS BLD VENIPUNCTURE: CPT

## 2019-03-02 PROCEDURE — C9113 INJ PANTOPRAZOLE SODIUM, VIA: HCPCS | Performed by: HOSPITALIST

## 2019-03-02 PROCEDURE — 99217 PR OBSERVATION CARE DISCHARGE: CPT | Mod: ,,, | Performed by: HOSPITALIST

## 2019-03-02 PROCEDURE — 85018 HEMOGLOBIN: CPT

## 2019-03-02 PROCEDURE — 85025 COMPLETE CBC W/AUTO DIFF WBC: CPT

## 2019-03-02 PROCEDURE — 25000003 PHARM REV CODE 250: Performed by: HOSPITALIST

## 2019-03-02 PROCEDURE — G0378 HOSPITAL OBSERVATION PER HR: HCPCS

## 2019-03-02 RX ADMIN — TAMSULOSIN HYDROCHLORIDE 0.4 MG: 0.4 CAPSULE ORAL at 09:03

## 2019-03-02 RX ADMIN — TIMOLOL MALEATE 1 DROP: 5 SOLUTION/ DROPS OPHTHALMIC at 09:03

## 2019-03-02 RX ADMIN — PANTOPRAZOLE SODIUM 40 MG: 40 INJECTION, POWDER, FOR SOLUTION INTRAVENOUS at 09:03

## 2019-03-02 RX ADMIN — BRIMONIDINE TARTRATE 1 DROP: 2 SOLUTION OPHTHALMIC at 09:03

## 2019-03-02 NOTE — PLAN OF CARE
Problem: Adult Inpatient Plan of Care  Goal: Plan of Care Review  Outcome: Ongoing (interventions implemented as appropriate)  Pt remains free of falls and injury, Pt makes statement of headache  This shift, provided PRN analgesic with positive results. Bed low and locked, Call light within reach.

## 2019-03-02 NOTE — DISCHARGE SUMMARY
[facility] Hospital Medicine  Discharge Summary      Patient Name: Artemio Colon Sr.  MRN: [id number]  Admission Date: 2/27/2019  Hospital Length of Stay: 0 days  Discharge Date and Time:  03/02/2019 11:09 AM  Attending Physician: Linsey Maria MD   Discharging Provider: Linsey Ashton MD  Primary Care Provider: Osiel Stone MD  [facility] Medicine Team: [facility] Linsey Ashton MD    HPI:   History of Present Illness:  Patient is a 89 y.o. male who has a past medical history of AV block s/p pacemaker placement, benign prostatic hyperplasia, Chronic rhinitis, Coronary artery disease, Erectile dysfunction, Glaucoma, Neuropathy, obstructive sleep apnea presented with complaints of bright red blood per rectum and melena. Symptoms have been present for approximately 1 day. The symptoms are gradually worsening. Patient did complain of lower abdominal pain earlier but on my assessment he currently denies.  He has not used nonsteroidal anti-inflammatory drugs on a regular bases; he is not anticoagulated. The patient currently denies significant abdominal pain or discomfort. There is not a past history of gastrointestinal bleeding. He reports that he usually has constipation with straining to defecate, which he attributes to his hydrocodone and fentanyl which he takes for back pain. He denies nausea, vomiting, fever, and chills. He is not lightheaded or dizzy. He has 7 pounds of weight loss which he says is intentional. His last colonoscopy was 5-6 years ago. He had a recent admission for small bowel obstruction earlier this month that was treated with conservative medical management. The abdominal CT from 2 weeks ago showed dilated small bowel just proximal to the anastomotic site concerning for obstruction at prior surgical site. The CT also showed colonic diverticulosis. He also has a history of SBO about 3 years ago, managed conservatively, then he underwent robo ANA electively at [facility]  afterwards. Patient currently denies any fever/chills, chest pain, dyspnea, weakness, numbness, abdominal pain, nausea/vomiting, dysuria/hematuria.     * No surgery found *      Hospital Course:   No notes on file     Consults:   Consults (From admission, onward)        Status Ordering Provider     Inpatient consult to Gastroenterology  Once     Provider:  (Not yet assigned)    PILY [place]          * Diverticulosis of intestine with bleeding    Lab Results   Component Value Date    HGB 10.0 (L) 03/02/2019     H/H tending stable  Hemodynamically stable  Intravascular resuscitation/support with IVFs and pRBCs as needed.  Serial H/H's transfuse as needed.  Discontinue ASA until follow up with cardiologist.   No evidence of coagulopathy   Platelets WNL  Maintain IV access with 2 large bore IVs  Advance diet  Plan per GI: Sounds like diverticular bleed with known diverticulosis on CT scan. Would watch for self resolution of bleeding  Resolved. No more bloody BM. Stable for discharge.   Ambulatory referral to GI  Repeat CBC in one week for monitoring.    Future Appointments   Date Time Provider Department Center   4/11/2019  9:00 AM [facility] IM [facility] PCW   5/7/2019  7:45 AM EKG, APPT [facility] EKG [facility]   5/7/2019  8:20 AM COORDINATED DEVICE CHECK [facility] REGGIENOEMI [facility]   5/7/2019  9:30 AM Sue Smyth NP [facility] ARRHYTH [facility]   5/21/2019  8:00 AM HOME MONITOR DEVICE CHECK, [facility]        Benign prostatic hyperplasia without lower urinary tract symptoms    Cont Flomax to treat  Monitor urine output  Bladder scan as needed  I/O cath for post void residual >300 cc       Dyslipidemia    Patient is not taking statin as prescribed  Follow up with PCP       Cervical stenosis of spine    Cont pain control with fentanyl patch  PT/OT       Neuropathic pain    Cont gabapentin to treat.        Complete AV block    S/p PPM in place  No active issues  Follow up as  outpatient with device clinic        Coronary artery disease involving native coronary artery of native heart without angina pectoris    No current symptoms of CP or BOYLE  Holding ASA due to above  Not on statin or BB  Follow up with cardiology.          Final Active Diagnoses:    Diagnosis Date Noted POA    PRINCIPAL PROBLEM:  Diverticulosis of intestine with bleeding [K57.91] 02/27/2019 Yes    Benign prostatic hyperplasia without lower urinary tract symptoms [N40.0] 02/27/2019 Yes    Dyslipidemia [E78.5] 06/08/2018 Yes    Neuropathic pain [M79.2] 08/21/2017 Yes    Cervical stenosis of spine [M48.02] 08/21/2017 Yes    Complete AV block [I44.2] 06/21/2017 Yes    Coronary artery disease involving native coronary artery of native heart without angina pectoris [I25.10] 06/20/2017 Yes     Chronic      Problems Resolved During this Admission:       Discharged Condition: good    Disposition: Home or Self Care    Follow Up:  Follow-up Information     Osiel Stone MD In 1 week.    Specialty:  Internal Medicine  Why:  For follow up and review of hosptial course   Contact information:  [address]  [place] [id number] [phone]                 Patient Instructions:      Comprehensive metabolic panel   Standing Status: Future Standing Exp. Date: 04/30/20     Ambulatory referral to Gastroenterology   Referral Priority: Routine Referral Type: Consultation   Referral Reason: Specialty Services Required   Requested Specialty: Gastroenterology   Number of Visits Requested: 1     Diet Cardiac     Notify your health care provider if you experience any of the following:  persistent dizziness, light-headedness, or visual disturbances     Activity as tolerated       Significant Diagnostic Studies: Labs:   BMP: No results for input(s): GLU, NA, K, CL, CO2, BUN, CREATININE, CALCIUM, MG in the last 48 hours. and CBC   Recent Labs   Lab 03/01/19  0518  03/01/19  1754 03/02/19  0658 03/02/19  0911   WBC 8.45  --   --  7.97   --    HGB 10.2*   < > 10.0* 9.8* 10.0*   HCT 31.6*  --   --  31.0*  --      --   --  215  --     < > = values in this interval not displayed.       Pending Diagnostic Studies:     None         Medications:  Reconciled Home Medications:      Medication List      CHANGE how you take these medications    gabapentin 600 MG tablet  Commonly known as:  NEURONTIN  Take 1 tablet (600 mg total) by mouth once daily.  What changed:  when to take this        CONTINUE taking these medications    anastrozole 1 mg Tab  Commonly known as:  ARIMIDEX  Take 1 mg by mouth Every 3 (three) days.     aspirin 81 MG EC tablet  Commonly known as:  ECOTRIN  Take 81 mg by mouth once daily.     brimonidine 0.2% 0.2 % Drop  Commonly known as:  ALPHAGAN  Place 1 drop into both eyes 2 (two) times daily.     DHEA ORAL  Take by mouth every evening. 1 cap at bedtime     efinaconazole 10 % [place]  Commonly known as:  JUBLIA  Apply to affected toenails once daily     fentaNYL 25 mcg/hr  Commonly known as:  DURAGESIC  Place 1 patch onto the skin every 72 hours.     fluticasone 50 mcg/actuation nasal spray  Commonly known as:  FLONASE  2 sprays by Each Nare route once daily.     HYDROcodone-acetaminophen  mg per tablet  Commonly known as:  NORCO  Take by mouth every 4 (four) hours as needed for Pain.     pravastatin 40 MG tablet  Commonly known as:  PRAVACHOL  Take 1 tablet (40 mg total) by mouth once daily.     tamsulosin 0.4 mg Cap  Commonly known as:  FLOMAX  Take 1 capsule (0.4 mg total) by mouth once daily.     testosterone cypionate 200 mg/mL Kit  Inject into the muscle Every 3 (three) days. 10 ml once every 3 days     timolol maleate 0.5% 0.5 % Drop  Commonly known as:  TIMOPTIC  Place 1 drop into both eyes 2 (two) times daily.     travoprost 0.004 % ophthalmic solution  Commonly known as:  TRAVATAN Z  Place 1 drop into both eyes every evening.     UNABLE TO FIND  medication name: HCG 12,000 iu vial once every 3 days             Indwelling Lines/Drains at time of discharge:   Lines/Drains/Airways          None          Time spent on the discharge of patient: 30 minutes  Patient was seen and examined on the date of discharge and determined to be suitable for discharge.         Linsey Ashton MD  Department of Hospital Medicine  [facility]

## 2019-03-02 NOTE — NURSING
IV d/c with cannula intact and coban pressure dressing applied.  D/C instructions reviewed with patient and wife, questions answered and copy of instructions given to patient.  Both verbalized understanding of instructions.

## 2019-03-02 NOTE — ASSESSMENT & PLAN NOTE
Lab Results   Component Value Date    HGB 10.0 (L) 03/02/2019     H/H tending stable  Hemodynamically stable  Intravascular resuscitation/support with IVFs and pRBCs as needed.  Serial H/H's transfuse as needed.  Discontinue ASA until follow up with cardiologist.   No evidence of coagulopathy   Platelets WNL  Maintain IV access with 2 large bore IVs  Advance diet  Plan per GI: Sounds like diverticular bleed with known diverticulosis on CT scan. Would watch for self resolution of bleeding  Resolved. No more bloody BM. Stable for discharge.   Ambulatory referral to GI  Repeat CBC in one week for monitoring.    Future Appointments   Date Time Provider Department Center   4/11/2019  9:00 AM [facility] IM [place] PCW   5/7/2019  7:45 AM EKG, APPT [facility] EKG [place]emi [facility]   5/7/2019  8:20 AM COORDINATED DEVICE CHECK [facility] CAR[facility] [place]   5/7/2019  9:30 AM Sue Smyth NP [facility] ARRHYTH [place]   5/21/2019  8:00 AM HOME MONITOR DEVICE CHECK, [facility] CARA Kahn cheo

## 2019-03-04 NOTE — PLAN OF CARE
Plan is to discharge home with family.    Future Appointments   Date Time Provider Department Center   [facility]  [facility]   5/7/2019  7:45 AM EKG, APPT [facility] EKG [facility]   5/7/2019  8:20 AM COORDINATED DEVICE CHECK [facility] CARA Kahn [facility]   5/7/2019  9:30 AM Sue Smyth NP [facility] ARRHYTH [facility]   5/21/2019  8:00 AM HOME MONITOR DEVICE CHECK, [facility] REGGIENOEMI [facility]        03/04/19 0713   Final Note   Assessment Type Final Discharge Note   Anticipated Discharge Disposition Home   Right Care Referral Info   Post Acute Recommendation No Care

## 2019-03-08 ENCOUNTER — LAB VISIT (OUTPATIENT)
Dept: LAB | Facility: HOSPITAL | Age: 84
End: 2019-03-08
Attending: HOSPITALIST
Payer: MEDICARE

## 2019-03-08 DIAGNOSIS — K92.2 LOWER GI BLEED: ICD-10-CM

## 2019-03-08 LAB
ALBUMIN SERPL BCP-MCNC: 3.8 G/DL
ALP SERPL-CCNC: 67 U/L
ALT SERPL W/O P-5'-P-CCNC: 14 U/L
ANION GAP SERPL CALC-SCNC: 9 MMOL/L
AST SERPL-CCNC: 21 U/L
BILIRUB SERPL-MCNC: 0.7 MG/DL
BUN SERPL-MCNC: 20 MG/DL
CALCIUM SERPL-MCNC: 9.3 MG/DL
CHLORIDE SERPL-SCNC: 104 MMOL/L
CO2 SERPL-SCNC: 26 MMOL/L
CREAT SERPL-MCNC: 1.1 MG/DL
EST. GFR  (AFRICAN AMERICAN): >60 ML/MIN/1.73 M^2
EST. GFR  (NON AFRICAN AMERICAN): 59 ML/MIN/1.73 M^2
GLUCOSE SERPL-MCNC: 112 MG/DL
POTASSIUM SERPL-SCNC: 4.6 MMOL/L
PROT SERPL-MCNC: 6.6 G/DL
SODIUM SERPL-SCNC: 139 MMOL/L

## 2019-03-08 PROCEDURE — 36415 COLL VENOUS BLD VENIPUNCTURE: CPT

## 2019-03-08 PROCEDURE — 80053 COMPREHEN METABOLIC PANEL: CPT

## 2019-03-09 ENCOUNTER — PATIENT MESSAGE (OUTPATIENT)
Dept: INTERNAL MEDICINE | Facility: CLINIC | Age: 84
End: 2019-03-09

## 2019-03-10 ENCOUNTER — TELEPHONE (OUTPATIENT)
Dept: INTERNAL MEDICINE | Facility: CLINIC | Age: 84
End: 2019-03-10

## 2019-03-10 ENCOUNTER — PATIENT MESSAGE (OUTPATIENT)
Dept: INTERNAL MEDICINE | Facility: CLINIC | Age: 84
End: 2019-03-10

## 2019-03-10 DIAGNOSIS — D53.9 MACROCYTIC ANEMIA: ICD-10-CM

## 2019-03-10 DIAGNOSIS — R73.09 ELEVATED RANDOM BLOOD GLUCOSE LEVEL: Primary | ICD-10-CM

## 2019-03-11 NOTE — TELEPHONE ENCOUNTER
Please schedule the patient for nonfasting labs in the next few days.    Please sign and close this encounter once scheduled.

## 2019-03-12 ENCOUNTER — PATIENT MESSAGE (OUTPATIENT)
Dept: INTERNAL MEDICINE | Facility: CLINIC | Age: 84
End: 2019-03-12

## 2019-03-12 ENCOUNTER — LAB VISIT (OUTPATIENT)
Dept: LAB | Facility: HOSPITAL | Age: 84
End: 2019-03-12
Attending: INTERNAL MEDICINE
Payer: MEDICARE

## 2019-03-12 DIAGNOSIS — D53.9 MACROCYTIC ANEMIA: ICD-10-CM

## 2019-03-12 DIAGNOSIS — D50.9 IRON DEFICIENCY ANEMIA, UNSPECIFIED IRON DEFICIENCY ANEMIA TYPE: Primary | ICD-10-CM

## 2019-03-12 DIAGNOSIS — R73.09 ELEVATED RANDOM BLOOD GLUCOSE LEVEL: ICD-10-CM

## 2019-03-12 LAB
ESTIMATED AVG GLUCOSE: 111 MG/DL
FERRITIN SERPL-MCNC: 17 NG/ML
GLUCOSE SERPL-MCNC: 101 MG/DL
HBA1C MFR BLD HPLC: 5.5 %
IRON SERPL-MCNC: 24 UG/DL
SATURATED IRON: 5 %
TOTAL IRON BINDING CAPACITY: 490 UG/DL
TRANSFERRIN SERPL-MCNC: 331 MG/DL
VIT B12 SERPL-MCNC: 416 PG/ML

## 2019-03-12 PROCEDURE — 83540 ASSAY OF IRON: CPT

## 2019-03-12 PROCEDURE — 82947 ASSAY GLUCOSE BLOOD QUANT: CPT

## 2019-03-12 PROCEDURE — 83036 HEMOGLOBIN GLYCOSYLATED A1C: CPT

## 2019-03-12 PROCEDURE — 82728 ASSAY OF FERRITIN: CPT

## 2019-03-12 PROCEDURE — 82607 VITAMIN B-12: CPT

## 2019-03-12 PROCEDURE — 36415 COLL VENOUS BLD VENIPUNCTURE: CPT

## 2019-03-12 RX ORDER — FERROUS SULFATE 325(65) MG
325 TABLET ORAL
COMMUNITY
End: 2019-03-13

## 2019-03-12 NOTE — TELEPHONE ENCOUNTER
Please notify the patient that his blood work shows iron deficiency anemia.  I would like him to stop aspirin and start taking over-the-counter iron supplements.  I have sent a message with more detailed information.  I am ordering an upper and lower endoscopy to evaluate for any sources of GI bleeding.  The team should be calling to schedule this appointment with him, but I also left the number in his message.    Please sign and close this encounter once completed.

## 2019-03-13 ENCOUNTER — TELEPHONE (OUTPATIENT)
Dept: INTERNAL MEDICINE | Facility: CLINIC | Age: 84
End: 2019-03-13

## 2019-03-13 ENCOUNTER — PATIENT MESSAGE (OUTPATIENT)
Dept: INTERNAL MEDICINE | Facility: CLINIC | Age: 84
End: 2019-03-13

## 2019-03-13 RX ORDER — NAPROXEN SODIUM 220 MG/1
81 TABLET, FILM COATED ORAL DAILY
COMMUNITY

## 2019-03-13 NOTE — TELEPHONE ENCOUNTER
"I spoke to the pt. He states that he already spoke to Dr Stone about "all of this". He is not going to to the upper and lower endoscopies because of his age.   "

## 2019-03-13 NOTE — TELEPHONE ENCOUNTER
Discussed situation with patient, he would like to hold off on endoscopy for now. defer endoscopy at this time.

## 2019-03-13 NOTE — TELEPHONE ENCOUNTER
----- Message from Cem Byrne sent at 3/13/2019  4:30 PM CDT -----  Contact: Patient [phone]  Type: Orders Request    What orders/ testing are being requested? Hemoglobin Labs    Is there a future appointment scheduled for the patient with PCP? Yes    When? 05/09/19    Comments: would to have the CBC (appt was cancel please schedule with Hemoglobin)and the Hemoglobin test done at the same time    Please call an advise  Thank you

## 2019-03-14 ENCOUNTER — TELEPHONE (OUTPATIENT)
Dept: INTERNAL MEDICINE | Facility: CLINIC | Age: 84
End: 2019-03-14

## 2019-03-14 DIAGNOSIS — D50.9 IRON DEFICIENCY ANEMIA, UNSPECIFIED IRON DEFICIENCY ANEMIA TYPE: Primary | ICD-10-CM

## 2019-03-15 ENCOUNTER — PATIENT MESSAGE (OUTPATIENT)
Dept: INTERNAL MEDICINE | Facility: CLINIC | Age: 84
End: 2019-03-15

## 2019-03-15 NOTE — TELEPHONE ENCOUNTER
Called patient and scehduled labs in 2wks 3-29-19.    Patient Verbalized Understanding.    Thanks Sushila

## 2019-03-15 NOTE — TELEPHONE ENCOUNTER
PLease call to notify the patient the lab was unable to add CBC to his blood work. I recommend we redraw labs in about 2 weeks to recheck the CBC and the iron. Please schedule with the patient.

## 2019-03-20 ENCOUNTER — PATIENT MESSAGE (OUTPATIENT)
Dept: INTERNAL MEDICINE | Facility: CLINIC | Age: 84
End: 2019-03-20

## 2019-03-22 NOTE — TELEPHONE ENCOUNTER
Patient is looking to have labs scheduled soon.  Please call him to the come in for blood work today or tomorrow, or any day early next week.  Labs were ordered March 15th, I believe they were scheduled for March 29th but he would like to be seen sooner.  He does not need to be fasting.  I have sent the patient a message.    Please sign and close this encounter once completed and/or scheduled.

## 2019-03-25 ENCOUNTER — PATIENT MESSAGE (OUTPATIENT)
Dept: INTERNAL MEDICINE | Facility: CLINIC | Age: 84
End: 2019-03-25

## 2019-03-29 ENCOUNTER — TELEPHONE (OUTPATIENT)
Dept: INTERNAL MEDICINE | Facility: CLINIC | Age: 84
End: 2019-03-29

## 2019-03-29 ENCOUNTER — LAB VISIT (OUTPATIENT)
Dept: LAB | Facility: HOSPITAL | Age: 84
End: 2019-03-29
Attending: INTERNAL MEDICINE
Payer: MEDICARE

## 2019-03-29 DIAGNOSIS — D50.9 IRON DEFICIENCY ANEMIA, UNSPECIFIED IRON DEFICIENCY ANEMIA TYPE: ICD-10-CM

## 2019-03-29 LAB
ERYTHROCYTE [DISTWIDTH] IN BLOOD BY AUTOMATED COUNT: 15.6 % (ref 11.5–14.5)
FERRITIN SERPL-MCNC: 12 NG/ML (ref 20–300)
HCT VFR BLD AUTO: 35.2 % (ref 40–54)
HGB BLD-MCNC: 10.9 G/DL (ref 14–18)
IRON SERPL-MCNC: 31 UG/DL (ref 45–160)
MCH RBC QN AUTO: 28.4 PG (ref 27–31)
MCHC RBC AUTO-ENTMCNC: 31 G/DL (ref 32–36)
MCV RBC AUTO: 92 FL (ref 82–98)
PLATELET # BLD AUTO: 336 K/UL (ref 150–350)
PMV BLD AUTO: 9.8 FL (ref 9.2–12.9)
RBC # BLD AUTO: 3.84 M/UL (ref 4.6–6.2)
SATURATED IRON: 6 % (ref 20–50)
TOTAL IRON BINDING CAPACITY: 500 UG/DL (ref 250–450)
TRANSFERRIN SERPL-MCNC: 338 MG/DL (ref 200–375)
WBC # BLD AUTO: 6.52 K/UL (ref 3.9–12.7)

## 2019-03-29 PROCEDURE — 82728 ASSAY OF FERRITIN: CPT

## 2019-03-29 PROCEDURE — 36415 COLL VENOUS BLD VENIPUNCTURE: CPT

## 2019-03-29 PROCEDURE — 85027 COMPLETE CBC AUTOMATED: CPT

## 2019-03-29 PROCEDURE — 83540 ASSAY OF IRON: CPT

## 2019-04-02 ENCOUNTER — OFFICE VISIT (OUTPATIENT)
Dept: INTERNAL MEDICINE | Facility: CLINIC | Age: 84
End: 2019-04-02
Payer: MEDICARE

## 2019-04-02 VITALS
HEIGHT: 71 IN | DIASTOLIC BLOOD PRESSURE: 66 MMHG | SYSTOLIC BLOOD PRESSURE: 132 MMHG | BODY MASS INDEX: 26.46 KG/M2 | HEART RATE: 67 BPM | WEIGHT: 189 LBS

## 2019-04-02 DIAGNOSIS — E78.5 DYSLIPIDEMIA: ICD-10-CM

## 2019-04-02 DIAGNOSIS — I70.0 AORTIC ATHEROSCLEROSIS: ICD-10-CM

## 2019-04-02 DIAGNOSIS — N40.1 BPH WITH URINARY OBSTRUCTION: ICD-10-CM

## 2019-04-02 DIAGNOSIS — N13.8 BPH WITH URINARY OBSTRUCTION: ICD-10-CM

## 2019-04-02 DIAGNOSIS — K57.91 DIVERTICULOSIS OF INTESTINE WITH BLEEDING, UNSPECIFIED INTESTINAL TRACT LOCATION: ICD-10-CM

## 2019-04-02 DIAGNOSIS — M19.042 PRIMARY OSTEOARTHRITIS OF BOTH HANDS: Primary | ICD-10-CM

## 2019-04-02 DIAGNOSIS — M48.02 CERVICAL STENOSIS OF SPINE: ICD-10-CM

## 2019-04-02 DIAGNOSIS — I48.0 PAF (PAROXYSMAL ATRIAL FIBRILLATION): ICD-10-CM

## 2019-04-02 DIAGNOSIS — E29.1 MALE HYPOGONADISM: ICD-10-CM

## 2019-04-02 DIAGNOSIS — M19.041 PRIMARY OSTEOARTHRITIS OF BOTH HANDS: Primary | ICD-10-CM

## 2019-04-02 PROCEDURE — 99999 PR PBB SHADOW E&M-EST. PATIENT-LVL III: CPT | Mod: PBBFAC,,, | Performed by: INTERNAL MEDICINE

## 2019-04-02 PROCEDURE — 99215 PR OFFICE/OUTPT VISIT, EST, LEVL V, 40-54 MIN: ICD-10-PCS | Mod: S$PBB,,, | Performed by: INTERNAL MEDICINE

## 2019-04-02 PROCEDURE — 99999 PR PBB SHADOW E&M-EST. PATIENT-LVL III: ICD-10-PCS | Mod: PBBFAC,,, | Performed by: INTERNAL MEDICINE

## 2019-04-02 PROCEDURE — 99213 OFFICE O/P EST LOW 20 MIN: CPT | Mod: PBBFAC | Performed by: INTERNAL MEDICINE

## 2019-04-02 PROCEDURE — 99215 OFFICE O/P EST HI 40 MIN: CPT | Mod: S$PBB,,, | Performed by: INTERNAL MEDICINE

## 2019-04-02 RX ORDER — DICLOFENAC SODIUM 10 MG/G
GEL TOPICAL
Qty: 100 G | Refills: 4 | Status: SHIPPED | OUTPATIENT
Start: 2019-04-02 | End: 2020-06-16 | Stop reason: SDUPTHER

## 2019-04-02 RX ORDER — TAMSULOSIN HYDROCHLORIDE 0.4 MG/1
0.4 CAPSULE ORAL DAILY
Qty: 90 CAPSULE | Refills: 3 | Status: SHIPPED | OUTPATIENT
Start: 2019-04-02 | End: 2019-07-02

## 2019-04-02 NOTE — PROGRESS NOTES
Subjective:       Patient ID: Artemio Colon Sr. is a 89 y.o. male.    Chief Complaint: Follow-up    HPI    Patient is accompanied by his wife Yaneth.    Last visit with me June 2018.  Since then has been seen by pain management, Ophthalmology, Urology, electrophysiology, and Orthopedics.  Upcoming appointment with health , Cardiology, Rheumatology.  Has appointment with me scheduled for May.    In February the patient was admitted to the hospital for small bowel obstruction.  Later in February was admitted again for a lower GI bleed.  At the time decision made not to proceed with endoscopy as it was likely diverticular bleeding.    Pt reports has high Hgb in past, in Jan was 18 per his report. Pt reports not feeling bad right now. Had significant bleeding from diverticulosis when went to the hospital. Reports was testosterone in [place].     Notes osteoarthritis of the MCPs bilaterally. Was seen by Orthopedics. xrays noted degeneration. Reports pain if he hits the knuckles. Sometimes drops items as result. especially at night more painful. No other joints affected.    Still with periodic ulnar neuropathy bilaterally due to C spine stenosis.    Review of Systems   Constitutional: Negative for activity change and unexpected weight change.   HENT: Negative for hearing loss, rhinorrhea and trouble swallowing.    Eyes: Negative for discharge and visual disturbance.   Respiratory: Negative for chest tightness and wheezing.    Cardiovascular: Negative for chest pain and palpitations.   Gastrointestinal: Negative for blood in stool, constipation, diarrhea and vomiting.   Endocrine: Negative for polydipsia and polyuria.   Genitourinary: Negative for difficulty urinating, hematuria and urgency.   Musculoskeletal: Negative for arthralgias, joint swelling and neck pain.   Neurological: Negative for weakness and headaches.   Psychiatric/Behavioral: Negative for confusion and dysphoric mood.       Objective:      Physical  "Exam   Constitutional: He is oriented to person, place, and time. No distress.   HENT:   Head: Atraumatic.   Right Ear: Tympanic membrane normal. No tenderness.   Left Ear: Tympanic membrane normal. No tenderness.   Mouth/Throat: Oropharynx is clear and moist. No oropharyngeal exudate.   Eyes: Pupils are equal, round, and reactive to light. Right eye exhibits no discharge. Left eye exhibits no discharge.   Neck: Normal range of motion. No thyromegaly present.   Cardiovascular: Normal rate, regular rhythm and normal heart sounds.  Occasional extrasystoles are present.   Pulmonary/Chest: Effort normal and breath sounds normal. No stridor. He has no wheezes. He has no rales.   Musculoskeletal: He exhibits no edema or tenderness.   Lymphadenopathy:     He has no cervical adenopathy.   Neurological: He is alert and oriented to person, place, and time.   Skin: Skin is warm and dry. No rash noted.   Psychiatric: He has a normal mood and affect. His behavior is normal.   Nursing note and vitals reviewed.      Vitals:    04/02/19 1025   BP: 132/66   BP Location: Right arm   Patient Position: Sitting   BP Method: Large (Manual)   Pulse: 67   Weight: 85.7 kg (189 lb)   Height: 5' 10.5" (1.791 m)     Body mass index is 26.74 kg/m².    RESULTS: Reviewed labs from last 6 months    Assessment:       1. Primary osteoarthritis of both hands    2. BPH with urinary obstruction    3. Male hypogonadism    4. Diverticulosis of intestine with bleeding, unspecified intestinal tract location    5. Cervical stenosis of spine    6. Dyslipidemia    7. PAF (paroxysmal atrial fibrillation)    8. Aortic atherosclerosis        Plan:   Artemio was seen today for follow-up.    Diagnoses and all orders for this visit:    Primary osteoarthritis of both hands:  New problem, was seen by outside Orthopedics.  Does not sound like an autoimmune process.  Given other comorbidities work to avoid systemic NSAIDs, try topical Voltaren.  He will discuss with " outside Orthopedics if they recommend occupational therapy.  -     diclofenac sodium (VOLTAREN) 1 % Gel; Use to affected joints up to 4 times a day    BPH with urinary obstruction:  Prior diagnosis, stable, well controlled on current management. No changes at this time, will continue to monitor.   -     tamsulosin (FLOMAX) 0.4 mg Cap; Take 1 capsule (0.4 mg total) by mouth once daily.    Male hypogonadism:  Prior dx, was receiving testosterone treatment in past. Reports had elevated Hgb in past, request outside records.    Diverticulosis of intestine with bleeding, unspecified intestinal tract location:  Prior dx, was in hospital, determination from GI not perform any endoscopy.  Hemoglobin steadily improving, though iron is still low.  I will discuss with the health  so she can give recommendations for iron rich foods at their visit next week.    Cervical stenosis of spine:  Prior diagnosis, persistent problem but stable, continues to see Orthopedics and Pain Management.    Dyslipidemia:  Prior diagnosis, stable, well controlled on current management. No changes at this time, will continue to monitor. Continue statin, continue follow up with Cardiology.    PAF (paroxysmal atrial fibrillation):  Prior dx, pt also with AV block, pt is asymptomatic with pacemaker. continue follow up with arrhythmia Cardiology.    Aortic atherosclerosis:  New diagnosis made via CT scan in February.  Asymptomatic.  Continue control of blood pressure and cholesterol to limit further atherosclerosis.     Follow up in about 3 months (around 7/2/2019) for follow up visit.  Osiel Stone MD  Internal Medicine    Portions of this note were completed using medical dictation software. Please excuse typographical or syntax errors that were missed on review.

## 2019-04-02 NOTE — PATIENT INSTRUCTIONS
Please check with Orthopedics if Occupational Therapy would be beneficial for the osteoarthritis in the hands.

## 2019-04-02 NOTE — Clinical Note
Pt will come to see you for follow up next week. He had a GI bleed and his iron is still low. Please review high-iron foods with him (especially the vegetables) so he can continue to get sufficient iron intake. Thanks!

## 2019-04-03 ENCOUNTER — PATIENT MESSAGE (OUTPATIENT)
Dept: INTERNAL MEDICINE | Facility: CLINIC | Age: 84
End: 2019-04-03

## 2019-04-11 ENCOUNTER — CLINICAL SUPPORT (OUTPATIENT)
Dept: INTERNAL MEDICINE | Facility: CLINIC | Age: 84
End: 2019-04-11
Payer: MEDICARE

## 2019-04-11 VITALS — BODY MASS INDEX: 26.69 KG/M2 | WEIGHT: 188.69 LBS

## 2019-04-11 PROCEDURE — 99999 PR PBB SHADOW E&M-EST. PATIENT-LVL I: CPT | Mod: PBBFAC,,,

## 2019-04-11 PROCEDURE — 99999 PR PBB SHADOW E&M-EST. PATIENT-LVL I: ICD-10-PCS | Mod: PBBFAC,,,

## 2019-04-11 PROCEDURE — 99211 OFF/OP EST MAY X REQ PHY/QHP: CPT | Mod: PBBFAC

## 2019-04-11 NOTE — PROGRESS NOTES
Health  Follow-Up Note   [x] Office  [] Phone  Notes from previous session reviewed.   [x] Previous Session Goals unchanged.   [] Patient/Caregiver Change in Goals.  Goals added or changed by Patient/Caregiver since program participation:  1. Continue same plan  2. Increase fiber and iron rich foods    3. Increase walking     Additional/Changed support that patient/caregiver has experienced/sought?  (Indicate readiness, support from family, friends, others, community groups, etc)  1.  Wife    Additional/Changed obstacles that could prevent patient/caregiver from reaching their goals?  1.  GI bleed last month    Feedback provided:  1.  Praised for great job down 5.29 lb total now 19.4 lb    Diagnostic values/Desriptors for follow-up as needed for chronic condition(s)   Weight: 85.6 kg 188.71 lb     Interventions:   1. Health  listened reflectively, validated thoughts and feelings, offered support and encouragement.   2. Allowed patient to express themselves in a non-biased atmosphere.  3. Health  assisted pt to problem-solve obstacles such as being in a challenging environment and dealing with these challenges.   4. Motivational Interviewed interventions utilized (OARS).   5. Patient responded favorably to interventions and remained actively engaged in the session.   6. Health  will remain available and connected for patient by phone and/or office visits.   7. Positive reinforcement, emotional support and encouragement provided.   8. Focused Education: MI, iron rich foods (handouts given), high fiber foods    Plan:  [x] Pt will work on goals as stated above.   [x] Pt will contact Health  for any questions, concerns or needs.  [x] Pt will follow up with Health  in office on  Will call after he checks schedule for vacation cruise  [] Pt will follow up with Health  on phone in:        [x] Health  will remain available.   [] Health  will contact patient by phone in:        []  Health  will consult:        [] Health  will inform Provider via EPIC messaging.     Impression:  1. Behavior is consistent with Action Stage of Change.   2. Participation level:       [x] Receptive      [x] Interactive      [] Guarded and Resistant      [x] Self Motivated      [] Refused/Declined to participate   3. [x] Pt voiced understanding of all information presented.       [] Pt voiced needing further information/education. This will be arranged.       [x] Pt would benefit from further education/information as identified by this health . This will be arranged.     Carie Hamilton RN HC

## 2019-04-16 ENCOUNTER — PATIENT MESSAGE (OUTPATIENT)
Dept: UROLOGY | Facility: CLINIC | Age: 84
End: 2019-04-16

## 2019-04-18 ENCOUNTER — PATIENT MESSAGE (OUTPATIENT)
Dept: UROLOGY | Facility: CLINIC | Age: 84
End: 2019-04-18

## 2019-04-27 ENCOUNTER — PATIENT MESSAGE (OUTPATIENT)
Dept: UROLOGY | Facility: CLINIC | Age: 84
End: 2019-04-27

## 2019-05-07 ENCOUNTER — HOSPITAL ENCOUNTER (OUTPATIENT)
Dept: CARDIOLOGY | Facility: CLINIC | Age: 84
Discharge: HOME | End: 2019-05-07
Attending: INTERNAL MEDICINE
Payer: MEDICARE

## 2019-05-07 ENCOUNTER — OFFICE VISIT (OUTPATIENT)
Dept: ELECTROPHYSIOLOGY | Facility: CLINIC | Age: 84
End: 2019-05-07
Attending: INTERNAL MEDICINE
Payer: MEDICARE

## 2019-05-07 VITALS
WEIGHT: 187.38 LBS | HEART RATE: 67 BPM | SYSTOLIC BLOOD PRESSURE: 140 MMHG | HEIGHT: 70 IN | BODY MASS INDEX: 26.82 KG/M2 | DIASTOLIC BLOOD PRESSURE: 76 MMHG

## 2019-05-07 DIAGNOSIS — G47.33 OBSTRUCTIVE SLEEP APNEA ON CPAP: ICD-10-CM

## 2019-05-07 DIAGNOSIS — I48.0 PAF (PAROXYSMAL ATRIAL FIBRILLATION): ICD-10-CM

## 2019-05-07 DIAGNOSIS — Z95.0 CARDIAC PACEMAKER IN SITU: Primary | ICD-10-CM

## 2019-05-07 DIAGNOSIS — I44.2 COMPLETE AV BLOCK: ICD-10-CM

## 2019-05-07 DIAGNOSIS — I42.0 DILATED CARDIOMYOPATHY: ICD-10-CM

## 2019-05-07 DIAGNOSIS — I50.22 CHRONIC SYSTOLIC CONGESTIVE HEART FAILURE: ICD-10-CM

## 2019-05-07 PROCEDURE — 99214 PR OFFICE/OUTPT VISIT, EST, LEVL IV, 30-39 MIN: ICD-10-PCS | Mod: S$PBB,,, | Performed by: NURSE PRACTITIONER

## 2019-05-07 PROCEDURE — 99999 PR PBB SHADOW E&M-EST. PATIENT-LVL III: ICD-10-PCS | Mod: PBBFAC,,, | Performed by: NURSE PRACTITIONER

## 2019-05-07 PROCEDURE — 93010 RHYTHM STRIP: ICD-10-PCS | Mod: S$PBB,,, | Performed by: INTERNAL MEDICINE

## 2019-05-07 PROCEDURE — 93010 ELECTROCARDIOGRAM REPORT: CPT | Mod: S$PBB,,, | Performed by: INTERNAL MEDICINE

## 2019-05-07 PROCEDURE — 99214 OFFICE O/P EST MOD 30 MIN: CPT | Mod: S$PBB,,, | Performed by: NURSE PRACTITIONER

## 2019-05-07 PROCEDURE — 99213 OFFICE O/P EST LOW 20 MIN: CPT | Mod: PBBFAC,25 | Performed by: NURSE PRACTITIONER

## 2019-05-07 PROCEDURE — 99999 PR PBB SHADOW E&M-EST. PATIENT-LVL III: CPT | Mod: PBBFAC,,, | Performed by: NURSE PRACTITIONER

## 2019-05-07 PROCEDURE — 93005 ELECTROCARDIOGRAM TRACING: CPT | Mod: PBBFAC,59 | Performed by: INTERNAL MEDICINE

## 2019-05-07 NOTE — PROGRESS NOTES
Mr. Colon is a patient of Dr. Adler and was last seen in clinic 11/7/2018.      Subjective:   Patient ID:  Artemio Colon Sr. is a 89 y.o. male who presents for follow-up of Cardiomyopathy and Atrial Fibrillation  .     HPI:    Mr. Colon is a 89 y.o. male with pAF, (PVI 2011), PPM (2003, CRT upgrade 2/2018), CAD, CHB here for follow up.    Background:    Mr. 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 He has CAD (cath 2012 rx with medical therapy) and preserved EF (60-65% 1/2015). I do not know if he had a PVI or AVN ablation as he has complete AV block.  He feels well, exercises regularly.  He denies any chest pain, shortness of breath, palpitations, orthopnea or PND.  He is not on anticoagulation and when I brought it up as a discussion he stated he does not want to be on it and rathered not discuss it any further.     Mr. Colon presented 1/2018 noting shortness of breath and low energy. Recent remote interrogation noted <0.1% AMS burden with longest episode 3 min and 52 seconds. No AF noted. He wass 61% A and 100% V paced. His V-threshold was 1.75 @ 0.4 on first visit with me. Recent remote auto-capture threshold was 2.25 @ 0.4. It has slowly increased over the past year and a half with stable impedance. Checked in clinic with bipolar threshold of 1.5 @ 0.4 sitting up and unipolar threshold of 1.25@ 0.4 also sitting up. Lying down his capture threshold was 2.25 @ 0.4. ECHO noted LVEF of 40-45% and stress test was negative. We discussed upgrade to CRT-P which he underwent 2/2018.    Mr. Colon presents for post upgrade check 4/2018. At time of implant RV lead capture threshold was even higher so we elected to cap it and implant a new RV lead. Device interrogation noted no  AF, sinus rhythm/kashif with PACs and complete AV block, 6 sec of AMS x 2, stable lead parameters. Noted rare phrenic nerve stim that is treated with position movement. He notes that he had a hard time mentally coming out of anesthesia during his recent procedure.    At his 6 month post-CRT upgrade evaluation 11/2018, ECHO 8/2018 noted LVEF was 60-65%. He feels well. Device interrogation from 10/30/2018 notes no AF, 59% RA and 100% BiV pacing. QRS duration is significantly prolonged compared to prior.  In clinic device interrogation notes slightly increased (2 @ 0.6ms) with small threshold-setting window. Changed settings to simultaneous RV-LV activation. Output increased to 3.5 @ 0.6ms. Repeat ECG notes 100% BiV pacing with QRS duration of 156ms.    Update (05/07/2019):    Today he says he feels well. No cardiac complaints. Mr. Colon denies chest pain with exertion or at rest, palpitations, SOB, BOYLE, dizziness, or syncope. He is compliant with nightly CPAP.    He is not on anticoagulation by choice. ASA only. Kidney function is stable, with a creatinine of 1.1 on 3/8/2019.    Device Interrogation (5/7/2019) reveals an intrinsic SB with CHB (no escape) with stable lead and device function. AMSx10, 12 seconds max duration. He paces 40% in the RA and 100% in the BiV. Estimated battery longevity 7-8 years.     I have personally reviewed the patient's EKG today, which shows biventricular paced rhythm at 67bpm. QRS is 158ms. QTc is 458.    Recent Cardiac Tests:    2D Echo (8/2018):  CONCLUSIONS     1 - Upper limit of normal left ventricular enlargement.     2 - Normal left ventricular systolic function (EF 60-65%).     3 - No wall motion abnormalities.     4 - Indeterminate LV diastolic function.     5 - Right ventricle is upper limit of normal in size with normal systolic function.     6 - Biatrial enlargement.     7 - Trivial aortic regurgitation.     8 - Trivial to mild tricuspid regurgitation.     9 - Trivial  pulmonic regurgitation.     10 - The estimated PA systolic pressure is 30 mmHg.     Current Outpatient Medications   Medication Sig    aspirin 81 MG Chew Take 81 mg by mouth once daily.    brimonidine 0.2% (ALPHAGAN) 0.2 % Drop Place 1 drop into both eyes 2 (two) times daily.    diclofenac sodium (VOLTAREN) 1 % Gel Use to affected joints up to 4 times a day    efinaconazole 10 % Werner Apply to affected toenails once daily    fentaNYL (DURAGESIC) 25 mcg/hr Place 1 patch onto the skin every 72 hours.    fluticasone (FLONASE) 50 mcg/actuation nasal spray 2 sprays by Each Nare route once daily.    gabapentin (NEURONTIN) 600 MG tablet Take 1 tablet (600 mg total) by mouth once daily. (Patient taking differently: Take 600 mg by mouth every evening. )    hydrocodone-acetaminophen 10-325mg (NORCO)  mg Tab Take by mouth every 4 (four) hours as needed for Pain.    pravastatin (PRAVACHOL) 40 MG tablet Take 1 tablet (40 mg total) by mouth once daily.    tamsulosin (FLOMAX) 0.4 mg Cap Take 1 capsule (0.4 mg total) by mouth once daily.    timolol maleate 0.5% (TIMOPTIC) 0.5 % Drop Place 1 drop into both eyes 2 (two) times daily. (Patient taking differently: Place 1 drop into both eyes once daily. )    travoprost (TRAVATAN Z) 0.004 % Drop Place 1 drop into both eyes every evening. (Patient taking differently: Place 1 drop into both eyes 2 (two) times daily. )     No current facility-administered medications for this visit.        Review of Systems   Constitution: Negative for malaise/fatigue.   Cardiovascular: Negative for chest pain, dyspnea on exertion, irregular heartbeat, leg swelling and palpitations.   Respiratory: Negative for shortness of breath.    Hematologic/Lymphatic: Negative for bleeding problem.   Skin: Negative for rash.   Musculoskeletal: Negative for myalgias.   Gastrointestinal: Negative for hematemesis, hematochezia and nausea.   Genitourinary: Negative for hematuria.   Neurological: Negative  "for light-headedness.   Psychiatric/Behavioral: Negative for altered mental status.   Allergic/Immunologic: Negative for persistent infections.         Objective:          BP (!) 140/76   Pulse 67   Ht 5' 10" (1.778 m)   Wt 85 kg (187 lb 6.3 oz)   BMI 26.89 kg/m²     Physical Exam   Constitutional: He is oriented to person, place, and time. He appears well-developed and well-nourished.   HENT:   Head: Normocephalic.   Nose: Nose normal.   Eyes: Pupils are equal, round, and reactive to light.   Cardiovascular: Normal rate, regular rhythm, S1 normal and S2 normal.   No murmur heard.  Pulses:       Radial pulses are 2+ on the right side, and 2+ on the left side.   Pulmonary/Chest: Breath sounds normal. No respiratory distress.   Device to LUCW.   Abdominal: Normal appearance.   Musculoskeletal: Normal range of motion. He exhibits no edema.   Neurological: He is alert and oriented to person, place, and time.   Skin: Skin is warm and dry. No erythema.   Psychiatric: He has a normal mood and affect. His speech is normal and behavior is normal.   Nursing note and vitals reviewed.        Lab Results   Component Value Date     03/08/2019    K 4.6 03/08/2019    MG 1.7 02/16/2019    BUN 20 03/08/2019    CREATININE 1.1 03/08/2019    ALT 14 03/08/2019    AST 21 03/08/2019    HGB 10.9 (L) 03/29/2019    HCT 35.2 (L) 03/29/2019    HCT 63 (H) 02/14/2019    LDLCALC 86.0 06/28/2018       Recent Labs   Lab 02/14/18  0828 02/27/19  1153   INR 1.0 1.1       Assessment:     1. Cardiac pacemaker in situ    2. PAF (paroxysmal atrial fibrillation)    3. Complete AV block    4. Dilated cardiomyopathy    5. Obstructive sleep apnea on CPAP      Plan:     In summary, Mr. Colon is a 89 y.o. male with pAF, (PVI 2011), PPM (2003, CRT upgrade 2/2018), CAD, CHB here for follow up.  Mr. Colon is doing well from a device perspective with stable lead and device function. 100% biventricular pacing. No sustained arrhythmia noted. Patient " opts for ASA only. He feels well.    Continue current medication regimen and device settings.   Follow up in device clinic as scheduled.   Follow up in EP clinic in 6 months, sooner as needed.     *A copy of this note has been sent to Dr. Adler*    Follow up in about 6 months (around 11/7/2019).    ------------------------------------------------------------------    ÁNGEL Landin, NP-C  Cardiac Electrophysiology

## 2019-05-07 NOTE — LETTER
May 7, 2019      Tawanda Adler MD  [address]  [facility] [address]           [facility] - Arrhythmia  [address]  [facility] [address]  Phone: [phone]  Fax: [phone]          Patient: Artemio Colon Sr.   MR Number: [id number]   YOB: 1929   Date of Visit: 5/7/2019       Dear Dr. Tawanda Adler:    Thank you for referring Artemio Colon to me for evaluation. Attached you will find relevant portions of my assessment and plan of care.    If you have questions, please do not hesitate to call me. I look forward to following Artemio Colon along with you.    Sincerely,    Sue Smyth, LAKSHMI    Enclosure  CC:  No Recipients    If you would like to receive this communication electronically, please contact [email or web address] or [phone] to request more information on Help/Systems Link access.    For providers and/or their staff who would like to refer a patient to [facility], please contact us through our one-stop-shop provider referral line, [facility] , at [phone].    If you feel you have received this communication in error or would no longer like to receive these types of communications, please e-mail [email or web address]

## 2019-05-09 ENCOUNTER — TELEPHONE (OUTPATIENT)
Dept: CARDIOLOGY | Facility: CLINIC | Age: 84
End: 2019-05-09

## 2019-05-09 ENCOUNTER — OFFICE VISIT (OUTPATIENT)
Dept: UROLOGY | Facility: CLINIC | Age: 84
End: 2019-05-09
Payer: MEDICARE

## 2019-05-09 ENCOUNTER — PATIENT MESSAGE (OUTPATIENT)
Dept: INTERNAL MEDICINE | Facility: CLINIC | Age: 84
End: 2019-05-09

## 2019-05-09 VITALS
DIASTOLIC BLOOD PRESSURE: 65 MMHG | HEIGHT: 70 IN | HEART RATE: 63 BPM | WEIGHT: 187.38 LBS | BODY MASS INDEX: 26.82 KG/M2 | SYSTOLIC BLOOD PRESSURE: 121 MMHG

## 2019-05-09 DIAGNOSIS — N41.1 CHRONIC PROSTATITIS: Primary | ICD-10-CM

## 2019-05-09 PROCEDURE — 99213 OFFICE O/P EST LOW 20 MIN: CPT | Mod: S$PBB,,, | Performed by: UROLOGY

## 2019-05-09 PROCEDURE — 99999 PR PBB SHADOW E&M-EST. PATIENT-LVL III: CPT | Mod: PBBFAC,,, | Performed by: UROLOGY

## 2019-05-09 PROCEDURE — 99999 PR PBB SHADOW E&M-EST. PATIENT-LVL III: ICD-10-PCS | Mod: PBBFAC,,, | Performed by: UROLOGY

## 2019-05-09 PROCEDURE — 99213 OFFICE O/P EST LOW 20 MIN: CPT | Mod: PBBFAC | Performed by: UROLOGY

## 2019-05-09 PROCEDURE — 99213 PR OFFICE/OUTPT VISIT, EST, LEVL III, 20-29 MIN: ICD-10-PCS | Mod: S$PBB,,, | Performed by: UROLOGY

## 2019-05-09 RX ORDER — CIPROFLOXACIN 500 MG/1
500 TABLET ORAL 2 TIMES DAILY
Qty: 60 TABLET | Refills: 0 | Status: SHIPPED | OUTPATIENT
Start: 2019-05-09 | End: 2019-06-08

## 2019-05-09 NOTE — TELEPHONE ENCOUNTER
----- Message from Nicholas Tee MD sent at 5/9/2019  4:19 PM CDT -----  Regarding: RE: Patient interested in restarting testosterone replacement therapy  His last EF was normal and with judicious dosing probably won't be a problem.  ----- Message -----  From: Beena Lynn MA  Sent: 5/9/2019   3:03 PM  To: Nicholas Tee MD  Subject: FW: Patient interested in restarting testost#        ----- Message -----  From: Osiel Stone MD  Sent: 5/9/2019   1:10 PM  To: Randal Hernandez  Subject: Patient interested in restarting testosteron#    Mr Colon was being treated with testosterone in the past. I had concerns about restarting initially it because of his heart failure. However it appears only patients who have poorly controlled heart failure are at risk. Do you have any concerns about him restarting testosterone treatment? Thanks so much.  --Osiel

## 2019-05-21 ENCOUNTER — CLINICAL SUPPORT (OUTPATIENT)
Dept: CARDIOLOGY | Facility: HOSPITAL | Age: 84
End: 2019-05-21
Attending: INTERNAL MEDICINE
Payer: MEDICARE

## 2019-05-21 DIAGNOSIS — I48.0 PAROXYSMAL ATRIAL FIBRILLATION: ICD-10-CM

## 2019-05-21 DIAGNOSIS — Z95.0 CARDIAC PACEMAKER IN SITU: ICD-10-CM

## 2019-05-21 DIAGNOSIS — I44.2 CHB (COMPLETE HEART BLOCK): ICD-10-CM

## 2019-06-06 ENCOUNTER — PATIENT MESSAGE (OUTPATIENT)
Dept: INTERNAL MEDICINE | Facility: CLINIC | Age: 84
End: 2019-06-06

## 2019-06-17 ENCOUNTER — PATIENT MESSAGE (OUTPATIENT)
Dept: INTERNAL MEDICINE | Facility: CLINIC | Age: 84
End: 2019-06-17

## 2019-06-28 ENCOUNTER — PATIENT MESSAGE (OUTPATIENT)
Dept: INTERNAL MEDICINE | Facility: CLINIC | Age: 84
End: 2019-06-28

## 2019-07-02 ENCOUNTER — LAB VISIT (OUTPATIENT)
Dept: LAB | Facility: HOSPITAL | Age: 84
End: 2019-07-02
Attending: INTERNAL MEDICINE
Payer: MEDICARE

## 2019-07-02 ENCOUNTER — OFFICE VISIT (OUTPATIENT)
Dept: INTERNAL MEDICINE | Facility: CLINIC | Age: 84
End: 2019-07-02
Payer: MEDICARE

## 2019-07-02 VITALS
HEIGHT: 71 IN | SYSTOLIC BLOOD PRESSURE: 150 MMHG | HEART RATE: 62 BPM | WEIGHT: 187 LBS | DIASTOLIC BLOOD PRESSURE: 76 MMHG | BODY MASS INDEX: 26.18 KG/M2

## 2019-07-02 DIAGNOSIS — I50.22 CHRONIC SYSTOLIC CONGESTIVE HEART FAILURE: ICD-10-CM

## 2019-07-02 DIAGNOSIS — E29.1 HYPOGONADISM MALE: ICD-10-CM

## 2019-07-02 DIAGNOSIS — N40.0 BPH WITHOUT URINARY OBSTRUCTION: ICD-10-CM

## 2019-07-02 DIAGNOSIS — D50.0 IRON DEFICIENCY ANEMIA DUE TO CHRONIC BLOOD LOSS: ICD-10-CM

## 2019-07-02 DIAGNOSIS — R03.0 ELEVATED BLOOD PRESSURE READING: ICD-10-CM

## 2019-07-02 DIAGNOSIS — R73.09 ELEVATED RANDOM BLOOD GLUCOSE LEVEL: ICD-10-CM

## 2019-07-02 DIAGNOSIS — R09.89 RALES: ICD-10-CM

## 2019-07-02 DIAGNOSIS — E29.1 HYPOGONADISM MALE: Primary | ICD-10-CM

## 2019-07-02 LAB
ALBUMIN SERPL BCP-MCNC: 4.4 G/DL (ref 3.5–5.2)
ALP SERPL-CCNC: 114 U/L (ref 55–135)
ALT SERPL W/O P-5'-P-CCNC: 116 U/L (ref 10–44)
ANION GAP SERPL CALC-SCNC: 9 MMOL/L (ref 8–16)
AST SERPL-CCNC: 34 U/L (ref 10–40)
BILIRUB SERPL-MCNC: 0.6 MG/DL (ref 0.1–1)
BNP SERPL-MCNC: 95 PG/ML (ref 0–99)
BUN SERPL-MCNC: 23 MG/DL (ref 8–23)
CALCIUM SERPL-MCNC: 9.9 MG/DL (ref 8.7–10.5)
CHLORIDE SERPL-SCNC: 103 MMOL/L (ref 95–110)
CO2 SERPL-SCNC: 27 MMOL/L (ref 23–29)
COMPLEXED PSA SERPL-MCNC: 3.5 NG/ML (ref 0–4)
CREAT SERPL-MCNC: 1 MG/DL (ref 0.5–1.4)
ERYTHROCYTE [DISTWIDTH] IN BLOOD BY AUTOMATED COUNT: 22 % (ref 11.5–14.5)
EST. GFR  (AFRICAN AMERICAN): >60 ML/MIN/1.73 M^2
EST. GFR  (NON AFRICAN AMERICAN): >60 ML/MIN/1.73 M^2
ESTIMATED AVG GLUCOSE: 123 MG/DL (ref 68–131)
FERRITIN SERPL-MCNC: 14 NG/ML (ref 20–300)
GLUCOSE SERPL-MCNC: 101 MG/DL (ref 70–110)
HBA1C MFR BLD HPLC: 5.9 % (ref 4–5.6)
HCT VFR BLD AUTO: 41.9 % (ref 40–54)
HGB BLD-MCNC: 12.9 G/DL (ref 14–18)
IRON SERPL-MCNC: 43 UG/DL (ref 45–160)
MCH RBC QN AUTO: 25.6 PG (ref 27–31)
MCHC RBC AUTO-ENTMCNC: 30.8 G/DL (ref 32–36)
MCV RBC AUTO: 83 FL (ref 82–98)
PLATELET # BLD AUTO: 266 K/UL (ref 150–350)
PMV BLD AUTO: 9.5 FL (ref 9.2–12.9)
POTASSIUM SERPL-SCNC: 4.1 MMOL/L (ref 3.5–5.1)
PROT SERPL-MCNC: 7.6 G/DL (ref 6–8.4)
RBC # BLD AUTO: 5.04 M/UL (ref 4.6–6.2)
SATURATED IRON: 8 % (ref 20–50)
SODIUM SERPL-SCNC: 139 MMOL/L (ref 136–145)
TESTOST SERPL-MCNC: 348 NG/DL (ref 304–1227)
TOTAL IRON BINDING CAPACITY: 546 UG/DL (ref 250–450)
TRANSFERRIN SERPL-MCNC: 369 MG/DL (ref 200–375)
TSH SERPL DL<=0.005 MIU/L-ACNC: 2 UIU/ML (ref 0.4–4)
WBC # BLD AUTO: 6.7 K/UL (ref 3.9–12.7)

## 2019-07-02 PROCEDURE — 83880 ASSAY OF NATRIURETIC PEPTIDE: CPT

## 2019-07-02 PROCEDURE — 84443 ASSAY THYROID STIM HORMONE: CPT

## 2019-07-02 PROCEDURE — 80053 COMPREHEN METABOLIC PANEL: CPT

## 2019-07-02 PROCEDURE — 84153 ASSAY OF PSA TOTAL: CPT

## 2019-07-02 PROCEDURE — 83036 HEMOGLOBIN GLYCOSYLATED A1C: CPT

## 2019-07-02 PROCEDURE — 82728 ASSAY OF FERRITIN: CPT

## 2019-07-02 PROCEDURE — 99215 OFFICE O/P EST HI 40 MIN: CPT | Mod: S$PBB,,, | Performed by: INTERNAL MEDICINE

## 2019-07-02 PROCEDURE — 83540 ASSAY OF IRON: CPT

## 2019-07-02 PROCEDURE — 85027 COMPLETE CBC AUTOMATED: CPT

## 2019-07-02 PROCEDURE — 99214 OFFICE O/P EST MOD 30 MIN: CPT | Mod: PBBFAC | Performed by: INTERNAL MEDICINE

## 2019-07-02 PROCEDURE — 99999 PR PBB SHADOW E&M-EST. PATIENT-LVL IV: CPT | Mod: PBBFAC,,, | Performed by: INTERNAL MEDICINE

## 2019-07-02 PROCEDURE — 36415 COLL VENOUS BLD VENIPUNCTURE: CPT

## 2019-07-02 PROCEDURE — 99999 PR PBB SHADOW E&M-EST. PATIENT-LVL IV: ICD-10-PCS | Mod: PBBFAC,,, | Performed by: INTERNAL MEDICINE

## 2019-07-02 PROCEDURE — 99215 PR OFFICE/OUTPT VISIT, EST, LEVL V, 40-54 MIN: ICD-10-PCS | Mod: S$PBB,,, | Performed by: INTERNAL MEDICINE

## 2019-07-02 PROCEDURE — 84403 ASSAY OF TOTAL TESTOSTERONE: CPT

## 2019-07-02 NOTE — PATIENT INSTRUCTIONS
Blood Pressure Measurement:    -- Please record your blood pressure once a week. When checking, make sure you have been sitting for at least 5 minutes, your legs are uncrossed, and the blood pressure cuff at the level of your heart. Record your blood pressure with the date and time in a log and bring it with you to every doctor's visit.  -- Goal blood pressure is top number in the 120s or 130s and the bottom close to 80. If the top number consistently is 140 or above, or the bottom number consistently 90 or above, we may need to start/adjust medication treatment.  -- If your blood pressure is high (140 or higher on top, 90 or higher on bottom) on two consecutive occasions, contact the office.

## 2019-07-02 NOTE — PROGRESS NOTES
Subjective:       Patient ID: Artemio Colon Sr. is a 89 y.o. male.    Chief Complaint: Follow-up    HPI    Last visit with me 04/2019, since then seen by Cardiology and Urology.    Pt dx with low testosterone in past. Was on injection treatment. Did well on testosterone in past for his mood and overall sense of wellbeing.     No more BRBPR over last few months. Mild constipation but no bleeding in stools, no dark or tarry stools. Pt reports feeling well overall. Doesn't feel weak. No shortness of breath.    Reports his blood pressure is generally well controlled. Today had 3 cups of coffee.    Review of Systems   Constitutional: Negative for activity change and unexpected weight change.   HENT: Negative for hearing loss, rhinorrhea and trouble swallowing.    Eyes: Negative for discharge and visual disturbance.   Respiratory: Negative for chest tightness and wheezing.    Cardiovascular: Negative for chest pain and palpitations.   Gastrointestinal: Negative for blood in stool, constipation, diarrhea and vomiting.   Endocrine: Negative for polydipsia and polyuria.   Genitourinary: Positive for urgency. Negative for difficulty urinating and hematuria.   Musculoskeletal: Positive for arthralgias. Negative for joint swelling and neck pain.   Neurological: Negative for weakness and headaches.   Psychiatric/Behavioral: Negative for confusion and dysphoric mood.       Objective:      Physical Exam   Constitutional: He is oriented to person, place, and time. No distress.   HENT:   Mouth/Throat: Oropharynx is clear and moist. No oropharyngeal exudate.   Neck: Normal range of motion. No thyromegaly present.   Cardiovascular: Normal rate, regular rhythm and normal heart sounds.  Occasional extrasystoles are present. Exam reveals no gallop and no friction rub.   No murmur heard.  Pulmonary/Chest: Effort normal. No stridor. He has no wheezes. He has rales (dry inspiratory) in the left lower field.   Lymphadenopathy:      "He has no cervical adenopathy.   Neurological: He is alert and oriented to person, place, and time.   Skin: He is not diaphoretic.   Psychiatric: He has a normal mood and affect. His behavior is normal.   Nursing note and vitals reviewed.      Vitals:    07/02/19 0856 07/02/19 0924   BP: (!) 148/76 (!) 150/76   BP Location: Right arm Right arm   Patient Position: Sitting Sitting   BP Method: Large (Manual) Large (Manual)   Pulse: 62    Weight: 84.8 kg (187 lb)    Height: 5' 10.5" (1.791 m)      Body mass index is 26.45 kg/m².    I have personally checked the blood pressure and documented my findings.     RESULTS: Reviewed labs from last 6 months    Assessment:       1. Hypogonadism male    2. Iron deficiency anemia due to chronic blood loss    3. Chronic systolic congestive heart failure, pacing induced now s/p upgrade to CRT-P 2/2018    4. BPH without urinary obstruction    5. Elevated random blood glucose level    6. Rales    7. Elevated blood pressure reading        Plan:   Artemio was seen today for follow-up.    Diagnoses and all orders for this visit:    Hypogonadism male:  Prior dx, was on testosterone treatment, I have discussed with Cardiology, they report his congestive heart failure is well controlled and that testosterone should not lead to worsening symptoms. Check labs and determine if testosterone treatment is indicated.  -     Testosterone; Future  -     Comprehensive metabolic panel; Future  -     TSH; Future    Iron deficiency anemia due to chronic blood loss:  Prior dx, stable, likely secondary to diverticulosis bleeding. continue to monitor, pt not on iron supplements due to GI upset.  -     CBC Without Differential; Future  -     Ferritin; Future  -     Iron and TIBC; Future  -     TSH; Future    Chronic systolic congestive heart failure, pacing induced now s/p upgrade to CRT-P 2/2018:  Prior diagnosis, stable, well controlled on current management. No changes at this time, will continue to " monitor. continue follow up with Cardiology.  -     Brain natriuretic peptide; Future  -     Comprehensive metabolic panel; Future  -     TSH; Future    BPH without urinary obstruction:  Prior diagnosis, stable, not overly bothersome on current management. No changes at this time, will continue to monitor. continue follow up with Urology.  -     Prostate Specific Antigen, Diagnostic; Future  -     TSH; Future    Elevated random blood glucose level:  Prior dx, resolved at last visit, check again today with A1c.  -     Hemoglobin A1c; Future  -     TSH; Future    Rales:  New finding on exam, dry rales, no shortness of breath. CT 02/2019 showed no lung abnormality at bases. Likely old scarring or atelectasis, notify office if cough or shortness of breath develops.    elevated blood pressure reading:  New problem today, pt had 3 cups coffee, continue to monitor regularly on follow up.    Follow up in about 3 months (around 10/2/2019) for follow up visit, fasting labs 1 week prior. labs today.  Osiel Stone MD  Internal Medicine    Portions of this note were completed using medical dictation software. Please excuse typographical or syntax errors that were missed on review.

## 2019-07-08 ENCOUNTER — PATIENT MESSAGE (OUTPATIENT)
Dept: INTERNAL MEDICINE | Facility: CLINIC | Age: 84
End: 2019-07-08

## 2019-07-08 DIAGNOSIS — R74.01 ELEVATED TRANSAMINASE LEVEL: ICD-10-CM

## 2019-07-08 DIAGNOSIS — R73.09 ELEVATED HEMOGLOBIN A1C: ICD-10-CM

## 2019-07-08 DIAGNOSIS — E29.1 HYPOGONADISM MALE: Primary | ICD-10-CM

## 2019-07-08 DIAGNOSIS — D50.9 IRON DEFICIENCY ANEMIA, UNSPECIFIED IRON DEFICIENCY ANEMIA TYPE: ICD-10-CM

## 2019-07-08 NOTE — TELEPHONE ENCOUNTER
At last visit patient was scheduled for 3 month follow-up, should have had a lab appointment made 1 week prior.  Please link today's labs to the lab appointment.  If no lab appointment was made, please route to scheduling to help make the lab appointment. I have sent the patient a [facility] message.

## 2019-07-23 ENCOUNTER — PATIENT MESSAGE (OUTPATIENT)
Dept: OPHTHALMOLOGY | Facility: CLINIC | Age: 84
End: 2019-07-23

## 2019-07-29 ENCOUNTER — PATIENT MESSAGE (OUTPATIENT)
Dept: OPHTHALMOLOGY | Facility: CLINIC | Age: 84
End: 2019-07-29

## 2019-09-24 ENCOUNTER — CLINICAL SUPPORT (OUTPATIENT)
Dept: CARDIOLOGY | Facility: HOSPITAL | Age: 84
End: 2019-09-24
Payer: MEDICARE

## 2019-09-24 PROCEDURE — 93294 CARDIAC DEVICE CHECK - REMOTE: ICD-10-PCS | Mod: ,,, | Performed by: INTERNAL MEDICINE

## 2019-09-24 PROCEDURE — 93294 REM INTERROG EVL PM/LDLS PM: CPT | Mod: ,,, | Performed by: INTERNAL MEDICINE

## 2019-09-25 ENCOUNTER — PATIENT MESSAGE (OUTPATIENT)
Dept: INTERNAL MEDICINE | Facility: CLINIC | Age: 84
End: 2019-09-25

## 2019-09-26 ENCOUNTER — LAB VISIT (OUTPATIENT)
Dept: LAB | Facility: HOSPITAL | Age: 84
End: 2019-09-26
Attending: INTERNAL MEDICINE
Payer: MEDICARE

## 2019-09-26 ENCOUNTER — PATIENT MESSAGE (OUTPATIENT)
Dept: INTERNAL MEDICINE | Facility: CLINIC | Age: 84
End: 2019-09-26

## 2019-09-26 DIAGNOSIS — E29.1 HYPOGONADISM MALE: ICD-10-CM

## 2019-09-26 DIAGNOSIS — R74.01 ELEVATED TRANSAMINASE LEVEL: ICD-10-CM

## 2019-09-26 DIAGNOSIS — D50.9 IRON DEFICIENCY ANEMIA, UNSPECIFIED IRON DEFICIENCY ANEMIA TYPE: ICD-10-CM

## 2019-09-26 DIAGNOSIS — R73.09 ELEVATED HEMOGLOBIN A1C: ICD-10-CM

## 2019-09-26 LAB
ALBUMIN SERPL BCP-MCNC: 4 G/DL (ref 3.5–5.2)
ALP SERPL-CCNC: 83 U/L (ref 55–135)
ALT SERPL W/O P-5'-P-CCNC: 33 U/L (ref 10–44)
ANION GAP SERPL CALC-SCNC: 9 MMOL/L (ref 8–16)
AST SERPL-CCNC: 22 U/L (ref 10–40)
BILIRUB SERPL-MCNC: 0.7 MG/DL (ref 0.1–1)
BUN SERPL-MCNC: 20 MG/DL (ref 8–23)
CALCIUM SERPL-MCNC: 9.6 MG/DL (ref 8.7–10.5)
CHLORIDE SERPL-SCNC: 103 MMOL/L (ref 95–110)
CO2 SERPL-SCNC: 27 MMOL/L (ref 23–29)
CREAT SERPL-MCNC: 1 MG/DL (ref 0.5–1.4)
ERYTHROCYTE [DISTWIDTH] IN BLOOD BY AUTOMATED COUNT: 17.1 % (ref 11.5–14.5)
EST. GFR  (AFRICAN AMERICAN): >60 ML/MIN/1.73 M^2
EST. GFR  (NON AFRICAN AMERICAN): >60 ML/MIN/1.73 M^2
ESTIMATED AVG GLUCOSE: 114 MG/DL (ref 68–131)
FERRITIN SERPL-MCNC: 19 NG/ML (ref 20–300)
GLUCOSE SERPL-MCNC: 97 MG/DL (ref 70–110)
HAV IGM SERPL QL IA: NEGATIVE
HBA1C MFR BLD HPLC: 5.6 % (ref 4–5.6)
HBV CORE IGM SERPL QL IA: NEGATIVE
HBV SURFACE AG SERPL QL IA: NEGATIVE
HCT VFR BLD AUTO: 42.8 % (ref 40–54)
HCV AB SERPL QL IA: NEGATIVE
HGB BLD-MCNC: 14.2 G/DL (ref 14–18)
IRON SERPL-MCNC: 128 UG/DL (ref 45–160)
MCH RBC QN AUTO: 29.9 PG (ref 27–31)
MCHC RBC AUTO-ENTMCNC: 33.2 G/DL (ref 32–36)
MCV RBC AUTO: 90 FL (ref 82–98)
PLATELET # BLD AUTO: 221 K/UL (ref 150–350)
PMV BLD AUTO: 10.7 FL (ref 9.2–12.9)
POTASSIUM SERPL-SCNC: 4.3 MMOL/L (ref 3.5–5.1)
PROT SERPL-MCNC: 7.1 G/DL (ref 6–8.4)
RBC # BLD AUTO: 4.75 M/UL (ref 4.6–6.2)
SATURATED IRON: 27 % (ref 20–50)
SODIUM SERPL-SCNC: 139 MMOL/L (ref 136–145)
TESTOST SERPL-MCNC: 523 NG/DL (ref 304–1227)
TOTAL IRON BINDING CAPACITY: 480 UG/DL (ref 250–450)
TRANSFERRIN SERPL-MCNC: 324 MG/DL (ref 200–375)
WBC # BLD AUTO: 5.95 K/UL (ref 3.9–12.7)

## 2019-09-26 PROCEDURE — 85027 COMPLETE CBC AUTOMATED: CPT

## 2019-09-26 PROCEDURE — 82728 ASSAY OF FERRITIN: CPT

## 2019-09-26 PROCEDURE — 80074 ACUTE HEPATITIS PANEL: CPT

## 2019-09-26 PROCEDURE — 83540 ASSAY OF IRON: CPT

## 2019-09-26 PROCEDURE — 80053 COMPREHEN METABOLIC PANEL: CPT

## 2019-09-26 PROCEDURE — 36415 COLL VENOUS BLD VENIPUNCTURE: CPT

## 2019-09-26 PROCEDURE — 84403 ASSAY OF TOTAL TESTOSTERONE: CPT

## 2019-09-26 PROCEDURE — 83036 HEMOGLOBIN GLYCOSYLATED A1C: CPT

## 2019-10-02 ENCOUNTER — OFFICE VISIT (OUTPATIENT)
Dept: INTERNAL MEDICINE | Facility: CLINIC | Age: 84
End: 2019-10-02
Payer: MEDICARE

## 2019-10-02 VITALS
WEIGHT: 183 LBS | SYSTOLIC BLOOD PRESSURE: 112 MMHG | HEIGHT: 71 IN | HEART RATE: 91 BPM | DIASTOLIC BLOOD PRESSURE: 72 MMHG | BODY MASS INDEX: 25.62 KG/M2

## 2019-10-02 DIAGNOSIS — Z01.818 PRE-OP EVALUATION: ICD-10-CM

## 2019-10-02 DIAGNOSIS — R09.89 CHEST RALES: ICD-10-CM

## 2019-10-02 DIAGNOSIS — K92.2 LOWER GI BLEED: ICD-10-CM

## 2019-10-02 DIAGNOSIS — E78.5 DYSLIPIDEMIA: ICD-10-CM

## 2019-10-02 DIAGNOSIS — G62.9 NEUROPATHY: ICD-10-CM

## 2019-10-02 DIAGNOSIS — H40.9 GLAUCOMA OF RIGHT EYE, UNSPECIFIED GLAUCOMA TYPE: Primary | ICD-10-CM

## 2019-10-02 PROBLEM — R06.02 SHORTNESS OF BREATH: Status: RESOLVED | Noted: 2018-01-16 | Resolved: 2019-10-02

## 2019-10-02 PROCEDURE — 99999 PR PBB SHADOW E&M-EST. PATIENT-LVL III: CPT | Mod: PBBFAC,,, | Performed by: INTERNAL MEDICINE

## 2019-10-02 PROCEDURE — 99213 OFFICE O/P EST LOW 20 MIN: CPT | Mod: PBBFAC | Performed by: INTERNAL MEDICINE

## 2019-10-02 PROCEDURE — 99214 PR OFFICE/OUTPT VISIT, EST, LEVL IV, 30-39 MIN: ICD-10-PCS | Mod: S$PBB,,, | Performed by: INTERNAL MEDICINE

## 2019-10-02 PROCEDURE — 99999 PR PBB SHADOW E&M-EST. PATIENT-LVL III: ICD-10-PCS | Mod: PBBFAC,,, | Performed by: INTERNAL MEDICINE

## 2019-10-02 PROCEDURE — 99214 OFFICE O/P EST MOD 30 MIN: CPT | Mod: S$PBB,,, | Performed by: INTERNAL MEDICINE

## 2019-10-02 RX ORDER — FINASTERIDE 5 MG/1
TABLET, FILM COATED ORAL
Refills: 11 | COMMUNITY
Start: 2019-09-06 | End: 2022-01-31

## 2019-10-02 RX ORDER — TAMSULOSIN HYDROCHLORIDE 0.4 MG/1
CAPSULE ORAL
Refills: 3 | COMMUNITY
Start: 2019-08-13 | End: 2020-08-31 | Stop reason: SDUPTHER

## 2019-10-02 RX ORDER — GABAPENTIN 600 MG/1
600 TABLET ORAL DAILY
Qty: 90 TABLET | Refills: 3 | Status: SHIPPED | OUTPATIENT
Start: 2019-10-02 | End: 2020-12-31

## 2019-10-02 NOTE — PATIENT INSTRUCTIONS
Please hold aspirin 1 week prior to glaucoma surgery unless otherwise recommended by Ophthalmology. Please restart the aspirin the day after surgery.

## 2019-10-02 NOTE — PROGRESS NOTES
"Subjective:       Patient ID: Artemio Colon Sr. is a 89 y.o. male.    Chief Complaint: Follow-up    HPI    Patient is accompanied by his wife.    Last visit with me 07/2019. No visits with other providers since then. Pt reports doing well and in usual state of health. Has upcoming glaucoma surgery. Pt denies chest pain or shortness of breath.    Review of Systems   Constitutional: Negative for activity change.   Eyes: Negative for discharge.   Respiratory: Negative for wheezing.    Cardiovascular: Negative for chest pain and palpitations.   Gastrointestinal: Negative for constipation, diarrhea and vomiting.   Genitourinary: Negative for difficulty urinating and hematuria.   Neurological: Negative for headaches.   Psychiatric/Behavioral: Negative for dysphoric mood.   All other systems reviewed and are negative.      Objective:      Physical Exam   Constitutional: He is oriented to person, place, and time. No distress.   HENT:   Mouth/Throat: Oropharynx is clear and moist. No oropharyngeal exudate.   Hearing aids bilaterally    Neck: Normal range of motion. No thyromegaly present.   Cardiovascular: Normal rate, regular rhythm and normal heart sounds. Exam reveals no gallop and no friction rub.   No murmur heard.  Pulmonary/Chest: Effort normal. No stridor. He has no wheezes. He has rales (dry inspiratory) in the left lower field.   Lymphadenopathy:     He has no cervical adenopathy.   Neurological: He is alert and oriented to person, place, and time.   Skin: He is not diaphoretic.   Psychiatric: He has a normal mood and affect. His behavior is normal.   Nursing note and vitals reviewed.      Vitals:    10/02/19 0900   BP: 112/72   BP Location: Right arm   Patient Position: Sitting   BP Method: Large (Manual)   Pulse: 91   Weight: 83 kg (183 lb)   Height: 5' 10.5" (1.791 m)     Body mass index is 25.89 kg/m².    RESULTS: Reviewed labs from last 12 months    Assessment:       1. Glaucoma of right eye, " unspecified glaucoma type    2. Neuropathy    3. Lower GI bleed    4. Dyslipidemia    5. Chest rales    6. Pre-op evaluation        Plan:   Artemio was seen today for follow-up.    Diagnoses and all orders for this visit:    Glaucoma of right eye, unspecified glaucoma type:  Prior dx, sees outside Ophthalmology, has upcoming surgery, preop evaluation noted below.    Neuropathy:  Prior diagnosis, stable, well controlled on current management. No changes at this time, will continue to monitor.   -     gabapentin (NEURONTIN) 600 MG tablet; Take 1 tablet (600 mg total) by mouth once daily.    Lower GI bleed:  Prior dx that has resolved. Anemia has resolved, iron levels continue to increase. continue to monitor periodically.  -     Basic metabolic panel; Future  -     CBC Without Differential; Future  -     Ferritin; Future  -     Iron and TIBC; Future    Dyslipidemia:  Prior diagnosis, stable, well controlled on current management. No changes at this time, will continue to monitor.   -     Lipid panel; Future    Chest rales:  Prior diagnosis, stable, asymptomatic. Likely old scarring, no evidence of any active lung inflammation or scarring. No changes at this time, will continue to monitor.     Pre-op evaluation:  Exercise capacity >=4 METS. Pt has 1 risk factors on Revised Cardiac Risk Index for congestive heart failure, however this is well controlled. Patient is at low cardiovascular risk for planned operation, his cardiac function is optimized, recommend proceed with surgery as planned.     Follow up in about 6 months (around 4/2/2020) for follow up visit, fasting labs 1 week prior.  Osiel Stone MD  Internal Medicine    Portions of this note were completed using medical dictation software. Please excuse typographical or syntax errors that were missed on review.

## 2019-10-14 ENCOUNTER — TELEPHONE (OUTPATIENT)
Dept: ELECTROPHYSIOLOGY | Facility: CLINIC | Age: 84
End: 2019-10-14

## 2019-10-14 NOTE — TELEPHONE ENCOUNTER
I left V/Message .  Mr. Colon ,like we spoke , I have scheduled your appointments with Lisa VANG  - Partner.  Your appointment is scheduled on the same day  will be in clinic.    Lena land

## 2019-12-10 NOTE — PROGRESS NOTES
Mr. Colon is a patient of Dr. Adler and was last seen in clinic 5/7/2019.      Subjective:   Patient ID:  Artemio Colon Sr. is a 89 y.o. male who presents for follow-up of Follow-up  .     HPI:    Mr. Colon is a 89 y.o. male with pAF, (PVI 2011), PPM (2003, CRT upgrade 2/2018), CAD, CHB here for routine follow up.    Background:    Mr. 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 He has CAD (cath 2012 rx with medical therapy) and preserved EF (60-65% 1/2015). I do not know if he had a PVI or AVN ablation as he has complete AV block.  He feels well, exercises regularly.  He denies any chest pain, shortness of breath, palpitations, orthopnea or PND.  He is not on anticoagulation and when I brought it up as a discussion he stated he does not want to be on it and rathered not discuss it any further.     Mr. Colon presented 1/2018 noting shortness of breath and low energy. Recent remote interrogation noted <0.1% AMS burden with longest episode 3 min and 52 seconds. No AF noted. He wass 61% A and 100% V paced. His V-threshold was 1.75 @ 0.4 on first visit with me. Recent remote auto-capture threshold was 2.25 @ 0.4. It has slowly increased over the past year and a half with stable impedance. Checked in clinic with bipolar threshold of 1.5 @ 0.4 sitting up and unipolar threshold of 1.25@ 0.4 also sitting up. Lying down his capture threshold was 2.25 @ 0.4. ECHO noted LVEF of 40-45% and stress test was negative. We discussed upgrade to CRT-P which he underwent 2/2018.    Mr. Colon presents for post upgrade check 4/2018. At time of implant RV lead capture threshold was even higher so we elected to cap it and implant a new RV lead. Device interrogation noted no AF, sinus rhythm/kashif  with PACs and complete AV block, 6 sec of AMS x 2, stable lead parameters. Noted rare phrenic nerve stim that is treated with position movement. He notes that he had a hard time mentally coming out of anesthesia during his recent procedure.    At his 6 month post-CRT upgrade evaluation 11/2018, ECHO 8/2018 noted LVEF was 60-65%. He feels well. Device interrogation from 10/30/2018 notes no AF, 59% RA and 100% BiV pacing. QRS duration is significantly prolonged compared to prior.  In clinic device interrogation notes slightly increased (2 @ 0.6ms) with small threshold-setting window. Changed settings to simultaneous RV-LV activation. Output increased to 3.5 @ 0.6ms. Repeat ECG notes 100% BiV pacing with QRS duration of 156ms.    Update (12/12/2019):    Today he says he has been feeling well. Only complaints are MSK pain for which he is treated with meds, which then make him a bit sleepy. No cardiac complaints Mr. Colon denies chest pain with exertion or at rest, palpitations, SOB, BOYLE, dizziness, or syncope.    He is not on anticoagulation by choice. ASA only. Kidney function is stable, with a creatinine of 1.1 on 3/8/2019.    Device Interrogation (12/12/2019) reveals an stable lead and device function. Review of report shows there was one AMS episode ~2 hours - no egram available. He paces 63% in the RA and >99% in the RV. Estimated battery longevity 7.4-8 years.     I have personally reviewed the patient's EKG today, which shows APVP at 66bpm with PVCs at 66bpm. QRS is 152. QTc is 457.    Recent Cardiac Tests:    2D Echo (8/2018):  CONCLUSIONS     1 - Upper limit of normal left ventricular enlargement.     2 - Normal left ventricular systolic function (EF 60-65%).     3 - No wall motion abnormalities.     4 - Indeterminate LV diastolic function.     5 - Right ventricle is upper limit of normal in size with normal systolic function.     6 - Biatrial enlargement.     7 - Trivial aortic regurgitation.     8 -  Trivial to mild tricuspid regurgitation.     9 - Trivial pulmonic regurgitation.     10 - The estimated PA systolic pressure is 30 mmHg.     Current Outpatient Medications   Medication Sig    aspirin 81 MG Chew Take 81 mg by mouth once daily.    brimonidine 0.2% (ALPHAGAN) 0.2 % Drop Place 1 drop into both eyes 2 (two) times daily.    diclofenac sodium (VOLTAREN) 1 % Gel Use to affected joints up to 4 times a day    fentaNYL (DURAGESIC) 25 mcg/hr Place 1 patch onto the skin every 72 hours.    finasteride (PROSCAR) 5 mg tablet     fluticasone (FLONASE) 50 mcg/actuation nasal spray 2 sprays by Each Nare route once daily.    gabapentin (NEURONTIN) 600 MG tablet Take 1 tablet (600 mg total) by mouth once daily.    hydrocodone-acetaminophen 10-325mg (NORCO)  mg Tab Take by mouth every 4 (four) hours as needed for Pain.    tamsulosin (FLOMAX) 0.4 mg Cap     timolol maleate 0.5% (TIMOPTIC) 0.5 % Drop Place 1 drop into both eyes 2 (two) times daily. (Patient taking differently: Place 1 drop into both eyes once daily. )    travoprost (TRAVATAN Z) 0.004 % Drop Place 1 drop into both eyes every evening. (Patient taking differently: Place 1 drop into both eyes 2 (two) times daily. )     No current facility-administered medications for this visit.        Review of Systems   Constitution: Negative for malaise/fatigue.   Cardiovascular: Negative for chest pain, dyspnea on exertion, irregular heartbeat, leg swelling and palpitations.   Respiratory: Negative for shortness of breath.    Hematologic/Lymphatic: Negative for bleeding problem.   Skin: Negative for rash.   Musculoskeletal: Negative for myalgias.   Gastrointestinal: Negative for hematemesis, hematochezia and nausea.   Genitourinary: Negative for hematuria.   Neurological: Negative for light-headedness.   Psychiatric/Behavioral: Negative for altered mental status.   Allergic/Immunologic: Negative for persistent infections.     Objective:        BP (!)  "116/57   Pulse 62   Ht 5' 10" (1.778 m)   Wt 86.6 kg (190 lb 14.7 oz)   BMI 27.39 kg/m²     Physical Exam   Constitutional: He is oriented to person, place, and time. He appears well-developed and well-nourished.   HENT:   Head: Normocephalic.   Nose: Nose normal.   Eyes: Pupils are equal, round, and reactive to light.   Cardiovascular: Normal rate, regular rhythm, S1 normal and S2 normal.   No murmur heard.  Pulses:       Radial pulses are 2+ on the right side, and 2+ on the left side.   Pulmonary/Chest: Breath sounds normal. No respiratory distress.   Device to LUCW.    Abdominal: Normal appearance.   Musculoskeletal: Normal range of motion. He exhibits no edema.   Neurological: He is alert and oriented to person, place, and time.   Skin: Skin is warm and dry. No erythema.   Psychiatric: He has a normal mood and affect. His speech is normal and behavior is normal.   Nursing note and vitals reviewed.    Lab Results   Component Value Date     09/26/2019    K 4.3 09/26/2019    MG 1.7 02/16/2019    BUN 20 09/26/2019    CREATININE 1.0 09/26/2019    ALT 33 09/26/2019    AST 22 09/26/2019    HGB 14.2 09/26/2019    HCT 42.8 09/26/2019    HCT 63 (H) 02/14/2019    TSH 1.999 07/02/2019    LDLCALC 86.0 06/28/2018       Recent Labs   Lab 02/14/18  0828 02/27/19  1153   INR 1.0 1.1       Assessment:     1. Cardiomyopathy, unspecified type    2. Chronic systolic congestive heart failure, pacing induced now s/p upgrade to CRT-P 2/2018    3. Complete AV block    4. PAF (paroxysmal atrial fibrillation)    5. Obstructive sleep apnea on CPAP      Plan:     In summary, Mr. Colon is a 89 y.o. male with pAF, (PVI 2011), PPM (2003, CRT upgrade 2/2018), CAD, CHB here for routine follow up.  Mr. oClon is doing well from a device perspective with stable lead and device function. One AMS episode 11/15/2019 ~2 hours, asymptomatic, patient electively on ASA only. AMS burden 0%. 100% biventricular pacing. No CHF symptoms. " Patient feels well.    Continue current medication regimen and device settings.   Follow up in device clinic as scheduled.   Follow up in EP clinic in 6 months for device and clinic visit with Dr. Adler, sooner as needed.     *A copy of this note has been sent to Dr. Adler*    Follow up in about 6 months (around 6/12/2020).    ------------------------------------------------------------------    ÁNGEL Landin, NP-C  Cardiac Electrophysiology

## 2019-12-12 ENCOUNTER — HOSPITAL ENCOUNTER (OUTPATIENT)
Dept: CARDIOLOGY | Facility: CLINIC | Age: 84
Discharge: HOME | End: 2019-12-12
Payer: MEDICARE

## 2019-12-12 ENCOUNTER — OFFICE VISIT (OUTPATIENT)
Dept: ELECTROPHYSIOLOGY | Facility: CLINIC | Age: 84
End: 2019-12-12
Payer: MEDICARE

## 2019-12-12 VITALS
HEART RATE: 62 BPM | DIASTOLIC BLOOD PRESSURE: 57 MMHG | WEIGHT: 190.94 LBS | BODY MASS INDEX: 27.34 KG/M2 | HEIGHT: 70 IN | SYSTOLIC BLOOD PRESSURE: 116 MMHG

## 2019-12-12 DIAGNOSIS — I50.22 CHRONIC SYSTOLIC CONGESTIVE HEART FAILURE: ICD-10-CM

## 2019-12-12 DIAGNOSIS — I44.2 COMPLETE AV BLOCK: ICD-10-CM

## 2019-12-12 DIAGNOSIS — I48.0 PAF (PAROXYSMAL ATRIAL FIBRILLATION): ICD-10-CM

## 2019-12-12 DIAGNOSIS — G47.33 OBSTRUCTIVE SLEEP APNEA ON CPAP: ICD-10-CM

## 2019-12-12 DIAGNOSIS — I42.9 CARDIOMYOPATHY, UNSPECIFIED TYPE: Primary | ICD-10-CM

## 2019-12-12 PROCEDURE — 1159F MED LIST DOCD IN RCRD: CPT | Mod: ,,, | Performed by: NURSE PRACTITIONER

## 2019-12-12 PROCEDURE — 99214 PR OFFICE/OUTPT VISIT, EST, LEVL IV, 30-39 MIN: ICD-10-PCS | Mod: S$PBB,,, | Performed by: NURSE PRACTITIONER

## 2019-12-12 PROCEDURE — 99999 PR PBB SHADOW E&M-EST. PATIENT-LVL III: ICD-10-PCS | Mod: PBBFAC,,, | Performed by: NURSE PRACTITIONER

## 2019-12-12 PROCEDURE — 1126F PR PAIN SEVERITY QUANTIFIED, NO PAIN PRESENT: ICD-10-PCS | Mod: ,,, | Performed by: NURSE PRACTITIONER

## 2019-12-12 PROCEDURE — 93010 ELECTROCARDIOGRAM REPORT: CPT | Mod: S$PBB,,, | Performed by: INTERNAL MEDICINE

## 2019-12-12 PROCEDURE — 99213 OFFICE O/P EST LOW 20 MIN: CPT | Mod: PBBFAC,25 | Performed by: NURSE PRACTITIONER

## 2019-12-12 PROCEDURE — 93005 ELECTROCARDIOGRAM TRACING: CPT | Mod: PBBFAC | Performed by: INTERNAL MEDICINE

## 2019-12-12 PROCEDURE — 1126F AMNT PAIN NOTED NONE PRSNT: CPT | Mod: ,,, | Performed by: NURSE PRACTITIONER

## 2019-12-12 PROCEDURE — 99214 OFFICE O/P EST MOD 30 MIN: CPT | Mod: S$PBB,,, | Performed by: NURSE PRACTITIONER

## 2019-12-12 PROCEDURE — 1159F PR MEDICATION LIST DOCUMENTED IN MEDICAL RECORD: ICD-10-PCS | Mod: ,,, | Performed by: NURSE PRACTITIONER

## 2019-12-12 PROCEDURE — 93010 RHYTHM STRIP: ICD-10-PCS | Mod: S$PBB,,, | Performed by: INTERNAL MEDICINE

## 2019-12-12 PROCEDURE — 99999 PR PBB SHADOW E&M-EST. PATIENT-LVL III: CPT | Mod: PBBFAC,,, | Performed by: NURSE PRACTITIONER

## 2019-12-23 ENCOUNTER — CLINICAL SUPPORT (OUTPATIENT)
Dept: CARDIOLOGY | Facility: HOSPITAL | Age: 84
End: 2019-12-23
Attending: INTERNAL MEDICINE
Payer: MEDICARE

## 2019-12-23 DIAGNOSIS — Z95.0 CARDIAC PACEMAKER IN SITU: ICD-10-CM

## 2019-12-23 DIAGNOSIS — I48.0 PAROXYSMAL ATRIAL FIBRILLATION: ICD-10-CM

## 2019-12-23 DIAGNOSIS — I44.2 CHB (COMPLETE HEART BLOCK): ICD-10-CM

## 2019-12-23 PROCEDURE — 93296 REM INTERROG EVL PM/IDS: CPT | Performed by: INTERNAL MEDICINE

## 2019-12-23 PROCEDURE — 93294 REM INTERROG EVL PM/LDLS PM: CPT | Mod: ,,, | Performed by: INTERNAL MEDICINE

## 2019-12-23 PROCEDURE — 93294 CARDIAC DEVICE CHECK - REMOTE: ICD-10-PCS | Mod: ,,, | Performed by: INTERNAL MEDICINE

## 2020-03-04 DIAGNOSIS — M47.26 OTHER SPONDYLOSIS WITH RADICULOPATHY, LUMBAR REGION: Primary | ICD-10-CM

## 2020-03-04 DIAGNOSIS — M50.10 CERVICAL DISC SYNDROME: ICD-10-CM

## 2020-03-04 DIAGNOSIS — M47.22 CERVICAL SPONDYLOSIS WITH RADICULOPATHY: Primary | ICD-10-CM

## 2020-03-05 ENCOUNTER — HOSPITAL ENCOUNTER (OUTPATIENT)
Dept: RADIOLOGY | Facility: HOSPITAL | Age: 85
Discharge: HOME | End: 2020-03-05
Payer: MEDICARE

## 2020-03-05 ENCOUNTER — HOSPITAL ENCOUNTER (OUTPATIENT)
Dept: RADIOLOGY | Facility: HOSPITAL | Age: 85
Discharge: HOME | End: 2020-03-05
Attending: PAIN MEDICINE
Payer: MEDICARE

## 2020-03-05 DIAGNOSIS — M47.26 OTHER SPONDYLOSIS WITH RADICULOPATHY, LUMBAR REGION: ICD-10-CM

## 2020-03-05 DIAGNOSIS — M47.22 CERVICAL SPONDYLOSIS WITH RADICULOPATHY: ICD-10-CM

## 2020-03-05 DIAGNOSIS — M50.10 CERVICAL DISC SYNDROME: ICD-10-CM

## 2020-03-05 PROCEDURE — 72125 CT NECK SPINE W/O DYE: CPT | Mod: 26,,, | Performed by: RADIOLOGY

## 2020-03-05 PROCEDURE — 72131 CT LUMBAR SPINE W/O DYE: CPT | Mod: TC

## 2020-03-05 PROCEDURE — 72125 CT CERVICAL SPINE WITHOUT CONTRAST: ICD-10-PCS | Mod: 26,,, | Performed by: RADIOLOGY

## 2020-03-05 PROCEDURE — 72131 CT LUMBAR SPINE W/O DYE: CPT | Mod: 26,,, | Performed by: RADIOLOGY

## 2020-03-05 PROCEDURE — 72125 CT NECK SPINE W/O DYE: CPT | Mod: TC

## 2020-03-05 PROCEDURE — 72131 CT LUMBAR SPINE WITHOUT CONTRAST: ICD-10-PCS | Mod: 26,,, | Performed by: RADIOLOGY

## 2020-03-17 ENCOUNTER — PATIENT MESSAGE (OUTPATIENT)
Dept: INTERNAL MEDICINE | Facility: CLINIC | Age: 85
End: 2020-03-17

## 2020-03-22 ENCOUNTER — CLINICAL SUPPORT (OUTPATIENT)
Dept: CARDIOLOGY | Facility: HOSPITAL | Age: 85
End: 2020-03-22
Attending: INTERNAL MEDICINE
Payer: MEDICARE

## 2020-03-22 DIAGNOSIS — I48.0 PAROXYSMAL ATRIAL FIBRILLATION: ICD-10-CM

## 2020-03-22 DIAGNOSIS — Z95.0 CARDIAC PACEMAKER IN SITU: ICD-10-CM

## 2020-03-22 DIAGNOSIS — I44.2 CHB (COMPLETE HEART BLOCK): ICD-10-CM

## 2020-03-22 PROCEDURE — 93294 CARDIAC DEVICE CHECK - REMOTE: ICD-10-PCS | Mod: ,,, | Performed by: INTERNAL MEDICINE

## 2020-03-22 PROCEDURE — 93296 REM INTERROG EVL PM/IDS: CPT | Performed by: INTERNAL MEDICINE

## 2020-03-22 PROCEDURE — 93294 REM INTERROG EVL PM/LDLS PM: CPT | Mod: ,,, | Performed by: INTERNAL MEDICINE

## 2020-04-22 ENCOUNTER — PATIENT MESSAGE (OUTPATIENT)
Dept: CARDIOLOGY | Facility: CLINIC | Age: 85
End: 2020-04-22

## 2020-05-08 ENCOUNTER — OFFICE VISIT (OUTPATIENT)
Dept: CARDIOLOGY | Facility: CLINIC | Age: 85
End: 2020-05-08
Payer: MEDICARE

## 2020-05-08 DIAGNOSIS — Z95.0 CARDIAC PACEMAKER IN SITU: ICD-10-CM

## 2020-05-08 DIAGNOSIS — I48.0 PAF (PAROXYSMAL ATRIAL FIBRILLATION): ICD-10-CM

## 2020-05-08 DIAGNOSIS — E78.5 DYSLIPIDEMIA: ICD-10-CM

## 2020-05-08 DIAGNOSIS — I25.10 CORONARY ARTERY DISEASE INVOLVING NATIVE CORONARY ARTERY OF NATIVE HEART WITHOUT ANGINA PECTORIS: Primary | Chronic | ICD-10-CM

## 2020-05-08 DIAGNOSIS — I42.9 CARDIOMYOPATHY, UNSPECIFIED TYPE: ICD-10-CM

## 2020-05-08 PROCEDURE — G0463 HOSPITAL OUTPT CLINIC VISIT: HCPCS

## 2020-05-08 PROCEDURE — 99214 PR OFFICE/OUTPT VISIT, EST, LEVL IV, 30-39 MIN: ICD-10-PCS | Mod: 95,,, | Performed by: INTERNAL MEDICINE

## 2020-05-08 PROCEDURE — 99211 OFF/OP EST MAY X REQ PHY/QHP: CPT

## 2020-05-08 PROCEDURE — 99214 OFFICE O/P EST MOD 30 MIN: CPT | Mod: 95,,, | Performed by: INTERNAL MEDICINE

## 2020-05-08 RX ORDER — PRAVASTATIN SODIUM 40 MG/1
40 TABLET ORAL DAILY
Qty: 90 TABLET | Refills: 3 | Status: SHIPPED | OUTPATIENT
Start: 2020-05-08 | End: 2022-01-31

## 2020-05-08 NOTE — PROGRESS NOTES
Subjective:   Patient ID:  Artemio Colon Sr. is a 89 y.o. male who presents for follow-up of Coronary Artery Disease      HPI:   Artemio Colon Sr. presents for Telemedicine follow up of coronary artery disease having medically managed disease in a diagonal branch with moderate disease in his mid LAD. . He had a cardiomyopathy but with upgrade of his PPM to CRT his EF has normalized. Mor recently he feels he periodically is pacing his diaphragm that is improved by change in position.  He had an ablation for PAFib at [place] with no sx episodes. At limited activity during the Pandemic but Artemio Colon Sr. denies chest pain, shortness of breath, palpitations, presyncope , or syncope. Artemio Colon Sr. has dyslipidemia but stopped his Stain because he felt his cholesterol was OK. Had a bump in hid liver enzyme but at a time that he was not on the statin. Normalized..  Review of Systems   Constitution: Negative for malaise/fatigue, weight gain and weight loss.   Eyes: Negative for blurred vision.   Cardiovascular: Negative for chest pain, claudication, cyanosis, dyspnea on exertion, irregular heartbeat, leg swelling, near-syncope, orthopnea, palpitations, paroxysmal nocturnal dyspnea and syncope.   Respiratory: Negative for cough, shortness of breath and wheezing.    Musculoskeletal: Positive for back pain, joint pain and neck pain. Negative for falls and myalgias.   Gastrointestinal: Negative for abdominal pain, heartburn, nausea and vomiting.   Genitourinary: Positive for nocturia.   Neurological: Negative for brief paralysis, dizziness, focal weakness, headaches, numbness, paresthesias and weakness.   Psychiatric/Behavioral: Negative for altered mental status.       Current Outpatient Medications   Medication Sig    aspirin 81 MG Chew Take 81 mg by mouth once daily.    brimonidine 0.2% (ALPHAGAN) 0.2 % Drop Place 1 drop into both eyes 2 (two) times daily.    diclofenac sodium  (VOLTAREN) 1 % Gel Use to affected joints up to 4 times a day    fentaNYL (DURAGESIC) 25 mcg/hr Place 1 patch onto the skin every 72 hours.    finasteride (PROSCAR) 5 mg tablet     fluticasone (FLONASE) 50 mcg/actuation nasal spray 2 sprays by Each Nare route once daily.    gabapentin (NEURONTIN) 600 MG tablet Take 1 tablet (600 mg total) by mouth once daily.    hydrocodone-acetaminophen 10-325mg (NORCO)  mg Tab Take by mouth every 4 (four) hours as needed for Pain.    pravastatin (PRAVACHOL) 40 MG tablet Take 1 tablet (40 mg total) by mouth once daily.    tamsulosin (FLOMAX) 0.4 mg Cap     timolol maleate 0.5% (TIMOPTIC) 0.5 % Drop Place 1 drop into both eyes 2 (two) times daily. (Patient taking differently: Place 1 drop into both eyes once daily. )    travoprost (TRAVATAN Z) 0.004 % Drop Place 1 drop into both eyes every evening. (Patient taking differently: Place 1 drop into both eyes 2 (two) times daily. )     No current facility-administered medications for this visit.      Objective:   Physical Exam    Lab Results   Component Value Date     09/26/2019    K 4.3 09/26/2019     09/26/2019    CO2 27 09/26/2019    BUN 20 09/26/2019    CREATININE 1.0 09/26/2019    GLU 97 09/26/2019    HGBA1C 5.6 09/26/2019    MG 1.7 02/16/2019    AST 22 09/26/2019    ALT 33 09/26/2019    ALBUMIN 4.0 09/26/2019    PROT 7.1 09/26/2019    BILITOT 0.7 09/26/2019    WBC 5.95 09/26/2019    HGB 14.2 09/26/2019    HCT 42.8 09/26/2019    HCT 63 (H) 02/14/2019    MCV 90 09/26/2019     09/26/2019    TSH 1.999 07/02/2019    CHOL 151 06/28/2018    HDL 50 06/28/2018    LDLCALC 86.0 06/28/2018    TRIG 75 06/28/2018       Assessment:     1. Coronary artery disease involving native coronary artery of native heart without angina pectoris: stable   2. Cardiomyopathy, unspecified type: EF normalized s/p CRT    3. Cardiac pacemaker in situ : May be pacing his diaphragm   4. PAF (paroxysmal atrial fibrillation) : No sx  episodes   5. Dyslipidemia : Not on his statin       Plan:     Artemio was seen today for coronary artery disease.    Diagnoses and all orders for this visit:    Coronary artery disease involving native coronary artery of native heart without angina pectoris    Cardiomyopathy, unspecified type  Continue current regimen    Cardiac pacemaker in situ  Told to contact Dr Adler ( EP)  PAF (paroxysmal atrial fibrillation)    Dyslipidemia  -     pravastatin (PRAVACHOL) 40 MG tablet; Take 1 tablet (40 mg total) by mouth once daily resumed.  -     Lipid Panel; Future; Expected date: 06/23/2020  -     Comprehensive metabolic panel; Future; Expected date: 06/23/2020      The patient location is: home  The chief complaint leading to consultation is: CAD follow up  Visit type: TELE AUDIOVISUAL:45065  Total time spent with patient:30 minutes  Including chart review, conversation, and note  Each patient to whom he or she provides medical services by telemedicine is:  (1) informed of the relationship between the physician and patient and the respective role of any other health care provider with respect to management of the patient; and (2) notified that he or she may decline to receive medical services by telemedicine and may withdraw from such care at any time.

## 2020-05-13 ENCOUNTER — TELEPHONE (OUTPATIENT)
Dept: CARDIOLOGY | Facility: HOSPITAL | Age: 85
End: 2020-05-13

## 2020-05-13 ENCOUNTER — PATIENT MESSAGE (OUTPATIENT)
Dept: ELECTROPHYSIOLOGY | Facility: CLINIC | Age: 85
End: 2020-05-13

## 2020-05-13 DIAGNOSIS — Z95.0 CARDIAC PACEMAKER IN SITU: Primary | ICD-10-CM

## 2020-05-13 DIAGNOSIS — I44.2 CHB (COMPLETE HEART BLOCK): ICD-10-CM

## 2020-05-14 ENCOUNTER — CLINICAL SUPPORT (OUTPATIENT)
Dept: CARDIOLOGY | Facility: HOSPITAL | Age: 85
End: 2020-05-14
Attending: INTERNAL MEDICINE
Payer: MEDICARE

## 2020-05-14 DIAGNOSIS — I44.2 CHB (COMPLETE HEART BLOCK): ICD-10-CM

## 2020-05-14 DIAGNOSIS — Z95.0 CARDIAC PACEMAKER IN SITU: ICD-10-CM

## 2020-05-14 PROCEDURE — 93281 PM DEVICE PROGR EVAL MULTI: CPT

## 2020-05-14 PROCEDURE — 93281 CARDIAC DEVICE CHECK - IN CLINIC & HOSPITAL: ICD-10-PCS | Mod: 26,,, | Performed by: INTERNAL MEDICINE

## 2020-05-14 PROCEDURE — 93281 PM DEVICE PROGR EVAL MULTI: CPT | Mod: 26,,, | Performed by: INTERNAL MEDICINE

## 2020-06-02 ENCOUNTER — LAB VISIT (OUTPATIENT)
Dept: LAB | Facility: HOSPITAL | Age: 85
End: 2020-06-02
Attending: INTERNAL MEDICINE
Payer: MEDICARE

## 2020-06-02 ENCOUNTER — OFFICE VISIT (OUTPATIENT)
Dept: ELECTROPHYSIOLOGY | Facility: CLINIC | Age: 85
End: 2020-06-02
Payer: MEDICARE

## 2020-06-02 ENCOUNTER — TELEPHONE (OUTPATIENT)
Dept: INTERNAL MEDICINE | Facility: CLINIC | Age: 85
End: 2020-06-02

## 2020-06-02 DIAGNOSIS — K92.2 LOWER GI BLEED: ICD-10-CM

## 2020-06-02 DIAGNOSIS — I48.0 PAF (PAROXYSMAL ATRIAL FIBRILLATION): ICD-10-CM

## 2020-06-02 DIAGNOSIS — I44.2 COMPLETE AV BLOCK: ICD-10-CM

## 2020-06-02 DIAGNOSIS — G47.33 OBSTRUCTIVE SLEEP APNEA ON CPAP: ICD-10-CM

## 2020-06-02 DIAGNOSIS — I50.22 CHRONIC SYSTOLIC CONGESTIVE HEART FAILURE: Primary | ICD-10-CM

## 2020-06-02 LAB
ANION GAP SERPL CALC-SCNC: 6 MMOL/L (ref 8–16)
BUN SERPL-MCNC: 18 MG/DL (ref 10–30)
CALCIUM SERPL-MCNC: 9.1 MG/DL (ref 8.7–10.5)
CHLORIDE SERPL-SCNC: 109 MMOL/L (ref 95–110)
CO2 SERPL-SCNC: 25 MMOL/L (ref 23–29)
CREAT SERPL-MCNC: 0.8 MG/DL (ref 0.5–1.4)
ERYTHROCYTE [DISTWIDTH] IN BLOOD BY AUTOMATED COUNT: 13.8 % (ref 11.5–14.5)
EST. GFR  (AFRICAN AMERICAN): >60 ML/MIN/1.73 M^2
EST. GFR  (NON AFRICAN AMERICAN): >60 ML/MIN/1.73 M^2
FERRITIN SERPL-MCNC: 57 NG/ML (ref 20–300)
GLUCOSE SERPL-MCNC: 97 MG/DL (ref 70–110)
HCT VFR BLD AUTO: 41.7 % (ref 40–54)
HGB BLD-MCNC: 13 G/DL (ref 14–18)
IRON SERPL-MCNC: 80 UG/DL (ref 45–160)
MCH RBC QN AUTO: 31.3 PG (ref 27–31)
MCHC RBC AUTO-ENTMCNC: 31.2 G/DL (ref 32–36)
MCV RBC AUTO: 101 FL (ref 82–98)
PLATELET # BLD AUTO: 176 K/UL (ref 150–350)
PMV BLD AUTO: 10.8 FL (ref 9.2–12.9)
POTASSIUM SERPL-SCNC: 4 MMOL/L (ref 3.5–5.1)
RBC # BLD AUTO: 4.15 M/UL (ref 4.6–6.2)
SATURATED IRON: 22 % (ref 20–50)
SODIUM SERPL-SCNC: 140 MMOL/L (ref 136–145)
TOTAL IRON BINDING CAPACITY: 369 UG/DL (ref 250–450)
TRANSFERRIN SERPL-MCNC: 249 MG/DL (ref 200–375)
WBC # BLD AUTO: 7.31 K/UL (ref 3.9–12.7)

## 2020-06-02 PROCEDURE — 85027 COMPLETE CBC AUTOMATED: CPT

## 2020-06-02 PROCEDURE — 80048 BASIC METABOLIC PNL TOTAL CA: CPT

## 2020-06-02 PROCEDURE — 82728 ASSAY OF FERRITIN: CPT

## 2020-06-02 PROCEDURE — 99213 OFFICE O/P EST LOW 20 MIN: CPT | Mod: 95,,, | Performed by: INTERNAL MEDICINE

## 2020-06-02 PROCEDURE — 83540 ASSAY OF IRON: CPT

## 2020-06-02 PROCEDURE — 99213 PR OFFICE/OUTPT VISIT, EST, LEVL III, 20-29 MIN: ICD-10-PCS | Mod: 95,,, | Performed by: INTERNAL MEDICINE

## 2020-06-02 PROCEDURE — 36415 COLL VENOUS BLD VENIPUNCTURE: CPT

## 2020-06-02 NOTE — TELEPHONE ENCOUNTER
Dr Stone , unable to add lab pt does not have a red or gold top tube. Lab will need to be ordered.

## 2020-06-02 NOTE — PROGRESS NOTES
Subjective:    Patient ID:  Artemio Colon Sr. is a 89 y.o. male who presents for follow-up of No chief complaint on file.    Referring Cardiologist: Nicholas Tee MD    The patient location is: home  The chief complaint leading to consultation is: pacemaker check-up    Visit type: audiovisual    Face to Face time with patient: 10 minutes  20 minutes of total time spent on the encounter, which includes face to face time and non-face to face time preparing to see the patient (eg, review of tests), Obtaining and/or reviewing separately obtained history, Documenting clinical information in the electronic or other health record, Independently interpreting results (not separately reported) and communicating results to the patient/family/caregiver, or Care coordination (not separately reported).         Each patient to whom he or she provides medical services by telemedicine is:  (1) informed of the relationship between the physician and patient and the respective role of any other health care provider with respect to management of the patient; and (2) notified that he or she may decline to receive medical services by telemedicine and may withdraw from such care at any time.        HPI    Prior Hx:  I had the pleasure of seeing Mr. Colon today in our electrophysiology clinic for follow-up for his pacemaker and for his atrial fibrillation.  He recently moved here from [place] and established care in the cardiology clinic.  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 He has CAD (cath 2012 rx with medical therapy) and preserved EF (60-65% 1/2015). I do not know if he had a PVI or AVN ablation as he has complete AV block.  He feels well, exercises regularly.  He denies any  chest pain, shortness of breath, palpitations, orthopnea or PND.  He is not on anticoagulation and when I brought it up as a discussion he stated he does not want to be on it and rathered not discuss it any further.     Mr. Colon presented 1/2018 noting shortness of breath and low energy. Recent remote interrogation noted <0.1% AMS burden with longest episode 3 min and 52 seconds. No AF noted. He wass 61% A and 100% V paced. His V-threshold was 1.75 @ 0.4 on first visit with me. Recent remote auto-capture threshold was 2.25 @ 0.4. It has slowly increased over the past year and a half with stable impedance. Checked in clinic with bipolar threshold of 1.5 @ 0.4 sitting up and unipolar threshold of 1.25@ 0.4 also sitting up. Lying down his capture threshold was 2.25 @ 0.4. ECHO noted LVEF of 40-45% and stress test was negative. We discussed upgrade to CRT-P which he underwent 2/2018.    Mr. Colon presents for post upgrade check 4/2018. At time of implant RV lead capture threshold was even higher so we elected to cap it and implant a new RV lead. Device interrogation noted no AF, sinus rhythm/kashif with PACs and complete AV block, 6 sec of AMS x 2, stable lead parameters. Noted rare phrenic nerve stim that is treated with position movement. He notes that he had a hard time mentally coming out of anesthesia during his recent procedure.    ECHO 8/2018 noted LVEF was 60-65%.     Interim Hx:  Mr. Colon presents for yearly follow-up. He feels well without complaints. He had a recent in-clinic check with stable lead parameters. He reported intermittent phrenic stim. Reprogramming didn't help. He reports it is tolerable. He otherwise notes chronic fatigue but feels well.       Review of Systems   Constitution: Negative for fever and malaise/fatigue.   HENT: Negative for congestion and sore throat.    Eyes: Negative for blurred vision and visual disturbance.   Cardiovascular: Negative for chest pain, dyspnea on  exertion, irregular heartbeat, leg swelling, orthopnea, palpitations and paroxysmal nocturnal dyspnea.   Respiratory: Negative for cough and shortness of breath.    Hematologic/Lymphatic: Negative for bleeding problem. Does not bruise/bleed easily.   Skin: Negative.    Musculoskeletal: Negative.    Gastrointestinal: Negative for abdominal pain, hematemesis and hematochezia.   Neurological: Negative for dizziness, focal weakness and weakness.        Objective:    Physical Exam   Constitutional: He is oriented to person, place, and time. He appears well-developed and well-nourished. No distress.   HENT:   Head: Normocephalic and atraumatic.   Eyes: Conjunctivae are normal. Right eye exhibits no discharge. Left eye exhibits no discharge.   Neurological: He is alert and oriented to person, place, and time.   Skin: He is not diaphoretic.   Psychiatric: He has a normal mood and affect. His behavior is normal. Judgment and thought content normal.   Limited exam, telemedicine visit      Assessment:       1. Chronic systolic congestive heart failure, pacing induced now s/p upgrade to CRT-P 2/2018    2. Complete AV block    3. PAF (paroxysmal atrial fibrillation)    4. Obstructive sleep apnea on CPAP         Plan:       In summary, Mr. 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 He is in complete AV block.  He also has JAEL, low testosterone, and CAD with preserved LVEF by ECHO in 2015. He is not on anticoagulation by choice and would not discuss it any further. 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 He is now s/p upgrade to CRT-P and RV lead revision for increasing RV lead threshold. He is doing  well with symptomatic improvement. His EF has normalized with biventricular pacing. Continue remote checks every 3 months. RTC in 1 year, sooner if needed.    Thank you for allowing me to participate in the care of this patient. Please do not hesitate to call me with any questions or concerns.    Tawanda Adler MD, PhD  Cardiac Electrophysiology

## 2020-06-04 ENCOUNTER — CLINICAL SUPPORT (OUTPATIENT)
Dept: CARDIOLOGY | Facility: HOSPITAL | Age: 85
End: 2020-06-04
Payer: MEDICARE

## 2020-06-04 DIAGNOSIS — Z95.0 PRESENCE OF CARDIAC PACEMAKER: ICD-10-CM

## 2020-06-04 PROCEDURE — 93294 REM INTERROG EVL PM/LDLS PM: CPT | Mod: ,,, | Performed by: INTERNAL MEDICINE

## 2020-06-04 PROCEDURE — 93294 CARDIAC DEVICE CHECK - REMOTE: ICD-10-PCS | Mod: ,,, | Performed by: INTERNAL MEDICINE

## 2020-06-16 ENCOUNTER — OFFICE VISIT (OUTPATIENT)
Dept: INTERNAL MEDICINE | Facility: CLINIC | Age: 85
End: 2020-06-16
Payer: MEDICARE

## 2020-06-16 VITALS
WEIGHT: 191.38 LBS | SYSTOLIC BLOOD PRESSURE: 110 MMHG | BODY MASS INDEX: 27.4 KG/M2 | OXYGEN SATURATION: 94 % | HEART RATE: 59 BPM | HEIGHT: 70 IN | DIASTOLIC BLOOD PRESSURE: 80 MMHG

## 2020-06-16 DIAGNOSIS — D53.9 MACROCYTIC ANEMIA: ICD-10-CM

## 2020-06-16 DIAGNOSIS — I70.0 AORTIC ATHEROSCLEROSIS: ICD-10-CM

## 2020-06-16 DIAGNOSIS — M19.042 PRIMARY OSTEOARTHRITIS OF BOTH HANDS: ICD-10-CM

## 2020-06-16 DIAGNOSIS — I50.22 CHRONIC SYSTOLIC CONGESTIVE HEART FAILURE: ICD-10-CM

## 2020-06-16 DIAGNOSIS — I44.2 COMPLETE AV BLOCK: ICD-10-CM

## 2020-06-16 DIAGNOSIS — M19.041 PRIMARY OSTEOARTHRITIS OF BOTH HANDS: ICD-10-CM

## 2020-06-16 DIAGNOSIS — I42.9 CARDIOMYOPATHY, UNSPECIFIED TYPE: ICD-10-CM

## 2020-06-16 DIAGNOSIS — G62.9 NEUROPATHY: Primary | ICD-10-CM

## 2020-06-16 DIAGNOSIS — R74.8 ELEVATED ALKALINE PHOSPHATASE LEVEL: ICD-10-CM

## 2020-06-16 DIAGNOSIS — I48.0 PAF (PAROXYSMAL ATRIAL FIBRILLATION): ICD-10-CM

## 2020-06-16 PROCEDURE — 99215 OFFICE O/P EST HI 40 MIN: CPT | Mod: S$PBB,,, | Performed by: INTERNAL MEDICINE

## 2020-06-16 PROCEDURE — 99999 PR PBB SHADOW E&M-EST. PATIENT-LVL IV: ICD-10-PCS | Mod: PBBFAC,,, | Performed by: INTERNAL MEDICINE

## 2020-06-16 PROCEDURE — 99214 OFFICE O/P EST MOD 30 MIN: CPT | Mod: PBBFAC | Performed by: INTERNAL MEDICINE

## 2020-06-16 PROCEDURE — 99999 PR PBB SHADOW E&M-EST. PATIENT-LVL IV: CPT | Mod: PBBFAC,,, | Performed by: INTERNAL MEDICINE

## 2020-06-16 PROCEDURE — 99215 PR OFFICE/OUTPT VISIT, EST, LEVL V, 40-54 MIN: ICD-10-PCS | Mod: S$PBB,,, | Performed by: INTERNAL MEDICINE

## 2020-06-16 RX ORDER — DICLOFENAC SODIUM 10 MG/G
GEL TOPICAL
Qty: 100 G | Refills: 4 | Status: SHIPPED | OUTPATIENT
Start: 2020-06-16 | End: 2021-10-22 | Stop reason: SDUPTHER

## 2020-06-16 RX ORDER — NORTRIPTYLINE HYDROCHLORIDE 10 MG/1
10 CAPSULE ORAL NIGHTLY
Qty: 90 CAPSULE | Refills: 3 | Status: SHIPPED | OUTPATIENT
Start: 2020-06-16 | End: 2020-12-31

## 2020-06-16 NOTE — PROGRESS NOTES
Subjective:       Patient ID: Artemio Colon Sr. is a 89 y.o. male.    Chief Complaint: Follow-up (no other c/o)    Last visit with me 10/2/2019. Since then seen by Cardiology including arrhythmia clinic. Also seen by outside providers including Orthopedics and Ophthalmology.    HPI    Notes more arthralgias pain than usual, now more in interior ankles. Also on outside and radiating to knee. Notes shoulder injury many yrs ago. Having problems with pain there as well. Also hands and wrists hurting, into knuckles as well. Hard to bend fingers at times. Wrists and ankle pain is new. No carpal tunnel syndrome symptoms. Patch does help with pain. Pain in hands probably worse in the morning. Had injections in knuckles in past that was painful. Voltaren can be helpful. Warmth helps. Has some neuropathy in legs. Legs often cold at night.     Gabapentin not helping too much. Never had much impact. No significant relief. Had EMG in past to diagnosis of neuropathy.    Some left foot drop recently as well. Has adjusted ambulation to limit trips and falls.    Review of Systems   Constitutional: Negative for activity change and unexpected weight change.   HENT: Negative for hearing loss, rhinorrhea and trouble swallowing.    Eyes: Negative for discharge and visual disturbance.   Respiratory: Negative for chest tightness and wheezing.    Cardiovascular: Negative for chest pain and palpitations.   Gastrointestinal: Negative for blood in stool, constipation, diarrhea and vomiting.   Endocrine: Negative for polydipsia and polyuria.   Genitourinary: Negative for difficulty urinating, hematuria and urgency.   Musculoskeletal: Negative for arthralgias, joint swelling and neck pain.   Neurological: Negative for weakness and headaches.   Psychiatric/Behavioral: Negative for confusion and dysphoric mood.       Objective:      Physical Exam  Vitals signs and nursing note reviewed.   Constitutional:       General: He is not in acute  "distress.     Appearance: He is not ill-appearing.   HENT:      Right Ear: External ear normal.      Left Ear: External ear normal.      Ears:      Comments: Hearing aids bilaterally   Neck:      Musculoskeletal: Normal range of motion. No muscular tenderness.   Cardiovascular:      Rate and Rhythm: Normal rate. Rhythm irregular.      Pulses:           Radial pulses are 2+ on the right side and 2+ on the left side.        Posterior tibial pulses are 2+ on the right side and 2+ on the left side.      Heart sounds: No murmur.   Pulmonary:      Effort: Pulmonary effort is normal.      Breath sounds: No stridor. Examination of the left-lower field reveals rales. Rales (trace dry inspiratory) present.   Musculoskeletal:         General: No swelling or tenderness.      Right lower leg: No edema.      Left lower leg: No edema.   Lymphadenopathy:      Cervical: No cervical adenopathy.   Neurological:      Mental Status: He is alert.   Psychiatric:         Mood and Affect: Mood normal.         Behavior: Behavior normal.         Vitals:    06/16/20 0916   BP: 110/80   BP Location: Right arm   Patient Position: Sitting   BP Method: Large (Manual)   Pulse: (!) 59   SpO2: (!) 94%   Weight: 86.8 kg (191 lb 5.8 oz)   Height: 5' 10" (1.778 m)     Body mass index is 27.46 kg/m².    RESULTS: Reviewed labs from last 12 months    Assessment:       1. Neuropathy    2. Primary osteoarthritis of both hands    3. Aortic atherosclerosis    4. PAF (paroxysmal atrial fibrillation)    5. Cardiomyopathy, unspecified type    6. Chronic systolic congestive heart failure, pacing induced now s/p upgrade to CRT-P 2/2018    7. Complete AV block    8. Elevated alkaline phosphatase level    9. Macrocytic anemia        Plan:   Artemio was seen today for follow-up.    Diagnoses and all orders for this visit:    Neuropathy:  Prior diagnosis, not well controlled, try changing to Pamelor.  "Please stop the gabapentin at night. Instead start nortriptyline " "(Pamelor) 10 mg capsule at night instead. Use for 4 nights in a row; if after that time the neuropathy is still there and there aren't bad side effects, please increase to 2 pills at night to see if this works better. Please notify the office with the results."  -     nortriptyline (PAMELOR) 10 MG capsule; Take 1 capsule (10 mg total) by mouth every evening.  -     CBC Without Differential; Future    Primary osteoarthritis of both hands:  Prior diagnosis, rule out Rheumatoid Arthritis on labs. Based on results consider Occupational Therapy vs Orthopedics.  -     diclofenac sodium (VOLTAREN) 1 % Gel; Use to affected joints up to 4 times a day  -     Uric acid; Future  -     C-Reactive Protein; Future  -     Sedimentation rate; Future  -     Rheumatoid factor; Future  -     Cyclic Citrullinated Peptide Antibody, IgG; Future    Aortic atherosclerosis:  Prior diagnosis, the patient is asymptomatic. Continue control of blood pressure and cholesterol to limit further atherosclerosis.     PAF (paroxysmal atrial fibrillation):  Prior diagnosis, stable, well controlled on current management. No changes at this time, will continue to monitor. continue follow up with Cardiology.    Cardiomyopathy, unspecified type:  Prior diagnosis, stable, well controlled on current management. No changes at this time, will continue to monitor. continue follow up with Cardiology.    Chronic systolic congestive heart failure, pacing induced now s/p upgrade to CRT-P 2/2018:  Prior diagnosis, stable, well controlled on current management. No changes at this time, will continue to monitor. continue follow up with Cardiology     Complete AV block:  Prior diagnosis, stable, well controlled on current management. No changes at this time, will continue to monitor. continue follow up with Cardiology     Elevated alkaline phosphatase level:  New problem based on recent labs, unclear etiology, also associated with elevated AST & ALT, repeat.  -     " Comprehensive metabolic panel; Future  -     Gamma GT; Future    Macrocytic anemia:  Prior diagnosis, stable, continue to monitor B12 levels periodically.  -     Vitamin B12; Future      Follow up in about 6 months (around 12/16/2020) for follow up visit.  Osiel Stone MD  Internal Medicine    Portions of this note were completed using medical dictation software. Please excuse typographical or syntax errors that were missed on review.

## 2020-06-16 NOTE — PATIENT INSTRUCTIONS
Please stop the gabapentin at night. Instead start nortriptyline (Pamelor) 10 mg capsule at night instead. Use for 4 nights in a row; if after that time the neuropathy is still there and there aren't bad side effects, please increase to 2 pills at night to see if this works better. Please notify the office with the results.

## 2020-06-23 ENCOUNTER — LAB VISIT (OUTPATIENT)
Dept: LAB | Facility: HOSPITAL | Age: 85
End: 2020-06-23
Attending: INTERNAL MEDICINE
Payer: MEDICARE

## 2020-06-23 DIAGNOSIS — E78.5 DYSLIPIDEMIA: ICD-10-CM

## 2020-06-23 LAB
ALBUMIN SERPL BCP-MCNC: 4 G/DL (ref 3.5–5.2)
ALP SERPL-CCNC: 169 U/L (ref 55–135)
ALT SERPL W/O P-5'-P-CCNC: 188 U/L (ref 10–44)
ANION GAP SERPL CALC-SCNC: 10 MMOL/L (ref 8–16)
AST SERPL-CCNC: 69 U/L (ref 10–40)
BILIRUB SERPL-MCNC: 0.9 MG/DL (ref 0.1–1)
BUN SERPL-MCNC: 14 MG/DL (ref 10–30)
CALCIUM SERPL-MCNC: 9.8 MG/DL (ref 8.7–10.5)
CHLORIDE SERPL-SCNC: 106 MMOL/L (ref 95–110)
CHOLEST SERPL-MCNC: 163 MG/DL (ref 120–199)
CHOLEST/HDLC SERPL: 2.8 {RATIO} (ref 2–5)
CO2 SERPL-SCNC: 24 MMOL/L (ref 23–29)
CREAT SERPL-MCNC: 0.9 MG/DL (ref 0.5–1.4)
EST. GFR  (AFRICAN AMERICAN): >60 ML/MIN/1.73 M^2
EST. GFR  (NON AFRICAN AMERICAN): >60 ML/MIN/1.73 M^2
GLUCOSE SERPL-MCNC: 105 MG/DL (ref 70–110)
HDLC SERPL-MCNC: 59 MG/DL (ref 40–75)
HDLC SERPL: 36.2 % (ref 20–50)
LDLC SERPL CALC-MCNC: 84.6 MG/DL (ref 63–159)
NONHDLC SERPL-MCNC: 104 MG/DL
POTASSIUM SERPL-SCNC: 3.8 MMOL/L (ref 3.5–5.1)
PROT SERPL-MCNC: 7 G/DL (ref 6–8.4)
SODIUM SERPL-SCNC: 140 MMOL/L (ref 136–145)
TRIGL SERPL-MCNC: 97 MG/DL (ref 30–150)

## 2020-06-23 PROCEDURE — 80061 LIPID PANEL: CPT

## 2020-06-23 PROCEDURE — 80053 COMPREHEN METABOLIC PANEL: CPT

## 2020-06-23 PROCEDURE — 36415 COLL VENOUS BLD VENIPUNCTURE: CPT

## 2020-06-26 ENCOUNTER — TELEPHONE (OUTPATIENT)
Dept: INTERNAL MEDICINE | Facility: CLINIC | Age: 85
End: 2020-06-26

## 2020-07-01 ENCOUNTER — TELEPHONE (OUTPATIENT)
Dept: INTERNAL MEDICINE | Facility: CLINIC | Age: 85
End: 2020-07-01

## 2020-07-01 ENCOUNTER — LAB VISIT (OUTPATIENT)
Dept: LAB | Facility: HOSPITAL | Age: 85
End: 2020-07-01
Attending: INTERNAL MEDICINE
Payer: MEDICARE

## 2020-07-01 DIAGNOSIS — R74.8 ELEVATED ALKALINE PHOSPHATASE LEVEL: ICD-10-CM

## 2020-07-01 DIAGNOSIS — M19.042 PRIMARY OSTEOARTHRITIS OF BOTH HANDS: ICD-10-CM

## 2020-07-01 DIAGNOSIS — M19.041 PRIMARY OSTEOARTHRITIS OF BOTH HANDS: ICD-10-CM

## 2020-07-01 DIAGNOSIS — M79.642 BILATERAL HAND PAIN: Primary | ICD-10-CM

## 2020-07-01 DIAGNOSIS — D53.9 MACROCYTIC ANEMIA: ICD-10-CM

## 2020-07-01 DIAGNOSIS — G62.9 NEUROPATHY: ICD-10-CM

## 2020-07-01 DIAGNOSIS — M79.641 BILATERAL HAND PAIN: Primary | ICD-10-CM

## 2020-07-01 DIAGNOSIS — R74.8 ELEVATED SERUM GAMMA-GLUTAMYL TRANSFERASE LEVEL: ICD-10-CM

## 2020-07-01 LAB
ALBUMIN SERPL BCP-MCNC: 3.9 G/DL (ref 3.5–5.2)
ALP SERPL-CCNC: 117 U/L (ref 55–135)
ALT SERPL W/O P-5'-P-CCNC: 33 U/L (ref 10–44)
ANION GAP SERPL CALC-SCNC: 7 MMOL/L (ref 8–16)
AST SERPL-CCNC: 20 U/L (ref 10–40)
BILIRUB SERPL-MCNC: 0.5 MG/DL (ref 0.1–1)
BUN SERPL-MCNC: 18 MG/DL (ref 10–30)
CALCIUM SERPL-MCNC: 9.2 MG/DL (ref 8.7–10.5)
CCP AB SER IA-ACNC: <0.5 U/ML
CHLORIDE SERPL-SCNC: 107 MMOL/L (ref 95–110)
CO2 SERPL-SCNC: 27 MMOL/L (ref 23–29)
CREAT SERPL-MCNC: 1 MG/DL (ref 0.5–1.4)
CRP SERPL-MCNC: 0.1 MG/L (ref 0–8.2)
ERYTHROCYTE [DISTWIDTH] IN BLOOD BY AUTOMATED COUNT: 13.5 % (ref 11.5–14.5)
ERYTHROCYTE [SEDIMENTATION RATE] IN BLOOD BY WESTERGREN METHOD: 6 MM/HR (ref 0–23)
EST. GFR  (AFRICAN AMERICAN): >60 ML/MIN/1.73 M^2
EST. GFR  (NON AFRICAN AMERICAN): >60 ML/MIN/1.73 M^2
GGT SERPL-CCNC: 108 U/L (ref 8–55)
GLUCOSE SERPL-MCNC: 99 MG/DL (ref 70–110)
HCT VFR BLD AUTO: 44.9 % (ref 40–54)
HGB BLD-MCNC: 14.2 G/DL (ref 14–18)
MCH RBC QN AUTO: 31.7 PG (ref 27–31)
MCHC RBC AUTO-ENTMCNC: 31.6 G/DL (ref 32–36)
MCV RBC AUTO: 100 FL (ref 82–98)
PLATELET # BLD AUTO: 233 K/UL (ref 150–350)
PMV BLD AUTO: 11 FL (ref 9.2–12.9)
POTASSIUM SERPL-SCNC: 4.6 MMOL/L (ref 3.5–5.1)
PROT SERPL-MCNC: 6.9 G/DL (ref 6–8.4)
RBC # BLD AUTO: 4.48 M/UL (ref 4.6–6.2)
RHEUMATOID FACT SERPL-ACNC: <10 IU/ML (ref 0–15)
SODIUM SERPL-SCNC: 141 MMOL/L (ref 136–145)
URATE SERPL-MCNC: 6.4 MG/DL (ref 3.4–7)
VIT B12 SERPL-MCNC: 650 PG/ML (ref 210–950)
WBC # BLD AUTO: 5.94 K/UL (ref 3.9–12.7)

## 2020-07-01 PROCEDURE — 86431 RHEUMATOID FACTOR QUANT: CPT

## 2020-07-01 PROCEDURE — 84550 ASSAY OF BLOOD/URIC ACID: CPT

## 2020-07-01 PROCEDURE — 80053 COMPREHEN METABOLIC PANEL: CPT

## 2020-07-01 PROCEDURE — 86140 C-REACTIVE PROTEIN: CPT

## 2020-07-01 PROCEDURE — 85027 COMPLETE CBC AUTOMATED: CPT

## 2020-07-01 PROCEDURE — 85652 RBC SED RATE AUTOMATED: CPT

## 2020-07-01 PROCEDURE — 86200 CCP ANTIBODY: CPT

## 2020-07-01 PROCEDURE — 82607 VITAMIN B-12: CPT

## 2020-07-01 PROCEDURE — 82977 ASSAY OF GGT: CPT

## 2020-07-01 PROCEDURE — 36415 COLL VENOUS BLD VENIPUNCTURE: CPT

## 2020-08-31 NOTE — TELEPHONE ENCOUNTER
No new care gaps identified.  Powered by [employer]. Reference number: [id number]. 8/31/2020 3:55:45 PM CDT

## 2020-09-02 ENCOUNTER — CLINICAL SUPPORT (OUTPATIENT)
Dept: CARDIOLOGY | Facility: HOSPITAL | Age: 85
End: 2020-09-02
Payer: MEDICARE

## 2020-09-02 DIAGNOSIS — Z95.0 PRESENCE OF CARDIAC PACEMAKER: ICD-10-CM

## 2020-09-02 PROCEDURE — 93296 REM INTERROG EVL PM/IDS: CPT | Performed by: INTERNAL MEDICINE

## 2020-09-02 PROCEDURE — 93294 REM INTERROG EVL PM/LDLS PM: CPT | Mod: ,,, | Performed by: INTERNAL MEDICINE

## 2020-09-02 PROCEDURE — 93294 CARDIAC DEVICE CHECK - REMOTE: ICD-10-PCS | Mod: ,,, | Performed by: INTERNAL MEDICINE

## 2020-09-02 RX ORDER — TAMSULOSIN HYDROCHLORIDE 0.4 MG/1
0.4 CAPSULE ORAL DAILY
Qty: 90 CAPSULE | Refills: 3 | Status: SHIPPED | OUTPATIENT
Start: 2020-09-02 | End: 2021-10-05

## 2020-09-02 NOTE — PROGRESS NOTES
Refill Routing Note   Medication(s) are not appropriate for processing by [facility]:       - Medication not previously prescribed by PCP        Medication Therapy Plan: CDMR. med last prescribed by historical provider; please advise; defer   Medication reconciliation completed: No      Automatic Epic Generated Protocol Data:        Requested Prescriptions   Pending Prescriptions Disp Refills    tamsulosin (FLOMAX) 0.4 mg Cap 90 capsule 0       Urology: Alpha-Adrenergic Blocker Passed - 9/2/2020  8:26 AM        Passed - Patient is at least 18 years old        Passed - Last BP in normal range within 360 days.     BP Readings from Last 3 Encounters:   06/16/20 110/80   12/12/19 (!) 116/57   10/02/19 112/72              Passed - Office visit in past 12 months or future 90 days.     Recent Outpatient Visits            2 months ago Neuropathy    [facility] Osiel Stone MD    3 months ago Chronic systolic congestive heart failure, pacing induced now s/p upgrade to CRT-P 2/2018    [id number] Tawanda Adler [facility]    3 months ago Coronary artery disease involving native coronary artery of native heart without angina pectoris    [facility]    8 months ago Cardiomyopathy, unspecified type    [id number] Sue Smyth NP    11 months ago Glaucoma of right eye, unspecified glaucoma type    [facility] Osiel Stone MD          Future Appointments              In 3 months Osiel Stone MD [facility], [facility]    In 3 months HOME MONITOR DEVICE CHECK, [facility] 3[facility] Fl, [facility]                Passed - Prostate Cancer is not on problem list              Appointments  past 12m or future 3m with PCP    Date Provider   Last Visit   6/16/2020 Osiel Stone MD   Next Visit   12/1/2020 Osiel Stone MD   ED visits in past 90 days: 0      Note composed:8:27 AM 09/02/2020

## 2020-10-28 ENCOUNTER — PATIENT MESSAGE (OUTPATIENT)
Dept: INTERNAL MEDICINE | Facility: CLINIC | Age: 85
End: 2020-10-28

## 2020-10-30 ENCOUNTER — TELEPHONE (OUTPATIENT)
Dept: ELECTROPHYSIOLOGY | Facility: CLINIC | Age: 85
End: 2020-10-30

## 2020-10-30 NOTE — TELEPHONE ENCOUNTER
"Alert received in [place] for patient going into AF last night at 2030, lasting til 0730 today.  Spoke with patient, states he felt "off" and run down this am upon waking, but felt fine at the time of phone call.  Had patient send a manual transmission, which shows presenting rhythm APBP.  [place] 3.4%, V rates are controlled.  He is not on OACs.       "

## 2020-11-02 ENCOUNTER — TELEPHONE (OUTPATIENT)
Dept: ELECTROPHYSIOLOGY | Facility: CLINIC | Age: 85
End: 2020-11-02

## 2020-11-02 ENCOUNTER — PATIENT OUTREACH (OUTPATIENT)
Dept: ADMINISTRATIVE | Facility: OTHER | Age: 85
End: 2020-11-02

## 2020-11-02 DIAGNOSIS — I49.8 OTHER SPECIFIED CARDIAC ARRHYTHMIAS: Primary | ICD-10-CM

## 2020-11-02 NOTE — TELEPHONE ENCOUNTER
Phoned lai to schedule a follow up with  office to discuss anticoagulation. Pt is now scheduled on Tuesday 11/03 . Pt verbalized understanding

## 2020-11-02 NOTE — PROGRESS NOTES
Requested updates within Care Everywhere.  Patient's chart was reviewed for overdue LESLEE topics.  Immunizations reconciled.

## 2020-11-03 ENCOUNTER — OFFICE VISIT (OUTPATIENT)
Dept: ELECTROPHYSIOLOGY | Facility: CLINIC | Age: 85
End: 2020-11-03
Payer: MEDICARE

## 2020-11-03 VITALS
HEART RATE: 59 BPM | BODY MASS INDEX: 26.99 KG/M2 | DIASTOLIC BLOOD PRESSURE: 68 MMHG | WEIGHT: 188.5 LBS | HEIGHT: 70 IN | SYSTOLIC BLOOD PRESSURE: 140 MMHG

## 2020-11-03 DIAGNOSIS — I50.22 CHRONIC SYSTOLIC CONGESTIVE HEART FAILURE: ICD-10-CM

## 2020-11-03 DIAGNOSIS — G47.33 OBSTRUCTIVE SLEEP APNEA ON CPAP: ICD-10-CM

## 2020-11-03 DIAGNOSIS — I70.0 AORTIC ATHEROSCLEROSIS: ICD-10-CM

## 2020-11-03 DIAGNOSIS — I49.8 OTHER SPECIFIED CARDIAC ARRHYTHMIAS: ICD-10-CM

## 2020-11-03 DIAGNOSIS — I44.2 COMPLETE AV BLOCK: ICD-10-CM

## 2020-11-03 DIAGNOSIS — K57.91 DIVERTICULOSIS OF INTESTINE WITH BLEEDING, UNSPECIFIED INTESTINAL TRACT LOCATION: ICD-10-CM

## 2020-11-03 DIAGNOSIS — I25.10 CORONARY ARTERY DISEASE INVOLVING NATIVE CORONARY ARTERY OF NATIVE HEART WITHOUT ANGINA PECTORIS: Chronic | ICD-10-CM

## 2020-11-03 DIAGNOSIS — I48.0 PAF (PAROXYSMAL ATRIAL FIBRILLATION): Primary | ICD-10-CM

## 2020-11-03 PROCEDURE — 99215 PR OFFICE/OUTPT VISIT, EST, LEVL V, 40-54 MIN: ICD-10-PCS | Mod: S$PBB,,, | Performed by: INTERNAL MEDICINE

## 2020-11-03 PROCEDURE — 99213 OFFICE O/P EST LOW 20 MIN: CPT | Mod: PBBFAC,25 | Performed by: INTERNAL MEDICINE

## 2020-11-03 PROCEDURE — 99999 PR PBB SHADOW E&M-EST. PATIENT-LVL III: CPT | Mod: PBBFAC,,, | Performed by: INTERNAL MEDICINE

## 2020-11-03 PROCEDURE — 99999 PR PBB SHADOW E&M-EST. PATIENT-LVL III: ICD-10-PCS | Mod: PBBFAC,,, | Performed by: INTERNAL MEDICINE

## 2020-11-03 PROCEDURE — 93010 RHYTHM STRIP: ICD-10-PCS | Mod: S$PBB,,, | Performed by: INTERNAL MEDICINE

## 2020-11-03 PROCEDURE — 93010 ELECTROCARDIOGRAM REPORT: CPT | Mod: S$PBB,,, | Performed by: INTERNAL MEDICINE

## 2020-11-03 PROCEDURE — 99215 OFFICE O/P EST HI 40 MIN: CPT | Mod: S$PBB,,, | Performed by: INTERNAL MEDICINE

## 2020-11-03 PROCEDURE — 93005 ELECTROCARDIOGRAM TRACING: CPT | Mod: PBBFAC | Performed by: INTERNAL MEDICINE

## 2020-11-03 NOTE — H&P (VIEW-ONLY)
Subjective:    Patient ID:  Artemio Colon Sr. is a 89 y.o. male who presents for follow-up of Atrial Fibrillation    Referring Cardiologist: Nicholas Tee MD      HPI    Prior Hx:  I had the pleasure of seeing Mr. Colon today in our electrophysiology clinic for follow-up for his pacemaker and for his atrial fibrillation.  He recently moved here from [place] and established care in the cardiology clinic.  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 He has CAD (cath 2012 rx with medical therapy) and preserved EF (60-65% 1/2015). I do not know if he had a PVI or AVN ablation as he has complete AV block.  He feels well, exercises regularly.  He denies any chest pain, shortness of breath, palpitations, orthopnea or PND.  He is not on anticoagulation and when I brought it up as a discussion he stated he does not want to be on it and rathered not discuss it any further.     Mr. Colon presented 1/2018 noting shortness of breath and low energy. Recent remote interrogation noted <0.1% AMS burden with longest episode 3 min and 52 seconds. No AF noted. He wass 61% A and 100% V paced. His V-threshold was 1.75 @ 0.4 on first visit with me. Recent remote auto-capture threshold was 2.25 @ 0.4. It has slowly increased over the past year and a half with stable impedance. Checked in clinic with bipolar threshold of 1.5 @ 0.4 sitting up and unipolar threshold of 1.25@ 0.4 also sitting up. Lying down his capture threshold was 2.25 @ 0.4. ECHO noted LVEF of 40-45% and stress test was negative. We discussed upgrade to CRT-P which he underwent 2/2018.    Mr. Colon presents for post upgrade check 4/2018. At time of implant RV lead capture threshold was even higher so we elected  "to cap it and implant a new RV lead. Device interrogation noted no AF, sinus rhythm/kashif with PACs and complete AV block, 6 sec of AMS x 2, stable lead parameters. Noted rare phrenic nerve stim that is treated with position movement. He notes that he had a hard time mentally coming out of anesthesia during his recent procedure.    ECHO 8/2018 noted LVEF was 60-65%.     Interim Hx:  Mr. Colon presents for follow-up to discuss anticoagulation. He recently began having issues with recurrent pAF. He has had episodes lasting up to 9 hours. He noted he felt a little "off". He feels well without complaints otherwise. He has had issues with recurrent GI bleeding not on anticoagulation (previously tried xarelto in [id number] for symptomatic AF and had issues with excessive bleeding then). He was hospitalized for a diverticular bleed in March of 2019.      My interpretation fo today's in clinic ECG is A-BiV paced rhythm with PACs and PVCs.    Review of Systems   Constitution: Negative for fever and malaise/fatigue.   HENT: Negative for congestion and sore throat.    Eyes: Negative for blurred vision and visual disturbance.   Cardiovascular: Negative for chest pain, dyspnea on exertion, irregular heartbeat, leg swelling, orthopnea, palpitations and paroxysmal nocturnal dyspnea.   Respiratory: Negative for cough and shortness of breath.    Hematologic/Lymphatic: Negative for bleeding problem. Bruises/bleeds easily.   Skin: Negative.    Musculoskeletal: Negative.    Gastrointestinal: Negative for abdominal pain, hematemesis and hematochezia.   Neurological: Negative for dizziness, focal weakness and weakness.        Objective:    Physical Exam   Constitutional: He is oriented to person, place, and time. He appears well-developed and well-nourished. No distress.   HENT:   Head: Normocephalic and atraumatic.   Eyes: Conjunctivae are normal. Right eye exhibits no discharge. Left eye exhibits no discharge.   Neck: Neck supple. "   Cardiovascular: Normal rate and regular rhythm. Exam reveals no gallop and no friction rub.   No murmur heard.  Pulmonary/Chest: Effort normal and breath sounds normal. No respiratory distress. He has no wheezes. He has no rales.   Abdominal: Soft. Bowel sounds are normal. He exhibits no distension. There is no abdominal tenderness.   Musculoskeletal:         General: No edema.   Neurological: He is alert and oriented to person, place, and time.   Skin: Skin is warm and dry. He is not diaphoretic.   Psychiatric: He has a normal mood and affect. His behavior is normal. Judgment and thought content normal.   Vitals reviewed.        Assessment:       1. PAF (paroxysmal atrial fibrillation)    2. Coronary artery disease involving native coronary artery of native heart without angina pectoris    3. Complete AV block    4. Chronic systolic congestive heart failure, pacing induced now s/p upgrade to CRT-P 2/2018    5. Aortic atherosclerosis -- seen CT 02/2019    6. Diverticulosis of intestine with bleeding, unspecified intestinal tract location    7. Obstructive sleep apnea on CPAP         Plan:       In summary, Mr. 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 He is in complete AV block.  He also has JAEL, low testosterone, and CAD with preserved LVEF by ECHO in 2015. He is now s/p upgrade to CRT-P and RV lead revision for increasing RV lead threshold and pacing induced CMP with normalization of his EF. He is having recurrent long episodes of pAF and has a [id number] score of 3. He has had recurrent GI bleeding from diverticular disease and for this reason has not been on long-term anticoagulation.     We discussed the etiology and pathophysiology of strokes associated with atrial  fibrillation. We discussed that  risk factors portend a high enough risk that warrants long-term anticoagulation for stroke risk reduction to which he is intolerant due to recurrent GI bleeding. We discussed the role of the left atrial appendage in stroke origin in atrial fibrillation and how a Watchman device can help reduce long-term stroke risk absent long-term anticoagulation. We discussed the implant procedure including transseptal puncture and the risks of the procedure including but not limited to death, infection, bleeding, stroke, MI, cardiac perforation, vascular injury, embolism (including device embolism), cardiac tamponade, skin burns, and other organic injury including the possibility for need for surgery. He understood and desires to proceed.      Thank you for allowing me to participate in the care of this patient. Please do not hesitate to call me with any questions or concerns.    Tawanda Adler MD, PhD  Cardiac Electrophysiology

## 2020-11-03 NOTE — PROGRESS NOTES
Subjective:    Patient ID:  Artemio Colon Sr. is a 89 y.o. male who presents for follow-up of Atrial Fibrillation    Referring Cardiologist: Nicholas Tee MD      HPI    Prior Hx:  I had the pleasure of seeing Mr. Colon today in our electrophysiology clinic for follow-up for his pacemaker and for his atrial fibrillation.  He recently moved here from [place] and established care in the cardiology clinic.  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 He has CAD (cath 2012 rx with medical therapy) and preserved EF (60-65% 1/2015). I do not know if he had a PVI or AVN ablation as he has complete AV block.  He feels well, exercises regularly.  He denies any chest pain, shortness of breath, palpitations, orthopnea or PND.  He is not on anticoagulation and when I brought it up as a discussion he stated he does not want to be on it and rathered not discuss it any further.     Mr. Colon presented 1/2018 noting shortness of breath and low energy. Recent remote interrogation noted <0.1% AMS burden with longest episode 3 min and 52 seconds. No AF noted. He wass 61% A and 100% V paced. His V-threshold was 1.75 @ 0.4 on first visit with me. Recent remote auto-capture threshold was 2.25 @ 0.4. It has slowly increased over the past year and a half with stable impedance. Checked in clinic with bipolar threshold of 1.5 @ 0.4 sitting up and unipolar threshold of 1.25@ 0.4 also sitting up. Lying down his capture threshold was 2.25 @ 0.4. ECHO noted LVEF of 40-45% and stress test was negative. We discussed upgrade to CRT-P which he underwent 2/2018.    Mr. Colon presents for post upgrade check 4/2018. At time of implant RV lead capture threshold was even higher so we elected  "to cap it and implant a new RV lead. Device interrogation noted no AF, sinus rhythm/kashif with PACs and complete AV block, 6 sec of AMS x 2, stable lead parameters. Noted rare phrenic nerve stim that is treated with position movement. He notes that he had a hard time mentally coming out of anesthesia during his recent procedure.    ECHO 8/2018 noted LVEF was 60-65%.     Interim Hx:  Mr. Colon presents for follow-up to discuss anticoagulation. He recently began having issues with recurrent pAF. He has had episodes lasting up to 9 hours. He noted he felt a little "off". He feels well without complaints otherwise. He has had issues with recurrent GI bleeding not on anticoagulation (previously tried xarelto in 1133-7974 for symptomatic AF and had issues with excessive bleeding then). He was hospitalized for a diverticular bleed in March of 2019.      My interpretation fo today's in clinic ECG is A-BiV paced rhythm with PACs and PVCs.    Review of Systems   Constitution: Negative for fever and malaise/fatigue.   HENT: Negative for congestion and sore throat.    Eyes: Negative for blurred vision and visual disturbance.   Cardiovascular: Negative for chest pain, dyspnea on exertion, irregular heartbeat, leg swelling, orthopnea, palpitations and paroxysmal nocturnal dyspnea.   Respiratory: Negative for cough and shortness of breath.    Hematologic/Lymphatic: Negative for bleeding problem. Bruises/bleeds easily.   Skin: Negative.    Musculoskeletal: Negative.    Gastrointestinal: Negative for abdominal pain, hematemesis and hematochezia.   Neurological: Negative for dizziness, focal weakness and weakness.        Objective:    Physical Exam   Constitutional: He is oriented to person, place, and time. He appears well-developed and well-nourished. No distress.   HENT:   Head: Normocephalic and atraumatic.   Eyes: Conjunctivae are normal. Right eye exhibits no discharge. Left eye exhibits no discharge.   Neck: Neck supple. "   Cardiovascular: Normal rate and regular rhythm. Exam reveals no gallop and no friction rub.   No murmur heard.  Pulmonary/Chest: Effort normal and breath sounds normal. No respiratory distress. He has no wheezes. He has no rales.   Abdominal: Soft. Bowel sounds are normal. He exhibits no distension. There is no abdominal tenderness.   Musculoskeletal:         General: No edema.   Neurological: He is alert and oriented to person, place, and time.   Skin: Skin is warm and dry. He is not diaphoretic.   Psychiatric: He has a normal mood and affect. His behavior is normal. Judgment and thought content normal.   Vitals reviewed.        Assessment:       1. PAF (paroxysmal atrial fibrillation)    2. Coronary artery disease involving native coronary artery of native heart without angina pectoris    3. Complete AV block    4. Chronic systolic congestive heart failure, pacing induced now s/p upgrade to CRT-P 2/2018    5. Aortic atherosclerosis -- seen CT 02/2019    6. Diverticulosis of intestine with bleeding, unspecified intestinal tract location    7. Obstructive sleep apnea on CPAP         Plan:       In summary, Mr. 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 He is in complete AV block.  He also has JAEL, low testosterone, and CAD with preserved LVEF by ECHO in 2015. He is now s/p upgrade to CRT-P and RV lead revision for increasing RV lead threshold and pacing induced CMP with normalization of his EF. He is having recurrent long episodes of pAF and has a [id number] score of 3. He has had recurrent GI bleeding from diverticular disease and for this reason has not been on long-term anticoagulation.     We discussed the etiology and pathophysiology of strokes associated with atrial  fibrillation. We discussed that  risk factors portend a high enough risk that warrants long-term anticoagulation for stroke risk reduction to which he is intolerant due to recurrent GI bleeding. We discussed the role of the left atrial appendage in stroke origin in atrial fibrillation and how a Watchman device can help reduce long-term stroke risk absent long-term anticoagulation. We discussed the implant procedure including transseptal puncture and the risks of the procedure including but not limited to death, infection, bleeding, stroke, MI, cardiac perforation, vascular injury, embolism (including device embolism), cardiac tamponade, skin burns, and other organic injury including the possibility for need for surgery. He understood and desires to proceed.      Thank you for allowing me to participate in the care of this patient. Please do not hesitate to call me with any questions or concerns.    Tawanda Adler MD, PhD  Cardiac Electrophysiology

## 2020-11-05 ENCOUNTER — PATIENT MESSAGE (OUTPATIENT)
Dept: ELECTROPHYSIOLOGY | Facility: CLINIC | Age: 85
End: 2020-11-05

## 2020-11-05 ENCOUNTER — TELEPHONE (OUTPATIENT)
Dept: ELECTROPHYSIOLOGY | Facility: CLINIC | Age: 85
End: 2020-11-05

## 2020-11-05 ENCOUNTER — TELEPHONE (OUTPATIENT)
Dept: PHARMACY | Facility: CLINIC | Age: 85
End: 2020-11-05

## 2020-11-05 NOTE — TELEPHONE ENCOUNTER
Patient was given a free 30  day trial of Eliquis.  I'm waiting for  him to return his SS Award Letter EOB from his Pharmacy .

## 2020-11-05 NOTE — TELEPHONE ENCOUNTER
Patient is having difficulty paying for his Eliquis. He has the coupon for 30 day free. Can you please reach out to see if he will qualify for any type of assistance?  Thanks!

## 2020-11-05 NOTE — TELEPHONE ENCOUNTER
----- Message from Ann Norwood RN sent at 11/5/2020  9:11 AM CST -----  Regarding: FW: schedule his procedure  Contact: patient    ----- Message -----  From: Jewel Keller MA  Sent: 11/5/2020   8:52 AM CST  To: Ann Norwood RN  Subject: FW: schedule his procedure                         ----- Message -----  From: Zina Mayo  Sent: 11/5/2020   8:22 AM CST  To: Vitor MEJIA Staff  Subject: schedule his procedure                           The pt is calling to schedule his procedure. Please call him back  @ [phone]. Thanks, Zina

## 2020-11-05 NOTE — TELEPHONE ENCOUNTER
Spoke with patient and scheduled his Watchman implant for 12/3/2020. Rx sent to [facility] pharmacy for Eliquis 5mg bid and he understands that he needs to start on 11/19/2020. Message sent to pharmacy patient assistance to see if they can help with the cost. He has a coupon for the 30 day free trial. All instructions reviewed and written instructions to be sent to patient portal, as requested.

## 2020-11-05 NOTE — TELEPHONE ENCOUNTER
----- Message from Ann Norwood RN sent at 11/5/2020  9:11 AM CST -----  Regarding: FW: schedule his procedure  Contact: patient    ----- Message -----  From: Jewel Keller MA  Sent: 11/5/2020   8:52 AM CST  To: Ann Norwood RN  Subject: FW: schedule his procedure                         ----- Message -----  From: Zina Mayo  Sent: 11/5/2020   8:22 AM CST  To: Vitor MEJIA Staff  Subject: schedule his procedure                           The pt is calling to schedule his procedure. Please call him back  @ [phone]. Thanks, Zina

## 2020-11-06 ENCOUNTER — PATIENT MESSAGE (OUTPATIENT)
Dept: ELECTROPHYSIOLOGY | Facility: CLINIC | Age: 85
End: 2020-11-06

## 2020-11-06 ENCOUNTER — TELEPHONE (OUTPATIENT)
Dept: INTERNAL MEDICINE | Facility: CLINIC | Age: 85
End: 2020-11-06

## 2020-11-06 DIAGNOSIS — R74.8 ELEVATED ALKALINE PHOSPHATASE LEVEL: Primary | ICD-10-CM

## 2020-11-06 DIAGNOSIS — Z01.89 ENCOUNTER FOR OTHER SPECIFIED SPECIAL EXAMINATIONS: ICD-10-CM

## 2020-11-06 DIAGNOSIS — Z95.818 PRESENCE OF WATCHMAN LEFT ATRIAL APPENDAGE CLOSURE DEVICE: Primary | ICD-10-CM

## 2020-11-06 DIAGNOSIS — I48.0 PAF (PAROXYSMAL ATRIAL FIBRILLATION): Primary | ICD-10-CM

## 2020-11-06 NOTE — TELEPHONE ENCOUNTER
----- Message from Cris Cortes sent at 11/6/2020  1:31 PM CST -----  Name Of Caller:  Artemio     Provider Name: Osiel Stone,    Does patient feel the need to be seen today? No     Relationship to the Pt?:  Patient     Contact Preference?: [phone]    What is the nature of the call?: Artemio called to schedule his lab work but the orders he has in his chart it wont let me you will need to put in new orders in for him  and his wife as well they both seeing you in December     Patient would like a call back

## 2020-11-06 NOTE — TELEPHONE ENCOUNTER
----- Message from Vicki Camacho sent at 11/6/2020  3:32 PM CST -----  Regarding: my chart request  Appointment Request From: Artemio Colon Sr.    With Provider: Osiel Stone MD [[facility]]    Preferred Date Range: Any date 11/25/2020 or later    Preferred Times: Any Time    Reason for visit: lab work    Comments:  you indicated you wanted labs done prior to our visit dec

## 2020-11-20 ENCOUNTER — TELEPHONE (OUTPATIENT)
Dept: PHARMACY | Facility: CLINIC | Age: 85
End: 2020-11-20

## 2020-11-20 NOTE — TELEPHONE ENCOUNTER
Patient approved with [employer] [facility]) until 12/31/20.  Medication  will be shipped to patients home within 7-10 business days.

## 2020-11-23 ENCOUNTER — LAB VISIT (OUTPATIENT)
Dept: LAB | Facility: HOSPITAL | Age: 85
End: 2020-11-23
Attending: INTERNAL MEDICINE
Payer: MEDICARE

## 2020-11-23 ENCOUNTER — TELEPHONE (OUTPATIENT)
Dept: PEDIATRICS | Facility: CLINIC | Age: 85
End: 2020-11-23

## 2020-11-23 DIAGNOSIS — R74.8 ELEVATED ALKALINE PHOSPHATASE LEVEL: ICD-10-CM

## 2020-11-23 DIAGNOSIS — I48.0 PAF (PAROXYSMAL ATRIAL FIBRILLATION): ICD-10-CM

## 2020-11-23 LAB
ALBUMIN SERPL BCP-MCNC: 3.8 G/DL (ref 3.5–5.2)
ALP SERPL-CCNC: 92 U/L (ref 55–135)
ALT SERPL W/O P-5'-P-CCNC: 27 U/L (ref 10–44)
ANION GAP SERPL CALC-SCNC: 10 MMOL/L (ref 8–16)
APTT BLDCRRT: 34.9 SEC (ref 21–32)
AST SERPL-CCNC: 29 U/L (ref 10–40)
BASOPHILS # BLD AUTO: 0.05 K/UL (ref 0–0.2)
BASOPHILS NFR BLD: 0.8 % (ref 0–1.9)
BILIRUB DIRECT SERPL-MCNC: 0.4 MG/DL (ref 0.1–0.3)
BILIRUB SERPL-MCNC: 0.8 MG/DL (ref 0.1–1)
BUN SERPL-MCNC: 21 MG/DL (ref 10–30)
CALCIUM SERPL-MCNC: 9.1 MG/DL (ref 8.7–10.5)
CHLORIDE SERPL-SCNC: 106 MMOL/L (ref 95–110)
CO2 SERPL-SCNC: 24 MMOL/L (ref 23–29)
CREAT SERPL-MCNC: 1 MG/DL (ref 0.5–1.4)
DIFFERENTIAL METHOD: ABNORMAL
EOSINOPHIL # BLD AUTO: 0.2 K/UL (ref 0–0.5)
EOSINOPHIL NFR BLD: 3.9 % (ref 0–8)
ERYTHROCYTE [DISTWIDTH] IN BLOOD BY AUTOMATED COUNT: 13.6 % (ref 11.5–14.5)
EST. GFR  (AFRICAN AMERICAN): >60 ML/MIN/1.73 M^2
EST. GFR  (NON AFRICAN AMERICAN): >60 ML/MIN/1.73 M^2
GGT SERPL-CCNC: 60 U/L (ref 8–55)
GLUCOSE SERPL-MCNC: 105 MG/DL (ref 70–110)
HCT VFR BLD AUTO: 44.5 % (ref 40–54)
HGB BLD-MCNC: 14.2 G/DL (ref 14–18)
IMM GRANULOCYTES # BLD AUTO: 0.01 K/UL (ref 0–0.04)
IMM GRANULOCYTES NFR BLD AUTO: 0.2 % (ref 0–0.5)
INR PPP: 1 (ref 0.8–1.2)
LYMPHOCYTES # BLD AUTO: 2.3 K/UL (ref 1–4.8)
LYMPHOCYTES NFR BLD: 38.2 % (ref 18–48)
MCH RBC QN AUTO: 31.6 PG (ref 27–31)
MCHC RBC AUTO-ENTMCNC: 31.9 G/DL (ref 32–36)
MCV RBC AUTO: 99 FL (ref 82–98)
MONOCYTES # BLD AUTO: 0.6 K/UL (ref 0.3–1)
MONOCYTES NFR BLD: 9.5 % (ref 4–15)
NEUTROPHILS # BLD AUTO: 2.8 K/UL (ref 1.8–7.7)
NEUTROPHILS NFR BLD: 47.4 % (ref 38–73)
NRBC BLD-RTO: 0 /100 WBC
PLATELET # BLD AUTO: 207 K/UL (ref 150–350)
PMV BLD AUTO: 11.1 FL (ref 9.2–12.9)
POTASSIUM SERPL-SCNC: 4.1 MMOL/L (ref 3.5–5.1)
PROT SERPL-MCNC: 6.8 G/DL (ref 6–8.4)
PROTHROMBIN TIME: 11.5 SEC (ref 9–12.5)
RBC # BLD AUTO: 4.5 M/UL (ref 4.6–6.2)
SODIUM SERPL-SCNC: 140 MMOL/L (ref 136–145)
WBC # BLD AUTO: 5.89 K/UL (ref 3.9–12.7)

## 2020-11-23 PROCEDURE — 80048 BASIC METABOLIC PNL TOTAL CA: CPT

## 2020-11-23 PROCEDURE — 85025 COMPLETE CBC W/AUTO DIFF WBC: CPT

## 2020-11-23 PROCEDURE — 85610 PROTHROMBIN TIME: CPT

## 2020-11-23 PROCEDURE — 84075 ASSAY ALKALINE PHOSPHATASE: CPT

## 2020-11-23 PROCEDURE — 85730 THROMBOPLASTIN TIME PARTIAL: CPT

## 2020-11-23 PROCEDURE — 82977 ASSAY OF GGT: CPT

## 2020-11-23 PROCEDURE — 80076 HEPATIC FUNCTION PANEL: CPT

## 2020-11-23 PROCEDURE — 36415 COLL VENOUS BLD VENIPUNCTURE: CPT

## 2020-11-23 NOTE — TELEPHONE ENCOUNTER
LVM. Called to remind of COVID testing. Informed that the clinic does close at 4:30 but we will be testing until then. Asked to please call back if they needed to cancel.

## 2020-11-24 ENCOUNTER — PATIENT MESSAGE (OUTPATIENT)
Dept: MEDSURG UNIT | Facility: HOSPITAL | Age: 85
End: 2020-11-24

## 2020-11-25 ENCOUNTER — PATIENT MESSAGE (OUTPATIENT)
Dept: ELECTROPHYSIOLOGY | Facility: CLINIC | Age: 85
End: 2020-11-25

## 2020-11-25 DIAGNOSIS — Z01.818 PRE-OP TESTING: ICD-10-CM

## 2020-11-27 LAB — ALP BONE SERPL-MCNC: 17.4 UG/L

## 2020-11-30 ENCOUNTER — LAB VISIT (OUTPATIENT)
Dept: INTERNAL MEDICINE | Facility: CLINIC | Age: 85
DRG: 274 | End: 2020-11-30
Payer: MEDICARE

## 2020-11-30 ENCOUNTER — PATIENT MESSAGE (OUTPATIENT)
Dept: INTERNAL MEDICINE | Facility: CLINIC | Age: 85
End: 2020-11-30

## 2020-11-30 DIAGNOSIS — Z01.818 PRE-OP TESTING: ICD-10-CM

## 2020-11-30 LAB — SARS-COV-2 RNA RESP QL NAA+PROBE: NOT DETECTED

## 2020-11-30 PROCEDURE — U0003 INFECTIOUS AGENT DETECTION BY NUCLEIC ACID (DNA OR RNA); SEVERE ACUTE RESPIRATORY SYNDROME CORONAVIRUS 2 (SARS-COV-2) (CORONAVIRUS DISEASE [COVID-19]), AMPLIFIED PROBE TECHNIQUE, MAKING USE OF HIGH THROUGHPUT TECHNOLOGIES AS DESCRIBED BY CMS-2020-01-R: HCPCS

## 2020-12-01 ENCOUNTER — CLINICAL SUPPORT (OUTPATIENT)
Dept: CARDIOLOGY | Facility: HOSPITAL | Age: 85
DRG: 274 | End: 2020-12-01
Payer: MEDICARE

## 2020-12-01 DIAGNOSIS — Z95.0 PRESENCE OF CARDIAC PACEMAKER: ICD-10-CM

## 2020-12-01 DIAGNOSIS — Z95.810 PRESENCE OF AUTOMATIC (IMPLANTABLE) CARDIAC DEFIBRILLATOR: ICD-10-CM

## 2020-12-01 PROCEDURE — 93294 CARDIAC DEVICE CHECK - REMOTE: ICD-10-PCS | Mod: ,,, | Performed by: INTERNAL MEDICINE

## 2020-12-01 PROCEDURE — 93294 REM INTERROG EVL PM/LDLS PM: CPT | Mod: ,,, | Performed by: INTERNAL MEDICINE

## 2020-12-01 PROCEDURE — 93296 REM INTERROG EVL PM/IDS: CPT | Performed by: INTERNAL MEDICINE

## 2020-12-02 ENCOUNTER — TELEPHONE (OUTPATIENT)
Dept: ELECTROPHYSIOLOGY | Facility: CLINIC | Age: 85
End: 2020-12-02

## 2020-12-02 NOTE — TELEPHONE ENCOUNTER
Spoke to Mr. Colon    CONFIRMED procedure arrival time of 7AM    Reiterated instructions including:  -Directions to check in desk  -NPO after midnight night prior to procedure  -High importance of HOLDING Eliquis morning of procedure   -Pre-procedure LABS done, no alerts  -COVID test negative  -Confirmed no fever, cough, or shortness of breath in the past 30 days  -Confirmed no redness, rash, irritation, or yeast infection to groin area.   -Reviewed current visitor policy    Patient verbalizes understanding of above and appreciates call.

## 2020-12-03 ENCOUNTER — HOSPITAL ENCOUNTER (OUTPATIENT)
Dept: CARDIOLOGY | Facility: HOSPITAL | Age: 85
Discharge: HOME | DRG: 274 | End: 2020-12-03
Attending: INTERNAL MEDICINE
Payer: MEDICARE

## 2020-12-03 ENCOUNTER — HOSPITAL ENCOUNTER (INPATIENT)
Facility: HOSPITAL | Age: 85
LOS: 1 days | Discharge: HOME | DRG: 274 | End: 2020-12-04
Attending: INTERNAL MEDICINE | Admitting: INTERNAL MEDICINE
Payer: MEDICARE

## 2020-12-03 ENCOUNTER — ANESTHESIA (OUTPATIENT)
Dept: MEDSURG UNIT | Facility: HOSPITAL | Age: 85
DRG: 274 | End: 2020-12-03
Payer: MEDICARE

## 2020-12-03 ENCOUNTER — ANESTHESIA EVENT (OUTPATIENT)
Dept: MEDSURG UNIT | Facility: HOSPITAL | Age: 85
DRG: 274 | End: 2020-12-03
Payer: MEDICARE

## 2020-12-03 VITALS
BODY MASS INDEX: 25.48 KG/M2 | WEIGHT: 182 LBS | DIASTOLIC BLOOD PRESSURE: 67 MMHG | SYSTOLIC BLOOD PRESSURE: 136 MMHG | HEIGHT: 71 IN

## 2020-12-03 DIAGNOSIS — I49.9 ARRHYTHMIA: ICD-10-CM

## 2020-12-03 DIAGNOSIS — I48.91 ATRIAL FIBRILLATION: ICD-10-CM

## 2020-12-03 DIAGNOSIS — I48.0 PAF (PAROXYSMAL ATRIAL FIBRILLATION): ICD-10-CM

## 2020-12-03 LAB
ABO + RH BLD: NORMAL
BLD GP AB SCN CELLS X3 SERPL QL: NORMAL
BSA FOR ECHO PROCEDURE: 2.03 M2

## 2020-12-03 PROCEDURE — D9220A PRA ANESTHESIA: Mod: ANES,,, | Performed by: ANESTHESIOLOGY

## 2020-12-03 PROCEDURE — 25000003 PHARM REV CODE 250: Performed by: NURSE ANESTHETIST, CERTIFIED REGISTERED

## 2020-12-03 PROCEDURE — 20600001 HC STEP DOWN PRIVATE ROOM

## 2020-12-03 PROCEDURE — 37000008 HC ANESTHESIA 1ST 15 MINUTES: Performed by: INTERNAL MEDICINE

## 2020-12-03 PROCEDURE — 27201423 OPTIME MED/SURG SUP & DEVICES STERILE SUPPLY: Performed by: INTERNAL MEDICINE

## 2020-12-03 PROCEDURE — 33340 PERQ CLSR TCAT L ATR APNDGE: CPT | Mod: Q0,ICN,, | Performed by: INTERNAL MEDICINE

## 2020-12-03 PROCEDURE — 86850 RBC ANTIBODY SCREEN: CPT

## 2020-12-03 PROCEDURE — 27201037 HC PRESSURE MONITORING SET UP

## 2020-12-03 PROCEDURE — 33340 PERQ CLSR TCAT L ATR APNDGE: CPT | Performed by: INTERNAL MEDICINE

## 2020-12-03 PROCEDURE — 25000003 PHARM REV CODE 250: Performed by: INTERNAL MEDICINE

## 2020-12-03 PROCEDURE — 27800903 OPTIME MED/SURG SUP & DEVICES OTHER IMPLANTS: Performed by: INTERNAL MEDICINE

## 2020-12-03 PROCEDURE — 37000009 HC ANESTHESIA EA ADD 15 MINS: Performed by: INTERNAL MEDICINE

## 2020-12-03 PROCEDURE — C1894 INTRO/SHEATH, NON-LASER: HCPCS | Performed by: INTERNAL MEDICINE

## 2020-12-03 PROCEDURE — 93005 ELECTROCARDIOGRAM TRACING: CPT

## 2020-12-03 PROCEDURE — 99499 NO LOS: ICD-10-PCS | Mod: ,,, | Performed by: INTERNAL MEDICINE

## 2020-12-03 PROCEDURE — 63600175 PHARM REV CODE 636 W HCPCS: Performed by: ANESTHESIOLOGY

## 2020-12-03 PROCEDURE — D9220A PRA ANESTHESIA: ICD-10-PCS | Mod: ANES,,, | Performed by: ANESTHESIOLOGY

## 2020-12-03 PROCEDURE — C1769 GUIDE WIRE: HCPCS | Performed by: INTERNAL MEDICINE

## 2020-12-03 PROCEDURE — 99499 UNLISTED E&M SERVICE: CPT | Mod: ,,, | Performed by: INTERNAL MEDICINE

## 2020-12-03 PROCEDURE — 86920 COMPATIBILITY TEST SPIN: CPT

## 2020-12-03 PROCEDURE — 25500020 PHARM REV CODE 255: Performed by: INTERNAL MEDICINE

## 2020-12-03 PROCEDURE — 93010 EKG 12-LEAD: ICD-10-PCS | Mod: ,,, | Performed by: INTERNAL MEDICINE

## 2020-12-03 PROCEDURE — 93010 ELECTROCARDIOGRAM REPORT: CPT | Mod: ,,, | Performed by: INTERNAL MEDICINE

## 2020-12-03 PROCEDURE — 33340 PR TRANSCATH CLOSURE, PERC, LEFT ATRIAL APPENDAGE, W/IMPLANT: ICD-10-PCS | Mod: Q0,ICN,, | Performed by: INTERNAL MEDICINE

## 2020-12-03 PROCEDURE — 63600175 PHARM REV CODE 636 W HCPCS: Performed by: NURSE ANESTHETIST, CERTIFIED REGISTERED

## 2020-12-03 PROCEDURE — D9220A PRA ANESTHESIA: Mod: CRNA,,, | Performed by: NURSE ANESTHETIST, CERTIFIED REGISTERED

## 2020-12-03 PROCEDURE — 63600175 PHARM REV CODE 636 W HCPCS: Performed by: NURSE PRACTITIONER

## 2020-12-03 PROCEDURE — D9220A PRA ANESTHESIA: ICD-10-PCS | Mod: CRNA,,, | Performed by: NURSE ANESTHETIST, CERTIFIED REGISTERED

## 2020-12-03 DEVICE — LEFT ATRIAL APPENDAGE CLOSURE DEVICE WITH DELIVERY SYSTEM
Type: IMPLANTABLE DEVICE | Site: HEART | Status: FUNCTIONAL
Brand: WATCHMAN FLX™

## 2020-12-03 RX ORDER — ACETAMINOPHEN 325 MG/1
650 TABLET ORAL EVERY 4 HOURS PRN
Status: DISCONTINUED | OUTPATIENT
Start: 2020-12-03 | End: 2020-12-04 | Stop reason: HOSPADM

## 2020-12-03 RX ORDER — ONDANSETRON 2 MG/ML
4 INJECTION INTRAMUSCULAR; INTRAVENOUS ONCE AS NEEDED
Status: DISCONTINUED | OUTPATIENT
Start: 2020-12-03 | End: 2020-12-04 | Stop reason: HOSPADM

## 2020-12-03 RX ORDER — TRAVOPROST OPHTHALMIC SOLUTION 0.04 MG/ML
1 SOLUTION OPHTHALMIC 2 TIMES DAILY
Status: DISCONTINUED | OUTPATIENT
Start: 2020-12-03 | End: 2020-12-04 | Stop reason: HOSPADM

## 2020-12-03 RX ORDER — PHENYLEPHRINE HYDROCHLORIDE 10 MG/ML
INJECTION INTRAVENOUS
Status: DISCONTINUED | OUTPATIENT
Start: 2020-12-03 | End: 2020-12-03

## 2020-12-03 RX ORDER — FENTANYL CITRATE 50 UG/ML
25 INJECTION, SOLUTION INTRAMUSCULAR; INTRAVENOUS EVERY 5 MIN PRN
Status: DISCONTINUED | OUTPATIENT
Start: 2020-12-03 | End: 2020-12-04 | Stop reason: HOSPADM

## 2020-12-03 RX ORDER — LIDOCAINE HYDROCHLORIDE 20 MG/ML
INJECTION, SOLUTION INFILTRATION; PERINEURAL
Status: DISCONTINUED | OUTPATIENT
Start: 2020-12-03 | End: 2020-12-04 | Stop reason: HOSPADM

## 2020-12-03 RX ORDER — PROPOFOL 10 MG/ML
VIAL (ML) INTRAVENOUS
Status: DISCONTINUED | OUTPATIENT
Start: 2020-12-03 | End: 2020-12-03

## 2020-12-03 RX ORDER — IODIXANOL 320 MG/ML
INJECTION, SOLUTION INTRAVASCULAR
Status: DISCONTINUED | OUTPATIENT
Start: 2020-12-03 | End: 2020-12-04 | Stop reason: HOSPADM

## 2020-12-03 RX ORDER — ROCURONIUM BROMIDE 10 MG/ML
INJECTION, SOLUTION INTRAVENOUS
Status: DISCONTINUED | OUTPATIENT
Start: 2020-12-03 | End: 2020-12-03

## 2020-12-03 RX ORDER — FINASTERIDE 5 MG/1
5 TABLET, FILM COATED ORAL DAILY
Status: DISCONTINUED | OUTPATIENT
Start: 2020-12-03 | End: 2020-12-04 | Stop reason: HOSPADM

## 2020-12-03 RX ORDER — HYDROCODONE BITARTRATE AND ACETAMINOPHEN 10; 325 MG/1; MG/1
1 TABLET ORAL EVERY 6 HOURS PRN
Status: DISCONTINUED | OUTPATIENT
Start: 2020-12-03 | End: 2020-12-04 | Stop reason: HOSPADM

## 2020-12-03 RX ORDER — PRAVASTATIN SODIUM 40 MG/1
40 TABLET ORAL DAILY
Status: DISCONTINUED | OUTPATIENT
Start: 2020-12-03 | End: 2020-12-04 | Stop reason: HOSPADM

## 2020-12-03 RX ORDER — FENTANYL CITRATE 50 UG/ML
INJECTION, SOLUTION INTRAMUSCULAR; INTRAVENOUS
Status: DISCONTINUED | OUTPATIENT
Start: 2020-12-03 | End: 2020-12-03

## 2020-12-03 RX ORDER — GABAPENTIN 300 MG/1
600 CAPSULE ORAL NIGHTLY
Status: DISCONTINUED | OUTPATIENT
Start: 2020-12-03 | End: 2020-12-04 | Stop reason: HOSPADM

## 2020-12-03 RX ORDER — DIPHENHYDRAMINE HYDROCHLORIDE 50 MG/ML
25 INJECTION INTRAMUSCULAR; INTRAVENOUS EVERY 6 HOURS PRN
Status: DISCONTINUED | OUTPATIENT
Start: 2020-12-03 | End: 2020-12-04 | Stop reason: HOSPADM

## 2020-12-03 RX ORDER — FLUTICASONE PROPIONATE 50 MCG
2 SPRAY, SUSPENSION (ML) NASAL DAILY
Status: DISCONTINUED | OUTPATIENT
Start: 2020-12-03 | End: 2020-12-04 | Stop reason: HOSPADM

## 2020-12-03 RX ORDER — HYDROMORPHONE HYDROCHLORIDE 1 MG/ML
0.2 INJECTION, SOLUTION INTRAMUSCULAR; INTRAVENOUS; SUBCUTANEOUS EVERY 5 MIN PRN
Status: DISCONTINUED | OUTPATIENT
Start: 2020-12-03 | End: 2020-12-04 | Stop reason: HOSPADM

## 2020-12-03 RX ORDER — HYDROCODONE BITARTRATE AND ACETAMINOPHEN 500; 5 MG/1; MG/1
TABLET ORAL
Status: DISCONTINUED | OUTPATIENT
Start: 2020-12-03 | End: 2020-12-04 | Stop reason: HOSPADM

## 2020-12-03 RX ORDER — ONDANSETRON 2 MG/ML
INJECTION INTRAMUSCULAR; INTRAVENOUS
Status: DISCONTINUED | OUTPATIENT
Start: 2020-12-03 | End: 2020-12-03

## 2020-12-03 RX ORDER — LIDOCAINE HYDROCHLORIDE 20 MG/ML
INJECTION INTRAVENOUS
Status: DISCONTINUED | OUTPATIENT
Start: 2020-12-03 | End: 2020-12-03

## 2020-12-03 RX ORDER — HEPARIN SODIUM 1000 [USP'U]/ML
INJECTION, SOLUTION INTRAVENOUS; SUBCUTANEOUS
Status: DISCONTINUED | OUTPATIENT
Start: 2020-12-03 | End: 2020-12-03

## 2020-12-03 RX ORDER — NAPROXEN SODIUM 220 MG/1
81 TABLET, FILM COATED ORAL DAILY
Status: DISCONTINUED | OUTPATIENT
Start: 2020-12-03 | End: 2020-12-04 | Stop reason: HOSPADM

## 2020-12-03 RX ORDER — NORTRIPTYLINE HYDROCHLORIDE 10 MG/1
10 CAPSULE ORAL NIGHTLY
Status: DISCONTINUED | OUTPATIENT
Start: 2020-12-03 | End: 2020-12-04 | Stop reason: HOSPADM

## 2020-12-03 RX ORDER — PROTAMINE SULFATE 10 MG/ML
INJECTION, SOLUTION INTRAVENOUS
Status: DISCONTINUED | OUTPATIENT
Start: 2020-12-03 | End: 2020-12-03

## 2020-12-03 RX ORDER — TAMSULOSIN HYDROCHLORIDE 0.4 MG/1
0.4 CAPSULE ORAL DAILY
Status: DISCONTINUED | OUTPATIENT
Start: 2020-12-03 | End: 2020-12-04 | Stop reason: HOSPADM

## 2020-12-03 RX ORDER — DICLOFENAC SODIUM 10 MG/G
2 GEL TOPICAL EVERY 6 HOURS PRN
Status: DISCONTINUED | OUTPATIENT
Start: 2020-12-03 | End: 2020-12-04 | Stop reason: HOSPADM

## 2020-12-03 RX ORDER — TIMOLOL MALEATE 5 MG/ML
1 SOLUTION/ DROPS OPHTHALMIC DAILY
Status: DISCONTINUED | OUTPATIENT
Start: 2020-12-03 | End: 2020-12-04 | Stop reason: HOSPADM

## 2020-12-03 RX ORDER — HEPARIN SODIUM 10000 [USP'U]/100ML
INJECTION, SOLUTION INTRAVENOUS CONTINUOUS PRN
Status: DISCONTINUED | OUTPATIENT
Start: 2020-12-03 | End: 2020-12-03

## 2020-12-03 RX ADMIN — ACETAMINOPHEN 650 MG: 325 TABLET ORAL at 12:12

## 2020-12-03 RX ADMIN — HEPARIN SODIUM 10000 UNITS: 1000 INJECTION INTRAVENOUS; SUBCUTANEOUS at 10:12

## 2020-12-03 RX ADMIN — FINASTERIDE 5 MG: 5 TABLET, FILM COATED ORAL at 01:12

## 2020-12-03 RX ADMIN — PHENYLEPHRINE HYDROCHLORIDE 100 MCG: 10 INJECTION, SOLUTION INTRAMUSCULAR; INTRAVENOUS; SUBCUTANEOUS at 10:12

## 2020-12-03 RX ADMIN — ASPIRIN 81 MG CHEWABLE TABLET 81 MG: 81 TABLET CHEWABLE at 01:12

## 2020-12-03 RX ADMIN — PROTAMINE SULFATE 10 MG: 10 INJECTION, SOLUTION INTRAVENOUS at 10:12

## 2020-12-03 RX ADMIN — NORTRIPTYLINE HYDROCHLORIDE 10 MG: 10 CAPSULE ORAL at 09:12

## 2020-12-03 RX ADMIN — SUGAMMADEX 200 MG: 100 INJECTION, SOLUTION INTRAVENOUS at 10:12

## 2020-12-03 RX ADMIN — FENTANYL CITRATE 100 MCG: 50 INJECTION INTRAMUSCULAR; INTRAVENOUS at 09:12

## 2020-12-03 RX ADMIN — PROPOFOL 130 MG: 10 INJECTION, EMULSION INTRAVENOUS at 09:12

## 2020-12-03 RX ADMIN — PRAVASTATIN SODIUM 40 MG: 40 TABLET ORAL at 01:12

## 2020-12-03 RX ADMIN — HEPARIN SODIUM AND DEXTROSE 8000 UNITS/HR: 10000; 5 INJECTION INTRAVENOUS at 10:12

## 2020-12-03 RX ADMIN — ONDANSETRON 4 MG: 2 INJECTION, SOLUTION INTRAMUSCULAR; INTRAVENOUS at 09:12

## 2020-12-03 RX ADMIN — ROCURONIUM BROMIDE 40 MG: 10 INJECTION, SOLUTION INTRAVENOUS at 09:12

## 2020-12-03 RX ADMIN — APIXABAN 5 MG: 5 TABLET, FILM COATED ORAL at 08:12

## 2020-12-03 RX ADMIN — TRAVOPROST 1 DROP: 0.04 SOLUTION/ DROPS OPHTHALMIC at 01:12

## 2020-12-03 RX ADMIN — TAMSULOSIN HYDROCHLORIDE 0.4 MG: 0.4 CAPSULE ORAL at 01:12

## 2020-12-03 RX ADMIN — FENTANYL CITRATE 25 MCG: 50 INJECTION, SOLUTION INTRAMUSCULAR; INTRAVENOUS at 12:12

## 2020-12-03 RX ADMIN — TIMOLOL MALEATE 1 DROP: 5 SOLUTION/ DROPS OPHTHALMIC at 01:12

## 2020-12-03 RX ADMIN — SODIUM CHLORIDE, SODIUM GLUCONATE, SODIUM ACETATE, POTASSIUM CHLORIDE, MAGNESIUM CHLORIDE, SODIUM PHOSPHATE, DIBASIC, AND POTASSIUM PHOSPHATE: .53; .5; .37; .037; .03; .012; .00082 INJECTION, SOLUTION INTRAVENOUS at 09:12

## 2020-12-03 RX ADMIN — TRAVOPROST 1 DROP: 0.04 SOLUTION/ DROPS OPHTHALMIC at 08:12

## 2020-12-03 RX ADMIN — SODIUM CHLORIDE 0.5 MCG/KG/MIN: 9 INJECTION, SOLUTION INTRAVENOUS at 10:12

## 2020-12-03 RX ADMIN — LIDOCAINE HYDROCHLORIDE 60 MG: 20 INJECTION, SOLUTION INTRAVENOUS at 09:12

## 2020-12-03 RX ADMIN — VANCOMYCIN HYDROCHLORIDE 1500 MG: 1.5 INJECTION, POWDER, LYOPHILIZED, FOR SOLUTION INTRAVENOUS at 09:12

## 2020-12-03 NOTE — Clinical Note
14 ml injected throughout the case. 186 mL total wasted during the case. 200 mL total used in the case.

## 2020-12-03 NOTE — BRIEF OP NOTE
: Dr. Adler   Date of procedure: 12/03/2020   Post-operative Diagnosis: AF    Procedure Performed: Left Atrial Appendage Occlusion with Watchman device     Description of Procedure: The patient was brought to the EP lab in the fasting state. Prepped and draped in sterile fashion. Safety timeout was performed. Sedation administered by anesthesia staff. Ultrasound guided venous access of the right femoral vein was performed. 16 Fr short sheath placed in right femoral vein. Heparin bolus given. GAIL and fluoroscopic guided single transseptal puncture performed. Watchman deployed in JAN using GAIL and fluoroscopic guidance. Tug test confirmed stable position. GAIL confirmed adequate compression and no significant meron-device flow. Sheaths removed. Two silk figure of 8 sutures placed at right femoral access site for hemostasis.     EBL: <10 mL    Specimens Removed: None  Complications: no immediate    Adil MD Neil  Cardiovascular Disease Fellow PGY IV  Pager [phone]

## 2020-12-03 NOTE — NURSING TRANSFER
Nursing Transfer Note      12/3/2020     Transfer To: 330    Transfer via stretcher    Transfer with cardiac monitoring    Transported by escort with ticket to ride    Medicines sent: eye drops, nasal spray     Chart send with patient: Yes    Notified: spouse     Patient reassessed at: see epic  (date, time)    Upon arrival to floor: to room no complaints no distress noted.

## 2020-12-03 NOTE — INTERVAL H&P NOTE
The patient has been examined and the H&P has been reviewed:    I concur with the findings and no changes have occurred since H&P was written.    Surgery risks, benefits and alternative options discussed and understood by patient/family.    Pt doing well today. No complaints. His last dose of eliquis was yesterday evening. Last creat was 1.0 on 11/23/20    Plan to proceed with JAN closure today        Active Hospital Problems    Diagnosis  POA    PAF (paroxysmal atrial fibrillation) [I48.0]  Yes     S/P ablation [place]        Resolved Hospital Problems   No resolved problems to display.

## 2020-12-03 NOTE — CONSULTS
[facility]  Cardiology  Consult Note    Patient Name: Artemio Colon Sr.  MRN: [id number]  Admission Date: 12/3/2020  Hospital Length of Stay: 0 days  Code Status: Prior   Attending Provider: Tawanda Adler MD   Consulting Provider: Tra Fowler MD  Primary Care Physician: Osiel Stone MD  Principal Problem:<principal problem not specified>    Patient information was obtained from patient, past medical records and ER records.     Consults  Subjective:     Chief Complaint:  Atrial fibrillation     HPI:   89 year old male with CAD 50-60% mid LAD with significant Diagonal and PDA stenosis 80%, Complete AV block s/p  CRT-P 2/2018, Obstructive sleep apnea on CPAP, and Aortic atherosclerosis. 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 From EP clinic He has had issues with recurrent GI bleeding not on anticoagulation (previously tried xarelto in 5111-7084 for symptomatic AF and had issues with excessive bleeding then). He was hospitalized for a diverticular bleed in March of 2019. NO recent GI Bleeding from upper GI source and prior notes stated Lower GI bleeding.     Dysphagia or odynophagia:  No  Liver Disease, esophageal disease, or known varices:  No  Upper GI Bleeding: No  Snoring:  No  Sleep Apnea:  Yes  Prior neck surgery or radiation:  No  History of anesthetic difficulties:  No  Family history of anesthetic difficulties:  No  Last oral intake:  12 hours ago  Able to move neck in all directions:  Yes  Stroke History:  No  Last dose of anticoagulation:  Last Night    Anticoagulation: Eliquis  Antiplatelets: Aspirin      Card Meds: Apixaban 5 mg BID, ASA, Pravastatin 40 mg.    TTE 8/2018  Upper limit of normal left ventricular enlargement.  Normal left ventricular systolic function (EF 60-65%). no wall motion abnormalities.   Indeterminate LV diastolic  function. Right ventricle is the upper limit of normal in size with normal systolic function.   Biatrial enlargement. Trivial aortic regurgitation. Trivial to mild tricuspid regurgitation. Trivial pulmonic regurgitation.       Past Medical History:   Diagnosis Date    AV block     BPH (benign prostatic hyperplasia)     Chronic rhinitis     Coronary artery disease     Erectile dysfunction     Glaucoma     Neuropathy 8/22/2017    JAEL (obstructive sleep apnea)     Pacemaker     Symptomatic bradycardia     s/p pacemaker       Past Surgical History:   Procedure Laterality Date    CATARACT EXTRACTION W/  INTRAOCULAR LENS IMPLANT Bilateral 2012    done in [place]    CHOLECYSTECTOMY      COLECTOMY      cecum    EYE SURGERY      HERNIA REPAIR      INSERT / REPLACE / REMOVE PACEMAKER  2012    changed    PROSTATE SURGERY         Review of patient's allergies indicates:   Allergen Reactions    Pcn [penicillins] Hives       No current facility-administered medications on file prior to encounter.      Current Outpatient Medications on File Prior to Encounter   Medication Sig    apixaban (ELIQUIS) 5 mg Tab Take 1 tablet (5 mg total) by mouth 2 (two) times daily.    aspirin 81 MG Chew Take 81 mg by mouth once daily.    diclofenac sodium (VOLTAREN) 1 % Gel Use to affected joints up to 4 times a day    fentaNYL (DURAGESIC) 25 mcg/hr Place 1 patch onto the skin every 72 hours.    finasteride (PROSCAR) 5 mg tablet     fluticasone (FLONASE) 50 mcg/actuation nasal spray 2 sprays by Each Nare route once daily.    gabapentin (NEURONTIN) 600 MG tablet Take 1 tablet (600 mg total) by mouth once daily.    hydrocodone-acetaminophen 10-325mg (NORCO)  mg Tab Take by mouth every 4 (four) hours as needed for Pain.    nortriptyline (PAMELOR) 10 MG capsule Take 1 capsule (10 mg total) by mouth every evening.    pravastatin (PRAVACHOL) 40 MG tablet Take 1 tablet (40 mg total) by mouth once daily.    tamsulosin  (FLOMAX) 0.4 mg Cap Take 1 capsule (0.4 mg total) by mouth once daily.    timolol maleate 0.5% (TIMOPTIC) 0.5 % Drop Place 1 drop into both eyes 2 (two) times daily. (Patient taking differently: Place 1 drop into both eyes once daily. )    travoprost (TRAVATAN Z) 0.004 % Drop Place 1 drop into both eyes every evening. (Patient taking differently: Place 1 drop into both eyes 2 (two) times daily. )     Family History     Problem Relation (Age of Onset)    Arthritis Father    Cancer Father        Tobacco Use    Smoking status: Never Smoker    Smokeless tobacco: Never Used   Substance and Sexual Activity    Alcohol use: Yes     Alcohol/week: 1.0 standard drinks     Types: 1 Glasses of wine per week     Frequency: Monthly or less     Binge frequency: Never     Comment: weekly with meal    Drug use: No    Sexual activity: Yes     Review of Systems   Constitution: Negative for chills and fever.   Cardiovascular: Negative for chest pain, dyspnea on exertion, palpitations and syncope.   Respiratory: Negative for cough and sleep disturbances due to breathing.    Musculoskeletal: Negative for back pain.   Gastrointestinal: Negative for abdominal pain, nausea and vomiting.   Neurological: Negative for dizziness and focal weakness.   Psychiatric/Behavioral: Negative for altered mental status.     Objective:     Vital Signs (Most Recent):    Vital Signs (24h Range):           There is no height or weight on file to calculate BMI.            No intake or output data in the 24 hours ending 12/03/20 0724    Lines/Drains/Airways     None                 Physical Exam   Constitutional: He is oriented to person, place, and time. He appears well-developed and well-nourished. No distress.   HENT:   Head: Normocephalic and atraumatic.   Eyes: Conjunctivae and EOM are normal.   Neck: Normal range of motion. No tracheal deviation present.   Cardiovascular: Normal rate, regular rhythm and normal heart sounds.   No murmur  heard.  Pulmonary/Chest: Effort normal and breath sounds normal. No respiratory distress. He has no wheezes.   Abdominal: Soft. Bowel sounds are normal. He exhibits no distension. There is no abdominal tenderness.   Musculoskeletal: Normal range of motion.         General: No edema.   Neurological: He is alert and oriented to person, place, and time.   Skin: He is not diaphoretic.       Significant Labs: All pertinent lab results from the last 24 hours have been reviewed.    Significant Imaging: All pertinent images from the last 24h have been reviewed.      Assessment and Plan:     PAF (paroxysmal atrial fibrillation)  1. GAIL for evaluation of JAN clot and during Watchman procedure  -No absolute contraindications of esophageal stricture, tumor, perforation, laceration,or diverticulum and/or active GI bleed  -The risks, benefits & alternatives of the procedure were explained to the patient.   -The risks of transesophageal echo include but are not limited to:  Dental trauma, esophageal trauma/perforation, bleeding, laryngospasm/brochospasm, aspiration, sore throat/hoarseness, & dislodgement of the endotracheal tube/nasogastric tube (where applicable).    -The risks of moderate sedation include hypotension, respiratory depression, arrhythmias, bronchospasm, & death.    -Informed consent was obtained. The patient is agreeable to proceed with the procedure and all questions and concerns addressed.    Case discussed with an attending in echocardiography lab.     Further recommendations per attending addendum          VTE Risk Mitigation (From admission, onward)    None          Thank you for your consult.     Tra Fowler MD  Cardiology   [facility]

## 2020-12-03 NOTE — PROGRESS NOTES
Patient does have fentanyl patch on left side lateral chest patient states he put it on yesterday at home also I ordered medication from pharmacy that patient is due for.

## 2020-12-03 NOTE — ANESTHESIA PROCEDURE NOTES
Intubation  Performed by: Katie Billy CRNA  Authorized by: Nick Cabrera MD     Intubation:     Induction:  Intravenous    Intubated:  Postinduction    Mask Ventilation:  Easy mask    Attempts:  1    Attempted By:  CRNA    Method of Intubation:  Direct    Blade:  Duncan 3    Laryngeal View Grade: Grade IIA - cords partially seen      Difficult Airway Encountered?: No      Complications:  None    Airway Device:  Oral endotracheal tube    Airway Device Size:  8.0    Style/Cuff Inflation:  Cuffed    Inflation Amount (mL):  7    Tube secured:  23    Secured at:  The lips    Placement Verified By:  Capnometry    Complicating Factors:  Large/floppy epiglottis    Findings Post-Intubation:  BS equal bilateral and atraumatic/condition of teeth unchanged

## 2020-12-03 NOTE — ANESTHESIA PREPROCEDURE EVALUATION
12/03/2020  Artemio Colon . is a 89 y.o., male   Patient Active Problem List   Diagnosis    Coronary artery disease involving native coronary artery of native heart without angina pectoris    PAF (paroxysmal atrial fibrillation)    Cardiac pacemaker in situ    Complete AV block    Neuropathic pain    Lumbar spinal stenosis    Cervical stenosis of spine    Open-angle glaucoma of right eye, indeterminate stage    Bilateral renal cysts    Onychomycosis    Neuropathy    Obstructive sleep apnea on CPAP    Chronic systolic congestive heart failure, pacing induced now s/p upgrade to CRT-P 2/2018    Cardiomyopathy    Dyslipidemia    Lower GI bleed    Diverticulosis of intestine with bleeding    Benign prostatic hyperplasia without lower urinary tract symptoms    Aortic atherosclerosis -- seen CT 02/2019     .    Anesthesia Evaluation          Review of Systems      Physical Exam   Airway/Jaw/Neck:  Airway Findings: Mouth Opening: Normal Tongue: Normal  General Airway Assessment: Adult  Mallampati: II  Improves to II with phonation.  TM Distance: Normal, at least 6 cm      Dental:  No active dental problems.    Chest/Lungs:  Chest/Lungs Findings: Clear to auscultation     Heart/Vascular:  Heart Findings: Rate: Normal  Rhythm: Irregularly Irregular  Sounds: Normal        Mental Status:  Mental Status Findings:  Cooperative, Alert and Oriented         Anesthesia Plan  Type of Anesthesia, risks & benefits discussed:  Anesthesia Type:  general  Patient's Preference:   Intra-op Monitoring Plan: standard ASA monitors  Intra-op Monitoring Plan Comments:   Post Op Pain Control Plan:   Post Op Pain Control Plan Comments:   Induction:   IV  Beta Blocker:         Informed Consent: Patient understands risks and agrees with Anesthesia plan.  Questions answered. Anesthesia consent signed with  patient.  ASA Score: 3     Day of Surgery Review of History & Physical:        Anesthesia Plan Notes: Chart reviewed, patient interviewed and examined.  The plan for anesthesia for this case was discussed.  Questions were answered and the consent was signed.  Nick Shetty For Surgery From Anesthesia Perspective.

## 2020-12-03 NOTE — PROGRESS NOTES
Patient admitted to recovery see [facility] for complete assessment pacu bcg's maintained safety measures verified patient instructed on pain scale and patient verbalized understanding. Called patient's wife and updated on patient location with the permission of patient.

## 2020-12-03 NOTE — SUBJECTIVE & OBJECTIVE
Past Medical History:   Diagnosis Date    AV block     BPH (benign prostatic hyperplasia)     Chronic rhinitis     Coronary artery disease     Erectile dysfunction     Glaucoma     Neuropathy 8/22/2017    JAEL (obstructive sleep apnea)     Pacemaker     Symptomatic bradycardia     s/p pacemaker       Past Surgical History:   Procedure Laterality Date    CATARACT EXTRACTION W/  INTRAOCULAR LENS IMPLANT Bilateral 2012    done in [place]    CHOLECYSTECTOMY      COLECTOMY      cecum    EYE SURGERY      HERNIA REPAIR      INSERT / REPLACE / REMOVE PACEMAKER  2012    changed    PROSTATE SURGERY         Review of patient's allergies indicates:   Allergen Reactions    Pcn [penicillins] Hives       No current facility-administered medications on file prior to encounter.      Current Outpatient Medications on File Prior to Encounter   Medication Sig    apixaban (ELIQUIS) 5 mg Tab Take 1 tablet (5 mg total) by mouth 2 (two) times daily.    aspirin 81 MG Chew Take 81 mg by mouth once daily.    diclofenac sodium (VOLTAREN) 1 % Gel Use to affected joints up to 4 times a day    fentaNYL (DURAGESIC) 25 mcg/hr Place 1 patch onto the skin every 72 hours.    finasteride (PROSCAR) 5 mg tablet     fluticasone (FLONASE) 50 mcg/actuation nasal spray 2 sprays by Each Nare route once daily.    gabapentin (NEURONTIN) 600 MG tablet Take 1 tablet (600 mg total) by mouth once daily.    hydrocodone-acetaminophen 10-325mg (NORCO)  mg Tab Take by mouth every 4 (four) hours as needed for Pain.    nortriptyline (PAMELOR) 10 MG capsule Take 1 capsule (10 mg total) by mouth every evening.    pravastatin (PRAVACHOL) 40 MG tablet Take 1 tablet (40 mg total) by mouth once daily.    tamsulosin (FLOMAX) 0.4 mg Cap Take 1 capsule (0.4 mg total) by mouth once daily.    timolol maleate 0.5% (TIMOPTIC) 0.5 % Drop Place 1 drop into both eyes 2 (two) times daily. (Patient taking differently: Place 1 drop into both eyes once  daily. )    travoprost (TRAVATAN Z) 0.004 % Drop Place 1 drop into both eyes every evening. (Patient taking differently: Place 1 drop into both eyes 2 (two) times daily. )     Family History     Problem Relation (Age of Onset)    Arthritis Father    Cancer Father        Tobacco Use    Smoking status: Never Smoker    Smokeless tobacco: Never Used   Substance and Sexual Activity    Alcohol use: Yes     Alcohol/week: 1.0 standard drinks     Types: 1 Glasses of wine per week     Frequency: Monthly or less     Binge frequency: Never     Comment: weekly with meal    Drug use: No    Sexual activity: Yes     Review of Systems   Constitution: Negative for chills and fever.   Cardiovascular: Negative for chest pain, dyspnea on exertion, palpitations and syncope.   Respiratory: Negative for cough and sleep disturbances due to breathing.    Musculoskeletal: Negative for back pain.   Gastrointestinal: Negative for abdominal pain, nausea and vomiting.   Neurological: Negative for dizziness and focal weakness.   Psychiatric/Behavioral: Negative for altered mental status.     Objective:     Vital Signs (Most Recent):    Vital Signs (24h Range):           There is no height or weight on file to calculate BMI.            No intake or output data in the 24 hours ending 12/03/20 0724    Lines/Drains/Airways     None                 Physical Exam   Constitutional: He is oriented to person, place, and time. He appears well-developed and well-nourished. No distress.   HENT:   Head: Normocephalic and atraumatic.   Eyes: Conjunctivae and EOM are normal.   Neck: Normal range of motion. No tracheal deviation present.   Cardiovascular: Normal rate, regular rhythm and normal heart sounds.   No murmur heard.  Pulmonary/Chest: Effort normal and breath sounds normal. No respiratory distress. He has no wheezes.   Abdominal: Soft. Bowel sounds are normal. He exhibits no distension. There is no abdominal tenderness.   Musculoskeletal: Normal  range of motion.         General: No edema.   Neurological: He is alert and oriented to person, place, and time.   Skin: He is not diaphoretic.       Significant Labs: All pertinent lab results from the last 24 hours have been reviewed.    Significant Imaging: All pertinent images from the last 24h have been reviewed.

## 2020-12-03 NOTE — HPI
89 year old male with CAD 50-60% mid LAD with significant Diagonal and PDA stenosis 80%, Complete AV block s/p  CRT-P 2/2018, Obstructive sleep apnea on CPAP, and Aortic atherosclerosis. 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 From EP clinic He has had issues with recurrent GI bleeding not on anticoagulation (previously tried xarelto in [id number] for symptomatic AF and had issues with excessive bleeding then). He was hospitalized for a diverticular bleed in March of 2019. NO recent GI Bleeding from upper GI source and prior notes stated Lower GI bleeding.     Dysphagia or odynophagia:  No  Liver Disease, esophageal disease, or known varices:  No  Upper GI Bleeding: No  Snoring:  No  Sleep Apnea:  Yes  Prior neck surgery or radiation:  No  History of anesthetic difficulties:  No  Family history of anesthetic difficulties:  No  Last oral intake:  12 hours ago  Able to move neck in all directions:  Yes  Stroke History:  No  Last dose of anticoagulation:  Last Night    Anticoagulation: Eliquis  Antiplatelets: Aspirin      Card Meds: Apixaban 5 mg BID, ASA, Pravastatin 40 mg.    TTE 8/2018  Upper limit of normal left ventricular enlargement.  Normal left ventricular systolic function (EF 60-65%). no wall motion abnormalities.   Indeterminate LV diastolic function. Right ventricle is the upper limit of normal in size with normal systolic function.   Biatrial enlargement. Trivial aortic regurgitation. Trivial to mild tricuspid regurgitation. Trivial pulmonic regurgitation.

## 2020-12-03 NOTE — ASSESSMENT & PLAN NOTE
1. GAIL for evaluation of JAN clot and during Watchman procedure  -No absolute contraindications of esophageal stricture, tumor, perforation, laceration,or diverticulum and/or active GI bleed  -The risks, benefits & alternatives of the procedure were explained to the patient.   -The risks of transesophageal echo include but are not limited to:  Dental trauma, esophageal trauma/perforation, bleeding, laryngospasm/brochospasm, aspiration, sore throat/hoarseness, & dislodgement of the endotracheal tube/nasogastric tube (where applicable).    -The risks of moderate sedation include hypotension, respiratory depression, arrhythmias, bronchospasm, & death.    -Informed consent was obtained. The patient is agreeable to proceed with the procedure and all questions and concerns addressed.    Case discussed with an attending in echocardiography lab.     Further recommendations per attending addendum

## 2020-12-03 NOTE — TRANSFER OF CARE
"Anesthesia Transfer of Care Note    Patient: Artemio Colon Sr.    Procedure(s) Performed: Procedure(s) (LRB):  Left atrial appendage closure device (N/A)  Transesophageal echo (GAIL) intra-procedure log documentation (N/A)    Patient location: PACU    Transport from OR: Transported from OR on 6-10 L/min O2 by face mask with adequate spontaneous ventilation    Post pain: adequate analgesia    Post assessment: no apparent anesthetic complications and tolerated procedure well    Post vital signs: stable    Level of consciousness: responds to stimulation and sedated    Nausea/Vomiting: no nausea/vomiting    Complications: none    Transfer of care protocol was followed      Last vitals:   Visit Vitals  /67 (BP Location: Left arm, Patient Position: Lying)   Pulse 63   Temp 36.6 °C (97.9 °F) (Oral)   Resp 16   Ht 5' 11" (1.803 m)   Wt 82.6 kg (182 lb)   SpO2 98%   BMI 25.38 kg/m²     "

## 2020-12-03 NOTE — PROGRESS NOTES
Called patient's wife and let her know room number and got patient's belongings from  Short stay placed on patient's stretcher to go to room and watchman card in patient's chart.

## 2020-12-04 VITALS
SYSTOLIC BLOOD PRESSURE: 109 MMHG | TEMPERATURE: 98 F | DIASTOLIC BLOOD PRESSURE: 75 MMHG | HEART RATE: 65 BPM | WEIGHT: 182 LBS | HEIGHT: 71 IN | BODY MASS INDEX: 25.48 KG/M2 | OXYGEN SATURATION: 99 % | RESPIRATION RATE: 16 BRPM

## 2020-12-04 PROCEDURE — 25000003 PHARM REV CODE 250: Performed by: INTERNAL MEDICINE

## 2020-12-04 RX ADMIN — PRAVASTATIN SODIUM 40 MG: 40 TABLET ORAL at 08:12

## 2020-12-04 RX ADMIN — TAMSULOSIN HYDROCHLORIDE 0.4 MG: 0.4 CAPSULE ORAL at 08:12

## 2020-12-04 RX ADMIN — ASPIRIN 81 MG CHEWABLE TABLET 81 MG: 81 TABLET CHEWABLE at 08:12

## 2020-12-04 RX ADMIN — FINASTERIDE 5 MG: 5 TABLET, FILM COATED ORAL at 08:12

## 2020-12-04 RX ADMIN — APIXABAN 5 MG: 5 TABLET, FILM COATED ORAL at 08:12

## 2020-12-04 NOTE — PLAN OF CARE
Plan of care discussed with patient, no new questions at this time. VSS, denies SOB, no complaint of pain. S/p watchmen procedure. Patient remains NSR post procedure. Pressure dressing in place overnight to R groin site. Bleeding controlled. Safety precautions taken, no falls/trauma during the shift. Will continue to monitor.

## 2020-12-04 NOTE — DISCHARGE SUMMARY
[facility]  Cardiac Electrophysiology  Discharge Summary      Patient Name: Artemio Colon Sr.  MRN: [id number]  Admission Date: 12/3/2020  Hospital Length of Stay: 1 days  Discharge Date and Time:  12/04/2020 7:49 AM  Attending Physician: Tawanda Adler MD    Discharging Provider: Ashley Trejo MD  Primary Care Physician: Osiel Stone MD    HPI/Hospital Course:    Mr. Colon, 89 yo M with paroxysmal atrial fibrillation ([id number] score of 3) and recurrent GI bleeding 2/2 diverticular disease was admitted for and underwent a JAN procedure with a Watchman device. Pt tolerated procedure well. He has a small hematoma evening of procedure. Pressure dressing applied and pt had no evidence of hematoma the following morning. Pt to continue his anticoagulation (eliquis) for 6 weeks along with ASA. He will be scheduled for GAIL in 6 weeks to evaluate for a leak around closure device. If there is no leak, eliquis will be discontinued and switched to a second antiplatelet. Pt to follow up with Dr. Adler in EP clinic in 3 weeks.    Procedure(s) (LRB):  Left atrial appendage closure device (N/A)  Transesophageal echo (GAIL) intra-procedure log documentation (N/A)         Pending Diagnostic Studies:     Procedure Component Value Units Date/Time    Transesophageal echo (GAIL) [[id number]]     Order Status: Sent Lab Status: No result           Final Active Diagnoses:    Diagnosis Date Noted POA    PRINCIPAL PROBLEM:  PAF (paroxysmal atrial fibrillation) [I48.0] 06/20/2017 Yes      Problems Resolved During this Admission:     No new Assessment & Plan notes have been filed under this hospital service since the last note was generated.  Service: Arrhythmia      Discharged Condition: stable    Disposition: Home or self care    Follow Up:  Follow-up Information     [id number] In 3 months.    Specialty: Cardiology  Contact information:  [address]  [facility]  [address]  [phone]  Additional information:  Cardiology Services Clinics - Main Building, Atrium 3rd Floor   Please park in [facility] and use Atrium elevator               Patient Instructions:     Please continue Eliquis and Aspirin as directed for 6 weeks. Afterwards you will ave a repeat transeophageal echocardiogram. If there is no leak seen from your closure device, your eliquis will be discontinued and switched to another antiplatelet medication (plavix).      Medications:  Reconciled Home Medications:      Medication List      CHANGE how you take these medications    timolol maleate 0.5% 0.5 % Drop  Commonly known as: TIMOPTIC  Place 1 drop into both eyes 2 (two) times daily.  What changed: when to take this     travoprost 0.004 % ophthalmic solution  Commonly known as: TRAVATAN Z  Place 1 drop into both eyes every evening.  What changed: when to take this        CONTINUE taking these medications    apixaban 5 mg Tab  Commonly known as: ELIQUIS  Take 1 tablet (5 mg total) by mouth 2 (two) times daily.     aspirin 81 MG Chew  Take 81 mg by mouth once daily.     diclofenac sodium 1 % Gel  Commonly known as: VOLTAREN  Use to affected joints up to 4 times a day     fentaNYL 25 mcg/hr  Commonly known as: DURAGESIC  Place 1 patch onto the skin every 72 hours.     finasteride 5 mg tablet  Commonly known as: PROSCAR     fluticasone propionate 50 mcg/actuation nasal spray  Commonly known as: FLONASE  2 sprays by Each Nare route once daily.     gabapentin 600 MG tablet  Commonly known as: NEURONTIN  Take 1 tablet (600 mg total) by mouth once daily.     HYDROcodone-acetaminophen  mg per tablet  Commonly known as: NORCO  Take by mouth every 4 (four) hours as needed for Pain.     nortriptyline 10 MG capsule  Commonly known as: PAMELOR  Take 1 capsule (10 mg total) by mouth every evening.     pravastatin 40 MG tablet  Commonly known as: PRAVACHOL  Take 1 tablet (40 mg total) by mouth once daily.      tamsulosin 0.4 mg Cap  Commonly known as: FLOMAX  Take 1 capsule (0.4 mg total) by mouth once daily.            Time spent on the discharge of patient: 20 minutes    Ashley Trejo MD  Cardiac Electrophysiology  [facility]

## 2020-12-04 NOTE — PLAN OF CARE
12/04/20 1043   Final Note   Assessment Type Final Discharge Note   Anticipated Discharge Disposition Home   CM went to the room for DC needs assessment. Pt had been DC prior to my arrival. Pt's nurse stated he appeared independent in his ADLs.

## 2020-12-04 NOTE — DISCHARGE INSTRUCTIONS
Please continue Eliquis and Aspirin as directed for 6 weeks. Afterwards you will ave a repeat transeophageal echocardiogram. If there is no leak seen from your closure device, your eliquis will be discontinued and switched to another antiplatelet medication (plavix).

## 2020-12-04 NOTE — PLAN OF CARE
Pt maintained free from falls/ trauma/ injuries and skin breakdown. He had an ablation done this shift. Rt groin site access, Suture removed at 1530, small hematoma noted Dr. Menchaca aware. Been on bedrest until shift change, Pressure dressing applied and no active drainage noted at shift change. Pt due to be discharge tmr. Plan of care reviewed. Pt denied pain or discomfort. Pt verbalized understanding. All questions and concerns addressed. Will continue to monitor.

## 2020-12-04 NOTE — ANESTHESIA POSTPROCEDURE EVALUATION
Anesthesia Post Evaluation    Patient: Artemio Colon .    Procedure(s) Performed: Procedure(s) (LRB):  Left atrial appendage closure device (N/A)  Transesophageal echo (GAIL) intra-procedure log documentation (N/A)    Final Anesthesia Type: general    Patient location during evaluation: PACU  Patient participation: Yes- Able to Participate  Level of consciousness: awake and alert  Post-procedure vital signs: reviewed and stable  Pain management: adequate  Airway patency: patent    PONV status at discharge: No PONV  Anesthetic complications: no      Cardiovascular status: hemodynamically stable  Respiratory status: unassisted  Hydration status: euvolemic  Follow-up not needed.          Vitals Value Taken Time   /75 12/04/20 0715   Temp 36.6 °C (97.9 °F) 12/04/20 0715   Pulse 66 12/04/20 0731   Resp 16 12/04/20 0715   SpO2 95 % 12/04/20 0731   Vitals shown include unvalidated device data.      Event Time   Out of Recovery 14:20:00         Pain/Carlyle Score: Pain Rating Prior to Med Admin: 8 (12/3/2020 12:17 PM)  Pain Rating Post Med Admin: 5 (12/3/2020  1:59 PM)  Carlyle Score: 10 (12/3/2020 11:45 AM)

## 2020-12-04 NOTE — NURSING
Discharge instructions given to patient, verbalized understanding. Written copy of instructions given to patient. Spouse at bedside. PIV, VISI, and telemetry removed. Getting dressed, will call nurses station to let RN know when ready for transport for D/C.

## 2020-12-07 ENCOUNTER — PATIENT MESSAGE (OUTPATIENT)
Dept: INTERNAL MEDICINE | Facility: CLINIC | Age: 85
End: 2020-12-07

## 2020-12-07 ENCOUNTER — PATIENT OUTREACH (OUTPATIENT)
Dept: ADMINISTRATIVE | Facility: CLINIC | Age: 85
End: 2020-12-07

## 2020-12-07 LAB
BLD PROD TYP BPU: NORMAL
BLOOD UNIT EXPIRATION DATE: NORMAL
BLOOD UNIT TYPE CODE: 6200
BLOOD UNIT TYPE: NORMAL
CODING SYSTEM: NORMAL
DISPENSE STATUS: NORMAL
POC ACTIVATED CLOTTING TIME K: 268 SEC (ref 74–137)
SAMPLE: ABNORMAL
TRANS ERYTHROCYTES VOL PATIENT: NORMAL ML

## 2020-12-07 NOTE — PROGRESS NOTES
C3 nurse attempted to contact patient. No answer. The following message was left for the patient to return the call:  Good afternoon, I am a nurse calling on behalf of [facility] from the [facility].  This is a Transitional Care Call for Artemio Colon Sr.. When you have a moment please contact us at [phone] or [phone] Monday through Friday, between the hours of 8 am to 4 pm. We look forward to speaking with you. On behalf of [facility] have a nice day.    The patient does not have a scheduled HOSFU appointment within 7-14 days post hospital discharge date 12/4/20. Message sent to Physician staff to assist with HOSFU appointment scheduling.

## 2020-12-11 NOTE — PATIENT INSTRUCTIONS
Discharge Instructions for Atrial Fibrillation  You have been diagnosed with an abnormal heart rhythm called atrial fibrillation. With this condition, your hearts 2 upper chambers quiver rather than squeeze the blood out in a normal pattern. This leads to an irregular and sometimes rapid heartbeat. Some people will develop associated symptoms such as a flip-flopping heartbeat, chest pain, lightheadedness, or shortness of breath. Other people may have no symptoms at all. Atrial fibrillation is serious because it affects the hearts ability to fill with blood as it should. Blood clots may form. This increases the risk for stroke. Untreated atrial fibrillation can also lead to heart failure. Atrial fibrillation can be controlled. With treatment, most people with atrial fibrillation lead normal lives.  Treatment options  Recommended treatment for atrial fibrillation depends on your age, symptoms, how long you have had atrial fibrillation, and other factors. You will have a complete evaluation to find out if you have any abnormalities that caused your heart to go into atrial fibrillation. This might be blocked heart arteries or a thyroid problem. Your doctor will assess your particular case and discuss choices with you.  Treatment choices may include:  · Treating an underlying disorder that puts you at risk for atrial fibrillation. For example, correcting an abnormal thyroid or electrolyte problem, or treating a blocked heart artery.  · Restoring a normal heart rhythm with an electrical shock (cardioversion) or with an antiarrhythmic medicine (chemical cardioversion)  · Using medicine to control your heart rate in atrial fibrillation.  · Preventing the risk for blood clot and stroke using blood-thinning medicines. Your doctor will tell you what he or she recommends. Choices may include aspirin, clopidogrel, warfarin, dabigatran, rivaroxaban, apixaban, and edoxaban.  · Doing catheter ablation or a surgical maze  procedure. These use different methods to destroy certain areas of heart tissue. This interrupts the electrical signals causing atrial fibrillation. One of these procedures may be a choice when medicines do not work, or as an alternative to long-term medicine.  · Other treatment choices may be recommended for you by your doctor.  Managing risk factors for stroke and preventing heart failure are important parts of any treatment plan for atrial fibrillation.  Home care  · Take your medicines exactly as directed. Dont skip doses.  · Work with your doctor to find the right medicines and doses for you.  · Learn to take your own pulse. Keep a record of your results. Ask your doctor which pulse rates mean that you need medical attention. Slowing your pulse is often the goal of treatment. Ask your doctor if its OK for you to use an automatic machine to check your pulse at home. Sometimes these machines dont count the pulse correctly when you have atrial fibrillation.  · Limit your intake of coffee, tea, cola, and other beverages with caffeine. Talk with your doctor about whether you should eliminate caffeine.  · Avoid over-the-counter medicines that have caffeine in them.  · Let your doctor know what medicines you take, including prescription and over-the-counter medicines, as well as any supplements. They interfere with some medicines given for atrial fibrillation.  · Ask your doctor about whether you can drink alcohol. Some people need to avoid alcohol to better treat atrial fibrillation. If you are taking blood-thinner medicines, alcohol may interfere with them by increasing their effect.  · Never take stimulants such as amphetamines or cocaine. These drugs can speed up your heart rate and trigger atrial fibrillation.  Follow-up care  Follow up with your doctor, or as advised.     When should I call my healthcare provider  Call your healthcare provider right away if you have any of the  following:  · Weakness  · Dizziness  · Fainting  · Fatigue  · Shortness of breath  · Chest pain with increased activity  · A change in the usual regularity of your heartbeat, or an unusually fast heartbeat     © 8199-0591 [employer]. [address] [place] [address], [place] [address]. All rights reserved. This information is not intended as a substitute for professional medical care. Always follow your healthcare professional's instructions.

## 2020-12-11 NOTE — PROGRESS NOTES
C3 nurse attempted to contact patient. No answer. The following message was left for the patient to return the call:  Good Morning, I am a nurse calling on behalf of [facility] from the [facility]ination [facility].  This is a Transitional Care Call for Artemio Colon Sr.. When you have a moment please contact us at [phone] or [phone] Monday through Friday, between the hours of 8 am to 4 pm. We look forward to speaking with you. On behalf of [facility] have a nice day.    The patient does not have a scheduled HOSFU appointment within 7-14 days post hospital discharge date 12/4/20. Message sent to Physician staff to assist with HOSFU appointment scheduling.

## 2020-12-15 ENCOUNTER — PATIENT MESSAGE (OUTPATIENT)
Dept: ELECTROPHYSIOLOGY | Facility: CLINIC | Age: 85
End: 2020-12-15

## 2020-12-16 ENCOUNTER — TELEPHONE (OUTPATIENT)
Dept: ELECTROPHYSIOLOGY | Facility: CLINIC | Age: 85
End: 2020-12-16

## 2020-12-16 DIAGNOSIS — I49.8 OTHER SPECIFIED CARDIAC ARRHYTHMIAS: Primary | ICD-10-CM

## 2020-12-16 NOTE — TELEPHONE ENCOUNTER
A vm was left for  to call the office of  in regards to scheduling a post procedure follow up         ----- Message from Tawanda Adler MD sent at 12/11/2020  6:15 AM CST -----  Regarding: RE:  8 weeks post watchman is correct follow up. not 3 weeks  ----- Message -----  From: Neetu Schaeffer MA  Sent: 12/10/2020   5:21 PM CST  To: Tawanda Adler MD    Hi      Should  be scheduled before his upcoming procedure on 01/19 with you.     See below and please advise   ----- Message -----  From: Christina Calixto RN  Sent: 12/10/2020  10:58 AM CST  To: Neetu Schaeffer MA    The Watchman patients should be seen at about 8 weeks post procedure.   Thanks  ----- Message -----  From: Neetu Schaeffer MA  Sent: 12/10/2020  10:39 AM CST  To: Christina Calixto RN    [facility]        had a procedure done on 12/03. D/c notes says f/u in 3 weeks. Pt is also scheduled on 01/19 for s/p Watchman.       Should this pt follow up before upcoming apt on 01/19 .    Please advise

## 2020-12-21 ENCOUNTER — TELEPHONE (OUTPATIENT)
Dept: ELECTROPHYSIOLOGY | Facility: CLINIC | Age: 85
End: 2020-12-21

## 2020-12-21 ENCOUNTER — PATIENT MESSAGE (OUTPATIENT)
Dept: ELECTROPHYSIOLOGY | Facility: CLINIC | Age: 85
End: 2020-12-21

## 2020-12-21 NOTE — TELEPHONE ENCOUNTER
----- Message from Tawanda Hightower sent at 12/21/2020  3:58 PM CST -----  Rafy Zaroc,    I scheduled Mr. Colon to come into the Device Clinic on 12/23/20 for assessment and reprogramming if reproducible.  He was seen in May for this same type issue which is Diaphragmatic Stim.      Thanks,  Tawanda

## 2020-12-22 ENCOUNTER — DOCUMENTATION ONLY (OUTPATIENT)
Dept: CARDIOLOGY | Facility: HOSPITAL | Age: 85
End: 2020-12-22

## 2020-12-22 NOTE — PROGRESS NOTES
"Contacted the pt on yesterday afternoon.  Pt with c/o "uncomfortable experience from the electrical stimulation of the third lead".  Pt also stated the muscle contractions are stronger and more often. (please see message sent by the pt via his pt portal). Pt stated this has been going on for over a month.  Scheduled and in clinic pacemaker appt with the pt on 12/23/20 @ 1:40 pm for evaluation.  Pt verbalized understanding.  Pt appreciated the call.     "

## 2020-12-23 ENCOUNTER — CLINICAL SUPPORT (OUTPATIENT)
Dept: CARDIOLOGY | Facility: HOSPITAL | Age: 85
End: 2020-12-23
Attending: INTERNAL MEDICINE
Payer: MEDICARE

## 2020-12-23 DIAGNOSIS — I49.8 OTHER SPECIFIED CARDIAC ARRHYTHMIAS: ICD-10-CM

## 2020-12-23 PROCEDURE — 93281 PM DEVICE PROGR EVAL MULTI: CPT

## 2020-12-31 ENCOUNTER — OFFICE VISIT (OUTPATIENT)
Dept: INTERNAL MEDICINE | Facility: CLINIC | Age: 85
End: 2020-12-31
Payer: MEDICARE

## 2020-12-31 VITALS
SYSTOLIC BLOOD PRESSURE: 136 MMHG | HEART RATE: 63 BPM | DIASTOLIC BLOOD PRESSURE: 68 MMHG | HEIGHT: 70 IN | BODY MASS INDEX: 27.36 KG/M2 | WEIGHT: 191.13 LBS | OXYGEN SATURATION: 95 %

## 2020-12-31 DIAGNOSIS — R26.89 IMBALANCE: ICD-10-CM

## 2020-12-31 DIAGNOSIS — Z87.19 HISTORY OF GI DIVERTICULAR BLEED: ICD-10-CM

## 2020-12-31 DIAGNOSIS — G62.9 NEUROPATHY: Primary | ICD-10-CM

## 2020-12-31 DIAGNOSIS — E78.5 DYSLIPIDEMIA: ICD-10-CM

## 2020-12-31 PROCEDURE — 99214 OFFICE O/P EST MOD 30 MIN: CPT | Mod: PBBFAC | Performed by: INTERNAL MEDICINE

## 2020-12-31 PROCEDURE — 99215 OFFICE O/P EST HI 40 MIN: CPT | Mod: S$PBB,,, | Performed by: INTERNAL MEDICINE

## 2020-12-31 PROCEDURE — 99215 PR OFFICE/OUTPT VISIT, EST, LEVL V, 40-54 MIN: ICD-10-PCS | Mod: S$PBB,,, | Performed by: INTERNAL MEDICINE

## 2020-12-31 PROCEDURE — 99999 PR PBB SHADOW E&M-EST. PATIENT-LVL IV: CPT | Mod: PBBFAC,,, | Performed by: INTERNAL MEDICINE

## 2020-12-31 PROCEDURE — 99999 PR PBB SHADOW E&M-EST. PATIENT-LVL IV: ICD-10-PCS | Mod: PBBFAC,,, | Performed by: INTERNAL MEDICINE

## 2020-12-31 RX ORDER — NORTRIPTYLINE HYDROCHLORIDE 10 MG/1
20 CAPSULE ORAL NIGHTLY
Qty: 180 CAPSULE | Refills: 3
Start: 2020-12-31 | End: 2021-03-26

## 2020-12-31 NOTE — PATIENT INSTRUCTIONS
Please use the exercise equipment at home for muscle tone and strengthening. Once pandemic concerns are done, if the imbalance persists we can look to set up Physical Therapy.    To help reduce stomach acid, please start over-the-counter Pepcid (famotidine) two times a day about 15-30 minutes before meals (breakfast and dinner) for 1-2 weeks. Afterwards, use in the future as needed if symptoms recur. Please notify the office if the symptoms persist or worsen.      Please continue Miralax daily. If constipation persists you can also add in over-the-counter Metamucil fiber supplement.

## 2020-12-31 NOTE — PROGRESS NOTES
Subjective:       Patient ID: Artemio Colon Sr. is a 89 y.o. male.    Chief Complaint: Annual Exam      Last visit with me 6/16/2020. Since then seen by Cardiology; went for L atrial appendage closure 12/3/20. Upcoming Cardiology visits.    HPI    At last visit with me started Pamelor for neuropathy, stopped gabapentin. Patient reports taking 2 caps qhs and this is helping with his nerve pain much better.    Continuing on Eliquis, has upcoming EGD. Had diverticulitis with bleeding in past.    Notes some more walking imbalance. No falls. Has history of leg problems. No Physical Therapy in past but does have exercise equipment at home.     Reviewed PMH, PSH, SH, FH, allergies, and medications.     Review of Systems   Constitutional: Negative for activity change.   HENT: Negative for hearing loss and trouble swallowing.    Eyes: Negative for discharge.   Respiratory: Negative for chest tightness and wheezing.    Cardiovascular: Negative for chest pain and palpitations.   Gastrointestinal: Negative for constipation, diarrhea and vomiting.   Genitourinary: Negative for difficulty urinating and hematuria.   Neurological: Positive for headaches.   Psychiatric/Behavioral: Negative for dysphoric mood.       Objective:      Physical Exam  Vitals signs and nursing note reviewed.   Constitutional:       General: He is not in acute distress.     Appearance: He is not diaphoretic.   HENT:      Head: Normocephalic and atraumatic.      Right Ear: External ear normal.      Left Ear: External ear normal.      Ears:      Comments: Hearing aids bilaterally   Eyes:      General:         Right eye: No discharge.         Left eye: No discharge.      Pupils: Pupils are equal, round, and reactive to light.   Neck:      Musculoskeletal: Normal range of motion.      Thyroid: No thyromegaly.   Cardiovascular:      Rate and Rhythm: Normal rate and regular rhythm.      Heart sounds: Normal heart sounds.   Pulmonary:      Effort:  "Pulmonary effort is normal.      Breath sounds: Normal breath sounds. No stridor. No wheezing or rales.   Abdominal:      Palpations: Abdomen is soft.      Tenderness: There is no abdominal tenderness. There is no guarding.   Musculoskeletal:         General: No tenderness.      Right lower leg: No edema.      Left lower leg: No edema.   Lymphadenopathy:      Cervical: No cervical adenopathy.   Skin:     General: Skin is warm and dry.      Findings: No rash.   Neurological:      General: No focal deficit present.      Mental Status: He is alert and oriented to person, place, and time.      Gait: Gait abnormal (ambulates with narrow steps; does not require assist device).   Psychiatric:         Mood and Affect: Mood normal.         Behavior: Behavior normal.         Vitals:    12/31/20 0915   BP: 136/68   BP Location: Right arm   Patient Position: Sitting   BP Method: Large (Manual)   Pulse: 63   SpO2: 95%   Weight: 86.7 kg (191 lb 2.2 oz)   Height: 5' 10" (1.778 m)     Body mass index is 27.43 kg/m².    RESULTS: Reviewed labs from last 12 months    Assessment:       1. Neuropathy    2. Dyslipidemia    3. Imbalance    4. History of GI diverticular bleed        Plan:   Artemio was seen today for annual exam.    Diagnoses and all orders for this visit:    Neuropathy:  Prior diagnosis, stable, well controlled on current management. No changes at this time, will continue to monitor.   -     nortriptyline (PAMELOR) 10 MG capsule; Take 2 capsules (20 mg total) by mouth every evening.    Dyslipidemia:  Prior diagnosis, stable, well controlled on current management. No changes at this time, will continue to monitor.   -     Comprehensive Metabolic Panel; Future  -     CBC Without Differential; Future  -     Lipid Panel; Future    Imbalance:  Prior diagnosis but steadily worsening. Encourage more home exercise. If any fall please notify the office. Counseled on home adjustments to remove tripping hazards and maintain good " lighting. Consider Physical Therapy once pandemic done.    History of GI diverticular bleed:  Prior diagnosis, currently asymptomatic, will aim to limit use of Eliquis to shortest duration, continue follow up with Cardiology.    Other orders  -     Cancel: Hemoglobin A1C; Future      Follow up in about 6 months (around 6/30/2021) for follow up visit, fasting labs 1 week prior.  Osiel Stone MD, FACP [facility]    Portions of this note were completed using medical dictation software. Please excuse typographical or syntax errors that were missed on review.

## 2021-01-09 ENCOUNTER — IMMUNIZATION (OUTPATIENT)
Dept: INTERNAL MEDICINE | Facility: CLINIC | Age: 86
End: 2021-01-09
Payer: MEDICARE

## 2021-01-09 DIAGNOSIS — Z23 NEED FOR VACCINATION: ICD-10-CM

## 2021-01-09 PROCEDURE — 91300 COVID-19, MRNA, LNP-S, PF, 30 MCG/0.3 ML DOSE VACCINE: CPT | Mod: PBBFAC

## 2021-01-16 ENCOUNTER — LAB VISIT (OUTPATIENT)
Dept: URGENT CARE | Facility: CLINIC | Age: 86
End: 2021-01-16
Payer: MEDICARE

## 2021-01-16 DIAGNOSIS — Z11.9 ENCOUNTER FOR SCREENING EXAMINATION FOR INFECTIOUS DISEASE: Primary | ICD-10-CM

## 2021-01-16 DIAGNOSIS — I48.0 PAF (PAROXYSMAL ATRIAL FIBRILLATION): ICD-10-CM

## 2021-01-16 DIAGNOSIS — Z95.818 PRESENCE OF WATCHMAN LEFT ATRIAL APPENDAGE CLOSURE DEVICE: ICD-10-CM

## 2021-01-16 LAB — SARS-COV-2 RNA RESP QL NAA+PROBE: NOT DETECTED

## 2021-01-16 PROCEDURE — U0003 INFECTIOUS AGENT DETECTION BY NUCLEIC ACID (DNA OR RNA); SEVERE ACUTE RESPIRATORY SYNDROME CORONAVIRUS 2 (SARS-COV-2) (CORONAVIRUS DISEASE [COVID-19]), AMPLIFIED PROBE TECHNIQUE, MAKING USE OF HIGH THROUGHPUT TECHNOLOGIES AS DESCRIBED BY CMS-2020-01-R: HCPCS

## 2021-01-18 ENCOUNTER — ANESTHESIA EVENT (OUTPATIENT)
Dept: MEDSURG UNIT | Facility: HOSPITAL | Age: 86
End: 2021-01-18
Payer: MEDICARE

## 2021-01-19 ENCOUNTER — ANESTHESIA (OUTPATIENT)
Dept: MEDSURG UNIT | Facility: HOSPITAL | Age: 86
End: 2021-01-19
Payer: MEDICARE

## 2021-01-19 ENCOUNTER — HOSPITAL ENCOUNTER (OUTPATIENT)
Facility: HOSPITAL | Age: 86
Discharge: HOME | End: 2021-01-19
Attending: INTERNAL MEDICINE | Admitting: INTERNAL MEDICINE
Payer: MEDICARE

## 2021-01-19 ENCOUNTER — HOSPITAL ENCOUNTER (OUTPATIENT)
Dept: CARDIOLOGY | Facility: HOSPITAL | Age: 86
Discharge: HOME | End: 2021-01-19
Attending: INTERNAL MEDICINE
Payer: MEDICARE

## 2021-01-19 VITALS
HEIGHT: 71 IN | DIASTOLIC BLOOD PRESSURE: 77 MMHG | SYSTOLIC BLOOD PRESSURE: 136 MMHG | WEIGHT: 191 LBS | BODY MASS INDEX: 26.74 KG/M2

## 2021-01-19 VITALS
SYSTOLIC BLOOD PRESSURE: 129 MMHG | TEMPERATURE: 98 F | DIASTOLIC BLOOD PRESSURE: 68 MMHG | RESPIRATION RATE: 18 BRPM | OXYGEN SATURATION: 100 % | HEIGHT: 71 IN | HEART RATE: 64 BPM | WEIGHT: 183 LBS | BODY MASS INDEX: 25.62 KG/M2

## 2021-01-19 DIAGNOSIS — Z95.818 PRESENCE OF WATCHMAN LEFT ATRIAL APPENDAGE CLOSURE DEVICE: ICD-10-CM

## 2021-01-19 DIAGNOSIS — I49.9 ARRHYTHMIA: ICD-10-CM

## 2021-01-19 DIAGNOSIS — Z01.89 ENCOUNTER FOR OTHER SPECIFIED SPECIAL EXAMINATIONS: ICD-10-CM

## 2021-01-19 LAB
BSA FOR ECHO PROCEDURE: 2.08 M2
PROX AORTA: 4.1 CM
SINUS: 4.3 CM
STJ: 3.3 CM

## 2021-01-19 PROCEDURE — 25000003 PHARM REV CODE 250: Performed by: NURSE ANESTHETIST, CERTIFIED REGISTERED

## 2021-01-19 PROCEDURE — 37000008 HC ANESTHESIA 1ST 15 MINUTES

## 2021-01-19 PROCEDURE — 93325 DOPPLER ECHO COLOR FLOW MAPG: CPT | Mod: 26,,, | Performed by: INTERNAL MEDICINE

## 2021-01-19 PROCEDURE — 25000003 PHARM REV CODE 250: Performed by: NURSE PRACTITIONER

## 2021-01-19 PROCEDURE — 93005 ELECTROCARDIOGRAM TRACING: CPT | Mod: 59

## 2021-01-19 PROCEDURE — D9220A PRA ANESTHESIA: ICD-10-PCS | Mod: CRNA,,, | Performed by: NURSE ANESTHETIST, CERTIFIED REGISTERED

## 2021-01-19 PROCEDURE — 93312 ECHO TRANSESOPHAGEAL: CPT

## 2021-01-19 PROCEDURE — D9220A PRA ANESTHESIA: ICD-10-PCS | Mod: ANES,,, | Performed by: ANESTHESIOLOGY

## 2021-01-19 PROCEDURE — 93010 EKG 12-LEAD: ICD-10-PCS | Mod: ,,, | Performed by: INTERNAL MEDICINE

## 2021-01-19 PROCEDURE — 93320 DOPPLER ECHO COMPLETE: CPT | Mod: 26,,, | Performed by: INTERNAL MEDICINE

## 2021-01-19 PROCEDURE — 93312 ECHO TRANSESOPHAGEAL: CPT | Mod: 26,,, | Performed by: INTERNAL MEDICINE

## 2021-01-19 PROCEDURE — 93312 TRANSESOPHAGEAL ECHO (TEE) (CUPID ONLY): ICD-10-PCS | Mod: 26,,, | Performed by: INTERNAL MEDICINE

## 2021-01-19 PROCEDURE — D9220A PRA ANESTHESIA: Mod: ANES,,, | Performed by: ANESTHESIOLOGY

## 2021-01-19 PROCEDURE — 93320 TRANSESOPHAGEAL ECHO (TEE) (CUPID ONLY): ICD-10-PCS | Mod: 26,,, | Performed by: INTERNAL MEDICINE

## 2021-01-19 PROCEDURE — D9220A PRA ANESTHESIA: Mod: CRNA,,, | Performed by: NURSE ANESTHETIST, CERTIFIED REGISTERED

## 2021-01-19 PROCEDURE — 93010 ELECTROCARDIOGRAM REPORT: CPT | Mod: ,,, | Performed by: INTERNAL MEDICINE

## 2021-01-19 PROCEDURE — 37000009 HC ANESTHESIA EA ADD 15 MINS

## 2021-01-19 PROCEDURE — 63600175 PHARM REV CODE 636 W HCPCS: Performed by: NURSE ANESTHETIST, CERTIFIED REGISTERED

## 2021-01-19 PROCEDURE — 93325 TRANSESOPHAGEAL ECHO (TEE) (CUPID ONLY): ICD-10-PCS | Mod: 26,,, | Performed by: INTERNAL MEDICINE

## 2021-01-19 RX ORDER — FENTANYL CITRATE 50 UG/ML
25 INJECTION, SOLUTION INTRAMUSCULAR; INTRAVENOUS EVERY 5 MIN PRN
Status: DISCONTINUED | OUTPATIENT
Start: 2021-01-19 | End: 2021-01-19 | Stop reason: HOSPADM

## 2021-01-19 RX ORDER — LIDOCAINE HYDROCHLORIDE 20 MG/ML
INJECTION INTRAVENOUS
Status: DISCONTINUED | OUTPATIENT
Start: 2021-01-19 | End: 2021-01-19

## 2021-01-19 RX ORDER — HYDROMORPHONE HYDROCHLORIDE 1 MG/ML
0.2 INJECTION, SOLUTION INTRAMUSCULAR; INTRAVENOUS; SUBCUTANEOUS EVERY 5 MIN PRN
Status: DISCONTINUED | OUTPATIENT
Start: 2021-01-19 | End: 2021-01-19 | Stop reason: HOSPADM

## 2021-01-19 RX ORDER — LIDOCAINE HYDROCHLORIDE 20 MG/ML
SOLUTION OROPHARYNGEAL
Status: DISCONTINUED | OUTPATIENT
Start: 2021-01-19 | End: 2021-01-19

## 2021-01-19 RX ORDER — DIPHENHYDRAMINE HYDROCHLORIDE 50 MG/ML
25 INJECTION INTRAMUSCULAR; INTRAVENOUS EVERY 6 HOURS PRN
Status: DISCONTINUED | OUTPATIENT
Start: 2021-01-19 | End: 2021-01-19 | Stop reason: HOSPADM

## 2021-01-19 RX ORDER — ONDANSETRON 2 MG/ML
4 INJECTION INTRAMUSCULAR; INTRAVENOUS ONCE AS NEEDED
Status: DISCONTINUED | OUTPATIENT
Start: 2021-01-19 | End: 2021-01-19 | Stop reason: HOSPADM

## 2021-01-19 RX ORDER — CLOPIDOGREL BISULFATE 75 MG/1
75 TABLET ORAL DAILY
Qty: 30 TABLET | Refills: 11 | Status: SHIPPED | OUTPATIENT
Start: 2021-01-19 | End: 2021-01-19 | Stop reason: SDUPTHER

## 2021-01-19 RX ORDER — CLOPIDOGREL BISULFATE 75 MG/1
75 TABLET ORAL DAILY
Qty: 30 TABLET | Refills: 11 | Status: SHIPPED | OUTPATIENT
Start: 2021-01-19 | End: 2021-07-15

## 2021-01-19 RX ORDER — PROPOFOL 10 MG/ML
VIAL (ML) INTRAVENOUS
Status: DISCONTINUED | OUTPATIENT
Start: 2021-01-19 | End: 2021-01-19

## 2021-01-19 RX ORDER — PROPOFOL 10 MG/ML
VIAL (ML) INTRAVENOUS CONTINUOUS PRN
Status: DISCONTINUED | OUTPATIENT
Start: 2021-01-19 | End: 2021-01-19

## 2021-01-19 RX ADMIN — SODIUM CHLORIDE 1000 ML: 0.9 INJECTION, SOLUTION INTRAVENOUS at 07:01

## 2021-01-19 RX ADMIN — PROPOFOL 40 MCG/KG/MIN: 10 INJECTION, EMULSION INTRAVENOUS at 09:01

## 2021-01-19 RX ADMIN — LIDOCAINE HYDROCHLORIDE 5 ML: 20 SOLUTION ORAL; TOPICAL at 09:01

## 2021-01-19 RX ADMIN — SODIUM CHLORIDE: 9 INJECTION, SOLUTION INTRAVENOUS at 09:01

## 2021-01-19 RX ADMIN — PROPOFOL 30 MG: 10 INJECTION, EMULSION INTRAVENOUS at 09:01

## 2021-01-19 RX ADMIN — LIDOCAINE HYDROCHLORIDE 75 MG: 20 INJECTION, SOLUTION INTRAVENOUS at 09:01

## 2021-01-19 RX ADMIN — PROPOFOL 20 MG: 10 INJECTION, EMULSION INTRAVENOUS at 09:01

## 2021-01-28 ENCOUNTER — OFFICE VISIT (OUTPATIENT)
Dept: ELECTROPHYSIOLOGY | Facility: CLINIC | Age: 86
End: 2021-01-28
Payer: MEDICARE

## 2021-01-28 ENCOUNTER — CLINICAL SUPPORT (OUTPATIENT)
Dept: CARDIOLOGY | Facility: HOSPITAL | Age: 86
End: 2021-01-28
Attending: INTERNAL MEDICINE
Payer: MEDICARE

## 2021-01-28 ENCOUNTER — HOSPITAL ENCOUNTER (OUTPATIENT)
Dept: CARDIOLOGY | Facility: CLINIC | Age: 86
Discharge: HOME | End: 2021-01-28
Payer: MEDICARE

## 2021-01-28 VITALS
HEART RATE: 98 BPM | DIASTOLIC BLOOD PRESSURE: 57 MMHG | SYSTOLIC BLOOD PRESSURE: 116 MMHG | HEIGHT: 70 IN | WEIGHT: 188.25 LBS | BODY MASS INDEX: 26.95 KG/M2

## 2021-01-28 DIAGNOSIS — I49.8 OTHER SPECIFIED CARDIAC ARRHYTHMIAS: ICD-10-CM

## 2021-01-28 DIAGNOSIS — Z95.818 PRESENCE OF WATCHMAN LEFT ATRIAL APPENDAGE CLOSURE DEVICE: ICD-10-CM

## 2021-01-28 DIAGNOSIS — G47.33 OBSTRUCTIVE SLEEP APNEA ON CPAP: ICD-10-CM

## 2021-01-28 DIAGNOSIS — I48.0 PAF (PAROXYSMAL ATRIAL FIBRILLATION): ICD-10-CM

## 2021-01-28 DIAGNOSIS — I44.2 COMPLETE AV BLOCK: Primary | ICD-10-CM

## 2021-01-28 DIAGNOSIS — K92.2 LOWER GI BLEED: ICD-10-CM

## 2021-01-28 DIAGNOSIS — I50.22 CHRONIC SYSTOLIC CONGESTIVE HEART FAILURE: ICD-10-CM

## 2021-01-28 PROCEDURE — 99214 PR OFFICE/OUTPT VISIT, EST, LEVL IV, 30-39 MIN: ICD-10-PCS | Mod: S$PBB,,, | Performed by: INTERNAL MEDICINE

## 2021-01-28 PROCEDURE — 93281 PM DEVICE PROGR EVAL MULTI: CPT | Mod: 26,,, | Performed by: INTERNAL MEDICINE

## 2021-01-28 PROCEDURE — 93281 CARDIAC DEVICE CHECK - IN CLINIC & HOSPITAL: ICD-10-PCS | Mod: 26,,, | Performed by: INTERNAL MEDICINE

## 2021-01-28 PROCEDURE — 93281 PM DEVICE PROGR EVAL MULTI: CPT

## 2021-01-28 PROCEDURE — 99999 PR PBB SHADOW E&M-EST. PATIENT-LVL III: CPT | Mod: PBBFAC,,, | Performed by: INTERNAL MEDICINE

## 2021-01-28 PROCEDURE — 99213 OFFICE O/P EST LOW 20 MIN: CPT | Mod: PBBFAC,25 | Performed by: INTERNAL MEDICINE

## 2021-01-28 PROCEDURE — 99999 PR PBB SHADOW E&M-EST. PATIENT-LVL III: ICD-10-PCS | Mod: PBBFAC,,, | Performed by: INTERNAL MEDICINE

## 2021-01-28 PROCEDURE — 93010 ELECTROCARDIOGRAM REPORT: CPT | Mod: S$PBB,,, | Performed by: INTERNAL MEDICINE

## 2021-01-28 PROCEDURE — 93005 ELECTROCARDIOGRAM TRACING: CPT | Mod: PBBFAC,59 | Performed by: INTERNAL MEDICINE

## 2021-01-28 PROCEDURE — 93010 RHYTHM STRIP: ICD-10-PCS | Mod: S$PBB,,, | Performed by: INTERNAL MEDICINE

## 2021-01-28 PROCEDURE — 99214 OFFICE O/P EST MOD 30 MIN: CPT | Mod: S$PBB,,, | Performed by: INTERNAL MEDICINE

## 2021-01-30 ENCOUNTER — IMMUNIZATION (OUTPATIENT)
Dept: INTERNAL MEDICINE | Facility: CLINIC | Age: 86
End: 2021-01-30
Payer: MEDICARE

## 2021-01-30 DIAGNOSIS — Z23 NEED FOR VACCINATION: Primary | ICD-10-CM

## 2021-01-30 PROCEDURE — 91300 PR SARS-COV- 2 COVID-19 VACCINE, NO PRSV, 30MCG/0.3ML, IM: CPT | Mod: PBBFAC

## 2021-01-30 PROCEDURE — 0002A PR IMMUNIZ ADMIN, SARS-COV-2 COVID-19 VACC, 30MCG/0.3ML, 2ND DOSE: CPT | Mod: PBBFAC

## 2021-01-30 RX ADMIN — RNA INGREDIENT BNT-162B2 0.3 ML: 0.23 INJECTION, SUSPENSION INTRAMUSCULAR at 10:01

## 2021-02-03 ENCOUNTER — PES CALL (OUTPATIENT)
Dept: ADMINISTRATIVE | Facility: CLINIC | Age: 86
End: 2021-02-03

## 2021-03-01 ENCOUNTER — CLINICAL SUPPORT (OUTPATIENT)
Dept: CARDIOLOGY | Facility: HOSPITAL | Age: 86
End: 2021-03-01
Payer: MEDICARE

## 2021-03-01 DIAGNOSIS — Z95.810 PRESENCE OF AUTOMATIC (IMPLANTABLE) CARDIAC DEFIBRILLATOR: ICD-10-CM

## 2021-03-01 DIAGNOSIS — I44.2 ATRIOVENTRICULAR BLOCK, COMPLETE: ICD-10-CM

## 2021-03-01 DIAGNOSIS — I42.5 OTHER RESTRICTIVE CARDIOMYOPATHY: ICD-10-CM

## 2021-03-01 DIAGNOSIS — I50.42 CHRONIC COMBINED SYSTOLIC (CONGESTIVE) AND DIASTOLIC (CONGESTIVE) HEART FAILURE: ICD-10-CM

## 2021-03-01 DIAGNOSIS — Z95.0 PRESENCE OF CARDIAC PACEMAKER: ICD-10-CM

## 2021-03-01 PROCEDURE — 93294 CARDIAC DEVICE CHECK - REMOTE: ICD-10-PCS | Mod: ,,, | Performed by: INTERNAL MEDICINE

## 2021-03-01 PROCEDURE — 93294 REM INTERROG EVL PM/LDLS PM: CPT | Mod: ,,, | Performed by: INTERNAL MEDICINE

## 2021-03-25 DIAGNOSIS — G62.9 NEUROPATHY: ICD-10-CM

## 2021-03-26 RX ORDER — NORTRIPTYLINE HYDROCHLORIDE 10 MG/1
CAPSULE ORAL
Qty: 90 CAPSULE | Refills: 3 | Status: SHIPPED | OUTPATIENT
Start: 2021-03-26 | End: 2021-08-05

## 2021-04-04 NOTE — ANESTHESIA POSTPROCEDURE EVALUATION
"Anesthesia Post Evaluation    Patient: Artemio Colon     Procedure(s) Performed: Procedure(s) (LRB):  [id number] (N/A)    Final Anesthesia Type: general  Patient location during evaluation: PACU  Patient participation: Yes- Able to Participate  Level of consciousness: awake and alert  Post-procedure vital signs: reviewed and stable  Pain management: adequate  Airway patency: patent  PONV status at discharge: No PONV  Anesthetic complications: no      Cardiovascular status: hemodynamically stable  Respiratory status: unassisted  Hydration status: euvolemic  Follow-up not needed.        Visit Vitals  /78 (BP Location: Right arm, Patient Position: Lying)   Pulse 60   Temp 36.8 °C (98.2 °F) (Temporal)   Resp 12   Ht 5' 11" (1.803 m)   Wt 94.8 kg (209 lb)   SpO2 (!) 92%   BMI 29.15 kg/m²       Pain/Carlyle Score: Pain Assessment Performed: Yes (2/16/2018  2:00 PM)  Presence of Pain: complains of pain/discomfort (2/16/2018  2:00 PM)  Pain Rating Prior to Med Admin: 7 (2/16/2018  2:00 PM)  Carlyle Score: 10 (2/16/2018  2:00 PM)      " 04-Apr-2021 09:17

## 2021-04-16 ENCOUNTER — PATIENT MESSAGE (OUTPATIENT)
Dept: INTERNAL MEDICINE | Facility: CLINIC | Age: 86
End: 2021-04-16

## 2021-04-30 ENCOUNTER — PATIENT MESSAGE (OUTPATIENT)
Dept: INTERNAL MEDICINE | Facility: CLINIC | Age: 86
End: 2021-04-30

## 2021-05-01 ENCOUNTER — PATIENT MESSAGE (OUTPATIENT)
Dept: ELECTROPHYSIOLOGY | Facility: CLINIC | Age: 86
End: 2021-05-01

## 2021-05-03 ENCOUNTER — TELEPHONE (OUTPATIENT)
Dept: INTERNAL MEDICINE | Facility: CLINIC | Age: 86
End: 2021-05-03

## 2021-05-06 ENCOUNTER — LAB VISIT (OUTPATIENT)
Dept: LAB | Facility: HOSPITAL | Age: 86
End: 2021-05-06
Attending: INTERNAL MEDICINE
Payer: MEDICARE

## 2021-05-06 ENCOUNTER — OFFICE VISIT (OUTPATIENT)
Dept: INTERNAL MEDICINE | Facility: CLINIC | Age: 86
End: 2021-05-06
Payer: MEDICARE

## 2021-05-06 VITALS
OXYGEN SATURATION: 98 % | HEIGHT: 71 IN | WEIGHT: 188.06 LBS | HEART RATE: 66 BPM | SYSTOLIC BLOOD PRESSURE: 128 MMHG | DIASTOLIC BLOOD PRESSURE: 70 MMHG | BODY MASS INDEX: 26.33 KG/M2

## 2021-05-06 DIAGNOSIS — I48.0 PAF (PAROXYSMAL ATRIAL FIBRILLATION): ICD-10-CM

## 2021-05-06 DIAGNOSIS — I70.0 AORTIC ATHEROSCLEROSIS: Primary | ICD-10-CM

## 2021-05-06 DIAGNOSIS — R73.09 ELEVATED HEMOGLOBIN A1C: ICD-10-CM

## 2021-05-06 DIAGNOSIS — G62.9 NEUROPATHY: ICD-10-CM

## 2021-05-06 DIAGNOSIS — I70.0 AORTIC ATHEROSCLEROSIS: ICD-10-CM

## 2021-05-06 DIAGNOSIS — R10.31 RLQ ABDOMINAL PAIN: ICD-10-CM

## 2021-05-06 LAB
ALBUMIN SERPL BCP-MCNC: 4 G/DL (ref 3.5–5.2)
ALP SERPL-CCNC: 89 U/L (ref 55–135)
ALT SERPL W/O P-5'-P-CCNC: 16 U/L (ref 10–44)
ANION GAP SERPL CALC-SCNC: 8 MMOL/L (ref 8–16)
AST SERPL-CCNC: 22 U/L (ref 10–40)
BASOPHILS # BLD AUTO: 0.05 K/UL (ref 0–0.2)
BASOPHILS NFR BLD: 0.8 % (ref 0–1.9)
BILIRUB SERPL-MCNC: 0.6 MG/DL (ref 0.1–1)
BUN SERPL-MCNC: 24 MG/DL (ref 10–30)
CALCIUM SERPL-MCNC: 9 MG/DL (ref 8.7–10.5)
CHLORIDE SERPL-SCNC: 106 MMOL/L (ref 95–110)
CHOLEST SERPL-MCNC: 123 MG/DL (ref 120–199)
CHOLEST/HDLC SERPL: 2.1 {RATIO} (ref 2–5)
CO2 SERPL-SCNC: 26 MMOL/L (ref 23–29)
CREAT SERPL-MCNC: 1 MG/DL (ref 0.5–1.4)
DIFFERENTIAL METHOD: NORMAL
EOSINOPHIL # BLD AUTO: 0.2 K/UL (ref 0–0.5)
EOSINOPHIL NFR BLD: 2.8 % (ref 0–8)
ERYTHROCYTE [DISTWIDTH] IN BLOOD BY AUTOMATED COUNT: 14 % (ref 11.5–14.5)
EST. GFR  (AFRICAN AMERICAN): >60 ML/MIN/1.73 M^2
EST. GFR  (NON AFRICAN AMERICAN): >60 ML/MIN/1.73 M^2
ESTIMATED AVG GLUCOSE: 120 MG/DL (ref 68–131)
GLUCOSE SERPL-MCNC: 104 MG/DL (ref 70–110)
HBA1C MFR BLD: 5.8 % (ref 4–5.6)
HCT VFR BLD AUTO: 46.4 % (ref 40–54)
HDLC SERPL-MCNC: 59 MG/DL (ref 40–75)
HDLC SERPL: 48 % (ref 20–50)
HGB BLD-MCNC: 14.9 G/DL (ref 14–18)
IMM GRANULOCYTES # BLD AUTO: 0.01 K/UL (ref 0–0.04)
IMM GRANULOCYTES NFR BLD AUTO: 0.2 % (ref 0–0.5)
LDLC SERPL CALC-MCNC: 50.4 MG/DL (ref 63–159)
LYMPHOCYTES # BLD AUTO: 1.9 K/UL (ref 1–4.8)
LYMPHOCYTES NFR BLD: 30.7 % (ref 18–48)
MCH RBC QN AUTO: 30.7 PG (ref 27–31)
MCHC RBC AUTO-ENTMCNC: 32.1 G/DL (ref 32–36)
MCV RBC AUTO: 96 FL (ref 82–98)
MONOCYTES # BLD AUTO: 0.7 K/UL (ref 0.3–1)
MONOCYTES NFR BLD: 11.2 % (ref 4–15)
NEUTROPHILS # BLD AUTO: 3.4 K/UL (ref 1.8–7.7)
NEUTROPHILS NFR BLD: 54.3 % (ref 38–73)
NONHDLC SERPL-MCNC: 64 MG/DL
NRBC BLD-RTO: 0 /100 WBC
PLATELET # BLD AUTO: 211 K/UL (ref 150–450)
PMV BLD AUTO: 10.6 FL (ref 9.2–12.9)
POTASSIUM SERPL-SCNC: 4.2 MMOL/L (ref 3.5–5.1)
PROT SERPL-MCNC: 7.2 G/DL (ref 6–8.4)
RBC # BLD AUTO: 4.85 M/UL (ref 4.6–6.2)
SODIUM SERPL-SCNC: 140 MMOL/L (ref 136–145)
TRIGL SERPL-MCNC: 68 MG/DL (ref 30–150)
WBC # BLD AUTO: 6.16 K/UL (ref 3.9–12.7)

## 2021-05-06 PROCEDURE — 80061 LIPID PANEL: CPT | Performed by: INTERNAL MEDICINE

## 2021-05-06 PROCEDURE — 80053 COMPREHEN METABOLIC PANEL: CPT | Performed by: INTERNAL MEDICINE

## 2021-05-06 PROCEDURE — 83036 HEMOGLOBIN GLYCOSYLATED A1C: CPT | Performed by: INTERNAL MEDICINE

## 2021-05-06 PROCEDURE — 85025 COMPLETE CBC W/AUTO DIFF WBC: CPT | Performed by: INTERNAL MEDICINE

## 2021-05-06 PROCEDURE — 99214 OFFICE O/P EST MOD 30 MIN: CPT | Mod: S$PBB,,, | Performed by: INTERNAL MEDICINE

## 2021-05-06 PROCEDURE — 36415 COLL VENOUS BLD VENIPUNCTURE: CPT | Performed by: INTERNAL MEDICINE

## 2021-05-06 PROCEDURE — 99999 PR PBB SHADOW E&M-EST. PATIENT-LVL IV: CPT | Mod: PBBFAC,,, | Performed by: INTERNAL MEDICINE

## 2021-05-06 PROCEDURE — 99214 PR OFFICE/OUTPT VISIT, EST, LEVL IV, 30-39 MIN: ICD-10-PCS | Mod: S$PBB,,, | Performed by: INTERNAL MEDICINE

## 2021-05-06 PROCEDURE — 99214 OFFICE O/P EST MOD 30 MIN: CPT | Mod: PBBFAC | Performed by: INTERNAL MEDICINE

## 2021-05-06 PROCEDURE — 99999 PR PBB SHADOW E&M-EST. PATIENT-LVL IV: ICD-10-PCS | Mod: PBBFAC,,, | Performed by: INTERNAL MEDICINE

## 2021-05-30 ENCOUNTER — CLINICAL SUPPORT (OUTPATIENT)
Dept: CARDIOLOGY | Facility: HOSPITAL | Age: 86
End: 2021-05-30
Payer: MEDICARE

## 2021-05-30 DIAGNOSIS — Z95.0 PRESENCE OF CARDIAC PACEMAKER: ICD-10-CM

## 2021-05-30 DIAGNOSIS — I48.91 UNSPECIFIED ATRIAL FIBRILLATION: ICD-10-CM

## 2021-05-30 DIAGNOSIS — I44.2 ATRIOVENTRICULAR BLOCK, COMPLETE: ICD-10-CM

## 2021-05-30 PROCEDURE — 93294 REM INTERROG EVL PM/LDLS PM: CPT | Mod: ,,, | Performed by: INTERNAL MEDICINE

## 2021-05-30 PROCEDURE — 93294 CARDIAC DEVICE CHECK - REMOTE: ICD-10-PCS | Mod: ,,, | Performed by: INTERNAL MEDICINE

## 2021-06-29 ENCOUNTER — TELEPHONE (OUTPATIENT)
Dept: CARDIOLOGY | Facility: HOSPITAL | Age: 86
End: 2021-06-29

## 2021-07-08 ENCOUNTER — PATIENT MESSAGE (OUTPATIENT)
Dept: ELECTROPHYSIOLOGY | Facility: CLINIC | Age: 86
End: 2021-07-08

## 2021-07-08 ENCOUNTER — TELEPHONE (OUTPATIENT)
Dept: INTERNAL MEDICINE | Facility: CLINIC | Age: 86
End: 2021-07-08

## 2021-07-13 ENCOUNTER — CLINICAL SUPPORT (OUTPATIENT)
Dept: CARDIOLOGY | Facility: HOSPITAL | Age: 86
End: 2021-07-13
Attending: INTERNAL MEDICINE
Payer: MEDICARE

## 2021-07-13 ENCOUNTER — PATIENT OUTREACH (OUTPATIENT)
Dept: ADMINISTRATIVE | Facility: OTHER | Age: 86
End: 2021-07-13

## 2021-07-13 DIAGNOSIS — I49.8 OTHER SPECIFIED CARDIAC ARRHYTHMIAS: ICD-10-CM

## 2021-07-13 PROCEDURE — 93281 PM DEVICE PROGR EVAL MULTI: CPT | Mod: 26,,, | Performed by: INTERNAL MEDICINE

## 2021-07-13 PROCEDURE — 93281 PM DEVICE PROGR EVAL MULTI: CPT

## 2021-07-13 PROCEDURE — 93281 CARDIAC DEVICE CHECK - IN CLINIC & HOSPITAL: ICD-10-PCS | Mod: 26,,, | Performed by: INTERNAL MEDICINE

## 2021-07-15 ENCOUNTER — HOSPITAL ENCOUNTER (OUTPATIENT)
Dept: CARDIOLOGY | Facility: CLINIC | Age: 86
Discharge: HOME | End: 2021-07-15
Payer: MEDICARE

## 2021-07-15 ENCOUNTER — OFFICE VISIT (OUTPATIENT)
Dept: ELECTROPHYSIOLOGY | Facility: CLINIC | Age: 86
End: 2021-07-15
Payer: MEDICARE

## 2021-07-15 VITALS
WEIGHT: 184.31 LBS | HEIGHT: 70 IN | HEART RATE: 69 BPM | SYSTOLIC BLOOD PRESSURE: 146 MMHG | BODY MASS INDEX: 26.39 KG/M2 | DIASTOLIC BLOOD PRESSURE: 70 MMHG

## 2021-07-15 DIAGNOSIS — I48.0 PAF (PAROXYSMAL ATRIAL FIBRILLATION): ICD-10-CM

## 2021-07-15 DIAGNOSIS — I50.22 CHRONIC SYSTOLIC CONGESTIVE HEART FAILURE: Primary | ICD-10-CM

## 2021-07-15 DIAGNOSIS — I44.2 COMPLETE AV BLOCK: ICD-10-CM

## 2021-07-15 DIAGNOSIS — G47.33 OBSTRUCTIVE SLEEP APNEA ON CPAP: ICD-10-CM

## 2021-07-15 DIAGNOSIS — K57.91 DIVERTICULOSIS OF INTESTINE WITH BLEEDING, UNSPECIFIED INTESTINAL TRACT LOCATION: ICD-10-CM

## 2021-07-15 DIAGNOSIS — I25.10 CORONARY ARTERY DISEASE INVOLVING NATIVE CORONARY ARTERY OF NATIVE HEART WITHOUT ANGINA PECTORIS: Chronic | ICD-10-CM

## 2021-07-15 DIAGNOSIS — Z95.818 PRESENCE OF WATCHMAN LEFT ATRIAL APPENDAGE CLOSURE DEVICE: ICD-10-CM

## 2021-07-15 DIAGNOSIS — I49.8 OTHER SPECIFIED CARDIAC ARRHYTHMIAS: ICD-10-CM

## 2021-07-15 PROCEDURE — 93005 ELECTROCARDIOGRAM TRACING: CPT | Mod: PBBFAC | Performed by: INTERNAL MEDICINE

## 2021-07-15 PROCEDURE — 99999 PR PBB SHADOW E&M-EST. PATIENT-LVL III: ICD-10-PCS | Mod: PBBFAC,,, | Performed by: INTERNAL MEDICINE

## 2021-07-15 PROCEDURE — 93010 ELECTROCARDIOGRAM REPORT: CPT | Mod: S$PBB,,, | Performed by: INTERNAL MEDICINE

## 2021-07-15 PROCEDURE — 99214 PR OFFICE/OUTPT VISIT, EST, LEVL IV, 30-39 MIN: ICD-10-PCS | Mod: S$PBB,,, | Performed by: INTERNAL MEDICINE

## 2021-07-15 PROCEDURE — 99214 OFFICE O/P EST MOD 30 MIN: CPT | Mod: S$PBB,,, | Performed by: INTERNAL MEDICINE

## 2021-07-15 PROCEDURE — 93010 RHYTHM STRIP: ICD-10-PCS | Mod: S$PBB,,, | Performed by: INTERNAL MEDICINE

## 2021-07-15 PROCEDURE — 99213 OFFICE O/P EST LOW 20 MIN: CPT | Mod: PBBFAC | Performed by: INTERNAL MEDICINE

## 2021-07-15 PROCEDURE — 99999 PR PBB SHADOW E&M-EST. PATIENT-LVL III: CPT | Mod: PBBFAC,,, | Performed by: INTERNAL MEDICINE

## 2021-07-16 ENCOUNTER — PATIENT MESSAGE (OUTPATIENT)
Dept: CARDIOLOGY | Facility: CLINIC | Age: 86
End: 2021-07-16

## 2021-07-28 ENCOUNTER — PATIENT MESSAGE (OUTPATIENT)
Dept: PULMONOLOGY | Facility: CLINIC | Age: 86
End: 2021-07-28

## 2021-07-29 ENCOUNTER — PATIENT MESSAGE (OUTPATIENT)
Dept: ELECTROPHYSIOLOGY | Facility: CLINIC | Age: 86
End: 2021-07-29

## 2021-08-05 ENCOUNTER — OFFICE VISIT (OUTPATIENT)
Dept: INTERNAL MEDICINE | Facility: CLINIC | Age: 86
End: 2021-08-05
Payer: MEDICARE

## 2021-08-05 VITALS
HEIGHT: 70 IN | SYSTOLIC BLOOD PRESSURE: 132 MMHG | BODY MASS INDEX: 26.92 KG/M2 | DIASTOLIC BLOOD PRESSURE: 74 MMHG | OXYGEN SATURATION: 97 % | HEART RATE: 60 BPM | WEIGHT: 188.06 LBS

## 2021-08-05 DIAGNOSIS — M48.061 SPINAL STENOSIS OF LUMBAR REGION, UNSPECIFIED WHETHER NEUROGENIC CLAUDICATION PRESENT: ICD-10-CM

## 2021-08-05 DIAGNOSIS — M48.02 CERVICAL STENOSIS OF SPINE: ICD-10-CM

## 2021-08-05 DIAGNOSIS — R10.31 RLQ ABDOMINAL PAIN: Primary | ICD-10-CM

## 2021-08-05 PROCEDURE — 99999 PR PBB SHADOW E&M-EST. PATIENT-LVL IV: CPT | Mod: PBBFAC,,, | Performed by: INTERNAL MEDICINE

## 2021-08-05 PROCEDURE — 99213 OFFICE O/P EST LOW 20 MIN: CPT | Mod: S$PBB,,, | Performed by: INTERNAL MEDICINE

## 2021-08-05 PROCEDURE — 99213 PR OFFICE/OUTPT VISIT, EST, LEVL III, 20-29 MIN: ICD-10-PCS | Mod: S$PBB,,, | Performed by: INTERNAL MEDICINE

## 2021-08-05 PROCEDURE — 99999 PR PBB SHADOW E&M-EST. PATIENT-LVL IV: ICD-10-PCS | Mod: PBBFAC,,, | Performed by: INTERNAL MEDICINE

## 2021-08-05 PROCEDURE — 99214 OFFICE O/P EST MOD 30 MIN: CPT | Mod: PBBFAC | Performed by: INTERNAL MEDICINE

## 2021-08-25 ENCOUNTER — OFFICE VISIT (OUTPATIENT)
Dept: PULMONOLOGY | Facility: CLINIC | Age: 86
End: 2021-08-25
Payer: MEDICARE

## 2021-08-25 DIAGNOSIS — G47.33 OSA (OBSTRUCTIVE SLEEP APNEA): Primary | ICD-10-CM

## 2021-08-25 DIAGNOSIS — G47.33 OBSTRUCTIVE SLEEP APNEA ON CPAP: ICD-10-CM

## 2021-08-25 PROCEDURE — 99203 PR OFFICE/OUTPT VISIT, NEW, LEVL III, 30-44 MIN: ICD-10-PCS | Mod: 95,,, | Performed by: INTERNAL MEDICINE

## 2021-08-25 PROCEDURE — 99203 OFFICE O/P NEW LOW 30 MIN: CPT | Mod: 95,,, | Performed by: INTERNAL MEDICINE

## 2021-08-28 ENCOUNTER — CLINICAL SUPPORT (OUTPATIENT)
Dept: CARDIOLOGY | Facility: HOSPITAL | Age: 86
End: 2021-08-28
Payer: MEDICARE

## 2021-08-28 DIAGNOSIS — I48.91 UNSPECIFIED ATRIAL FIBRILLATION: ICD-10-CM

## 2021-08-28 DIAGNOSIS — I44.2 ATRIOVENTRICULAR BLOCK, COMPLETE: ICD-10-CM

## 2021-08-28 DIAGNOSIS — Z95.0 PRESENCE OF CARDIAC PACEMAKER: ICD-10-CM

## 2021-08-28 PROCEDURE — 93294 REM INTERROG EVL PM/LDLS PM: CPT | Mod: ,,, | Performed by: INTERNAL MEDICINE

## 2021-08-28 PROCEDURE — 93294 CARDIAC DEVICE CHECK - REMOTE: ICD-10-PCS | Mod: ,,, | Performed by: INTERNAL MEDICINE

## 2021-10-01 ENCOUNTER — PATIENT MESSAGE (OUTPATIENT)
Dept: PULMONOLOGY | Facility: CLINIC | Age: 86
End: 2021-10-01

## 2021-10-05 RX ORDER — TAMSULOSIN HYDROCHLORIDE 0.4 MG/1
CAPSULE ORAL
Qty: 90 CAPSULE | Refills: 3 | Status: SHIPPED | OUTPATIENT
Start: 2021-10-05 | End: 2022-03-15

## 2021-10-14 NOTE — PATIENT INSTRUCTIONS
Continue current regimen  .Mediteranian diet recommended  Attempt to work up to Graded exercise for 30 minutes a day at least 5 days a week .   No...

## 2021-10-22 ENCOUNTER — PATIENT MESSAGE (OUTPATIENT)
Dept: INTERNAL MEDICINE | Facility: CLINIC | Age: 86
End: 2021-10-22

## 2021-10-22 ENCOUNTER — PATIENT MESSAGE (OUTPATIENT)
Dept: PULMONOLOGY | Facility: CLINIC | Age: 86
End: 2021-10-22
Payer: MEDICARE

## 2021-10-22 DIAGNOSIS — M19.041 PRIMARY OSTEOARTHRITIS OF BOTH HANDS: ICD-10-CM

## 2021-10-22 DIAGNOSIS — M19.042 PRIMARY OSTEOARTHRITIS OF BOTH HANDS: ICD-10-CM

## 2021-10-22 RX ORDER — DICLOFENAC SODIUM 10 MG/G
GEL TOPICAL
Qty: 100 G | Refills: 4 | Status: SHIPPED | OUTPATIENT
Start: 2021-10-22

## 2021-11-22 ENCOUNTER — PATIENT MESSAGE (OUTPATIENT)
Dept: PULMONOLOGY | Facility: CLINIC | Age: 86
End: 2021-11-22
Payer: MEDICARE

## 2021-11-22 DIAGNOSIS — G47.33 OSA (OBSTRUCTIVE SLEEP APNEA): Primary | ICD-10-CM

## 2021-11-23 ENCOUNTER — PATIENT MESSAGE (OUTPATIENT)
Dept: PULMONOLOGY | Facility: CLINIC | Age: 86
End: 2021-11-23
Payer: MEDICARE

## 2021-11-26 ENCOUNTER — CLINICAL SUPPORT (OUTPATIENT)
Dept: CARDIOLOGY | Facility: HOSPITAL | Age: 86
End: 2021-11-26
Payer: MEDICARE

## 2021-11-26 DIAGNOSIS — Z95.0 PRESENCE OF CARDIAC PACEMAKER: ICD-10-CM

## 2021-11-26 PROCEDURE — 93294 CARDIAC DEVICE CHECK - REMOTE: ICD-10-PCS | Mod: ,,, | Performed by: INTERNAL MEDICINE

## 2021-11-26 PROCEDURE — 93294 REM INTERROG EVL PM/LDLS PM: CPT | Mod: ,,, | Performed by: INTERNAL MEDICINE

## 2022-01-21 ENCOUNTER — TELEPHONE (OUTPATIENT)
Dept: ELECTROPHYSIOLOGY | Facility: CLINIC | Age: 87
End: 2022-01-21
Payer: MEDICARE

## 2022-01-21 NOTE — TELEPHONE ENCOUNTER
Atrial fibrillation noted during device remote check:    Overall burden:  18%    Max duration seen: in current episode for 4 days.    Ventricular rates:   Controlled        Anticoagulation status:  Watchman/ASA    Patient symptoms:  Asymptomatic

## 2022-01-24 ENCOUNTER — PATIENT MESSAGE (OUTPATIENT)
Dept: ELECTROPHYSIOLOGY | Facility: CLINIC | Age: 87
End: 2022-01-24
Payer: MEDICARE

## 2022-01-24 NOTE — TELEPHONE ENCOUNTER
Attempted to contact patient but no answer received. Voicemail left requesting return call to discuss message received.

## 2022-01-25 NOTE — TELEPHONE ENCOUNTER
Attempted to contact patient to discuss recommendations, but no answer received. Voicemail left advising patient that he should continue his current therapy at this time and if shortness of breath becomes persistent or worsens , he should call the office to let us know. Instructed to call office back if he has any questions about information provided, or any further concerns.

## 2022-01-31 ENCOUNTER — OFFICE VISIT (OUTPATIENT)
Dept: INTERNAL MEDICINE | Facility: CLINIC | Age: 87
End: 2022-01-31
Payer: MEDICARE

## 2022-01-31 VITALS
HEART RATE: 65 BPM | DIASTOLIC BLOOD PRESSURE: 62 MMHG | SYSTOLIC BLOOD PRESSURE: 120 MMHG | HEIGHT: 70 IN | OXYGEN SATURATION: 97 % | BODY MASS INDEX: 26.48 KG/M2 | WEIGHT: 184.94 LBS

## 2022-01-31 DIAGNOSIS — I44.2 COMPLETE AV BLOCK: ICD-10-CM

## 2022-01-31 DIAGNOSIS — Z95.0 CARDIAC PACEMAKER IN SITU: ICD-10-CM

## 2022-01-31 DIAGNOSIS — I48.0 PAF (PAROXYSMAL ATRIAL FIBRILLATION): ICD-10-CM

## 2022-01-31 DIAGNOSIS — Z79.899 ENCOUNTER FOR LONG-TERM (CURRENT) USE OF OTHER MEDICATIONS: ICD-10-CM

## 2022-01-31 DIAGNOSIS — N40.0 BENIGN PROSTATIC HYPERPLASIA WITHOUT LOWER URINARY TRACT SYMPTOMS: ICD-10-CM

## 2022-01-31 DIAGNOSIS — I50.22 CHRONIC SYSTOLIC CONGESTIVE HEART FAILURE: ICD-10-CM

## 2022-01-31 DIAGNOSIS — Z76.89 ENCOUNTER TO ESTABLISH CARE WITH NEW DOCTOR: Primary | ICD-10-CM

## 2022-01-31 DIAGNOSIS — G47.33 OBSTRUCTIVE SLEEP APNEA ON CPAP: ICD-10-CM

## 2022-01-31 DIAGNOSIS — I25.10 CORONARY ARTERY DISEASE INVOLVING NATIVE CORONARY ARTERY OF NATIVE HEART WITHOUT ANGINA PECTORIS: Chronic | ICD-10-CM

## 2022-01-31 DIAGNOSIS — Z95.818 PRESENCE OF WATCHMAN LEFT ATRIAL APPENDAGE CLOSURE DEVICE: ICD-10-CM

## 2022-01-31 DIAGNOSIS — I70.0 AORTIC ATHEROSCLEROSIS: ICD-10-CM

## 2022-01-31 PROCEDURE — 99214 OFFICE O/P EST MOD 30 MIN: CPT | Mod: S$PBB,,, | Performed by: FAMILY MEDICINE

## 2022-01-31 PROCEDURE — 99999 PR PBB SHADOW E&M-EST. PATIENT-LVL III: CPT | Mod: PBBFAC,,, | Performed by: FAMILY MEDICINE

## 2022-01-31 PROCEDURE — 99214 PR OFFICE/OUTPT VISIT, EST, LEVL IV, 30-39 MIN: ICD-10-PCS | Mod: S$PBB,,, | Performed by: FAMILY MEDICINE

## 2022-01-31 PROCEDURE — 99213 OFFICE O/P EST LOW 20 MIN: CPT | Mod: PBBFAC | Performed by: FAMILY MEDICINE

## 2022-01-31 PROCEDURE — 99999 PR PBB SHADOW E&M-EST. PATIENT-LVL III: ICD-10-PCS | Mod: PBBFAC,,, | Performed by: FAMILY MEDICINE

## 2022-01-31 RX ORDER — GABAPENTIN 600 MG/1
TABLET ORAL
COMMUNITY
End: 2022-02-23 | Stop reason: SDUPTHER

## 2022-01-31 RX ORDER — TIMOLOL MALEATE 5 MG/ML
SOLUTION/ DROPS OPHTHALMIC
COMMUNITY

## 2022-01-31 NOTE — PROGRESS NOTES
Subjective:       Patient ID: Artemio Colon Sr. is a 89 y.o. male.    Chief Complaint: Follow-up    HPI    Artemio Colon Sr. is a 89 y.o. male PMHx JAEL, Afib, S/p Watchman, S/p pacemaker, HF, Chronic back pain, PreDM for checkup.     Wanting to transition to new pcp. Also wife will plan to transition too.    Doing relatively well today.    #Cards: CAD, HF, Afib, S/p Pacemaker  - est w/ Dr. Adler (EP), lv 7/2021 - f/u 1yr  - takes ASA qd  - issues with recurrent GI bleeding not on anticoagulation (previously tried xarelto in 2633-2834 for symptomatic AF and had issues with excessive bleeding then). He was hospitalized for a diverticular bleed in March of 2019. We discussed pros and cons of Watchman implant which he elected to have which was performed on 12/3/2020.    #Endo: PreDM    #Ortho: Chronic back pain    #Uro: BPH  - reg: Flomax 0.4 qd with relief    #JAEL    #BMI 26    Review of Systems   Constitutional: Negative for activity change.   HENT: Negative for hearing loss and trouble swallowing.    Eyes: Negative for discharge.   Respiratory: Negative for chest tightness and wheezing.    Cardiovascular: Negative for chest pain and palpitations.   Gastrointestinal: Negative for constipation, diarrhea and vomiting.   Genitourinary: Negative for difficulty urinating and hematuria.   Neurological: Negative for headaches.   Psychiatric/Behavioral: Negative for dysphoric mood.         Past Medical History:   Diagnosis Date    AV block     BPH (benign prostatic hyperplasia)     Cancer     Chronic rhinitis     Coronary artery disease     Erectile dysfunction     Glaucoma     Kidney cysts     Neuropathy 8/22/2017    JAEL (obstructive sleep apnea)     Pacemaker     Symptomatic bradycardia     s/p pacemaker        Prior to Admission medications    Medication Sig Start Date End Date Taking? Authorizing Provider   aspirin 81 MG Chew Take 81 mg by mouth once daily.    Historical Provider  "  diclofenac sodium (VOLTAREN) 1 % Gel Use to affected joints up to 4 times a day 10/22/21   Javed Courtney MD   fentaNYL (DURAGESIC) 25 mcg/hr Place 1 patch onto the skin every 72 hours.    Historical Provider   finasteride (PROSCAR) 5 mg tablet  9/6/19   Historical Provider   hydrocodone-acetaminophen 10-325mg (NORCO)  mg Tab Take by mouth every 4 (four) hours as needed for Pain.    Historical Provider   pravastatin (PRAVACHOL) 40 MG tablet Take 1 tablet (40 mg total) by mouth once daily. 5/8/20 8/5/21  Nicholas Tee MD   tamsulosin (FLOMAX) 0.4 mg Cap TAKE ONE CAPSULE BY MOUTH EVERY DAY 10/5/21   Javed Courtney MD        Past medical history, surgical history, and family medical history reviewed and updated as appropriate.    Medications and allergies reviewed.     Objective:          Vitals:    01/31/22 0925   BP: 120/62   BP Location: Right arm   Patient Position: Sitting   BP Method: Medium (Manual)   Pulse: 65   SpO2: 97%   Weight: 83.9 kg (184 lb 15.5 oz)   Height: 5' 10" (1.778 m)     Body mass index is 26.54 kg/m².  Physical Exam  Vitals and nursing note reviewed.   Constitutional:       General: He is not in acute distress.     Appearance: Normal appearance. He is well-developed.   Eyes:      Extraocular Movements: Extraocular movements intact.   Cardiovascular:      Rate and Rhythm: Normal rate and regular rhythm.      Pulses: Normal pulses.      Heart sounds: Normal heart sounds. No murmur heard.      Pulmonary:      Effort: Pulmonary effort is normal. No respiratory distress.      Breath sounds: Normal breath sounds. No wheezing.   Abdominal:      General: Bowel sounds are normal. There is no distension.      Palpations: Abdomen is soft.      Tenderness: There is no abdominal tenderness.   Neurological:      Mental Status: He is alert and oriented to person, place, and time.         Lab Results   Component Value Date    WBC 6.16 05/06/2021    HGB 14.9 05/06/2021    HCT 46.4 05/06/2021    PLT " 211 05/06/2021    CHOL 123 05/06/2021    TRIG 68 05/06/2021    HDL 59 05/06/2021    ALT 16 05/06/2021    AST 22 05/06/2021     05/06/2021    K 4.2 05/06/2021     05/06/2021    CREATININE 1.0 05/06/2021    BUN 24 05/06/2021    CO2 26 05/06/2021    TSH 1.999 07/02/2019    INR 1.0 11/23/2020    HGBA1C 5.8 (H) 05/06/2021       Assessment:       1. Encounter to establish care with new doctor    2. Chronic systolic congestive heart failure    3. Complete AV block    4. Aortic atherosclerosis    5. Presence of Watchman left atrial appendage closure device    6. Obstructive sleep apnea on CPAP    7. Benign prostatic hyperplasia without lower urinary tract symptoms    8. PAF (paroxysmal atrial fibrillation)    9. Cardiac pacemaker in situ    10. Coronary artery disease involving native coronary artery of native heart without angina pectoris    11. Encounter for long-term (current) use of other medications          Plan:     Problem List Items Addressed This Visit        Cardiac/Vascular    Coronary artery disease involving native coronary artery of native heart without angina pectoris (Chronic)    Overview     50-60% mid LAD with signigficant Diagonal and PDA stenosis 80% or >  - (cath 2012 rx with medical therapy)          PAF (paroxysmal atrial fibrillation)    Overview     S/P ablation [place]         Cardiac pacemaker in situ    Overview     - underwent single-chamber pacemaker implantation in 2003 with upgrade to dual-chamber device in 2011         Complete AV block    Overview     Reviewed cardiology records         Chronic systolic congestive heart failure, pacing induced now s/p upgrade to CRT-P 2/2018    Overview     Reviewed cardiology records         Aortic atherosclerosis -- seen CT 02/2019    Overview     Imaging reviewed            Renal/    Benign prostatic hyperplasia without lower urinary tract symptoms       Other    Obstructive sleep apnea on CPAP    Overview     currently on cpap of 11 cm H20.   Doing well with cpap.  Will provide prescription for new cpap.          Presence of Watchman left atrial appendage closure device    Encounter for long-term (current) use of other medications      Other Visit Diagnoses     Encounter to establish care with new doctor    -  Primary          Health maintenance reviewed with patient.     Follow up in about 6 months (around 7/31/2022) for Annual.    Patel Loyd MD  Family Medicine / Primary Care  [facility]  1/31/2022

## 2022-02-22 ENCOUNTER — OFFICE VISIT (OUTPATIENT)
Dept: CARDIOLOGY | Facility: CLINIC | Age: 87
End: 2022-02-22
Payer: MEDICARE

## 2022-02-22 VITALS
HEART RATE: 59 BPM | WEIGHT: 189.13 LBS | BODY MASS INDEX: 27.08 KG/M2 | SYSTOLIC BLOOD PRESSURE: 152 MMHG | DIASTOLIC BLOOD PRESSURE: 70 MMHG | HEIGHT: 70 IN

## 2022-02-22 DIAGNOSIS — G47.33 OBSTRUCTIVE SLEEP APNEA ON CPAP: ICD-10-CM

## 2022-02-22 DIAGNOSIS — I42.9 CARDIOMYOPATHY, UNSPECIFIED TYPE: ICD-10-CM

## 2022-02-22 DIAGNOSIS — I48.0 PAF (PAROXYSMAL ATRIAL FIBRILLATION): Primary | ICD-10-CM

## 2022-02-22 DIAGNOSIS — I25.10 CORONARY ARTERY DISEASE INVOLVING NATIVE CORONARY ARTERY OF NATIVE HEART WITHOUT ANGINA PECTORIS: Chronic | ICD-10-CM

## 2022-02-22 DIAGNOSIS — I50.22 CHRONIC SYSTOLIC CONGESTIVE HEART FAILURE: ICD-10-CM

## 2022-02-22 DIAGNOSIS — G62.9 NEUROPATHY: ICD-10-CM

## 2022-02-22 DIAGNOSIS — E78.5 DYSLIPIDEMIA: ICD-10-CM

## 2022-02-22 DIAGNOSIS — I44.2 COMPLETE AV BLOCK: ICD-10-CM

## 2022-02-22 DIAGNOSIS — Z95.0 CARDIAC PACEMAKER IN SITU: ICD-10-CM

## 2022-02-22 DIAGNOSIS — Z95.818 PRESENCE OF WATCHMAN LEFT ATRIAL APPENDAGE CLOSURE DEVICE: ICD-10-CM

## 2022-02-22 PROCEDURE — 99214 PR OFFICE/OUTPT VISIT, EST, LEVL IV, 30-39 MIN: ICD-10-PCS | Mod: S$PBB,,, | Performed by: INTERNAL MEDICINE

## 2022-02-22 PROCEDURE — 99214 OFFICE O/P EST MOD 30 MIN: CPT | Mod: PBBFAC | Performed by: INTERNAL MEDICINE

## 2022-02-22 PROCEDURE — 99999 PR PBB SHADOW E&M-EST. PATIENT-LVL IV: ICD-10-PCS | Mod: PBBFAC,,, | Performed by: INTERNAL MEDICINE

## 2022-02-22 PROCEDURE — 99214 OFFICE O/P EST MOD 30 MIN: CPT | Mod: S$PBB,,, | Performed by: INTERNAL MEDICINE

## 2022-02-22 PROCEDURE — 99999 PR PBB SHADOW E&M-EST. PATIENT-LVL IV: CPT | Mod: PBBFAC,,, | Performed by: INTERNAL MEDICINE

## 2022-02-22 NOTE — PROGRESS NOTES
Subjective:   Patient ID:  Artemio Colon Sr. is a 89 y.o. male who presents for evaluation of Establish Care, Follow-up, and Atrial Fibrillation      HPI: Very pleasant man with a h/o pAF, CRT, and Watchman implantation.  He is doing well with no new symptoms or cardiovascular complaints and no change in exercise capacity.  He denies chest discomfort, BOYLE, palpitations, PND/orthopnea, lightheadedness and syncope.    He has occasional stimulation of his diaphragm when he lay down at night.  This is worse with sleeping on his left side and since gaining weight.    Feb 2022 CRT interrogation   Remote interrogation report on CRT-P (implanted 2/16/2018)   Presenting egram demonstrates AP/BiVP   Device fxn WNL. DDDR  60/130   Autocapture algorithms are on   RA pacing 56%, Bi-V pacing >99%   Atrial arrhythmias: x206, longest at 9d 14h (11/18/21) and most recent 1/9/2022 lasting 1h 55m. Egms c/w afib/aflutter w/RVR. Peak vent rates up to 131 bpm.   AT/AF burden: 14%   Anticoagulation status: Watchman 12/2020   Ventricular arrhythmias: none detected   Battery status/longevity (estimated):  58 months   F/U via remote transmissions every 3 months   Due 7/2022 w/MD and in clinic device check   Report prepared by bennett Toscano Dr.'s last Telemedicine (May 2020) HPI:   Artemio Colon Sr. presents for Telemedicine follow up of coronary artery disease having medically managed disease in a diagonal branch with moderate disease in his mid LAD. . He had a cardiomyopathy but with upgrade of his PPM to CRT his EF has normalized. Mor recently he feels he periodically is pacing his diaphragm that is improved by change in position.  He had an ablation for PAFib at [place] with no sx episodes. At limited activity during the Pandemic but Artemio Colon Sr. denies chest pain, shortness of breath, palpitations, presyncope , or syncope. Artemio Colon Sr. has dyslipidemia but stopped his Stain  because he felt his cholesterol was OK. Had a bump in hid liver enzyme but at a time that he was not on the statin. Normalized..    Past Medical History:   Diagnosis Date    AV block     BPH (benign prostatic hyperplasia)     Cancer     Chronic rhinitis     Coronary artery disease     Erectile dysfunction     Glaucoma     Kidney cysts     Neuropathy 8/22/2017    JAEL (obstructive sleep apnea)     Pacemaker     Symptomatic bradycardia     s/p pacemaker       Past Surgical History:   Procedure Laterality Date    ADENOIDECTOMY  1939    unsure    CATARACT EXTRACTION W/  INTRAOCULAR LENS IMPLANT Bilateral 2012    done in [place]    CHOLECYSTECTOMY      CLOSURE OF LEFT ATRIAL APPENDAGE USING DEVICE N/A 12/3/2020    Procedure: Left atrial appendage closure device;  Surgeon: Tawanda Adler MD;  Location[facility] EP LAB;  Service: Cardiology;  Laterality: N/A;  AF, GAIL, Watchman Implant, BSci, Gen, NV/MB, 3 Prep    COLECTOMY      cecum    COSMETIC SURGERY  1967    rhinoplasty    EYE SURGERY      FRACTURE SURGERY  wrist    1939    HERNIA REPAIR      INSERT / REPLACE / REMOVE PACEMAKER  2012    changed    PROSTATE SURGERY      TONSILLECTOMY  1941    VASECTOMY  1975       Social History     Tobacco Use    Smoking status: Never Smoker    Smokeless tobacco: Never Used   Substance Use Topics    Alcohol use: Not Currently     Alcohol/week: 1.0 standard drink     Types: 1 Glasses of wine per week     Comment: weekly with meal    Drug use: No       Family History   Problem Relation Age of Onset    Arthritis Father     Cancer Father     Kidney disease Father     Cancer Mother     Hearing loss Mother     Mental illness Mother     Alcohol abuse Brother     Cancer Brother     COPD Brother     Mental illness Brother     Cancer Paternal Uncle     Depression Brother     Early death Sister         accidental    Heart disease Brother     Mental illness Sister     Amblyopia Neg Hx     Blindness Neg Hx      Cataracts Neg Hx     Glaucoma Neg Hx     Macular degeneration Neg Hx     Retinal detachment Neg Hx     Strabismus Neg Hx        Current Outpatient Medications   Medication Sig    fentaNYL (DURAGESIC) 25 mcg/hr Place 1 patch onto the skin every 72 hours.    hydrocodone-acetaminophen 10-325mg (NORCO)  mg Tab Take by mouth every 4 (four) hours as needed for Pain.    timolol maleate 0.5% (TIMOPTIC) 0.5 % Drop timolol 0.5 % eye drops   INSTILL 1 DROP INTO AFFECTED EYE(S) BY OPHTHALMIC ROUTE 2 TIMES PER DAY    aspirin 81 MG Chew Take 81 mg by mouth once daily.    diclofenac sodium (VOLTAREN) 1 % Gel Use to affected joints up to 4 times a day    gabapentin (NEURONTIN) 600 MG tablet gabapentin 600 mg tablet   Take 1 tablet 3 times a day by oral route.    tamsulosin (FLOMAX) 0.4 mg Cap TAKE ONE CAPSULE BY MOUTH EVERY DAY     No current facility-administered medications for this visit.       Review of patient's allergies indicates:   Allergen Reactions    Ketamine Hallucinations     Had dysphoric reaction.     Penicillins Hives       Review of Systems   Constitutional: Negative.   HENT: Negative.    Eyes: Negative.    Cardiovascular: Negative.  Negative for chest pain, dyspnea on exertion, near-syncope, orthopnea and palpitations.   Respiratory: Negative.  Negative for cough and shortness of breath.    Endocrine: Negative.    Hematologic/Lymphatic: Negative.    Skin: Negative.    Musculoskeletal: Negative.    Gastrointestinal: Negative.    Genitourinary: Negative.    Neurological: Negative.    Psychiatric/Behavioral: Negative.      Objective:   Physical Exam  Vitals reviewed.   Constitutional:       Appearance: He is well-developed.   HENT:      Head: Normocephalic and atraumatic.   Eyes:      General: No scleral icterus.     Conjunctiva/sclera: Conjunctivae normal.   Neck:      Vascular: No JVD.   Cardiovascular:      Rate and Rhythm: Normal rate.      Pulses: Intact distal pulses.      Heart sounds:  Normal heart sounds. No murmur heard.    No friction rub. No gallop.      Comments: Regular rhythm with frequent extrasystoles  Pulmonary:      Effort: Pulmonary effort is normal.      Breath sounds: Normal breath sounds. No wheezing or rales.   Abdominal:      General: Bowel sounds are normal. There is no distension.      Palpations: Abdomen is soft.      Tenderness: There is no abdominal tenderness.   Musculoskeletal:         General: Normal range of motion.      Cervical back: Normal range of motion and neck supple.   Skin:     General: Skin is warm and dry.      Findings: No erythema or rash.   Neurological:      Mental Status: He is alert and oriented to person, place, and time.   Psychiatric:         Behavior: Behavior normal.         Thought Content: Thought content normal.         Judgment: Judgment normal.         Lab Results   Component Value Date    WBC 6.16 05/06/2021    HGB 14.9 05/06/2021    HCT 46.4 05/06/2021    MCV 96 05/06/2021     05/06/2021         Chemistry        Component Value Date/Time     05/06/2021 0848    K 4.2 05/06/2021 0848     05/06/2021 0848    CO2 26 05/06/2021 0848    BUN 24 05/06/2021 0848    CREATININE 1.0 05/06/2021 0848     05/06/2021 0848        Component Value Date/Time    CALCIUM 9.0 05/06/2021 0848    ALKPHOS 89 05/06/2021 0848    AST 22 05/06/2021 0848    ALT 16 05/06/2021 0848    BILITOT 0.6 05/06/2021 0848    ESTGFRAFRICA >60.0 05/06/2021 0848    EGFRNONAA >60.0 05/06/2021 0848            Lab Results   Component Value Date    CHOL 123 05/06/2021    CHOL 163 06/23/2020    CHOL 151 06/28/2018     Lab Results   Component Value Date    HDL 59 05/06/2021    HDL 59 06/23/2020    HDL 50 06/28/2018     Lab Results   Component Value Date    LDLCALC 50.4 (L) 05/06/2021    LDLCALC 84.6 06/23/2020    LDLCALC 86.0 06/28/2018     Lab Results   Component Value Date    TRIG 68 05/06/2021    TRIG 97 06/23/2020    TRIG 75 06/28/2018     Lab Results   Component  Value Date    CHOLHDL 48.0 05/06/2021    CHOLHDL 36.2 06/23/2020    CHOLHDL 33.1 06/28/2018       Lab Results   Component Value Date    TSH 1.999 07/02/2019       Lab Results   Component Value Date    HGBA1C 5.8 (H) 05/06/2021         Assessment:     1. PAF (paroxysmal atrial fibrillation)    2. Complete AV block    3. Coronary artery disease involving native coronary artery of native heart without angina pectoris    4. Chronic systolic congestive heart failure, pacing induced now s/p upgrade to CRT-P 2/2018    5. Cardiomyopathy, unspecified type    6. Dyslipidemia    7. Obstructive sleep apnea on CPAP    8. Cardiac pacemaker in situ    9. Presence of Watchman left atrial appendage closure device        Plan:     Referral to Neurology for management of neuropathy.    Continue current medicines.  Watch BPs at home - he states that they're much better away from the doctor's office.    Diet/exercise goals reinforced.    Hand washing and social distancing stressed.    F/U 12 months

## 2022-02-23 ENCOUNTER — PATIENT MESSAGE (OUTPATIENT)
Dept: INTERNAL MEDICINE | Facility: CLINIC | Age: 87
End: 2022-02-23
Payer: MEDICARE

## 2022-02-23 DIAGNOSIS — M79.2 NEUROPATHIC PAIN: Primary | ICD-10-CM

## 2022-02-23 RX ORDER — GABAPENTIN 600 MG/1
600 TABLET ORAL 3 TIMES DAILY
Qty: 90 TABLET | Refills: 5 | Status: SHIPPED | OUTPATIENT
Start: 2022-02-23 | End: 2023-01-24

## 2022-02-24 ENCOUNTER — CLINICAL SUPPORT (OUTPATIENT)
Dept: CARDIOLOGY | Facility: HOSPITAL | Age: 87
End: 2022-02-24
Payer: MEDICARE

## 2022-02-24 ENCOUNTER — PATIENT MESSAGE (OUTPATIENT)
Dept: INTERNAL MEDICINE | Facility: CLINIC | Age: 87
End: 2022-02-24
Payer: MEDICARE

## 2022-02-24 DIAGNOSIS — I50.9 HEART FAILURE, UNSPECIFIED: ICD-10-CM

## 2022-02-24 DIAGNOSIS — I44.2 ATRIOVENTRICULAR BLOCK, COMPLETE: ICD-10-CM

## 2022-02-24 DIAGNOSIS — I42.9 CARDIOMYOPATHY, UNSPECIFIED: ICD-10-CM

## 2022-02-24 DIAGNOSIS — I48.91 UNSPECIFIED ATRIAL FIBRILLATION: ICD-10-CM

## 2022-02-24 DIAGNOSIS — Z95.0 PRESENCE OF CARDIAC PACEMAKER: ICD-10-CM

## 2022-03-02 ENCOUNTER — TELEPHONE (OUTPATIENT)
Dept: PULMONOLOGY | Facility: CLINIC | Age: 87
End: 2022-03-02
Payer: MEDICARE

## 2022-03-02 ENCOUNTER — PATIENT MESSAGE (OUTPATIENT)
Dept: PULMONOLOGY | Facility: CLINIC | Age: 87
End: 2022-03-02
Payer: MEDICARE

## 2022-03-02 NOTE — TELEPHONE ENCOUNTER
Scheduled an appointment to see provider.    SUNNY Torres                ----- Message from Chuy Amador MD sent at 2/26/2022 12:09 AM CST -----  Please schedule routine clinic follow up with me.  Advice patient to bring cpap to clinic visit.  Thanks  chuy  ----- Message -----  From: Sirisha Key, FRANKIE  Sent: 2/23/2022  11:49 AM CST  To: Chuy Amador MD    Hi Dr. Amador,    According to airview, Mr. Ulloa's AHI is still appearing to be high. Just wanted to let you know. Thank you!

## 2022-03-15 ENCOUNTER — PATIENT MESSAGE (OUTPATIENT)
Dept: PULMONOLOGY | Facility: CLINIC | Age: 87
End: 2022-03-15

## 2022-03-15 ENCOUNTER — OFFICE VISIT (OUTPATIENT)
Dept: PULMONOLOGY | Facility: CLINIC | Age: 87
End: 2022-03-15
Payer: MEDICARE

## 2022-03-15 DIAGNOSIS — G47.33 OBSTRUCTIVE SLEEP APNEA ON CPAP: ICD-10-CM

## 2022-03-15 PROCEDURE — 99214 OFFICE O/P EST MOD 30 MIN: CPT | Mod: 95,,, | Performed by: INTERNAL MEDICINE

## 2022-03-15 PROCEDURE — 99214 PR OFFICE/OUTPT VISIT, EST, LEVL IV, 30-39 MIN: ICD-10-PCS | Mod: 95,,, | Performed by: INTERNAL MEDICINE

## 2022-03-15 NOTE — PROGRESS NOTES
Artemio Colon  was seen as a follow up.    The patient location is: home  The chief complaint leading to consultation is: forrest    Visit type: audiovisual    Face to Face time with patient: 35 minutes of total time spent on the encounter, which includes face to face time and non-face to face time preparing to see the patient (eg, review of tests), Obtaining and/or reviewing separately obtained history, Documenting clinical information in the electronic or other health record, Independently interpreting results (not separately reported) and communicating results to the patient/family/caregiver, or Care coordination (not separately reported).         Each patient to whom he or she provides medical services by telemedicine is:  (1) informed of the relationship between the physician and patient and the respective role of any other health care provider with respect to management of the patient; and (2) notified that he or she may decline to receive medical services by telemedicine and may withdraw from such care at any time.    Notes:      CHIEF COMPLAINT:    No chief complaint on file.      HISTORY OF PRESENT ILLNESS: Artemio Colon Sr. is a 89 y.o. male is here for sleep evaluation.   Our first encounter was 8/23/17.  Patient was diagnosed with forrest 2008.  At that time, patient was doing well with cpap at 11 cm H20.      Doing well with cpap, patient denied snoring or apnea.  Feeling rested upon awake.  No daytime somnolence.  No cataplexy.  No parasomnia.       Patient was set up with new cpap since last visit.      Chronic pain in back of leg.  Worse at night or with prolonged sitting.  +chronic back pain.      McCormick Sleepiness Scale score during initial sleep evaluation was 4.    SLEEP ROUTINE:  Activity the hour prior to sleep: watch tv    Bed partner:  wife  Time to bed:  10 pm   Lights off:  off  Sleep onset latency:  5 minutes        Disruptions or awakenings:    2 times (no difficulty going back to  sleep)    Wakeup time:      5 am   Perceived sleep quality:  rested       Daytime naps:      60 minutes in afternoon  Weekend sleep routine:      10 pm to 5 am   Caffeine use: 1 cup of coffee in am   exercise habit:   Not regimented      PAST MEDICAL HISTORY:    Active Ambulatory Problems     Diagnosis Date Noted    Coronary artery disease involving native coronary artery of native heart without angina pectoris 06/20/2017    PAF (paroxysmal atrial fibrillation) 06/20/2017    Cardiac pacemaker in situ 06/20/2017    Complete AV block 06/21/2017    Neuropathic pain 08/21/2017    Lumbar spinal stenosis 08/21/2017    Cervical stenosis of spine 08/21/2017    Open-angle glaucoma of right eye, indeterminate stage 08/21/2017    Bilateral renal cysts 08/21/2017    Onychomycosis 08/21/2017    Neuropathy 08/22/2017    Obstructive sleep apnea on CPAP 01/16/2018    Chronic systolic congestive heart failure, pacing induced now s/p upgrade to CRT-P 2/2018 01/23/2018    Cardiomyopathy 02/16/2018    Dyslipidemia 06/08/2018    Lower GI bleed 02/27/2019    Diverticulosis of intestine with bleeding 02/27/2019    Benign prostatic hyperplasia without lower urinary tract symptoms 02/27/2019    Aortic atherosclerosis -- seen CT 02/2019 04/02/2019    Presence of Watchman left atrial appendage closure device 01/28/2021    Encounter for long-term (current) use of other medications 01/31/2022     Resolved Ambulatory Problems     Diagnosis Date Noted    Shortness of breath 01/16/2018    Small bowel obstruction 02/14/2019     Past Medical History:   Diagnosis Date    AV block     BPH (benign prostatic hyperplasia)     Cancer     Chronic rhinitis     Coronary artery disease     Erectile dysfunction     Glaucoma     Kidney cysts     JAEL (obstructive sleep apnea)     Pacemaker     Symptomatic bradycardia                 PAST SURGICAL HISTORY:    Past Surgical History:   Procedure Laterality Date    ADENOIDECTOMY   1939    unsure    CATARACT EXTRACTION W/  INTRAOCULAR LENS IMPLANT Bilateral 2012    done in [place]    CHOLECYSTECTOMY      CLOSURE OF LEFT ATRIAL APPENDAGE USING DEVICE N/A 12/3/2020    Procedure: Left atrial appendage closure device;  Surgeon: Tawanda Adler MD;  Location: [facility];  Service: Cardiology;  Laterality: N/A;  AF, GAIL, Watchman Implant, BSci, Gen, [place], 3 Prep    COLECTOMY      cecum    COSMETIC SURGERY  1967    rhinoplasty    EYE SURGERY      FRACTURE SURGERY  wrist    1939    HERNIA REPAIR      INSERT / REPLACE / REMOVE PACEMAKER  2012    changed    PROSTATE SURGERY      TONSILLECTOMY  1941    VASECTOMY  1975         FAMILY HISTORY:                Family History   Problem Relation Age of Onset    Arthritis Father     Cancer Father     Kidney disease Father     Cancer Mother     Hearing loss Mother     Mental illness Mother     Alcohol abuse Brother     Cancer Brother     COPD Brother     Mental illness Brother     Cancer Paternal Uncle     Depression Brother     Early death Sister         accidental    Heart disease Brother     Mental illness Sister     Amblyopia Neg Hx     Blindness Neg Hx     Cataracts Neg Hx     Glaucoma Neg Hx     Macular degeneration Neg Hx     Retinal detachment Neg Hx     Strabismus Neg Hx        SOCIAL HISTORY:          Tobacco:   Social History     Tobacco Use   Smoking Status Never Smoker   Smokeless Tobacco Never Used       alcohol use:    Social History     Substance and Sexual Activity   Alcohol Use Not Currently    Alcohol/week: 1.0 standard drink    Types: 1 Glasses of wine per week    Comment: weekly with meal                 Occupation:  Former medical equipment salesman    ALLERGIES:    Review of patient's allergies indicates:   Allergen Reactions    Pcn [penicillins] Hives       CURRENT MEDICATIONS:    Current Outpatient Medications   Medication Sig Dispense Refill    aspirin 81 MG Chew Take 81 mg by mouth once daily.       diclofenac sodium (VOLTAREN) 1 % Gel Use to affected joints up to 4 times a day 100 g 4    fentaNYL (DURAGESIC) 25 mcg/hr Place 1 patch onto the skin every 72 hours.      gabapentin (NEURONTIN) 600 MG tablet Take 1 tablet (600 mg total) by mouth 3 (three) times daily. 90 tablet 5    hydrocodone-acetaminophen 10-325mg (NORCO)  mg Tab Take by mouth every 4 (four) hours as needed for Pain.      tamsulosin (FLOMAX) 0.4 mg Cap TAKE ONE CAPSULE BY MOUTH EVERY DAY 90 capsule 3    timolol maleate 0.5% (TIMOPTIC) 0.5 % Drop timolol 0.5 % eye drops   INSTILL 1 DROP INTO AFFECTED EYE(S) BY OPHTHALMIC ROUTE 2 TIMES PER DAY       No current facility-administered medications for this visit.                  REVIEW OF SYSTEMS:     Sleep related symptoms as per HPI.  CONST:Denies weight gain    HEENT: Denies sinus congestion; hearing loss  PULM: Denies dyspnea  CARD:  Denies palpitations   GI:  Denies acid reflux  : Denies polyuria  NEURO: Denies headaches  PSYCH: Denies mood disturbance  HEME: Denies anemia   Otherwise, a balance of systems reviewed is negative.          PHYSICAL EXAM:  There were no vitals filed for this visit.  There is no height or weight on file to calculate BMI.     GENERAL: Normal development, well groomed.  No apparent distress.    HEENT:   extra oculomotor is intact  NECK: N/A.  SKIN: On face and neck: No abrasions, no rashes, no lesions.    RESPIRATORY:   Normal chest expansion and non-labored breathing at rest.  CARDIOVASCULAR: n/a    EXTREMITIES: n/a  NEURO/PSYCH: Oriented to time, place and person. Normal attention span and concentration. Affect is full. Mood is normal.                                               DATA   psg 7/20/08 ahi of 62.2  8/2/08 optimal cpap of 11 cm H20    Lab Results   Component Value Date    TSH 1.999 07/02/2019       ASSESSMENT  Problem List Items Addressed This Visit     Obstructive sleep apnea on CPAP    Overview     -currently on apap 8-12 cm H20.  Doing  well with cpap.  Currently with resmed.  -70%>4 hours.  Residual ahi of 29.  High leak value 23 lpm.  Currently with ffm.  Will swith to nasal pillow in hope of improving leakage and residual ahi.           Current Assessment & Plan     -may consider asv titration if ahi still persit.             Relevant Orders    CPAP/BIPAP SUPPLIES         Sciatica - proper sleep position d/w patient.      Nasal congestion - sinus irrigation and nasal steroid.      Education: During our discussion today, we talked about the etiology of obstructive sleep apnea as well as the potential ramifications of untreated sleep apnea, which could include daytime sleepiness, hypertension, heart disease and/or stroke.     Precautions: The patient was advised to abstain from driving should they feel sleepy or drowsy.       Patient will No follow-ups on file. with md/np.      25 minutes of total time spent on the encounter, which includes face to face time and non-face to face time preparing to see the patient (eg, review of tests), Obtaining and/or reviewing separately obtained history, documenting clinical information in the electronic or other health record, independently interpreting results (not separately reported) and communicating results to the patient/family/caregiver, or Care coordination (not separately reported).

## 2022-03-22 ENCOUNTER — PATIENT MESSAGE (OUTPATIENT)
Dept: INTERNAL MEDICINE | Facility: CLINIC | Age: 87
End: 2022-03-22
Payer: MEDICARE

## 2022-05-23 ENCOUNTER — PATIENT MESSAGE (OUTPATIENT)
Dept: ELECTROPHYSIOLOGY | Facility: CLINIC | Age: 87
End: 2022-05-23
Payer: MEDICARE

## 2022-05-23 ENCOUNTER — TELEPHONE (OUTPATIENT)
Dept: ELECTROPHYSIOLOGY | Facility: CLINIC | Age: 87
End: 2022-05-23
Payer: MEDICARE

## 2022-05-23 DIAGNOSIS — Z95.0 BIVENTRICULAR CARDIAC PACEMAKER IN SITU: Primary | ICD-10-CM

## 2022-05-23 DIAGNOSIS — I44.2 COMPLETE AV BLOCK: ICD-10-CM

## 2022-05-23 NOTE — TELEPHONE ENCOUNTER
"90 day remote home monitoring transmission received:    Atrial fibrillation noted during device remote check:    Overall burden:  41%    In current episode for 16 days (starting May 6th)    Ventricular rates:   Controlled      Anticoagulation status:  Watchman/ASA    Patient symptoms:  Feels "a little off", fatigue.  Denies LH, dizzy, SOB.  States currently in [place] and will be there for another month.    Has clinic follow up scheduled for 8/9/22.                  "

## 2022-05-25 ENCOUNTER — CLINICAL SUPPORT (OUTPATIENT)
Dept: CARDIOLOGY | Facility: HOSPITAL | Age: 87
End: 2022-05-25
Payer: MEDICARE

## 2022-05-25 DIAGNOSIS — I48.91 UNSPECIFIED ATRIAL FIBRILLATION: ICD-10-CM

## 2022-05-25 DIAGNOSIS — Z95.0 PRESENCE OF CARDIAC PACEMAKER: ICD-10-CM

## 2022-05-25 DIAGNOSIS — I44.2 ATRIOVENTRICULAR BLOCK, COMPLETE: ICD-10-CM

## 2022-05-25 DIAGNOSIS — I50.9 HEART FAILURE, UNSPECIFIED: ICD-10-CM

## 2022-05-25 PROCEDURE — 93294 CARDIAC DEVICE CHECK - REMOTE: ICD-10-PCS | Mod: ,,, | Performed by: INTERNAL MEDICINE

## 2022-05-25 PROCEDURE — 93294 REM INTERROG EVL PM/LDLS PM: CPT | Mod: ,,, | Performed by: INTERNAL MEDICINE

## 2022-06-09 ENCOUNTER — TELEPHONE (OUTPATIENT)
Dept: OTOLARYNGOLOGY | Facility: CLINIC | Age: 87
End: 2022-06-09
Payer: MEDICARE

## 2022-06-09 NOTE — TELEPHONE ENCOUNTER
Spoke with patient told him Dr. Saleem does not do ear pain would have to go to [facility] or [place] / patient states he is in [place] for a few weeks and will see someone there / took him off of Dr. Saleem's schedule

## 2022-06-09 NOTE — TELEPHONE ENCOUNTER
Called patient to cancel ear appointment with Dr. Saleem was it was already canceled, passed the number on to Ms. Tavarez so she can try to get him in with Dr. Burgos

## 2022-07-20 ENCOUNTER — OFFICE VISIT (OUTPATIENT)
Dept: OTOLARYNGOLOGY | Facility: CLINIC | Age: 87
End: 2022-07-20
Payer: MEDICARE

## 2022-07-20 DIAGNOSIS — H92.01 OTALGIA OF RIGHT EAR: Primary | ICD-10-CM

## 2022-07-20 DIAGNOSIS — R09.82 POST-NASAL DRIP: ICD-10-CM

## 2022-07-20 PROCEDURE — 99202 PR OFFICE/OUTPT VISIT, NEW, LEVL II, 15-29 MIN: ICD-10-PCS | Mod: S$PBB,,, | Performed by: NURSE PRACTITIONER

## 2022-07-20 PROCEDURE — 99202 OFFICE O/P NEW SF 15 MIN: CPT | Mod: S$PBB,,, | Performed by: NURSE PRACTITIONER

## 2022-07-20 PROCEDURE — 99999 PR PBB SHADOW E&M-EST. PATIENT-LVL II: CPT | Mod: PBBFAC,,, | Performed by: NURSE PRACTITIONER

## 2022-07-20 PROCEDURE — 99999 PR PBB SHADOW E&M-EST. PATIENT-LVL II: ICD-10-PCS | Mod: PBBFAC,,, | Performed by: NURSE PRACTITIONER

## 2022-07-20 PROCEDURE — 99212 OFFICE O/P EST SF 10 MIN: CPT | Mod: PBBFAC,PN | Performed by: NURSE PRACTITIONER

## 2022-07-20 NOTE — PROGRESS NOTES
Subjective:      Artemio Colon Sr. is a 89 y.o. male who was self-referred for ear pain.    Mr. Colon reports having intermittent sharp right ear pain. He has had this occur over the past several years. He denies change in hearing, ear drainage, nasal or sinus symptoms, or ear trauma. He denies rash, fever, chills.    There is not a prior history of ear surgery.  There is not a prior history of ear infections .  He denies a history of significant noise exposure.  He does not wear hearing aids currently.  He has not had a hearing test recently.     Past Medical History  He has a past medical history of AV block, BPH (benign prostatic hyperplasia), Cancer, Chronic rhinitis, Coronary artery disease, Erectile dysfunction, Glaucoma, Kidney cysts, Neuropathy, JAEL (obstructive sleep apnea), Pacemaker, and Symptomatic bradycardia.    Past Surgical History  He has a past surgical history that includes Cholecystectomy; Insert / replace / remove pacemaker (2012); Colectomy; Cataract extraction w/  intraocular lens implant (Bilateral, 2012); Prostate surgery; Eye surgery; Hernia repair; Closure of left atrial appendage using device (N/A, 12/3/2020); Adenoidectomy (1939); Fracture surgery (wrist); Tonsillectomy (1941); Vasectomy (1975); and Cosmetic surgery (1967).    Family History  His family history includes Alcohol abuse in his brother; Arthritis in his father; COPD in his brother; Cancer in his brother, father, mother, and paternal uncle; Depression in his brother; Early death in his sister; Hearing loss in his mother; Heart disease in his brother; Kidney disease in his father; Mental illness in his brother, mother, and sister.    Social History  He reports that he has never smoked. He has never used smokeless tobacco. He reports previous alcohol use of about 1.0 standard drink of alcohol per week. He reports that he does not use drugs.    Allergies  He is allergic to ketamine and  penicillins.    Medications  He has a current medication list which includes the following prescription(s): aspirin, diclofenac sodium, fentanyl, gabapentin, hydrocodone-acetaminophen, and timolol maleate 0.5%.    Review of Systems   Constitutional: Negative.  Negative for chills, fever and unexpected weight change.   HENT: Positive for ear pain and postnasal drip. Negative for ear discharge, hearing loss, sore throat, tinnitus and trouble swallowing.    Eyes: Positive for photophobia and itching. Negative for pain and visual disturbance.   Respiratory: Negative for apnea and shortness of breath.    Cardiovascular: Negative.  Negative for chest pain and palpitations.   Gastrointestinal: Negative.  Negative for abdominal pain and nausea.   Endocrine: Positive for cold intolerance. Negative for heat intolerance.   Genitourinary: Negative.    Musculoskeletal: Positive for back pain and neck pain. Negative for joint swelling and neck stiffness.   Skin: Negative.  Negative for color change and rash.   Neurological: Positive for headaches. Negative for dizziness and facial asymmetry.   Hematological: Negative for adenopathy. Bruises/bleeds easily.   Psychiatric/Behavioral: Positive for sleep disturbance. Negative for agitation. The patient is not nervous/anxious.           Objective:     There were no vitals taken for this visit.     Constitutional:   He is oriented to person, place, and time. Vital signs are normal. He appears well-developed and well-nourished. He appears alert. Normal speech.      Head:  Normocephalic and atraumatic.     Ears:    Right Ear: No lacerations. No drainage, swelling or tenderness. No foreign bodies. No mastoid tenderness. Tympanic membrane is not injected, not scarred, not perforated, not erythematous, not retracted and not bulging. Tympanic membrane mobility is normal. No middle ear effusion. No hemotympanum.   Left Ear: No lacerations. No drainage, swelling or tenderness. No foreign  bodies. No mastoid tenderness. Tympanic membrane is not injected, not scarred, not perforated, not erythematous, not retracted and not bulging. Tympanic membrane mobility is normal.  No middle ear effusion. No hemotympanum.     Nose:  No mucosal edema, rhinorrhea, nose lacerations, sinus tenderness, septal deviation, nasal septal hematoma or polyps. No epistaxis.  No foreign bodies. No turbinate hypertrophy.  Right sinus exhibits no maxillary sinus tenderness and no frontal sinus tenderness. Left sinus exhibits no maxillary sinus tenderness and no frontal sinus tenderness.     Mouth/Throat  Oropharynx clear and moist without lesions or asymmetry, normal uvula midline and lips, teeth, and gums normal. No uvula swelling, oral lesions, trismus, mucous membrane lesions or xerostomia. No oropharyngeal exudate, posterior oropharyngeal edema or posterior oropharyngeal erythema.     Neck:  Neck normal without thyromegaly masses, asymmetry, normal tracheal structure, crepitus, and tenderness and no adenopathy.     Psychiatric:   He has a normal mood and affect. His speech is normal and behavior is normal.     Neurological:   He is alert and oriented to person, place, and time. No cranial nerve deficit.     Skin:   No abrasions, lacerations, lesions, or rashes.       Procedure    None      Data Reviewed    WBC (K/uL)   Date Value   05/06/2021 6.16     Platelets (K/uL)   Date Value   05/06/2021 211      Creatinine (mg/dL)   Date Value   05/06/2021 1.0     TSH (uIU/mL)   Date Value   07/02/2019 1.999     Glucose (mg/dL)   Date Value   05/06/2021 104     Hemoglobin A1C (%)   Date Value   05/06/2021 5.8 (H)            Assessment:     1. Otalgia of right ear    2. Post-nasal drip         Plan:     There is no evidence of ear infection, middle ear effusion or signs of trauma.   Symptoms are likely musculoskeletal in nature.  I recommend tylenol as needed for pain.  I recommend Fluticasone (Flonase) - 2 sprays each nostril daily as  needed for post-nasal drip.    RTC prn.

## 2022-08-01 ENCOUNTER — OFFICE VISIT (OUTPATIENT)
Dept: INTERNAL MEDICINE | Facility: CLINIC | Age: 87
End: 2022-08-01
Payer: MEDICARE

## 2022-08-01 VITALS
WEIGHT: 191.13 LBS | OXYGEN SATURATION: 97 % | HEART RATE: 90 BPM | BODY MASS INDEX: 27.36 KG/M2 | SYSTOLIC BLOOD PRESSURE: 124 MMHG | DIASTOLIC BLOOD PRESSURE: 74 MMHG | HEIGHT: 70 IN

## 2022-08-01 DIAGNOSIS — Z00.00 ANNUAL PHYSICAL EXAM: Primary | ICD-10-CM

## 2022-08-01 DIAGNOSIS — G47.33 OBSTRUCTIVE SLEEP APNEA ON CPAP: ICD-10-CM

## 2022-08-01 DIAGNOSIS — I48.0 PAF (PAROXYSMAL ATRIAL FIBRILLATION): ICD-10-CM

## 2022-08-01 DIAGNOSIS — E66.3 OVERWEIGHT (BMI 25.0-29.9): ICD-10-CM

## 2022-08-01 DIAGNOSIS — Z79.899 ENCOUNTER FOR LONG-TERM (CURRENT) USE OF OTHER MEDICATIONS: ICD-10-CM

## 2022-08-01 DIAGNOSIS — E78.5 DYSLIPIDEMIA: ICD-10-CM

## 2022-08-01 DIAGNOSIS — N20.0 NEPHROLITHIASIS: ICD-10-CM

## 2022-08-01 DIAGNOSIS — I25.10 CORONARY ARTERY DISEASE INVOLVING NATIVE CORONARY ARTERY OF NATIVE HEART WITHOUT ANGINA PECTORIS: ICD-10-CM

## 2022-08-01 PROCEDURE — 99999 PR PBB SHADOW E&M-EST. PATIENT-LVL III: CPT | Mod: PBBFAC,,, | Performed by: FAMILY MEDICINE

## 2022-08-01 PROCEDURE — 99397 PER PM REEVAL EST PAT 65+ YR: CPT | Mod: S$PBB,GZ,, | Performed by: FAMILY MEDICINE

## 2022-08-01 PROCEDURE — 99213 OFFICE O/P EST LOW 20 MIN: CPT | Mod: PBBFAC | Performed by: FAMILY MEDICINE

## 2022-08-01 PROCEDURE — 99397 PR PREVENTIVE VISIT,EST,65 & OVER: ICD-10-PCS | Mod: S$PBB,GZ,, | Performed by: FAMILY MEDICINE

## 2022-08-01 PROCEDURE — 99999 PR PBB SHADOW E&M-EST. PATIENT-LVL III: ICD-10-PCS | Mod: PBBFAC,,, | Performed by: FAMILY MEDICINE

## 2022-08-01 NOTE — PROGRESS NOTES
Subjective:       Patient ID: Artemio Colon Sr. is a 89 y.o. male.    Chief Complaint: Annual Exam    HPI    Artemio Colon Sr. is a 89 y.o. male PMHx JAEL, Afib, S/p Watchman, S/p Pacemaker, HF, Chronic back pain, PreDM for checkup.      #Cards: CAD, HF, Afib, S/p Pacemaker, S/p Watchman  - est w/ Dr. Adler (EP), lv 7/2021 - upcoming appt 8/2022  - est w/ Dr. Gutiérrez, lv 2/2022 -- f/u 1yr  - reg: ASA qd  - issues with recurrent GI bleeding not on anticoagulation (previously tried xarelto in 5622-6793 for symptomatic AF and had issues with excessive bleeding then). He was hospitalized for a diverticular bleed in March of 2019.  - s/p Watchman implant performed on 12/3/2020.     #Endo: PreDM (A1c 5/2021 = 5.8)    #Neuro: Neuropathy  - upcoming appt w/ Dr. Gonzalez on 9/14/22     #Ortho: Chronic back pain  - est w/ [place] [facility], sees q3m  - taking Norco prn (usually every 3d) and uses Fentanyl patches  -  reviewed     #Uro: BPH, H/o Nephrolithiasis  - reg: Flomax 0.4 qd with relief     #Pulm: JAEL on cpap  - est w/ Dr. Amador,  3/2022     #BMI 27    Review of Systems   Constitutional: Negative for activity change and unexpected weight change.   HENT: Negative for hearing loss, rhinorrhea and trouble swallowing.    Eyes: Negative for discharge and visual disturbance.   Respiratory: Negative for chest tightness and wheezing.    Cardiovascular: Negative for chest pain and palpitations.   Gastrointestinal: Negative for blood in stool, constipation, diarrhea and vomiting.   Endocrine: Negative for polydipsia and polyuria.   Genitourinary: Negative for difficulty urinating, hematuria and urgency.   Musculoskeletal: Positive for arthralgias. Negative for joint swelling and neck pain.   Neurological: Negative for weakness and headaches.   Psychiatric/Behavioral: Negative for confusion and dysphoric mood.         Past Medical History:   Diagnosis Date    AV block     BPH (benign prostatic hyperplasia)   "   Cancer     Chronic rhinitis     Coronary artery disease     Erectile dysfunction     Glaucoma     Kidney cysts     Neuropathy 8/22/2017    JAEL (obstructive sleep apnea)     Pacemaker     Symptomatic bradycardia     s/p pacemaker        Prior to Admission medications    Medication Sig Start Date End Date Taking? Authorizing Provider   aspirin 81 MG Chew Take 81 mg by mouth once daily.    Historical Provider   diclofenac sodium (VOLTAREN) 1 % Gel Use to affected joints up to 4 times a day 10/22/21   Javed Courtney MD   fentaNYL (DURAGESIC) 25 mcg/hr Place 1 patch onto the skin every 72 hours.    Historical Provider   gabapentin (NEURONTIN) 600 MG tablet Take 1 tablet (600 mg total) by mouth 3 (three) times daily. 2/23/22 8/22/22  Patel Loyd MD   hydrocodone-acetaminophen 10-325mg (NORCO)  mg Tab Take by mouth every 4 (four) hours as needed for Pain.    Historical Provider   timolol maleate 0.5% (TIMOPTIC) 0.5 % Drop timolol 0.5 % eye drops   INSTILL 1 DROP INTO AFFECTED EYE(S) BY OPHTHALMIC ROUTE 2 TIMES PER DAY    Historical Provider        Past medical history, surgical history, and family medical history reviewed and updated as appropriate.    Medications and allergies reviewed.     Objective:          Vitals:    08/01/22 0807   BP: 124/74   BP Location: Left arm   Patient Position: Sitting   BP Method: Medium (Manual)   Pulse: 90   SpO2: 97%   Weight: 86.7 kg (191 lb 2.2 oz)   Height: 5' 10" (1.778 m)     Body mass index is 27.43 kg/m².  Physical Exam  Vitals and nursing note reviewed.   Constitutional:       General: He is not in acute distress.     Appearance: Normal appearance. He is well-developed.   Eyes:      Extraocular Movements: Extraocular movements intact.   Cardiovascular:      Rate and Rhythm: Normal rate and regular rhythm.      Pulses: Normal pulses.      Heart sounds: Normal heart sounds. No murmur heard.  Pulmonary:      Effort: Pulmonary effort is normal. No respiratory " distress.      Breath sounds: Normal breath sounds. No wheezing.   Abdominal:      General: Bowel sounds are normal. There is no distension.      Palpations: Abdomen is soft.      Tenderness: There is no abdominal tenderness.   Neurological:      Mental Status: He is alert and oriented to person, place, and time.         Lab Results   Component Value Date    WBC 6.16 05/06/2021    HGB 14.9 05/06/2021    HCT 46.4 05/06/2021     05/06/2021    CHOL 123 05/06/2021    TRIG 68 05/06/2021    HDL 59 05/06/2021    ALT 16 05/06/2021    AST 22 05/06/2021     05/06/2021    K 4.2 05/06/2021     05/06/2021    CREATININE 1.0 05/06/2021    BUN 24 05/06/2021    CO2 26 05/06/2021    TSH 1.999 07/02/2019    INR 1.0 11/23/2020    HGBA1C 5.8 (H) 05/06/2021       Assessment:       1. Annual physical exam    2. Encounter for long-term (current) use of other medications    3. Coronary artery disease involving native coronary artery of native heart without angina pectoris    4. PAF (paroxysmal atrial fibrillation)    5. Dyslipidemia    6. Obstructive sleep apnea on CPAP    7. Overweight (BMI 25.0-29.9)    8. Nephrolithiasis          Plan:   1. Annual physical exam  -     Comprehensive Metabolic Panel  -     Lipid Panel  -     Hemoglobin A1C    2. Encounter for long-term (current) use of other medications  -     Comprehensive Metabolic Panel  -     Lipid Panel  -     Hemoglobin A1C    3. Coronary artery disease involving native coronary artery of native heart without angina pectoris  Overview:  50-60% mid LAD with signigficant Diagonal and PDA stenosis 80% or >  - (cath 2012 rx with medical therapy)       4. PAF (paroxysmal atrial fibrillation)  Overview:  S/P ablation [place]      5. Dyslipidemia    6. Obstructive sleep apnea on CPAP  Overview:  -currently on apap 8-12 cm H20.  Doing well with cpap.  Currently with resmed.  -70%>4 hours.  Residual ahi of 29.  High leak value 23 lpm.  Currently with ffm.  Will swith to nasal  pillow in hope of improving leakage and residual ahi.      7. Overweight (BMI 25.0-29.9)    8. Nephrolithiasis      Health maintenance reviewed with patient.     Follow up in about 6 months (around 2/1/2023) for Checkup.    Patel Loyd MD  Family Medicine / Primary Care  [facility]  8/1/2022

## 2022-08-03 ENCOUNTER — LAB VISIT (OUTPATIENT)
Dept: LAB | Facility: HOSPITAL | Age: 87
End: 2022-08-03
Attending: FAMILY MEDICINE
Payer: MEDICARE

## 2022-08-03 DIAGNOSIS — Z79.899 ENCOUNTER FOR LONG-TERM (CURRENT) USE OF OTHER MEDICATIONS: ICD-10-CM

## 2022-08-03 DIAGNOSIS — Z00.00 ANNUAL PHYSICAL EXAM: ICD-10-CM

## 2022-08-03 LAB
ALBUMIN SERPL BCP-MCNC: 4.1 G/DL (ref 3.5–5.2)
ALP SERPL-CCNC: 121 U/L (ref 55–135)
ALT SERPL W/O P-5'-P-CCNC: 65 U/L (ref 10–44)
ANION GAP SERPL CALC-SCNC: 8 MMOL/L (ref 8–16)
AST SERPL-CCNC: 54 U/L (ref 10–40)
BILIRUB SERPL-MCNC: 1 MG/DL (ref 0.1–1)
BUN SERPL-MCNC: 18 MG/DL (ref 10–30)
CALCIUM SERPL-MCNC: 9.6 MG/DL (ref 8.7–10.5)
CHLORIDE SERPL-SCNC: 105 MMOL/L (ref 95–110)
CHOLEST SERPL-MCNC: 171 MG/DL (ref 120–199)
CHOLEST/HDLC SERPL: 3 {RATIO} (ref 2–5)
CO2 SERPL-SCNC: 29 MMOL/L (ref 23–29)
CREAT SERPL-MCNC: 1 MG/DL (ref 0.5–1.4)
EST. GFR  (NO RACE VARIABLE): >60 ML/MIN/1.73 M^2
ESTIMATED AVG GLUCOSE: 117 MG/DL (ref 68–131)
GLUCOSE SERPL-MCNC: 102 MG/DL (ref 70–110)
HBA1C MFR BLD: 5.7 % (ref 4–5.6)
HDLC SERPL-MCNC: 57 MG/DL (ref 40–75)
HDLC SERPL: 33.3 % (ref 20–50)
LDLC SERPL CALC-MCNC: 99.8 MG/DL (ref 63–159)
NONHDLC SERPL-MCNC: 114 MG/DL
POTASSIUM SERPL-SCNC: 4.6 MMOL/L (ref 3.5–5.1)
PROT SERPL-MCNC: 7 G/DL (ref 6–8.4)
SODIUM SERPL-SCNC: 142 MMOL/L (ref 136–145)
TRIGL SERPL-MCNC: 71 MG/DL (ref 30–150)

## 2022-08-03 PROCEDURE — 36415 COLL VENOUS BLD VENIPUNCTURE: CPT | Performed by: FAMILY MEDICINE

## 2022-08-03 PROCEDURE — 80053 COMPREHEN METABOLIC PANEL: CPT | Performed by: FAMILY MEDICINE

## 2022-08-03 PROCEDURE — 80061 LIPID PANEL: CPT | Performed by: FAMILY MEDICINE

## 2022-08-03 PROCEDURE — 83036 HEMOGLOBIN GLYCOSYLATED A1C: CPT | Performed by: FAMILY MEDICINE

## 2022-08-09 ENCOUNTER — OFFICE VISIT (OUTPATIENT)
Dept: ELECTROPHYSIOLOGY | Facility: CLINIC | Age: 87
End: 2022-08-09
Payer: MEDICARE

## 2022-08-09 ENCOUNTER — CLINICAL SUPPORT (OUTPATIENT)
Dept: CARDIOLOGY | Facility: HOSPITAL | Age: 87
End: 2022-08-09
Attending: INTERNAL MEDICINE
Payer: MEDICARE

## 2022-08-09 VITALS
BODY MASS INDEX: 26.76 KG/M2 | WEIGHT: 191.13 LBS | HEART RATE: 90 BPM | SYSTOLIC BLOOD PRESSURE: 128 MMHG | DIASTOLIC BLOOD PRESSURE: 82 MMHG | HEIGHT: 71 IN

## 2022-08-09 DIAGNOSIS — Z95.818 PRESENCE OF WATCHMAN LEFT ATRIAL APPENDAGE CLOSURE DEVICE: ICD-10-CM

## 2022-08-09 DIAGNOSIS — I48.91 ATRIAL FIBRILLATION, UNSPECIFIED TYPE: ICD-10-CM

## 2022-08-09 DIAGNOSIS — I25.10 CORONARY ARTERY DISEASE INVOLVING NATIVE CORONARY ARTERY OF NATIVE HEART WITHOUT ANGINA PECTORIS: ICD-10-CM

## 2022-08-09 DIAGNOSIS — I44.2 COMPLETE AV BLOCK: ICD-10-CM

## 2022-08-09 DIAGNOSIS — I48.21 PERMANENT ATRIAL FIBRILLATION: ICD-10-CM

## 2022-08-09 DIAGNOSIS — G47.33 OBSTRUCTIVE SLEEP APNEA ON CPAP: ICD-10-CM

## 2022-08-09 DIAGNOSIS — Z95.0 BIVENTRICULAR CARDIAC PACEMAKER IN SITU: ICD-10-CM

## 2022-08-09 DIAGNOSIS — Z95.0 CARDIAC PACEMAKER IN SITU: ICD-10-CM

## 2022-08-09 DIAGNOSIS — I70.0 AORTIC ATHEROSCLEROSIS: ICD-10-CM

## 2022-08-09 DIAGNOSIS — I44.2 COMPLETE AV BLOCK: Primary | ICD-10-CM

## 2022-08-09 PROBLEM — I50.22 CHRONIC SYSTOLIC CONGESTIVE HEART FAILURE: Status: RESOLVED | Noted: 2018-01-23 | Resolved: 2022-08-09

## 2022-08-09 PROCEDURE — 99999 PR PBB SHADOW E&M-EST. PATIENT-LVL III: CPT | Mod: PBBFAC,,, | Performed by: INTERNAL MEDICINE

## 2022-08-09 PROCEDURE — 93281 PM DEVICE PROGR EVAL MULTI: CPT

## 2022-08-09 PROCEDURE — 93010 ELECTROCARDIOGRAM REPORT: CPT | Mod: S$PBB,,, | Performed by: INTERNAL MEDICINE

## 2022-08-09 PROCEDURE — 93010 RHYTHM STRIP: ICD-10-PCS | Mod: S$PBB,,, | Performed by: INTERNAL MEDICINE

## 2022-08-09 PROCEDURE — 99214 PR OFFICE/OUTPT VISIT, EST, LEVL IV, 30-39 MIN: ICD-10-PCS | Mod: S$PBB,,, | Performed by: INTERNAL MEDICINE

## 2022-08-09 PROCEDURE — 93281 PM DEVICE PROGR EVAL MULTI: CPT | Mod: 26,,, | Performed by: INTERNAL MEDICINE

## 2022-08-09 PROCEDURE — 99214 OFFICE O/P EST MOD 30 MIN: CPT | Mod: S$PBB,,, | Performed by: INTERNAL MEDICINE

## 2022-08-09 PROCEDURE — 93281 CARDIAC DEVICE CHECK - IN CLINIC & HOSPITAL: ICD-10-PCS | Mod: 26,,, | Performed by: INTERNAL MEDICINE

## 2022-08-09 PROCEDURE — 93005 ELECTROCARDIOGRAM TRACING: CPT | Mod: PBBFAC,59 | Performed by: INTERNAL MEDICINE

## 2022-08-09 PROCEDURE — 99213 OFFICE O/P EST LOW 20 MIN: CPT | Mod: PBBFAC | Performed by: INTERNAL MEDICINE

## 2022-08-09 PROCEDURE — 99999 PR PBB SHADOW E&M-EST. PATIENT-LVL III: ICD-10-PCS | Mod: PBBFAC,,, | Performed by: INTERNAL MEDICINE

## 2022-08-09 NOTE — PROGRESS NOTES
Subjective:    Patient ID:  Artemio Colon Sr. is a 89 y.o. male who presents for follow-up of Pacemaker Check    Referring Cardiologist: Nicholas Tee MD      HPI    Prior Hx:  I had the pleasure of seeing Mr. Colon today in our electrophysiology clinic for follow-up for his pacemaker and for his atrial fibrillation.  He recently moved here from [place] and established care in the cardiology clinic.  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 He has CAD (cath 2012 rx with medical therapy) and preserved EF (60-65% 1/2015). I do not know if he had a PVI or AVN ablation as he has complete AV block.  He feels well, exercises regularly.  He denies any chest pain, shortness of breath, palpitations, orthopnea or PND.  He is not on anticoagulation and when I brought it up as a discussion he stated he does not want to be on it and rathered not discuss it any further.     Mr. Colon presented 1/2018 noting shortness of breath and low energy. Recent remote interrogation noted <0.1% AMS burden with longest episode 3 min and 52 seconds. No AF noted. He wass 61% A and 100% V paced. His V-threshold was 1.75 @ 0.4 on first visit with me. Recent remote auto-capture threshold was 2.25 @ 0.4. It has slowly increased over the past year and a half with stable impedance. Checked in clinic with bipolar threshold of 1.5 @ 0.4 sitting up and unipolar threshold of 1.25@ 0.4 also sitting up. Lying down his capture threshold was 2.25 @ 0.4. ECHO noted LVEF of 40-45% and stress test was negative. We discussed upgrade to CRT-P which he underwent 2/2018.    Mr. Colon presents for post upgrade check 4/2018. At time of implant RV lead capture threshold was even higher so we elected to  "cap it and implant a new RV lead. Device interrogation noted no AF, sinus rhythm/kashif with PACs and complete AV block, 6 sec of AMS x 2, stable lead parameters. Noted rare phrenic nerve stim that is treated with position movement. He notes that he had a hard time mentally coming out of anesthesia during his recent procedure.    ECHO 8/2018 noted LVEF was 60-65%.     Mr. Colon presented for follow-up 11/2020 to discuss anticoagulation. He recently began having issues with recurrent pAF. He has had episodes lasting up to 9 hours. He noted he felt a little "off". He feels well without complaints otherwise. He has had issues with recurrent GI bleeding not on anticoagulation (previously tried xarelto in [id number] for symptomatic AF and had issues with excessive bleeding then). He was hospitalized for a diverticular bleed in March of 2019. We discussed pros and cons of Watchman implant which he elected to have which was performed on 12/3/2020.     Mr. Colon returned for follow-up 1/2021. His 6 week post-watchman implant GAIL noted no JAN leaks. Eliquis was stopped. He is on DAPT now. He recently had his LV lead reprogrammed due to phrenic nerve stim. He noted some fatigue that he thinks is from stopping testosterone.    Mr. Isaiah Clark returned for 6 month clinic follow-up 7/2021. He completed 6 months of DAPT and is now on aspirin 81mg daily only. He feels great. He has no complaints.    Interim Hx:  Mr. Isaiah Clark returns for yearly device follow-up. He feels well. He is dieting to try to lose weight. Notes some occasional phrenic stim but not too bothersome.    In clinic device check notes shows 1.3% RA and 100% BiV pacing with stable lead parameters, 100% AF burden since May of 2022, and ~2-2.5 yrs of estimated battery longevity.    My interpretation fo today's in clinic ECG is AF with BiV pacing (QRSd 145ms)    Review of Systems   Constitutional: Negative for fever and malaise/fatigue.   HENT: Negative for " congestion and sore throat.    Eyes: Negative for blurred vision and visual disturbance.   Cardiovascular: Negative for chest pain, dyspnea on exertion, irregular heartbeat, leg swelling, orthopnea, palpitations and paroxysmal nocturnal dyspnea.   Respiratory: Negative for cough and shortness of breath.    Hematologic/Lymphatic: Negative for bleeding problem. Bruises/bleeds easily.   Skin: Negative.    Musculoskeletal: Negative.    Gastrointestinal: Negative for abdominal pain, hematemesis and hematochezia.   Neurological: Negative for dizziness, focal weakness and weakness.        Objective:    Physical Exam  Vitals reviewed.   Constitutional:       General: He is not in acute distress.     Appearance: He is well-developed. He is not diaphoretic.   HENT:      Head: Normocephalic and atraumatic.   Eyes:      General:         Right eye: No discharge.         Left eye: No discharge.      Conjunctiva/sclera: Conjunctivae normal.   Cardiovascular:      Rate and Rhythm: Normal rate and regular rhythm.      Heart sounds: No murmur heard.    No friction rub. No gallop.   Pulmonary:      Effort: Pulmonary effort is normal. No respiratory distress.      Breath sounds: Normal breath sounds. No wheezing or rales.   Abdominal:      General: Bowel sounds are normal. There is no distension.      Palpations: Abdomen is soft.      Tenderness: There is no abdominal tenderness.   Musculoskeletal:      Cervical back: Neck supple.   Skin:     General: Skin is warm and dry.   Neurological:      Mental Status: He is alert and oriented to person, place, and time.   Psychiatric:         Behavior: Behavior normal.         Thought Content: Thought content normal.         Judgment: Judgment normal.           Assessment:       1. Complete AV block    2. Cardiac pacemaker in situ    3. Permanent atrial fibrillation    4. Aortic atherosclerosis -- seen CT 02/2019    5. Presence of Watchman left atrial appendage closure device    6. Coronary  artery disease involving native coronary artery of native heart without angina pectoris    7. Obstructive sleep apnea on CPAP         Plan:       In summary, Mr. 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 He is in complete AV block.  He also has JAEL, low testosterone, and CAD with preserved LVEF by ECHO in 2015. He is now s/p upgrade to CRT-P and RV lead revision for increasing RV lead threshold and pacing induced CMP with normalization of his EF. He is having recurrent long episodes of pAF and has a [id number] score of 3. He has had recurrent GI bleeding from diverticular disease and for this reason has not been on long-term anticoagulation and is now s/p successful Watchman implantation with 6 week GAIL noting no JAN leaks. He completed 6 months of DAPT and is now on 81mg aspirin daily alone. He is doing great. Device is functioning normally. Will investigate battery longevity change (was 8 years 1 year ago). AMS base rate changed from 90 to 70 bpm.    RTC in 1 year, sooner if needed.    Thank you for allowing me to participate in the care of this patient. Please do not hesitate to call me with any questions or concerns.    Tawanda Adler MD, PhD  Cardiac Electrophysiology

## 2022-08-10 DIAGNOSIS — I48.91 ATRIAL FIBRILLATION, UNSPECIFIED TYPE: Primary | ICD-10-CM

## 2022-08-17 ENCOUNTER — PATIENT MESSAGE (OUTPATIENT)
Dept: NEUROLOGY | Facility: CLINIC | Age: 87
End: 2022-08-17
Payer: MEDICARE

## 2022-08-17 ENCOUNTER — TELEPHONE (OUTPATIENT)
Dept: NEUROLOGY | Facility: CLINIC | Age: 87
End: 2022-08-17
Payer: MEDICARE

## 2022-08-19 ENCOUNTER — PATIENT MESSAGE (OUTPATIENT)
Dept: NEUROLOGY | Facility: CLINIC | Age: 87
End: 2022-08-19
Payer: MEDICARE

## 2022-08-23 ENCOUNTER — CLINICAL SUPPORT (OUTPATIENT)
Dept: CARDIOLOGY | Facility: HOSPITAL | Age: 87
End: 2022-08-23
Payer: MEDICARE

## 2022-08-23 DIAGNOSIS — Z95.0 PRESENCE OF CARDIAC PACEMAKER: ICD-10-CM

## 2022-08-25 ENCOUNTER — PATIENT MESSAGE (OUTPATIENT)
Dept: NEUROLOGY | Facility: CLINIC | Age: 87
End: 2022-08-25
Payer: MEDICARE

## 2022-10-28 ENCOUNTER — OFFICE VISIT (OUTPATIENT)
Dept: NEUROLOGY | Facility: CLINIC | Age: 87
End: 2022-10-28
Payer: MEDICARE

## 2022-10-28 VITALS
DIASTOLIC BLOOD PRESSURE: 81 MMHG | WEIGHT: 189.69 LBS | HEIGHT: 70 IN | SYSTOLIC BLOOD PRESSURE: 143 MMHG | BODY MASS INDEX: 27.16 KG/M2 | HEART RATE: 69 BPM

## 2022-10-28 DIAGNOSIS — M54.17 LUMBOSACRAL RADICULOPATHY: Primary | ICD-10-CM

## 2022-10-28 PROCEDURE — 99204 OFFICE O/P NEW MOD 45 MIN: CPT | Mod: S$PBB,,, | Performed by: PSYCHIATRY & NEUROLOGY

## 2022-10-28 PROCEDURE — 99204 PR OFFICE/OUTPT VISIT, NEW, LEVL IV, 45-59 MIN: ICD-10-PCS | Mod: S$PBB,,, | Performed by: PSYCHIATRY & NEUROLOGY

## 2022-10-28 PROCEDURE — 99999 PR PBB SHADOW E&M-EST. PATIENT-LVL III: ICD-10-PCS | Mod: PBBFAC,,, | Performed by: PSYCHIATRY & NEUROLOGY

## 2022-10-28 PROCEDURE — 99213 OFFICE O/P EST LOW 20 MIN: CPT | Mod: PBBFAC | Performed by: PSYCHIATRY & NEUROLOGY

## 2022-10-28 PROCEDURE — 99999 PR PBB SHADOW E&M-EST. PATIENT-LVL III: CPT | Mod: PBBFAC,,, | Performed by: PSYCHIATRY & NEUROLOGY

## 2022-10-31 NOTE — PROGRESS NOTES
"  Artemio Colon Sr. is a 89 y.o. year old male that  presents for follow up legs pain  Chief Complaint   Patient presents with    Consult    Numbness     Both legs - burning pain    Back Pain              HPI:  I had the pleasure of seeing Mr Artemio Colon in follow up today. He was last seen on 8/22/17  Mr Colon has a medical history significant for BPH, ED, and also carries a diagnosis of lumbar stenosis, and lumbar polyradiculopathy.  He remains experiencing sharp legs pain that starts in the buttocks, often times is severe and bothers him mainly at night. The pain sometimes is associated with numbness but indicated that these symptoms rarely occur during the daytime.  Denies legs weakness, imbalance, dizziness, or bladder impairment.   Stated that he has done a good deal of PT without noticeable pain benefit " some stronger legs".  He wears a fentanyl patch that typically helps his pain for the first  hours.  Tried gabapentin " with no good results and caused me to be little bit drowsy".      Past Medical History:   Diagnosis Date    AV block     BPH (benign prostatic hyperplasia)     Cancer     Chronic rhinitis     Coronary artery disease     Erectile dysfunction     Glaucoma     Kidney cysts     Neuropathy 8/22/2017    JAEL (obstructive sleep apnea)     Pacemaker     Symptomatic bradycardia     s/p pacemaker     Social History     Socioeconomic History    Marital status:    Tobacco Use    Smoking status: Never    Smokeless tobacco: Never   Substance and Sexual Activity    Alcohol use: Not Currently     Alcohol/week: 1.0 standard drink     Types: 1 Glasses of wine per week     Comment: weekly with meal    Drug use: No    Sexual activity: Yes     Partners: Female     Birth control/protection: Condom   Social History Narrative    Lives with wife (younger 72), healthy. 1 dtr and 1 son, and older son by prev marriage in 70s. 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     Social " Determinants of Health     Financial Resource Strain: Low Risk     Difficulty of Paying Living Expenses: Not very hard   Food Insecurity: No Food Insecurity    Worried About Running Out of Food in the Last Year: Never true    Ran Out of Food in the Last Year: Never true   Transportation Needs: No Transportation Needs    Lack of Transportation (Medical): No    Lack of Transportation (Non-Medical): No   Physical Activity: Insufficiently Active    Days of Exercise per Week: 2 days    Minutes of Exercise per Session: 10 min   Stress: No Stress Concern Present    Feeling of Stress : Only a little   Social Connections: Unknown    Frequency of Communication with Friends and Family: Three times a week    Frequency of Social Gatherings with Friends and Family: Twice a week    Active Member of Clubs or Organizations: No    Attends Club or Organization Meetings: 1 to 4 times per year    Marital Status:    Housing Stability: Low Risk     Unable to Pay for Housing in the Last Year: No    Number of Places Lived in the Last Year: 1    Unstable Housing in the Last Year: No     Past Surgical History:   Procedure Laterality Date    ADENOIDECTOMY  1939    unsure    CATARACT EXTRACTION W/  INTRAOCULAR LENS IMPLANT Bilateral 2012    done in [place]    CHOLECYSTECTOMY      CLOSURE OF LEFT ATRIAL APPENDAGE USING DEVICE N/A 12/3/2020    Procedure: Left atrial appendage closure device;  Surgeon: Tawanda Adler MD;  Location: [facility];  Service: Cardiology;  Laterality: N/A;  AF, GAIL, Watchman Implant, BSci, Gen, ID/MB, 3 Prep    COLECTOMY      cecum    COSMETIC SURGERY  1967    rhinoplasty    EYE SURGERY      FRACTURE SURGERY  wrist    1939    HERNIA REPAIR      INSERT / REPLACE / REMOVE PACEMAKER  2012    changed    PROSTATE SURGERY      TONSILLECTOMY  1941    VASECTOMY  1975     Family History   Problem Relation Age of Onset    Arthritis Father     Cancer Father     Kidney disease Father     Cancer Mother     Hearing loss Mother  "    Mental illness Mother     Alcohol abuse Brother     Cancer Brother     COPD Brother     Mental illness Brother     Cancer Paternal Uncle     Depression Brother     Early death Sister         accidental    Heart disease Brother     Mental illness Sister     Amblyopia Neg Hx     Blindness Neg Hx     Cataracts Neg Hx     Glaucoma Neg Hx     Macular degeneration Neg Hx     Retinal detachment Neg Hx     Strabismus Neg Hx            Review of Systems  General ROS: negative for chills, fever or weight loss  Psychological ROS: negative for hallucination, depression or suicidal ideation  Ophthalmic ROS: negative for blurry vision, photophobia or eye pain  ENT ROS: negative for epistaxis, sore throat or rhinorrhea  Respiratory ROS: no cough, shortness of breath, or wheezing  Cardiovascular ROS: no chest pain or dyspnea on exertion  Gastrointestinal ROS: no abdominal pain, change in bowel habits, or black/ bloody stools  Genito-Urinary ROS: no dysuria, trouble voiding, or hematuria  Musculoskeletal ROS: negative for gait disturbance or muscular weakness  Neurological ROS: no syncope or seizures; no ataxia  Dermatological ROS: negative for pruritis, rash and jaundice      Physical Exam:  BP (!) 143/81   Pulse 69   Ht 5' 10" (1.778 m)   Wt 86 kg (189 lb 11.3 oz)   BMI 27.22 kg/m²   General appearance: alert, cooperative, no distress  Constitutional:Oriented to person, place, and time.appears well-developed and well-nourished.   HEENT: Normocephalic, atraumatic, neck symmetrical, no nasal discharge   Eyes: conjunctivae/corneas clear, PERRL, EOM's intact  Lungs: clear to auscultation bilaterally, no dullness to percussion bilaterally  Heart: regular rate and rhythm without rub; no displacement of the PMI   Abdomen: soft, non-tender; bowel sounds normoactive; no organomegaly  Extremities: extremities symmetric; no clubbing, cyanosis, or edema  Integument: Skin color, texture, turgor normal; no rashes; hair distrubution " normal  Neurologic:  Mental status: alert and awake, oriented x 4, comprehension, naming, and repetition intact. No right to left confusion. Performs serial 7's without difficulty .No dysarthria.  CN 2-12: pupils 4 mm bilaterally, reactive to light. Fundi without papilledema. Visual fields full to confrontation. EOM full without nystagmus. Face sensation normal in all distributions. Face symmetric. Hearing grossly intact. Palate elevates well. Tongue midline without atrophy or fasciculations.  Motor: 5/5 all over  Sensory: intact in all modalities.  DTR's: 1+ all over except absent ankle jerks.  Plantars: no tested.  Coordination: finger to nose and heel-knee-shin intact.  Gait: no ataxia or bradykinesia  Psychiatric: no pressured speech; normal affect; no evidence of impaired cognition                LABS:    Complete Blood Count  Lab Results   Component Value Date    RBC 4.85 05/06/2021    HGB 14.9 05/06/2021    HCT 46.4 05/06/2021    MCV 96 05/06/2021    MCH 30.7 05/06/2021    MCHC 32.1 05/06/2021    RDW 14.0 05/06/2021     05/06/2021    MPV 10.6 05/06/2021    GRAN 3.4 05/06/2021    GRAN 54.3 05/06/2021    LYMPH 1.9 05/06/2021    LYMPH 30.7 05/06/2021    MONO 0.7 05/06/2021    MONO 11.2 05/06/2021    EOS 0.2 05/06/2021    BASO 0.05 05/06/2021    EOSINOPHIL 2.8 05/06/2021    BASOPHIL 0.8 05/06/2021    DIFFMETHOD Automated 05/06/2021       Comprehensive Metabolic Panel  Lab Results   Component Value Date     08/03/2022    BUN 18 08/03/2022    CREATININE 1.0 08/03/2022     08/03/2022    K 4.6 08/03/2022     08/03/2022    PROT 7.0 08/03/2022    ALBUMIN 4.1 08/03/2022    BILITOT 1.0 08/03/2022    AST 54 (H) 08/03/2022    ALKPHOS 121 08/03/2022    CO2 29 08/03/2022    ALT 65 (H) 08/03/2022    ANIONGAP 8 08/03/2022    EGFRNONAA >60.0 05/06/2021    ESTGFRAFRICA >60.0 05/06/2021       TSH  Lab Results   Component Value Date    TSH 1.999 07/02/2019         Assessment: pleasant 89 y/o with BPH, ED,  and also carries a diagnosis of lumbar stenosis, and lumbar polyradiculopathy, presents with complain of persistent legs pain at night with a pattern described above.  Unimpressive neuro exam.  Likely radicular pain in the context of known lumbar polyradiculopathy.  Trial of Pregabalin.         ICD-10-CM ICD-9-CM    1. Lumbosacral radiculopathy  M54.17 724.4 Ambulatory referral/consult to Physical/Occupational Therapy        The encounter diagnosis was Lumbosacral radiculopathy.      Plan:  1) Legs pain: as above  2) Lumbar stenosis  3) Lumbar polyradiculopathy  4) BPH  5) ED    Orders Placed This Encounter   Procedures    Ambulatory referral/consult to Physical/Occupational Therapy           Vic Hylton MD

## 2022-11-16 ENCOUNTER — CLINICAL SUPPORT (OUTPATIENT)
Dept: REHABILITATION | Facility: HOSPITAL | Age: 87
End: 2022-11-16
Attending: PSYCHIATRY & NEUROLOGY
Payer: MEDICARE

## 2022-11-16 DIAGNOSIS — M54.17 LUMBOSACRAL RADICULOPATHY: ICD-10-CM

## 2022-11-16 PROCEDURE — 97110 THERAPEUTIC EXERCISES: CPT | Mod: PO

## 2022-11-16 PROCEDURE — 97161 PT EVAL LOW COMPLEX 20 MIN: CPT | Mod: PO

## 2022-11-18 NOTE — PLAN OF CARE
"[facility]  Physical Therapy Initial Evaluation    Date: 11/16/2022   Name: Artemio Colon .  Clinic Number: [id number]    Therapy Diagnosis:   Encounter Diagnosis   Name Primary?    Lumbosacral radiculopathy      Physician: Vic Hylton MD    Physician Orders: PT eval and treat  Medical Diagnosis: M54.17 (ICD-10-CM) - Lumbosacral radiculopathy  Evaluation Date: 11/16/22  Authorization Period Expiration: 12/31/22  Plan of Care Certification Period: 1/31/22  Progress Note Due: 12/16/22    Visit # / Visits authorized: 1/ 1   FOTO: 1/ 3   PTA Visit #: 0/5     Time In: 110 pm  Time Out: 200 pm  Total Appointment Time (timed & untimed codes): 50 minutes    Precautions: Standard, pacemaker    Subjective   Date of onset: + 5 years but chronic pain +40 years  History of current condition - Artemio reports: per neuro visit on 10/28/22:  HPI:  I had the pleasure of seeing Mr Artemio Colon in follow up today. He was last seen on 8/22/17  Mr Colon has a medical history significant for BPH, ED, and also carries a diagnosis of lumbar stenosis, and lumbar polyradiculopathy.  He remains experiencing sharp legs pain that starts in the buttocks, often times is severe and bothers him mainly at night. The pain sometimes is associated with numbness but indicated that these symptoms rarely occur during the daytime.  Denies legs weakness, imbalance, dizziness, or bladder impairment.   Stated that he has done a good deal of PT without noticeable pain benefit " some stronger legs".  He wears a fentanyl patch that typically helps his pain for the first  hours.  Tried gabapentin " with no good results and caused me to be little bit drowsy".     Medical History:   Past Medical History:   Diagnosis Date    AV block     BPH (benign prostatic hyperplasia)     Cancer     Chronic rhinitis     Coronary artery disease     Erectile dysfunction     Glaucoma     Kidney cysts     Neuropathy 8/22/2017    JAEL " (obstructive sleep apnea)     Pacemaker     Symptomatic bradycardia     s/p pacemaker       Surgical History:   Artemio Colon .  has a past surgical history that includes Cholecystectomy; Insert / replace / remove pacemaker (2012); Colectomy; Cataract extraction w/  intraocular lens implant (Bilateral, 2012); Prostate surgery; Eye surgery; Hernia repair; Closure of left atrial appendage using device (N/A, 12/3/2020); Adenoidectomy (1939); Fracture surgery (wrist); Tonsillectomy (1941); Vasectomy (1975); and Cosmetic surgery (1967).    Medications:   Artemio has a current medication list which includes the following prescription(s): aspirin, diclofenac sodium, fentanyl, gabapentin, hydrocodone-acetaminophen, and timolol maleate 0.5%.    Allergies:   Review of patient's allergies indicates:   Allergen Reactions    Ketamine Hallucinations     Had dysphoric reaction.     Penicillins Hives    Iodine         Imaging, Impression:     Lumbar spondylosis as detailed above worst at L2-L3 contributing to mild spinal canal stenosis as well as mild-to-moderate bilateral neuroforaminal.     Grade 1 anterolisthesis of L3 on L4.     Partially visualized large right simple renal cyst as well as bilateral nonobstructing renal stones.     Electronically signed by resident: Kodi Logan  Date:                                            03/05/2020  Time:                                           09:04     Electronically signed by: Reyes Mullen MD  Date:                                            03/05/2020  Time:                                           10:01    Prior Therapy: yes a few months ago for 3 months   Social History:  lives with their spouse apartment or condo +5 steps bilateral rails  Occupation: retired  Prior Level of Function: rollator and community ambulation + 1 year  Current Level of Function: same but with pain    Pain:  Current 3/10, worst 5/10, best 0/10   Location: bilateral back , lower legs, and upper  legs R>L  Description: increased a  Aggravating Factors: Sitting, Standing, and Night Time  Easing Factors: relaxation, pain medication, and pain patch    Pts goals: to see if you can help with leg pain.     Objective     Lumbar ROM: (measured in degrees)    Degrees Quality   flexion   100    extension   10    Rotation R 50    Rotation L 52      Active/Passive Hip ROM: (measured in degrees)    RLE LLE   Flexion 110/ 100/   Extension 0/ 0/     Lower Extremity Strength (graded 0-5 out of 5)   RLE LLE   Hip flexion: 4/5 4/5   Hip extension: 4-/5 4-/5   Ankle dorsiflexion: 5/5 5/5   Ankle plantarflexion 5/5 5/5   Knee flexion 5/5 5/5   Knee extension 5/5 5/5   Hip adduction 4+/5 4+/5   Hip abduction 4/5 4/5     Special Tests: ((+): pos.; (-): neg.)   Fabers Test: neg  Slump Test: neg  SLR Test: neg for hip and back  Palpation: unremarkable with quad tightness and edema of L knee due to OA  Gait: mild forward flexed posture with rollator with good waqas and foot clearance    CMS Impairment/Limitation/Restriction for FOTO lumbar Survey    Therapist reviewed FOTO scores for Artemio Tyson Isaiah Sr. on 11/16/2022.   FOTO documents entered into EPIC - see Media section.    Limitation Score: NT%  Category: Other             TREATMENT   Treatment Time In: 130 pm  Treatment Time Out: 155 pm  Total Treatment time (time-based codes) separate from Evaluation: 25 minutes    Artemio received therapeutic exercises to develop strength, endurance, flexibility, and core stabilization for 25 minutes including:    Small amplitude trunk rotation x 30    Bridges with add ball 3 x 10  Bridges with abd GTB 3 x 10  Supine clams GTB 3 x 10 vs seated clams  SLR 2 x 10  Sit to stand 2 x 10  Ankle pumps x 30  Seated vs standing heel raises x30      Hip add/frog stretch 3 x 30 secs  Trunk rotation stretch 3 x 30 secs  SKTC 3 x 30 secs         Home Exercises and Patient Education Provided    Education provided:   - HEP, findings    Written Home  Exercises Provided: Patient instructed to cont prior HEP.  Exercises were reviewed and Artemio was able to demonstrate them prior to the end of the session.  Artemio demonstrated good  understanding of the education provided.     See EMR under Patient Instructions for exercises provided 11/16/2022.    Assessment   Artemio is a 89 y.o. male referred to outpatient Physical Therapy with a medical diagnosis of M54.17 (ICD-10-CM) - Radiculopathy, lumbosacral region. Pt presents with general left knee pain from OA and chronic LBP which we were unable to reproduce with special test and palpation. Pt has +40 years history of chronic lumbar pain and leg pain. Pt recently completed 3 months of physical therapy and improved overall leg strength reported but no overall change in pain.  Pt also reported this to his physician as noted above in the history section. Pt was given HEP to improve LLE strength for knee stability and lumbar program for strengthening and flexibility of lumbar.  Pt education with findings and plan of care and HEP review for continued strengthening and maintenance but is not a candidate for skilled PT services.  Pt pain is well controlled and we were unable to reproduce the pain and pt had an extensive attempt with PT to address earlier this year without any change in pain reported.     Pt prognosis is Good.   Pt will benefit from skilled outpatient Physical Therapy to address the deficits stated above and in the chart below, provide pt/family education, and to maximize pt's level of independence.     Plan of care discussed with patient: Yes  Pt's spiritual, cultural and educational needs considered and patient is agreeable to the plan of care and goals as stated below:     Anticipated Barriers for therapy: none    Medical Necessity is demonstrated by the following  History  Co-morbidities and personal factors that may impact the plan of care Co-morbidities:   See PMHx    Personal Factors:   age     low    Examination  Body Structures and Functions, activity limitations and participation restrictions that may impact the plan of care Body Regions:   back  lower extremities    Body Systems:    gross symmetry  ROM  strength    Participation Restrictions:   none    Activity limitations:   Learning and applying knowledge  no deficits    General Tasks and Commands  no deficits    Communication  no deficits    Mobility  lifting and carrying objects    Self care  no deficits    Domestic Life  no deficits    Interactions/Relationships  no deficits    Life Areas  no deficits    Community and Social Life  community life         low     Clinical Presentation stable and uncomplicated low   Decision Making/ Complexity Score: low     Goals:N/A    Plan   Discharge PT    Vicki Uribe PT

## 2022-11-21 ENCOUNTER — CLINICAL SUPPORT (OUTPATIENT)
Dept: CARDIOLOGY | Facility: HOSPITAL | Age: 87
End: 2022-11-21
Payer: MEDICARE

## 2022-11-21 DIAGNOSIS — Z95.0 PRESENCE OF CARDIAC PACEMAKER: ICD-10-CM

## 2022-11-21 PROCEDURE — 93294 REM INTERROG EVL PM/LDLS PM: CPT | Mod: ,,, | Performed by: INTERNAL MEDICINE

## 2022-11-21 PROCEDURE — 93294 CARDIAC DEVICE CHECK - REMOTE: ICD-10-PCS | Mod: ,,, | Performed by: INTERNAL MEDICINE

## 2022-11-29 ENCOUNTER — CLINICAL SUPPORT (OUTPATIENT)
Dept: CARDIOLOGY | Facility: HOSPITAL | Age: 87
End: 2022-11-29
Attending: INTERNAL MEDICINE
Payer: MEDICARE

## 2022-11-29 DIAGNOSIS — I44.2 COMPLETE AV BLOCK: ICD-10-CM

## 2022-11-29 DIAGNOSIS — Z95.0 BIVENTRICULAR CARDIAC PACEMAKER IN SITU: ICD-10-CM

## 2022-11-29 PROCEDURE — 93281 PM DEVICE PROGR EVAL MULTI: CPT | Mod: 26,,, | Performed by: INTERNAL MEDICINE

## 2022-11-29 PROCEDURE — 93281 PM DEVICE PROGR EVAL MULTI: CPT

## 2022-11-29 PROCEDURE — 93281 CARDIAC DEVICE CHECK - IN CLINIC & HOSPITAL: ICD-10-PCS | Mod: 26,,, | Performed by: INTERNAL MEDICINE

## 2022-12-30 ENCOUNTER — TELEPHONE (OUTPATIENT)
Dept: SPINE | Facility: CLINIC | Age: 87
End: 2022-12-30
Payer: MEDICARE

## 2022-12-30 NOTE — TELEPHONE ENCOUNTER
Staff called patient in regards to his appointment on 1/3/23,patient told staff that he wanted to cancel his appointment.

## 2023-01-18 ENCOUNTER — PATIENT MESSAGE (OUTPATIENT)
Dept: INTERNAL MEDICINE | Facility: CLINIC | Age: 88
End: 2023-01-18
Payer: MEDICARE

## 2023-01-19 ENCOUNTER — LAB VISIT (OUTPATIENT)
Dept: LAB | Facility: HOSPITAL | Age: 88
End: 2023-01-19
Payer: MEDICARE

## 2023-01-19 ENCOUNTER — OFFICE VISIT (OUTPATIENT)
Dept: INTERNAL MEDICINE | Facility: CLINIC | Age: 88
End: 2023-01-19
Payer: MEDICARE

## 2023-01-19 VITALS
HEART RATE: 61 BPM | OXYGEN SATURATION: 96 % | BODY MASS INDEX: 27.34 KG/M2 | SYSTOLIC BLOOD PRESSURE: 130 MMHG | HEIGHT: 70 IN | WEIGHT: 190.94 LBS | DIASTOLIC BLOOD PRESSURE: 73 MMHG

## 2023-01-19 DIAGNOSIS — E78.5 DYSLIPIDEMIA: ICD-10-CM

## 2023-01-19 DIAGNOSIS — N40.0 BENIGN PROSTATIC HYPERPLASIA WITHOUT LOWER URINARY TRACT SYMPTOMS: ICD-10-CM

## 2023-01-19 DIAGNOSIS — I25.10 CORONARY ARTERY DISEASE INVOLVING NATIVE CORONARY ARTERY OF NATIVE HEART WITHOUT ANGINA PECTORIS: ICD-10-CM

## 2023-01-19 DIAGNOSIS — G47.33 OBSTRUCTIVE SLEEP APNEA ON CPAP: ICD-10-CM

## 2023-01-19 DIAGNOSIS — I48.0 PAF (PAROXYSMAL ATRIAL FIBRILLATION): Primary | ICD-10-CM

## 2023-01-19 DIAGNOSIS — I44.2 COMPLETE AV BLOCK: ICD-10-CM

## 2023-01-19 DIAGNOSIS — N20.0 NEPHROLITHIASIS: ICD-10-CM

## 2023-01-19 DIAGNOSIS — R79.9 ABNORMAL FINDING OF BLOOD CHEMISTRY, UNSPECIFIED: ICD-10-CM

## 2023-01-19 DIAGNOSIS — I70.0 AORTIC ATHEROSCLEROSIS: ICD-10-CM

## 2023-01-19 DIAGNOSIS — Z95.818 PRESENCE OF WATCHMAN LEFT ATRIAL APPENDAGE CLOSURE DEVICE: ICD-10-CM

## 2023-01-19 PROCEDURE — 99213 PR OFFICE/OUTPT VISIT, EST, LEVL III, 20-29 MIN: ICD-10-PCS | Mod: S$PBB,GC,, | Performed by: STUDENT IN AN ORGANIZED HEALTH CARE EDUCATION/TRAINING PROGRAM

## 2023-01-19 PROCEDURE — 80053 COMPREHEN METABOLIC PANEL: CPT | Performed by: STUDENT IN AN ORGANIZED HEALTH CARE EDUCATION/TRAINING PROGRAM

## 2023-01-19 PROCEDURE — 36415 COLL VENOUS BLD VENIPUNCTURE: CPT | Performed by: STUDENT IN AN ORGANIZED HEALTH CARE EDUCATION/TRAINING PROGRAM

## 2023-01-19 PROCEDURE — 99213 OFFICE O/P EST LOW 20 MIN: CPT | Mod: PBBFAC | Performed by: STUDENT IN AN ORGANIZED HEALTH CARE EDUCATION/TRAINING PROGRAM

## 2023-01-19 PROCEDURE — 99999 PR PBB SHADOW E&M-EST. PATIENT-LVL III: CPT | Mod: PBBFAC,GC,, | Performed by: STUDENT IN AN ORGANIZED HEALTH CARE EDUCATION/TRAINING PROGRAM

## 2023-01-19 PROCEDURE — 99213 OFFICE O/P EST LOW 20 MIN: CPT | Mod: S$PBB,GC,, | Performed by: STUDENT IN AN ORGANIZED HEALTH CARE EDUCATION/TRAINING PROGRAM

## 2023-01-19 PROCEDURE — 99999 PR PBB SHADOW E&M-EST. PATIENT-LVL III: ICD-10-PCS | Mod: PBBFAC,GC,, | Performed by: STUDENT IN AN ORGANIZED HEALTH CARE EDUCATION/TRAINING PROGRAM

## 2023-01-19 PROCEDURE — 85025 COMPLETE CBC W/AUTO DIFF WBC: CPT | Performed by: STUDENT IN AN ORGANIZED HEALTH CARE EDUCATION/TRAINING PROGRAM

## 2023-01-19 RX ORDER — ESCITALOPRAM OXALATE 10 MG/1
10 TABLET ORAL DAILY
Qty: 30 TABLET | Refills: 11 | Status: SHIPPED | OUTPATIENT
Start: 2023-01-19 | End: 2023-04-21

## 2023-01-19 RX ORDER — POLYETHYLENE GLYCOL 3350 17 G/17G
17 POWDER, FOR SOLUTION ORAL 2 TIMES DAILY
Qty: 507 G | Refills: 2 | Status: SHIPPED | OUTPATIENT
Start: 2023-01-19 | End: 2023-01-24

## 2023-01-19 RX ORDER — AMOXICILLIN 250 MG
1 CAPSULE ORAL DAILY
Qty: 30 TABLET | Refills: 3 | Status: CANCELLED | OUTPATIENT
Start: 2023-01-19

## 2023-01-19 NOTE — PROGRESS NOTES
Artemio Colon Sr.  2/24/1929        Subjective     Chief Complaint:abdominal pain    History of Present Illness:  Mr. Artemio Colon Sr. is a 89 y.o. male who presents to clinic for evaluation of abdominal pain. Past medical history as listed below    The patient has been having burning senation when he urinates. He has 4/10 pain in his belly. Pain is relieved with passage of stool or flatus. Last bowel movement 3 hours. The patient has had hypothermic readings on his temperature at home. The patient has also been having chills and rigors    I asked the patient what he likes to do for fun and he likes to eat steaks and Italian, The patient likes     When I brought up the idea of depression    The patient reports he has had no energy over the past 10 days    Dr. Loyd is the patients PCP who noted below at the last visit     #Cards: CAD, HF, Afib, S/p Pacemaker, S/p Watchman  - est w/ Dr. Adler (),  7/2021 - upcoming appt 8/2022  - est w/ Dr. Gutiérrez,  2/2022 -- f/u 1yr  - reg: ASA qd  - issues with recurrent GI bleeding not on anticoagulation (previously tried xarelto in [id number] for symptomatic AF and had issues with excessive bleeding then). He was hospitalized for a diverticular bleed in March of 2019.  - s/p Watchman implant performed on 12/3/2020.     #Endo: PreDM (A1c 5/2021 = 5.8)     #Neuro: Neuropathy  - upcoming appt w/ Dr. Gonzalez on 9/14/22     #Ortho: Chronic back pain  - est w/ [place] [facility], sees q3m  - taking Norco prn (usually every 3d) and uses Fentanyl patches  -  reviewed     #Uro: BPH, H/o Nephrolithiasis  - reg: Flomax 0.4 qd with relief     #Pulm: JAEL on cpap  - est w/ Dr. 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   HENT:  Negative for hearing loss.    Respiratory:  Negative for cough.    Cardiovascular:  Negative for chest pain.    Genitourinary:  Positive for dysuria, frequency and urgency.   Musculoskeletal:  Positive for back pain and myalgias.   Skin:  Negative for rash.   Neurological:  Positive for weakness.   Psychiatric/Behavioral:  Positive for depression. The patient is nervous/anxious.       PAST HISTORY:     Past Medical History:   Diagnosis Date    AV block     BPH (benign prostatic hyperplasia)     Cancer     Chronic rhinitis     Coronary artery disease     Erectile dysfunction     Glaucoma     Kidney cysts     Neuropathy 8/22/2017    JAEL (obstructive sleep apnea)     Pacemaker     Symptomatic bradycardia     s/p pacemaker       Past Surgical History:   Procedure Laterality Date    ADENOIDECTOMY  1939    unsure    CATARACT EXTRACTION W/  INTRAOCULAR LENS IMPLANT Bilateral 2012    done in [place]    CHOLECYSTECTOMY      CLOSURE OF LEFT ATRIAL APPENDAGE USING DEVICE N/A 12/3/2020    Procedure: Left atrial appendage closure device;  Surgeon: Tawanda Adler MD;  [facility] EP LAB;  Service: Cardiology;  Laterality: N/A;  AF, GAIL, Watchman Implant, BSci, Gen, MN/MB, 3 Prep    COLECTOMY      cecum    COSMETIC SURGERY  1967    rhinoplasty    EYE SURGERY      FRACTURE SURGERY  wrist    1939    HERNIA REPAIR      INSERT / REPLACE / REMOVE PACEMAKER  2012    changed    PROSTATE SURGERY      TONSILLECTOMY  1941    VASECTOMY  1975       Family History   Problem Relation Age of Onset    Arthritis Father     Cancer Father     Kidney disease Father     Cancer Mother     Hearing loss Mother     Mental illness Mother     Alcohol abuse Brother     Cancer Brother     COPD Brother     Mental illness Brother     Cancer Paternal Uncle     Depression Brother     Early death Sister         accidental    Heart disease Brother     Mental illness Sister     Amblyopia Neg Hx     Blindness Neg Hx     Cataracts Neg Hx     Glaucoma Neg Hx     Macular degeneration Neg Hx     Retinal detachment Neg Hx     Strabismus Neg Hx        Social History      Socioeconomic History    Marital status:    Tobacco Use    Smoking status: Never    Smokeless tobacco: Never   Substance and Sexual Activity    Alcohol use: Not Currently     Alcohol/week: 1.0 standard drink     Types: 1 Glasses of wine per week     Comment: weekly with meal    Drug use: No    Sexual activity: Yes     Partners: Female     Birth control/protection: Condom   Social History Narrative    Lives with wife (younger 72), healthy. 1 dtr and 1 son, and older son by prev marriage in 70s. 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     Social Determinants of Health     Financial Resource Strain: Low Risk     Difficulty of Paying Living Expenses: Not hard at all   Food Insecurity: No Food Insecurity    Worried About Running Out of Food in the Last Year: Never true    Ran Out of Food in the Last Year: Never true   Transportation Needs: No Transportation Needs    Lack of Transportation (Medical): No    Lack of Transportation (Non-Medical): No   Physical Activity: Insufficiently Active    Days of Exercise per Week: 1 day    Minutes of Exercise per Session: 20 min   Stress: No Stress Concern Present    Feeling of Stress : Not at all   Social Connections: Unknown    Frequency of Communication with Friends and Family: More than three times a week    Frequency of Social Gatherings with Friends and Family: Three times a week    Active Member of Clubs or Organizations: No    Attends Club or Organization Meetings: Never    Marital Status:    Housing Stability: Low Risk     Unable to Pay for Housing in the Last Year: No    Number of Places Lived in the Last Year: 1    Unstable Housing in the Last Year: No       MEDICATIONS & ALLERGIES:     Current Outpatient Medications on File Prior to Visit   Medication Sig    aspirin 81 MG Chew Take 81 mg by mouth once daily.    diclofenac sodium (VOLTAREN) 1 % Gel Use to affected joints up to 4 times a day    fentaNYL (DURAGESIC) 25 mcg/hr Place 1 patch onto the  "skin every 72 hours.    gabapentin (NEURONTIN) 600 MG tablet Take 1 tablet (600 mg total) by mouth 3 (three) times daily.    hydrocodone-acetaminophen 10-325mg (NORCO)  mg Tab Take by mouth every 4 (four) hours as needed for Pain.    timolol maleate 0.5% (TIMOPTIC) 0.5 % Drop timolol 0.5 % eye drops   INSTILL 1 DROP INTO AFFECTED EYE(S) BY OPHTHALMIC ROUTE 2 TIMES PER DAY     No current facility-administered medications on file prior to visit.       Review of patient's allergies indicates:   Allergen Reactions    Ketamine Hallucinations     Had dysphoric reaction.     Penicillins Hives    Iodine        OBJECTIVE:     Vital Signs:  Vitals:    01/19/23 1404   BP: 130/73   BP Location: Right arm   Patient Position: Sitting   BP Method: Large (Manual)   Pulse: 61   SpO2: 96%   Weight: 86.6 kg (190 lb 14.7 oz)   Height: 5' 10" (1.778 m)       Body mass index is 27.39 kg/m².     Physical Exam:  Physical Exam  Constitutional:       General: He is not in acute distress.     Appearance: Normal appearance.   HENT:      Head: Normocephalic.      Nose: Nose normal.   Eyes:      Conjunctiva/sclera: Conjunctivae normal.   Cardiovascular:      Rate and Rhythm: Normal rate.      Pulses: Normal pulses.   Pulmonary:      Effort: Pulmonary effort is normal. No respiratory distress.   Abdominal:      General: Abdomen is flat. There is distension.   Musculoskeletal:         General: Deformity and signs of injury present.   Skin:     General: Skin is warm.      Findings: Bruising present.   Neurological:      Mental Status: He is alert and oriented to person, place, and time.   Psychiatric:         Behavior: Behavior normal.         Thought Content: Thought content normal.      Comments: Feels down  Few Hobbies  No SI Hi          Laboratory  Lab Results   Component Value Date    WBC 6.16 05/06/2021    HGB 14.9 05/06/2021    HCT 46.4 05/06/2021    MCV 96 05/06/2021     05/06/2021     Lab Results   Component Value Date    GLU " 102 08/03/2022     08/03/2022    K 4.6 08/03/2022     08/03/2022    CO2 29 08/03/2022    BUN 18 08/03/2022    CREATININE 1.0 08/03/2022    CALCIUM 9.6 08/03/2022    MG 1.7 02/16/2019     Lab Results   Component Value Date    INR 1.0 11/23/2020    INR 1.1 02/27/2019    INR 1.0 02/14/2018     Lab Results   Component Value Date    HGBA1C 5.7 (H) 08/03/2022     No results for input(s): POCTGLUCOSE in the last 72 hours.      Health Maintenance         Date Due Completion Date    Shingles Vaccine (2 of 3) 08/20/2014 6/25/2014    Hemoglobin A1c (Prediabetes) 08/03/2023 8/3/2022    Aspirin/Antiplatelet Therapy 10/28/2023 10/28/2022    TETANUS VACCINE 06/25/2027 6/25/2017    Lipid Panel 08/03/2027 8/3/2022            ASSESSMENT & PLAN:   Mr. Artemio Tyson [facility]. is a 89 y.o. male who was seen today in clinic for abdominal pain. I will start the patient on miralax for what I presume is opioid induced constipation. I will also initiate the patient on Lexapro today given he endorses what appears to me as anhedonia and feeling down. We revealed the risks and benefits of this medication and the patient endorsed he wanted to initiate it. I will do some lab work with regards to the patients urinary symptoms including a UA, CBC and CMP. Based on these results I will start antibiotic therapy vs send the patient for evaluation in an inpatient setting given history of stones if there is evidence of obstructive UTI. The patient had scheduled an appointment at the end of this month but this was a superfluous appointment after this visit and he will follow up with Dr. Loyd in 3 [facility]. I gave the patient ER return precautions and told him to reach out via [employer] if they had any further questions    Artemio was seen today for abdominal pain.    Diagnoses and all orders for this visit:    PAF (paroxysmal atrial fibrillation)    Obstructive sleep apnea on CPAP    Coronary artery disease involving native coronary artery of  native heart without angina pectoris    Dyslipidemia    Nephrolithiasis    Aortic atherosclerosis    Complete AV block    Presence of Watchman left atrial appendage closure device    Benign prostatic hyperplasia without lower urinary tract symptoms  -     Urinalysis, Reflex to Urine Culture Urine, Clean Catch  -     COMPREHENSIVE METABOLIC PANEL; Future  -     CBC W/ AUTO DIFFERENTIAL; Future  -     Urinalysis, Reflex to Urine Culture Urine, Clean Catch; Future    Abnormal finding of blood chemistry, unspecified  -     CBC W/ AUTO DIFFERENTIAL; Future    Other orders  -     polyethylene glycol (GLYCOLAX) 17 gram/dose powder; Take 17 g by mouth 2 (two) times daily.  -     EScitalopram oxalate (LEXAPRO) 10 MG tablet; Take 1 tablet (10 mg total) by mouth once daily.  The following orders have not been finalized:  -     Cancel: senna-docusate 8.6-50 mg (SENNA WITH DOCUSATE SODIUM) 8.6-50 mg per tablet         1. PAF (paroxysmal atrial fibrillation)    2. Obstructive sleep apnea on CPAP    3. Coronary artery disease involving native coronary artery of native heart without angina pectoris    4. Dyslipidemia    5. Nephrolithiasis    6. Aortic atherosclerosis    7. Complete AV block    8. Presence of Watchman left atrial appendage closure device    9. Benign prostatic hyperplasia without lower urinary tract symptoms    10. Abnormal finding of blood chemistry, unspecified          Cl Bower MD  Internal Medicine PGY-2  [facility]  [address] [place] [address]

## 2023-01-19 NOTE — TELEPHONE ENCOUNTER
"Called and spoke to patient.    Positive intermittent non localized abdominal pain/discomfort (4/10) that pt describes as being "gassy" accompanied with nausea, mild distension, and headache (4/10), increased flatulence, increased tiredness x 10 days.   Alleviated with passing flatus, burping    Denies constipation, diarrhea, fever, CP, radiating pain to arms, upper back or jaw; vomiting, lightheadedness, heartburn    Pt also mentions mild SOB x weeks (not occurring with abd pain) when sleeping without CPAP; no SOB during the day or when using CPAP  *Denies pillow use to sleep      "

## 2023-01-19 NOTE — TELEPHONE ENCOUNTER
Sent teams message to Julieta Cain RN with pts MRN # and his concerns. She is going to reach out to him.

## 2023-01-20 LAB
ALBUMIN SERPL BCP-MCNC: 3.8 G/DL (ref 3.5–5.2)
ALP SERPL-CCNC: 215 U/L (ref 55–135)
ALT SERPL W/O P-5'-P-CCNC: 126 U/L (ref 10–44)
ANION GAP SERPL CALC-SCNC: 8 MMOL/L (ref 8–16)
AST SERPL-CCNC: 145 U/L (ref 10–40)
BASOPHILS # BLD AUTO: 0.05 K/UL (ref 0–0.2)
BASOPHILS NFR BLD: 0.7 % (ref 0–1.9)
BILIRUB SERPL-MCNC: 1.2 MG/DL (ref 0.1–1)
BUN SERPL-MCNC: 16 MG/DL (ref 10–30)
CALCIUM SERPL-MCNC: 9.4 MG/DL (ref 8.7–10.5)
CHLORIDE SERPL-SCNC: 105 MMOL/L (ref 95–110)
CO2 SERPL-SCNC: 26 MMOL/L (ref 23–29)
CREAT SERPL-MCNC: 0.9 MG/DL (ref 0.5–1.4)
DIFFERENTIAL METHOD: ABNORMAL
EOSINOPHIL # BLD AUTO: 0.2 K/UL (ref 0–0.5)
EOSINOPHIL NFR BLD: 2.5 % (ref 0–8)
ERYTHROCYTE [DISTWIDTH] IN BLOOD BY AUTOMATED COUNT: 13.7 % (ref 11.5–14.5)
EST. GFR  (NO RACE VARIABLE): >60 ML/MIN/1.73 M^2
GLUCOSE SERPL-MCNC: 99 MG/DL (ref 70–110)
HCT VFR BLD AUTO: 46.2 % (ref 40–54)
HGB BLD-MCNC: 14.1 G/DL (ref 14–18)
IMM GRANULOCYTES # BLD AUTO: 0.02 K/UL (ref 0–0.04)
IMM GRANULOCYTES NFR BLD AUTO: 0.3 % (ref 0–0.5)
LYMPHOCYTES # BLD AUTO: 2.3 K/UL (ref 1–4.8)
LYMPHOCYTES NFR BLD: 33.2 % (ref 18–48)
MCH RBC QN AUTO: 31 PG (ref 27–31)
MCHC RBC AUTO-ENTMCNC: 30.5 G/DL (ref 32–36)
MCV RBC AUTO: 102 FL (ref 82–98)
MONOCYTES # BLD AUTO: 0.7 K/UL (ref 0.3–1)
MONOCYTES NFR BLD: 10 % (ref 4–15)
NEUTROPHILS # BLD AUTO: 3.7 K/UL (ref 1.8–7.7)
NEUTROPHILS NFR BLD: 53.3 % (ref 38–73)
NRBC BLD-RTO: 0 /100 WBC
PLATELET # BLD AUTO: 256 K/UL (ref 150–450)
PMV BLD AUTO: 11.3 FL (ref 9.2–12.9)
POTASSIUM SERPL-SCNC: 4.1 MMOL/L (ref 3.5–5.1)
PROT SERPL-MCNC: 7.2 G/DL (ref 6–8.4)
RBC # BLD AUTO: 4.55 M/UL (ref 4.6–6.2)
SODIUM SERPL-SCNC: 139 MMOL/L (ref 136–145)
WBC # BLD AUTO: 6.9 K/UL (ref 3.9–12.7)

## 2023-01-21 ENCOUNTER — PATIENT MESSAGE (OUTPATIENT)
Dept: INTERNAL MEDICINE | Facility: CLINIC | Age: 88
End: 2023-01-21
Payer: MEDICARE

## 2023-01-23 ENCOUNTER — TELEPHONE (OUTPATIENT)
Dept: INTERNAL MEDICINE | Facility: CLINIC | Age: 88
End: 2023-01-23
Payer: MEDICARE

## 2023-01-23 ENCOUNTER — DOCUMENTATION ONLY (OUTPATIENT)
Dept: INTERNAL MEDICINE | Facility: CLINIC | Age: 88
End: 2023-01-23
Payer: MEDICARE

## 2023-01-23 ENCOUNTER — PATIENT MESSAGE (OUTPATIENT)
Dept: INTERNAL MEDICINE | Facility: CLINIC | Age: 88
End: 2023-01-23
Payer: MEDICARE

## 2023-01-23 NOTE — PROGRESS NOTES
Attempted to call to go over lab results with patient. Unable to reach as it went straight to voicemail. Will attempt to reach out again

## 2023-01-23 NOTE — TELEPHONE ENCOUNTER
Attempted to call pt back  about recent labs but no response at this time. Left VM to call office when available. ( Appt scheduled with another provider for 1/24.)

## 2023-01-24 ENCOUNTER — DOCUMENTATION ONLY (OUTPATIENT)
Dept: INTERNAL MEDICINE | Facility: CLINIC | Age: 88
End: 2023-01-24
Payer: MEDICARE

## 2023-01-24 ENCOUNTER — OFFICE VISIT (OUTPATIENT)
Dept: FAMILY MEDICINE | Facility: CLINIC | Age: 88
End: 2023-01-24
Payer: MEDICARE

## 2023-01-24 ENCOUNTER — PATIENT MESSAGE (OUTPATIENT)
Dept: FAMILY MEDICINE | Facility: CLINIC | Age: 88
End: 2023-01-24

## 2023-01-24 DIAGNOSIS — R10.9 ABDOMINAL CRAMPING: Primary | ICD-10-CM

## 2023-01-24 DIAGNOSIS — D75.89 MACROCYTOSIS WITHOUT ANEMIA: ICD-10-CM

## 2023-01-24 DIAGNOSIS — R74.01 ELEVATED TRANSAMINASE LEVEL: ICD-10-CM

## 2023-01-24 PROCEDURE — 99214 OFFICE O/P EST MOD 30 MIN: CPT | Mod: 95,,, | Performed by: STUDENT IN AN ORGANIZED HEALTH CARE EDUCATION/TRAINING PROGRAM

## 2023-01-24 PROCEDURE — 99214 PR OFFICE/OUTPT VISIT, EST, LEVL IV, 30-39 MIN: ICD-10-PCS | Mod: 95,,, | Performed by: STUDENT IN AN ORGANIZED HEALTH CARE EDUCATION/TRAINING PROGRAM

## 2023-01-24 NOTE — PROGRESS NOTES
Attempted to contact Mr Dinah carr yesterday 1/23 on his listed numbers. Will attempt again today 1/24.

## 2023-01-24 NOTE — ASSESSMENT & PLAN NOTE
Improving. With elevated transaminases and alkaline phosphatase, I think there was likely something with the biliary system. Will get repeat blood work including GGT, hepatitis panel. He denies tylenol use outside his [place]. Will get ultrasound as well.

## 2023-01-24 NOTE — PROGRESS NOTES
Spoke with the patient over the phone and reviewed lab work. We discussed possible causes. The patient is still not feeling well and I advised him to go to the ER for evaluation. He declined but said he would consider based on the labs ordered by another MD he recently saw. He thanked me for the call and expressed his understanding.    Cl Bower MD

## 2023-01-24 NOTE — PROGRESS NOTES
Attending The patient location is: [place]  The chief complaint leading to consultation is: labs    Visit type: audiovisual    Notes:    HPI  Patient is here today to review lab work. He had labs collected on 1/19 as he was feeling uncomfortable since 1/9 - general abdominal cramping with nausea, bloating that was initially becoming worse. The symptoms have since been improving. However, the labs showed several abnormalities. I discussed each in detail with him.    Answers submitted by the patient for this visit:  Abdominal Pain Questionnaire (Submitted on 1/23/2023)  Chief Complaint: Abdominal pain  Chronicity: chronic  Onset: 1 to 4 weeks ago  Onset quality: gradual  Frequency: constantly  Episode duration: 3 Hours  Progression since onset: gradually improving  Pain location: generalized abdominal region  Pain - numeric: 3/10  Pain quality: cramping  Radiates to: does not radiate  anorexia: No  arthralgias: No  belching: Yes  flatus: Yes  Aggravated by: nothing  Relieved by: nothing, passing flatus  Pain severity: mild  Improvement on treatment: mild  abdominal surgery: Yes  gallstones: Yes  kidney stones: Yes    Review of Systems   Constitutional:  Negative for fever (might have felt flushed at one point).   Gastrointestinal:  Positive for abdominal pain and nausea. Negative for constipation, diarrhea and vomiting.   Neurological:  Positive for headaches.     Objective:  Physical Exam  Constitutional:       General: He is not in acute distress.     Appearance: Normal appearance. He is well-developed.   HENT:      Head: Normocephalic and atraumatic.   Pulmonary:      Effort: Pulmonary effort is normal. No respiratory distress.   Neurological:      Mental Status: He is alert and oriented to person, place, and time. Mental status is at baseline.   Psychiatric:         Attention and Perception: Attention and perception normal.         Mood and Affect: Mood normal.         Speech: Speech normal.         Behavior: Behavior  normal. Behavior is cooperative.         Thought Content: Thought content normal.         Cognition and Memory: Cognition normal.         Judgment: Judgment normal.       Plan:  1. Abdominal cramping  Assessment & Plan:  Improving. With elevated transaminases and alkaline phosphatase, I think there was likely something with the biliary system. Will get repeat blood work including GGT, hepatitis panel. He denies tylenol use outside his [place]. Will get ultrasound as well.    Orders:  -     US Abdomen Limited; Future; Expected date: 01/24/2023  -     Hepatic function panel; Future; Expected date: 01/24/2023  -     Gamma GT; Future  -     Hepatitis B Surface Antigen; Future; Expected date: 01/24/2023  -     Hepatitis B Core Antibody, Total; Future; Expected date: 01/24/2023  -     Hepatitis C Antibody; Future; Expected date: 01/24/2023    2. Elevated transaminase level  -     Hepatic function panel; Future; Expected date: 01/24/2023  -     Gamma GT; Future    3. Macrocytosis without anemia  Assessment & Plan:  Reviewed labs with patient. No anemia at this time. Encouraged vitamin supplementation.            Face to Face time with patient: 16 minutes  25 minutes of total time spent on the encounter, which includes face to face time and non-face to face time preparing to see the patient (eg, review of tests), Obtaining and/or reviewing separately obtained history, Documenting clinical information in the electronic or other health record, Independently interpreting results (not separately reported) and communicating results to the patient/family/caregiver, or Care coordination (not separately reported).     Each patient to whom he or she provides medical services by telemedicine is:  (1) informed of the relationship between the physician and patient and the respective role of any other health care provider with respect to management of the patient; and (2) notified that he or she may decline to receive medical services by  telemedicine and may withdraw from such care at any time.

## 2023-01-25 ENCOUNTER — LAB VISIT (OUTPATIENT)
Dept: LAB | Facility: HOSPITAL | Age: 88
End: 2023-01-25
Payer: MEDICARE

## 2023-01-25 DIAGNOSIS — R74.01 ELEVATED TRANSAMINASE LEVEL: ICD-10-CM

## 2023-01-25 DIAGNOSIS — R10.9 ABDOMINAL CRAMPING: ICD-10-CM

## 2023-01-25 LAB
ALBUMIN SERPL BCP-MCNC: 4 G/DL (ref 3.5–5.2)
ALP SERPL-CCNC: 149 U/L (ref 55–135)
ALT SERPL W/O P-5'-P-CCNC: 33 U/L (ref 10–44)
AST SERPL-CCNC: 27 U/L (ref 10–40)
BILIRUB DIRECT SERPL-MCNC: 0.3 MG/DL (ref 0.1–0.3)
BILIRUB SERPL-MCNC: 0.8 MG/DL (ref 0.1–1)
GGT SERPL-CCNC: 123 U/L (ref 8–55)
HBV CORE AB SERPL QL IA: NORMAL
HBV SURFACE AG SERPL QL IA: NORMAL
HCV AB SERPL QL IA: NORMAL
PROT SERPL-MCNC: 7.5 G/DL (ref 6–8.4)

## 2023-01-25 PROCEDURE — 36415 COLL VENOUS BLD VENIPUNCTURE: CPT | Performed by: STUDENT IN AN ORGANIZED HEALTH CARE EDUCATION/TRAINING PROGRAM

## 2023-01-25 PROCEDURE — 86803 HEPATITIS C AB TEST: CPT | Performed by: STUDENT IN AN ORGANIZED HEALTH CARE EDUCATION/TRAINING PROGRAM

## 2023-01-25 PROCEDURE — 86704 HEP B CORE ANTIBODY TOTAL: CPT | Performed by: STUDENT IN AN ORGANIZED HEALTH CARE EDUCATION/TRAINING PROGRAM

## 2023-01-25 PROCEDURE — 80076 HEPATIC FUNCTION PANEL: CPT | Performed by: STUDENT IN AN ORGANIZED HEALTH CARE EDUCATION/TRAINING PROGRAM

## 2023-01-25 PROCEDURE — 87340 HEPATITIS B SURFACE AG IA: CPT | Performed by: STUDENT IN AN ORGANIZED HEALTH CARE EDUCATION/TRAINING PROGRAM

## 2023-01-25 PROCEDURE — 82977 ASSAY OF GGT: CPT | Performed by: STUDENT IN AN ORGANIZED HEALTH CARE EDUCATION/TRAINING PROGRAM

## 2023-01-26 ENCOUNTER — TELEPHONE (OUTPATIENT)
Dept: INTERNAL MEDICINE | Facility: CLINIC | Age: 88
End: 2023-01-26
Payer: MEDICARE

## 2023-01-29 ENCOUNTER — HOSPITAL ENCOUNTER (OUTPATIENT)
Dept: RADIOLOGY | Facility: HOSPITAL | Age: 88
Discharge: HOME | End: 2023-01-29
Attending: STUDENT IN AN ORGANIZED HEALTH CARE EDUCATION/TRAINING PROGRAM
Payer: MEDICARE

## 2023-01-29 DIAGNOSIS — R10.9 ABDOMINAL CRAMPING: ICD-10-CM

## 2023-01-29 PROCEDURE — 76705 ECHO EXAM OF ABDOMEN: CPT | Mod: TC

## 2023-01-29 PROCEDURE — 76705 ECHO EXAM OF ABDOMEN: CPT | Mod: 26,,, | Performed by: STUDENT IN AN ORGANIZED HEALTH CARE EDUCATION/TRAINING PROGRAM

## 2023-01-29 PROCEDURE — 76705 US ABDOMEN LIMITED: ICD-10-PCS | Mod: 26,,, | Performed by: STUDENT IN AN ORGANIZED HEALTH CARE EDUCATION/TRAINING PROGRAM

## 2023-02-16 ENCOUNTER — TELEPHONE (OUTPATIENT)
Dept: INTERNAL MEDICINE | Facility: CLINIC | Age: 88
End: 2023-02-16
Payer: MEDICARE

## 2023-02-16 NOTE — TELEPHONE ENCOUNTER
Tried to call pt back no answer LVM . Will have to schedule him with an available provider to discuss labs.

## 2023-02-17 ENCOUNTER — OFFICE VISIT (OUTPATIENT)
Dept: GASTROENTEROLOGY | Facility: CLINIC | Age: 88
End: 2023-02-17
Payer: MEDICARE

## 2023-02-17 DIAGNOSIS — R93.5 ABNORMAL US (ULTRASOUND) OF ABDOMEN: Primary | ICD-10-CM

## 2023-02-17 DIAGNOSIS — R10.9 ABDOMINAL PAIN, UNSPECIFIED ABDOMINAL LOCATION: ICD-10-CM

## 2023-02-17 DIAGNOSIS — R94.5 ABNORMAL RESULTS OF LIVER FUNCTION STUDIES: ICD-10-CM

## 2023-02-17 DIAGNOSIS — R74.8 ABNORMAL LIVER ENZYMES: ICD-10-CM

## 2023-02-17 PROCEDURE — 99214 PR OFFICE/OUTPT VISIT, EST, LEVL IV, 30-39 MIN: ICD-10-PCS | Mod: 95,,, | Performed by: INTERNAL MEDICINE

## 2023-02-17 PROCEDURE — 99214 OFFICE O/P EST MOD 30 MIN: CPT | Mod: 95,,, | Performed by: INTERNAL MEDICINE

## 2023-02-17 RX ORDER — PANTOPRAZOLE SODIUM 40 MG/1
40 TABLET, DELAYED RELEASE ORAL DAILY
Qty: 30 TABLET | Refills: 3 | Status: SHIPPED | OUTPATIENT
Start: 2023-02-17 | End: 2023-10-27

## 2023-02-17 NOTE — PROGRESS NOTES
Subjective:       Patient ID: Artemio Colon Sr. is a 89 y.o. male.    Chief Complaint:  Discomfort    Telemedicine encounter today to establish care with aforementioned complaints.  Patient reports history of abdominal discomfort, characterized as gas which has been going on for some time.  He was sent for labs which showed which showed abnormal liver tests.  Ultrasound showed evidence of cholecystectomy with persistent dilation in biliary tree.  Patient denies significant weight loss or change in appetite.  Complaint does not seem to be modified by eating or having a bowel movement.  He does report indigestion however, particularly if he lies down after eating.  Alternatively he does report constipation which is adequately treated with laxatives.    Past Medical History:   Diagnosis Date    AV block     BPH (benign prostatic hyperplasia)     Cancer     Chronic rhinitis     Coronary artery disease     Erectile dysfunction     Glaucoma     Kidney cysts     Neuropathy 8/22/2017    JAEL (obstructive sleep apnea)     Pacemaker     Symptomatic bradycardia     s/p pacemaker       Past Surgical History:   Procedure Laterality Date    ADENOIDECTOMY  1939    unsure    CATARACT EXTRACTION W/  INTRAOCULAR LENS IMPLANT Bilateral 2012    done in [place]    CHOLECYSTECTOMY      CLOSURE OF LEFT ATRIAL APPENDAGE USING DEVICE N/A 12/3/2020    Procedure: Left atrial appendage closure device;  Surgeon: Tawanda Adler MD;  Location: [facility];  Service: Cardiology;  Laterality: N/A;  AF, GAIL, Watchman Implant, BSci, Gen, [place]/MB, 3 Prep    COLECTOMY      cecum    COSMETIC SURGERY  1967    rhinoplasty    EYE SURGERY      FRACTURE SURGERY  wrist    1939    HERNIA REPAIR      INSERT / REPLACE / REMOVE PACEMAKER  2012    changed    PROSTATE SURGERY      TONSILLECTOMY  1941    VASECTOMY  1975       Social History  Social History     Tobacco Use    Smoking status: Never    Smokeless tobacco: Never   Substance Use Topics    Alcohol  use: Not Currently     Alcohol/week: 1.0 standard drink     Types: 1 Glasses of wine per week     Comment: weekly with meal    Drug use: No       Family History   Problem Relation Age of Onset    Arthritis Father     Cancer Father     Kidney disease Father     Cancer Mother     Hearing loss Mother     Mental illness Mother     Alcohol abuse Brother     Cancer Brother     COPD Brother     Mental illness Brother     Cancer Paternal Uncle     Depression Brother     Early death Sister         accidental    Heart disease Brother     Mental illness Sister     Amblyopia Neg Hx     Blindness Neg Hx     Cataracts Neg Hx     Glaucoma Neg Hx     Macular degeneration Neg Hx     Retinal detachment Neg Hx     Strabismus Neg Hx        Review of Systems   Constitutional:  Negative for appetite change and unexpected weight change.   Gastrointestinal:  Positive for abdominal distention and abdominal pain.         Objective:      Physical Exam  Constitutional:       Appearance: He is well-developed.   HENT:      Head: Normocephalic and atraumatic.   Eyes:      Conjunctiva/sclera: Conjunctivae normal.   Pulmonary:      Effort: Pulmonary effort is normal. No respiratory distress.   Neurological:      Mental Status: He is alert and oriented to person, place, and time.   Psychiatric:         Behavior: Behavior normal.         Thought Content: Thought content normal.         Judgment: Judgment normal.         Pertinent labs and imaging studies reviewed    Assessment:       1. Abnormal US (ultrasound) of abdomen    2. Abnormal liver enzymes    3. Abdominal pain, unspecified abdominal location    4. Abnormal results of liver function studies        Plan:       Schedule MRI to better visualized biliary dilation   Given new onset abdominal discomfort after age 65 will schedule EGD    (Portions of this note were dictated using voice recognition software and may contain dictation related errors in spelling/grammar/syntax not found on text  review)

## 2023-02-19 ENCOUNTER — CLINICAL SUPPORT (OUTPATIENT)
Dept: CARDIOLOGY | Facility: HOSPITAL | Age: 88
End: 2023-02-19
Payer: MEDICARE

## 2023-02-19 DIAGNOSIS — Z95.0 PRESENCE OF CARDIAC PACEMAKER: ICD-10-CM

## 2023-02-19 DIAGNOSIS — I48.91 UNSPECIFIED ATRIAL FIBRILLATION: ICD-10-CM

## 2023-02-19 DIAGNOSIS — I44.2 ATRIOVENTRICULAR BLOCK, COMPLETE: ICD-10-CM

## 2023-03-02 ENCOUNTER — PATIENT MESSAGE (OUTPATIENT)
Dept: GASTROENTEROLOGY | Facility: CLINIC | Age: 88
End: 2023-03-02
Payer: MEDICARE

## 2023-04-19 ENCOUNTER — PATIENT MESSAGE (OUTPATIENT)
Dept: INTERNAL MEDICINE | Facility: CLINIC | Age: 88
End: 2023-04-19
Payer: MEDICARE

## 2023-04-19 DIAGNOSIS — R74.01 ELEVATED TRANSAMINASE LEVEL: Primary | ICD-10-CM

## 2023-04-19 NOTE — TELEPHONE ENCOUNTER
Tried to call pt to let him know that his lab orders are in, and to help him schedule a lab appt. No answer TAMEKAM

## 2023-04-20 ENCOUNTER — LAB VISIT (OUTPATIENT)
Dept: LAB | Facility: HOSPITAL | Age: 88
End: 2023-04-20
Payer: MEDICARE

## 2023-04-20 DIAGNOSIS — R74.01 ELEVATED TRANSAMINASE LEVEL: ICD-10-CM

## 2023-04-20 LAB
ALBUMIN SERPL BCP-MCNC: 3.7 G/DL (ref 3.5–5.2)
ALP SERPL-CCNC: 148 U/L (ref 55–135)
ALT SERPL W/O P-5'-P-CCNC: 91 U/L (ref 10–44)
ANION GAP SERPL CALC-SCNC: 7 MMOL/L (ref 8–16)
AST SERPL-CCNC: 48 U/L (ref 10–40)
BILIRUB SERPL-MCNC: 0.7 MG/DL (ref 0.1–1)
BUN SERPL-MCNC: 16 MG/DL (ref 10–30)
CALCIUM SERPL-MCNC: 9.6 MG/DL (ref 8.7–10.5)
CHLORIDE SERPL-SCNC: 106 MMOL/L (ref 95–110)
CO2 SERPL-SCNC: 28 MMOL/L (ref 23–29)
CREAT SERPL-MCNC: 1 MG/DL (ref 0.5–1.4)
EST. GFR  (NO RACE VARIABLE): >60 ML/MIN/1.73 M^2
GLUCOSE SERPL-MCNC: 104 MG/DL (ref 70–110)
POTASSIUM SERPL-SCNC: 4.3 MMOL/L (ref 3.5–5.1)
PROT SERPL-MCNC: 6.9 G/DL (ref 6–8.4)
SODIUM SERPL-SCNC: 141 MMOL/L (ref 136–145)

## 2023-04-20 PROCEDURE — 36415 COLL VENOUS BLD VENIPUNCTURE: CPT | Performed by: FAMILY MEDICINE

## 2023-04-20 PROCEDURE — 80053 COMPREHEN METABOLIC PANEL: CPT | Performed by: FAMILY MEDICINE

## 2023-04-21 ENCOUNTER — OFFICE VISIT (OUTPATIENT)
Dept: INTERNAL MEDICINE | Facility: CLINIC | Age: 88
End: 2023-04-21
Payer: MEDICARE

## 2023-04-21 VITALS
HEIGHT: 70 IN | BODY MASS INDEX: 27.4 KG/M2 | OXYGEN SATURATION: 97 % | SYSTOLIC BLOOD PRESSURE: 122 MMHG | DIASTOLIC BLOOD PRESSURE: 80 MMHG | HEART RATE: 60 BPM | WEIGHT: 191.38 LBS

## 2023-04-21 DIAGNOSIS — I50.22 CHRONIC SYSTOLIC CONGESTIVE HEART FAILURE: ICD-10-CM

## 2023-04-21 DIAGNOSIS — I25.10 CORONARY ARTERY DISEASE INVOLVING NATIVE CORONARY ARTERY OF NATIVE HEART WITHOUT ANGINA PECTORIS: ICD-10-CM

## 2023-04-21 DIAGNOSIS — Z00.00 ANNUAL PHYSICAL EXAM: Primary | ICD-10-CM

## 2023-04-21 DIAGNOSIS — Z95.0 CARDIAC PACEMAKER IN SITU: Primary | ICD-10-CM

## 2023-04-21 DIAGNOSIS — Z79.899 ENCOUNTER FOR LONG-TERM (CURRENT) USE OF OTHER MEDICATIONS: ICD-10-CM

## 2023-04-21 PROCEDURE — 99999 PR PBB SHADOW E&M-EST. PATIENT-LVL IV: CPT | Mod: PBBFAC,,, | Performed by: FAMILY MEDICINE

## 2023-04-21 PROCEDURE — 99214 OFFICE O/P EST MOD 30 MIN: CPT | Mod: PBBFAC | Performed by: FAMILY MEDICINE

## 2023-04-21 PROCEDURE — 99214 OFFICE O/P EST MOD 30 MIN: CPT | Mod: S$PBB,,, | Performed by: FAMILY MEDICINE

## 2023-04-21 PROCEDURE — 99999 PR PBB SHADOW E&M-EST. PATIENT-LVL IV: ICD-10-PCS | Mod: PBBFAC,,, | Performed by: FAMILY MEDICINE

## 2023-04-21 PROCEDURE — 99214 PR OFFICE/OUTPT VISIT, EST, LEVL IV, 30-39 MIN: ICD-10-PCS | Mod: S$PBB,,, | Performed by: FAMILY MEDICINE

## 2023-04-21 NOTE — PROGRESS NOTES
Subjective:       Patient ID: Artemio Colon Sr. is a 89 y.o. male.    Chief Complaint: Annual Exam    HPI    Artemio Colon Sr. is a 89 y.o. male PMHx JAEL, Afib, S/p Watchman, S/p Pacemaker, HF, Chronic back pain, PreDM for checkup.     Lab prior, review.    Abd pain not an issue at this time.     #Cards: CAD, HF, Afib, S/p Pacemaker, S/p Watchman  - est w/ Dr. Adler (), lv 8/2022 - f/u 1yr  - est w/ Dr. Gutiérrez, lv 2/2022 -- f/u 1yr  - reg: ASA qd  - issues with recurrent GI bleeding not on anticoagulation (previously tried xarelto in 8071-4073 for symptomatic AF and had issues with excessive bleeding then). He was hospitalized for a diverticular bleed in March of 2019.  - s/p Watchman implant performed on 12/3/2020.     #Endo: PreDM (A1c 5/2021 = 5.8)    #GI:  - est w/ Dr. Loera, lv 2.2023     #Neuro: Neuropathy, Lumbar radiculopathy  - est w/ Dr. Hylton, lv 10/2022     #Ortho: Chronic back pain  - est w/ [place] [facility], sees q3m  - taking Norco prn (usually every 3d) and uses Fentanyl patches  -  reviewed     #Uro: BPH, H/o Nephrolithiasis  - reg: Flomax 0.4 qd with relief     #Pulm: JAEL on cpap  - est w/ Dr. Amador,  3/2022     #BMI 27    Review of Systems   Constitutional:  Negative for activity change and unexpected weight change.   HENT:  Negative for hearing loss, rhinorrhea and trouble swallowing.    Eyes:  Negative for discharge and visual disturbance.   Respiratory:  Negative for chest tightness and wheezing.    Cardiovascular:  Negative for chest pain and palpitations.   Gastrointestinal:  Negative for blood in stool, constipation, diarrhea and vomiting.   Endocrine: Negative for polydipsia and polyuria.   Genitourinary:  Negative for difficulty urinating, hematuria and urgency.   Musculoskeletal:  Negative for arthralgias, joint swelling and neck pain.   Neurological:  Negative for weakness and headaches.   Psychiatric/Behavioral:  Negative for confusion and dysphoric mood.    "     Past Medical History:   Diagnosis Date    AV block     BPH (benign prostatic hyperplasia)     Cancer     Chronic rhinitis     Coronary artery disease     Erectile dysfunction     Glaucoma     Kidney cysts     Neuropathy 8/22/2017    JAEL (obstructive sleep apnea)     Pacemaker     Symptomatic bradycardia     s/p pacemaker        Prior to Admission medications    Medication Sig Start Date End Date Taking? Authorizing Provider   aspirin 81 MG Chew Take 81 mg by mouth once daily.    Historical Provider   diclofenac sodium (VOLTAREN) 1 % Gel Use to affected joints up to 4 times a day 10/22/21   Javed Courtney MD   EScitalopram oxalate (LEXAPRO) 10 MG tablet Take 1 tablet (10 mg total) by mouth once daily. 1/19/23 1/19/24  Cl Bower MD   fentaNYL (DURAGESIC) 25 mcg/hr Place 1 patch onto the skin every 72 hours.    Historical Provider   hydrocodone-acetaminophen 10-325mg (NORCO)  mg Tab Take by mouth every 4 (four) hours as needed for Pain.    Historical Provider   pantoprazole (PROTONIX) 40 MG tablet Take 1 tablet (40 mg total) by mouth once daily. 2/17/23 3/19/23  Valentín Loera MD   timolol maleate 0.5% (TIMOPTIC) 0.5 % Drop timolol 0.5 % eye drops   INSTILL 1 DROP INTO AFFECTED EYE(S) BY OPHTHALMIC ROUTE 2 TIMES PER DAY    Historical Provider        Past medical history, surgical history, and family medical history reviewed and updated as appropriate.    Medications and allergies reviewed.     Objective:          Vitals:    04/21/23 0922   BP: 122/80   BP Location: Right arm   Patient Position: Sitting   Pulse: 60   SpO2: 97%   Weight: 86.8 kg (191 lb 5.8 oz)   Height: 5' 10" (1.778 m)     Body mass index is 27.46 kg/m².  Physical Exam  Vitals and nursing note reviewed.   Constitutional:       General: He is not in acute distress.     Appearance: Normal appearance. He is well-developed.   Eyes:      Extraocular Movements: Extraocular movements intact.   Cardiovascular:      Rate and Rhythm: " Normal rate and regular rhythm.      Pulses: Normal pulses.      Heart sounds: Normal heart sounds. No murmur heard.  Pulmonary:      Effort: Pulmonary effort is normal. No respiratory distress.      Breath sounds: Normal breath sounds. No wheezing.   Neurological:      Mental Status: He is alert and oriented to person, place, and time.   Psychiatric:         Mood and Affect: Mood normal.         Behavior: Behavior normal.       Lab Results   Component Value Date    WBC 6.90 01/19/2023    HGB 14.1 01/19/2023    HCT 46.2 01/19/2023     01/19/2023    CHOL 171 08/03/2022    TRIG 71 08/03/2022    HDL 57 08/03/2022    ALT 91 (H) 04/20/2023    AST 48 (H) 04/20/2023     04/20/2023    K 4.3 04/20/2023     04/20/2023    CREATININE 1.0 04/20/2023    BUN 16 04/20/2023    CO2 28 04/20/2023    TSH 1.999 07/02/2019    INR 1.0 11/23/2020    HGBA1C 5.7 (H) 08/03/2022       Assessment:       1. Cardiac pacemaker in situ    2. Chronic systolic congestive heart failure    3. Coronary artery disease involving native coronary artery of native heart without angina pectoris    4. Encounter for long-term (current) use of other medications          Plan:   1. Cardiac pacemaker in situ  Overview:  - underwent single-chamber pacemaker implantation in 2003 with upgrade to dual-chamber device in 2011      2. Chronic systolic congestive heart failure  Overview:  Reviewed cardiology records.      3. Coronary artery disease involving native coronary artery of native heart without angina pectoris  Overview:  50-60% mid LAD with signigficant Diagonal and PDA stenosis 80% or >  - (cath 2012 rx with medical therapy)       4. Encounter for long-term (current) use of other medications        Health maintenance reviewed with patient.     Follow up in about 6 months (around 10/21/2023) for Annual.    Patel Loyd MD  Family Medicine / Primary Care  [facility]  4/21/2023

## 2023-04-21 NOTE — PATIENT INSTRUCTIONS
[email or web address]    For more information, call [phone]

## 2023-05-20 ENCOUNTER — CLINICAL SUPPORT (OUTPATIENT)
Dept: CARDIOLOGY | Facility: HOSPITAL | Age: 88
End: 2023-05-20
Payer: MEDICARE

## 2023-05-20 DIAGNOSIS — Z95.0 PRESENCE OF CARDIAC PACEMAKER: ICD-10-CM

## 2023-05-20 DIAGNOSIS — I48.91 UNSPECIFIED ATRIAL FIBRILLATION: ICD-10-CM

## 2023-05-20 DIAGNOSIS — I44.2 ATRIOVENTRICULAR BLOCK, COMPLETE: ICD-10-CM

## 2023-05-20 PROCEDURE — 93294 CARDIAC DEVICE CHECK - REMOTE: ICD-10-PCS | Mod: ,,, | Performed by: INTERNAL MEDICINE

## 2023-05-20 PROCEDURE — 93294 REM INTERROG EVL PM/LDLS PM: CPT | Mod: ,,, | Performed by: INTERNAL MEDICINE

## 2023-08-01 DIAGNOSIS — Z95.0 CARDIAC PACEMAKER IN SITU: Primary | ICD-10-CM

## 2023-08-01 DIAGNOSIS — I44.2 COMPLETE AV BLOCK: ICD-10-CM

## 2023-08-01 DIAGNOSIS — I48.21 PERMANENT ATRIAL FIBRILLATION: ICD-10-CM

## 2023-08-18 ENCOUNTER — CLINICAL SUPPORT (OUTPATIENT)
Dept: CARDIOLOGY | Facility: HOSPITAL | Age: 88
End: 2023-08-18
Payer: MEDICARE

## 2023-08-18 DIAGNOSIS — Z95.0 PRESENCE OF CARDIAC PACEMAKER: ICD-10-CM

## 2023-09-13 DIAGNOSIS — I48.0 PAROXYSMAL ATRIAL FIBRILLATION: Primary | ICD-10-CM

## 2023-09-19 ENCOUNTER — HOSPITAL ENCOUNTER (OUTPATIENT)
Dept: CARDIOLOGY | Facility: CLINIC | Age: 88
Discharge: HOME | End: 2023-09-19
Payer: MEDICARE

## 2023-09-19 DIAGNOSIS — I48.0 PAROXYSMAL ATRIAL FIBRILLATION: ICD-10-CM

## 2023-09-19 PROCEDURE — 93010 ELECTROCARDIOGRAM REPORT: CPT | Mod: S$PBB,,, | Performed by: INTERNAL MEDICINE

## 2023-09-19 PROCEDURE — 93005 ELECTROCARDIOGRAM TRACING: CPT | Mod: PBBFAC | Performed by: INTERNAL MEDICINE

## 2023-09-19 PROCEDURE — 93010 RHYTHM STRIP: ICD-10-PCS | Mod: S$PBB,,, | Performed by: INTERNAL MEDICINE

## 2023-09-21 ENCOUNTER — OFFICE VISIT (OUTPATIENT)
Dept: ELECTROPHYSIOLOGY | Facility: CLINIC | Age: 88
End: 2023-09-21
Payer: MEDICARE

## 2023-09-21 ENCOUNTER — CLINICAL SUPPORT (OUTPATIENT)
Dept: CARDIOLOGY | Facility: HOSPITAL | Age: 88
End: 2023-09-21
Attending: INTERNAL MEDICINE
Payer: MEDICARE

## 2023-09-21 VITALS
SYSTOLIC BLOOD PRESSURE: 118 MMHG | HEART RATE: 65 BPM | WEIGHT: 179 LBS | BODY MASS INDEX: 25.62 KG/M2 | DIASTOLIC BLOOD PRESSURE: 80 MMHG | HEIGHT: 70 IN

## 2023-09-21 DIAGNOSIS — G47.33 OBSTRUCTIVE SLEEP APNEA ON CPAP: ICD-10-CM

## 2023-09-21 DIAGNOSIS — Z95.818 PRESENCE OF WATCHMAN LEFT ATRIAL APPENDAGE CLOSURE DEVICE: ICD-10-CM

## 2023-09-21 DIAGNOSIS — I48.21 PERMANENT ATRIAL FIBRILLATION: ICD-10-CM

## 2023-09-21 DIAGNOSIS — I44.2 COMPLETE AV BLOCK: ICD-10-CM

## 2023-09-21 DIAGNOSIS — I25.10 CORONARY ARTERY DISEASE INVOLVING NATIVE CORONARY ARTERY OF NATIVE HEART WITHOUT ANGINA PECTORIS: ICD-10-CM

## 2023-09-21 DIAGNOSIS — I70.0 AORTIC ATHEROSCLEROSIS: ICD-10-CM

## 2023-09-21 DIAGNOSIS — I44.2 COMPLETE AV BLOCK: Primary | ICD-10-CM

## 2023-09-21 DIAGNOSIS — Z95.0 CARDIAC PACEMAKER IN SITU: ICD-10-CM

## 2023-09-21 DIAGNOSIS — I50.22 CHRONIC SYSTOLIC CONGESTIVE HEART FAILURE: ICD-10-CM

## 2023-09-21 PROCEDURE — 93281 PM DEVICE PROGR EVAL MULTI: CPT

## 2023-09-21 PROCEDURE — 93281 CARDIAC DEVICE CHECK - IN CLINIC & HOSPITAL: ICD-10-PCS | Mod: 26,,, | Performed by: INTERNAL MEDICINE

## 2023-09-21 PROCEDURE — 99999 PR PBB SHADOW E&M-EST. PATIENT-LVL III: CPT | Mod: PBBFAC,,, | Performed by: INTERNAL MEDICINE

## 2023-09-21 PROCEDURE — 93281 PM DEVICE PROGR EVAL MULTI: CPT | Mod: 26,,, | Performed by: INTERNAL MEDICINE

## 2023-09-21 PROCEDURE — 99213 OFFICE O/P EST LOW 20 MIN: CPT | Mod: PBBFAC | Performed by: INTERNAL MEDICINE

## 2023-09-21 PROCEDURE — 99214 PR OFFICE/OUTPT VISIT, EST, LEVL IV, 30-39 MIN: ICD-10-PCS | Mod: S$PBB,,, | Performed by: INTERNAL MEDICINE

## 2023-09-21 PROCEDURE — 99214 OFFICE O/P EST MOD 30 MIN: CPT | Mod: S$PBB,,, | Performed by: INTERNAL MEDICINE

## 2023-09-21 PROCEDURE — 99999 PR PBB SHADOW E&M-EST. PATIENT-LVL III: ICD-10-PCS | Mod: PBBFAC,,, | Performed by: INTERNAL MEDICINE

## 2023-09-21 NOTE — PROGRESS NOTES
Subjective:    Patient ID:  Artemio Colon Sr. is a 89 y.o. male who presents for follow-up of Pacemaker Check      HPI  Prior Hx:  I had the pleasure of seeing Mr. Colon today in our electrophysiology clinic for follow-up for his pacemaker and for his atrial fibrillation.  He recently moved here from [place] and established care in the cardiology clinic.  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 He has CAD (cath 2012 rx with medical therapy) and preserved EF (60-65% 1/2015). I do not know if he had a PVI or AVN ablation as he has complete AV block.  He feels well, exercises regularly.  He denies any chest pain, shortness of breath, palpitations, orthopnea or PND.  He is not on anticoagulation and when I brought it up as a discussion he stated he does not want to be on it and rathered not discuss it any further.     Mr. Colon presented 1/2018 noting shortness of breath and low energy. Recent remote interrogation noted <0.1% AMS burden with longest episode 3 min and 52 seconds. No AF noted. He wass 61% A and 100% V paced. His V-threshold was 1.75 @ 0.4 on first visit with me. Recent remote auto-capture threshold was 2.25 @ 0.4. It has slowly increased over the past year and a half with stable impedance. Checked in clinic with bipolar threshold of 1.5 @ 0.4 sitting up and unipolar threshold of 1.25@ 0.4 also sitting up. Lying down his capture threshold was 2.25 @ 0.4. ECHO noted LVEF of 40-45% and stress test was negative. We discussed upgrade to CRT-P which he underwent 2/2018.    Mr. Colon presents for post upgrade check 4/2018. At time of implant RV lead capture threshold was even higher so we elected to cap it and implant a new RV lead. Device  "interrogation noted no AF, sinus rhythm/kashif with PACs and complete AV block, 6 sec of AMS x 2, stable lead parameters. Noted rare phrenic nerve stim that is treated with position movement. He notes that he had a hard time mentally coming out of anesthesia during his recent procedure.    ECHO 8/2018 noted LVEF was 60-65%.     Mr. Colon presented for follow-up 11/2020 to discuss anticoagulation. He recently began having issues with recurrent pAF. He has had episodes lasting up to 9 hours. He noted he felt a little "off". He feels well without complaints otherwise. He has had issues with recurrent GI bleeding not on anticoagulation (previously tried xarelto in 0271-5701 for symptomatic AF and had issues with excessive bleeding then). He was hospitalized for a diverticular bleed in March of 2019. We discussed pros and cons of Watchman implant which he elected to have which was performed on 12/3/2020.     Mr. Colon returned for follow-up 1/2021. His 6 week post-watchman implant GAIL noted no JAN leaks. Eliquis was stopped. He is on DAPT now. He recently had his LV lead reprogrammed due to phrenic nerve stim. He noted some fatigue that he thinks is from stopping testosterone.    Mr. Isaiah Clark returned for 6 month clinic follow-up 7/2021. He completed 6 months of DAPT and is now on aspirin 81mg daily only. He feels great. He has no complaints.    8/2022: Mr. Isiaah Clark returned for yearly device follow-up. He felt well. He is dieting to try to lose weight. Notes some occasional phrenic stim but not too bothersome.    Interim Hx:  Mr. Isaiah Clark returns for yearly follow-up. No complaints.    In clinic device check notes shows 100% BiV pacing with stable lead parameters, 100% AF burden, and ~2yrs of estimated battery longevity.    My interpretation fo today's in clinic ECG is AF with BiV pacing (QRSd 150ms)    Review of Systems   Constitutional: Negative for fever and malaise/fatigue.   HENT:  Negative for " congestion and sore throat.    Eyes:  Negative for blurred vision and visual disturbance.   Cardiovascular:  Negative for chest pain, dyspnea on exertion, irregular heartbeat, leg swelling, orthopnea, palpitations and paroxysmal nocturnal dyspnea.   Respiratory:  Negative for cough and shortness of breath.    Hematologic/Lymphatic: Negative for bleeding problem. Bruises/bleeds easily.   Skin: Negative.    Musculoskeletal: Negative.    Gastrointestinal:  Negative for abdominal pain, hematemesis and hematochezia.   Neurological:  Negative for dizziness, focal weakness and weakness.        Objective:    Physical Exam  Vitals reviewed.   Constitutional:       General: He is not in acute distress.     Appearance: He is well-developed. He is not diaphoretic.   HENT:      Head: Normocephalic and atraumatic.   Eyes:      General:         Right eye: No discharge.         Left eye: No discharge.      Conjunctiva/sclera: Conjunctivae normal.   Cardiovascular:      Rate and Rhythm: Normal rate and regular rhythm.      Heart sounds: No murmur heard.     No friction rub. No gallop.   Pulmonary:      Effort: Pulmonary effort is normal. No respiratory distress.      Breath sounds: Normal breath sounds. No wheezing or rales.   Abdominal:      General: Bowel sounds are normal. There is no distension.      Palpations: Abdomen is soft.      Tenderness: There is no abdominal tenderness.   Musculoskeletal:      Cervical back: Neck supple.   Skin:     General: Skin is warm and dry.   Neurological:      Mental Status: He is alert and oriented to person, place, and time.   Psychiatric:         Behavior: Behavior normal.         Thought Content: Thought content normal.         Judgment: Judgment normal.           Assessment:       1. Complete AV block    2. Cardiac pacemaker in situ    3. Permanent atrial fibrillation    4. Coronary artery disease involving native coronary artery of native heart without angina pectoris    5. Chronic systolic  congestive heart failure, pacing induced now s/p upgrade to CRT-P 2/2018    6. Presence of Watchman left atrial appendage closure device    7. Aortic atherosclerosis -- seen CT 02/2019    8. Obstructive sleep apnea on CPAP         Plan:       In summary, Mr. 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 He is in complete AV block.  He also has JAEL, low testosterone, and CAD with preserved LVEF by ECHO in 2015. He is now s/p upgrade to CRT-P and RV lead revision for increasing RV lead threshold and pacing induced CMP with normalization of his EF. He is having recurrent long episodes of pAF and has a [id number] score of 3. He has had recurrent GI bleeding from diverticular disease and for this reason has not been on long-term anticoagulation and is now s/p successful Watchman implantation with 6 week GAIL noting no JAN leaks. He completed 6 months of DAPT and is now on 81mg aspirin daily alone. He is doing great. Device is functioning normally.     RTC in 1 year, sooner if needed.    Thank you for allowing me to participate in the care of this patient. Please do not hesitate to call me with any questions or concerns.    Tawanda Adler MD, PhD  Cardiac Electrophysiology

## 2023-10-23 ENCOUNTER — LAB VISIT (OUTPATIENT)
Dept: LAB | Facility: HOSPITAL | Age: 88
End: 2023-10-23
Payer: MEDICARE

## 2023-10-23 DIAGNOSIS — Z00.00 ANNUAL PHYSICAL EXAM: ICD-10-CM

## 2023-10-23 DIAGNOSIS — Z79.899 ENCOUNTER FOR LONG-TERM (CURRENT) USE OF OTHER MEDICATIONS: ICD-10-CM

## 2023-10-23 LAB
ALBUMIN SERPL BCP-MCNC: 4 G/DL (ref 3.5–5.2)
ALP SERPL-CCNC: 207 U/L (ref 55–135)
ALT SERPL W/O P-5'-P-CCNC: 168 U/L (ref 10–44)
ANION GAP SERPL CALC-SCNC: 10 MMOL/L (ref 8–16)
AST SERPL-CCNC: 181 U/L (ref 10–40)
BILIRUB SERPL-MCNC: 0.8 MG/DL (ref 0.1–1)
BUN SERPL-MCNC: 20 MG/DL (ref 10–30)
CALCIUM SERPL-MCNC: 9.7 MG/DL (ref 8.7–10.5)
CHLORIDE SERPL-SCNC: 108 MMOL/L (ref 95–110)
CHOLEST SERPL-MCNC: 168 MG/DL (ref 120–199)
CHOLEST/HDLC SERPL: 2.7 {RATIO} (ref 2–5)
CO2 SERPL-SCNC: 24 MMOL/L (ref 23–29)
CREAT SERPL-MCNC: 1.1 MG/DL (ref 0.5–1.4)
ERYTHROCYTE [DISTWIDTH] IN BLOOD BY AUTOMATED COUNT: 13.8 % (ref 11.5–14.5)
EST. GFR  (NO RACE VARIABLE): >60 ML/MIN/1.73 M^2
ESTIMATED AVG GLUCOSE: 114 MG/DL (ref 68–131)
GLUCOSE SERPL-MCNC: 108 MG/DL (ref 70–110)
HBA1C MFR BLD: 5.6 % (ref 4–5.6)
HCT VFR BLD AUTO: 47.3 % (ref 40–54)
HDLC SERPL-MCNC: 62 MG/DL (ref 40–75)
HDLC SERPL: 36.9 % (ref 20–50)
HGB BLD-MCNC: 15.2 G/DL (ref 14–18)
LDLC SERPL CALC-MCNC: 94 MG/DL (ref 63–159)
MCH RBC QN AUTO: 31.5 PG (ref 27–31)
MCHC RBC AUTO-ENTMCNC: 32.1 G/DL (ref 32–36)
MCV RBC AUTO: 98 FL (ref 82–98)
NONHDLC SERPL-MCNC: 106 MG/DL
PLATELET # BLD AUTO: 225 K/UL (ref 150–450)
PMV BLD AUTO: 11.4 FL (ref 9.2–12.9)
POTASSIUM SERPL-SCNC: 4.3 MMOL/L (ref 3.5–5.1)
PROT SERPL-MCNC: 7.4 G/DL (ref 6–8.4)
RBC # BLD AUTO: 4.83 M/UL (ref 4.6–6.2)
SODIUM SERPL-SCNC: 142 MMOL/L (ref 136–145)
TRIGL SERPL-MCNC: 60 MG/DL (ref 30–150)
WBC # BLD AUTO: 7.45 K/UL (ref 3.9–12.7)

## 2023-10-23 PROCEDURE — 36415 COLL VENOUS BLD VENIPUNCTURE: CPT | Performed by: FAMILY MEDICINE

## 2023-10-23 PROCEDURE — 83036 HEMOGLOBIN GLYCOSYLATED A1C: CPT | Performed by: FAMILY MEDICINE

## 2023-10-23 PROCEDURE — 85027 COMPLETE CBC AUTOMATED: CPT | Performed by: FAMILY MEDICINE

## 2023-10-23 PROCEDURE — 80053 COMPREHEN METABOLIC PANEL: CPT | Performed by: FAMILY MEDICINE

## 2023-10-23 PROCEDURE — 80061 LIPID PANEL: CPT | Performed by: FAMILY MEDICINE

## 2023-10-27 ENCOUNTER — OFFICE VISIT (OUTPATIENT)
Dept: INTERNAL MEDICINE | Facility: CLINIC | Age: 88
End: 2023-10-27
Payer: MEDICARE

## 2023-10-27 VITALS
BODY MASS INDEX: 27.36 KG/M2 | HEART RATE: 80 BPM | SYSTOLIC BLOOD PRESSURE: 140 MMHG | RESPIRATION RATE: 16 BRPM | HEIGHT: 70 IN | DIASTOLIC BLOOD PRESSURE: 82 MMHG | OXYGEN SATURATION: 96 % | WEIGHT: 191.13 LBS

## 2023-10-27 DIAGNOSIS — Z00.00 ANNUAL PHYSICAL EXAM: Primary | ICD-10-CM

## 2023-10-27 DIAGNOSIS — R79.89 ELEVATED LFTS: ICD-10-CM

## 2023-10-27 DIAGNOSIS — R74.8 ELEVATED SERUM GGT LEVEL: ICD-10-CM

## 2023-10-27 DIAGNOSIS — Z79.899 ENCOUNTER FOR LONG-TERM (CURRENT) USE OF OTHER MEDICATIONS: ICD-10-CM

## 2023-10-27 DIAGNOSIS — R74.8 ELEVATED ALKALINE PHOSPHATASE LEVEL: ICD-10-CM

## 2023-10-27 PROCEDURE — 99397 PER PM REEVAL EST PAT 65+ YR: CPT | Mod: S$PBB,GZ,, | Performed by: FAMILY MEDICINE

## 2023-10-27 PROCEDURE — 99214 OFFICE O/P EST MOD 30 MIN: CPT | Mod: PBBFAC | Performed by: FAMILY MEDICINE

## 2023-10-27 PROCEDURE — 99397 PR PREVENTIVE VISIT,EST,65 & OVER: ICD-10-PCS | Mod: S$PBB,GZ,, | Performed by: FAMILY MEDICINE

## 2023-10-27 PROCEDURE — 99999 PR PBB SHADOW E&M-EST. PATIENT-LVL IV: CPT | Mod: PBBFAC,,, | Performed by: FAMILY MEDICINE

## 2023-10-27 PROCEDURE — 99999 PR PBB SHADOW E&M-EST. PATIENT-LVL IV: ICD-10-PCS | Mod: PBBFAC,,, | Performed by: FAMILY MEDICINE

## 2023-10-27 NOTE — PROGRESS NOTES
Subjective:       Patient ID: Artemio Colon Sr. is a 89 y.o. male.    Chief Complaint: Annual Exam    HPI    Artemio Colon Sr. is a 89 y.o. male PMHx JAEL, Afib, S/p Watchman, S/p Pacemaker, HF, Chronic back pain, PreDM for checkup.      Labs prior, review.     #Cards: CAD, HF, Afib, S/p Pacemaker, S/p Watchman  - est w/ Dr. Adler (EP), lv 9/2023 - rtc 1yr  - est w/ Dr. Gutiérrez, lv 2/2022 -- f/u 1yr  - reg: ASA qd  - issues with recurrent GI bleeding not on anticoagulation (previously tried xarelto in 0827-0514 for symptomatic AF and had issues with excessive bleeding then). He was hospitalized for a diverticular bleed in March of 2019.  - s/p Watchman implant performed on 12/3/2020.     #Endo: PreDM (A1c 5/2021 = 5.8)     #GI: Elev LFTs, Elev alk phos and ggt  - est w/ Dr. Loera,  2/2023 - rec for MRI (?pacemaker, can he get?)  - u/s 1/2023     #Neuro: Neuropathy, Lumbar radiculopathy  - est w/ Dr. Hylton,  10/2022     #Ortho: Chronic back pain  - est w/ [place] [facility], sees q3m  - taking Norco prn (usually every 3d) and uses Fentanyl patches  -  reviewed     #Uro: BPH, H/o Nephrolithiasis  - reg: Flomax 0.4 qd with relief     #Sleep med: JAEL on cpap  - est w/ Dr. Amador,  3/2022     #BMI 27    Review of Systems   Constitutional:  Negative for activity change and unexpected weight change.   HENT:  Negative for hearing loss, rhinorrhea and trouble swallowing.    Eyes:  Negative for discharge and visual disturbance.   Respiratory:  Negative for chest tightness and wheezing.    Cardiovascular:  Negative for chest pain and palpitations.   Gastrointestinal:  Negative for blood in stool, constipation, diarrhea and vomiting.   Endocrine: Negative for polydipsia and polyuria.   Genitourinary:  Negative for difficulty urinating, hematuria and urgency.   Musculoskeletal:  Negative for arthralgias, joint swelling and neck pain.   Neurological:  Negative for weakness and headaches.  "  Psychiatric/Behavioral:  Negative for confusion and dysphoric mood.          Past Medical History:   Diagnosis Date    AV block     BPH (benign prostatic hyperplasia)     Cancer     Chronic rhinitis     Coronary artery disease     Erectile dysfunction     Glaucoma     Kidney cysts     Neuropathy 8/22/2017    JAEL (obstructive sleep apnea)     Pacemaker     Symptomatic bradycardia     s/p pacemaker        Prior to Admission medications    Medication Sig Start Date End Date Taking? Authorizing Provider   aspirin 81 MG Chew Take 81 mg by mouth once daily.    Provider, Historical   diclofenac sodium (VOLTAREN) 1 % Gel Use to affected joints up to 4 times a day 10/22/21   Javed Courtney MD   fentaNYL (DURAGESIC) 25 mcg/hr Place 1 patch onto the skin every 72 hours.    Provider, Historical   hydrocodone-acetaminophen 10-325mg (NORCO)  mg Tab Take by mouth every 4 (four) hours as needed for Pain.    Provider, Historical   pantoprazole (PROTONIX) 40 MG tablet Take 1 tablet (40 mg total) by mouth once daily. 2/17/23 3/19/23  Valentín Loera MD   timolol maleate 0.5% (TIMOPTIC) 0.5 % Drop timolol 0.5 % eye drops   INSTILL 1 DROP INTO AFFECTED EYE(S) BY OPHTHALMIC ROUTE 2 TIMES PER DAY    Provider, Historical        Past medical history, surgical history, and family medical history reviewed and updated as appropriate.    Medications and allergies reviewed.     Objective:          Vitals:    10/27/23 0915   BP: (!) 140/82   BP Location: Right arm   Patient Position: Sitting   BP Method: Small (Manual)   Pulse: 80   Resp: 16   SpO2: 96%   Weight: 86.7 kg (191 lb 2.2 oz)   Height: 5' 10" (1.778 m)     Body mass index is 27.43 kg/m².  Physical Exam  Vitals and nursing note reviewed.   Constitutional:       General: He is not in acute distress.     Appearance: Normal appearance. He is well-developed.   Eyes:      Extraocular Movements: Extraocular movements intact.   Cardiovascular:      Rate and Rhythm: Normal " rate and regular rhythm.      Pulses: Normal pulses.      Heart sounds: Normal heart sounds. No murmur heard.  Pulmonary:      Effort: Pulmonary effort is normal. No respiratory distress.      Breath sounds: Normal breath sounds. No wheezing.   Neurological:      Mental Status: He is alert and oriented to person, place, and time.   Psychiatric:         Mood and Affect: Mood normal.         Behavior: Behavior normal.         Lab Results   Component Value Date    WBC 7.45 10/23/2023    HGB 15.2 10/23/2023    HCT 47.3 10/23/2023     10/23/2023    CHOL 168 10/23/2023    TRIG 60 10/23/2023    HDL 62 10/23/2023     (H) 10/23/2023     (H) 10/23/2023     10/23/2023    K 4.3 10/23/2023     10/23/2023    CREATININE 1.1 10/23/2023    BUN 20 10/23/2023    CO2 24 10/23/2023    TSH 1.999 07/02/2019    INR 1.0 11/23/2020    HGBA1C 5.6 10/23/2023       Assessment:       1. Annual physical exam    2. Elevated LFTs    3. Encounter for long-term (current) use of other medications    4. Elevated alkaline phosphatase level    5. Elevated serum GGT level          Plan:   1. Annual physical exam    2. Elevated LFTs  -     Ambulatory referral/consult to Gastroenterology; Future; Expected date: 10/27/2023    3. Encounter for long-term (current) use of other medications    4. Elevated alkaline phosphatase level    5. Elevated serum GGT level        Health maintenance reviewed with patient.     Follow up in about 6 months (around 4/27/2024) for Checkup.    Patel Loyd MD  Family Medicine / Primary Care  [facility]  10/27/2023

## 2023-11-16 ENCOUNTER — CLINICAL SUPPORT (OUTPATIENT)
Dept: CARDIOLOGY | Facility: HOSPITAL | Age: 88
End: 2023-11-16
Payer: MEDICARE

## 2023-11-16 DIAGNOSIS — Z95.0 PRESENCE OF CARDIAC PACEMAKER: ICD-10-CM

## 2023-11-16 DIAGNOSIS — I48.91 UNSPECIFIED ATRIAL FIBRILLATION: ICD-10-CM

## 2023-11-16 DIAGNOSIS — I50.9 HEART FAILURE, UNSPECIFIED: ICD-10-CM

## 2023-11-16 DIAGNOSIS — I44.2 ATRIOVENTRICULAR BLOCK, COMPLETE: ICD-10-CM

## 2023-11-16 PROCEDURE — 93294 CARDIAC DEVICE CHECK - REMOTE: ICD-10-PCS | Mod: ,,, | Performed by: INTERNAL MEDICINE

## 2023-11-16 PROCEDURE — 93294 REM INTERROG EVL PM/LDLS PM: CPT | Mod: ,,, | Performed by: INTERNAL MEDICINE

## 2023-11-27 ENCOUNTER — TELEPHONE (OUTPATIENT)
Dept: HEPATOLOGY | Facility: CLINIC | Age: 88
End: 2023-11-27
Payer: MEDICARE

## 2023-11-27 NOTE — TELEPHONE ENCOUNTER
"----- Message from Radha Ibarra sent at 11/27/2023 10:23 AM CST -----  Regarding: NP appt request  Contact: @ [phone]  Scheduling Request      Patient Status: Np    Scheduling Appt: From referral but referral is for gastro, it keeps bringing me to hepatology each time I try to abhi     Time/Date Preference: Soonest available next week     Relationship to Patient?: Self     Contact Preference?: Call back     Treating Provider: MARGARITA BEDOYA MD     Do you feel you need to be seen today? No    Additional Notes: pt prefers early morning appts   "Thank you for all that you do for our patients"     "

## 2023-11-27 NOTE — TELEPHONE ENCOUNTER
Returned call to the patient to schedule a hepatology consult from the referral. Patient requested to be seen by a physician and a sooner appt.  Scheduled with Dr. Pereyra 12/4/2023.  Patient confirmed and agreed with the appt.  Reminder letter mailed.

## 2023-11-28 ENCOUNTER — TELEPHONE (OUTPATIENT)
Dept: HEPATOLOGY | Facility: CLINIC | Age: 88
End: 2023-11-28
Payer: MEDICARE

## 2023-11-28 NOTE — TELEPHONE ENCOUNTER
----- Message from Pamela Swanson sent at 11/28/2023  9:04 AM CST -----  Type: Appointment Request       Name of Caller:pt portal  When is the first available appointment? n/a  Symptoms:abnormal liver function tests  Best Call Back Number: [phone]  Additional Information: via pt portal request for dates 12/1-12/20 new pt

## 2023-12-04 ENCOUNTER — OFFICE VISIT (OUTPATIENT)
Dept: HEPATOLOGY | Facility: CLINIC | Age: 88
End: 2023-12-04
Payer: MEDICARE

## 2023-12-04 ENCOUNTER — LAB VISIT (OUTPATIENT)
Dept: LAB | Facility: HOSPITAL | Age: 88
End: 2023-12-04
Attending: INTERNAL MEDICINE
Payer: MEDICARE

## 2023-12-04 VITALS
TEMPERATURE: 97 F | HEART RATE: 60 BPM | OXYGEN SATURATION: 94 % | HEIGHT: 70 IN | RESPIRATION RATE: 18 BRPM | BODY MASS INDEX: 27.4 KG/M2 | DIASTOLIC BLOOD PRESSURE: 70 MMHG | WEIGHT: 191.38 LBS | SYSTOLIC BLOOD PRESSURE: 143 MMHG

## 2023-12-04 DIAGNOSIS — K76.9 LIVER DISEASE, UNSPECIFIED: ICD-10-CM

## 2023-12-04 DIAGNOSIS — R74.8 ABNORMAL LIVER ENZYMES: ICD-10-CM

## 2023-12-04 DIAGNOSIS — K83.8 DILATION OF BILIARY TRACT: ICD-10-CM

## 2023-12-04 DIAGNOSIS — Z90.49 HISTORY OF CHOLECYSTECTOMY: ICD-10-CM

## 2023-12-04 LAB
ALBUMIN SERPL BCP-MCNC: 4.3 G/DL (ref 3.5–5.2)
ALP SERPL-CCNC: 134 U/L (ref 55–135)
ALT SERPL W/O P-5'-P-CCNC: 43 U/L (ref 10–44)
AST SERPL-CCNC: 54 U/L (ref 10–40)
BILIRUB DIRECT SERPL-MCNC: 0.3 MG/DL (ref 0.1–0.3)
BILIRUB SERPL-MCNC: 0.9 MG/DL (ref 0.1–1)
FERRITIN SERPL-MCNC: 92 NG/ML (ref 20–300)
IGA SERPL-MCNC: 170 MG/DL (ref 40–350)
IGG SERPL-MCNC: 1047 MG/DL (ref 650–1600)
IGM SERPL-MCNC: 41 MG/DL (ref 50–300)
INR PPP: 1.1 (ref 0.8–1.2)
IRON SERPL-MCNC: 89 UG/DL (ref 45–160)
PROT SERPL-MCNC: 7.7 G/DL (ref 6–8.4)
PROTHROMBIN TIME: 11.4 SEC (ref 9–12.5)
SATURATED IRON: 22 % (ref 20–50)
TOTAL IRON BINDING CAPACITY: 406 UG/DL (ref 250–450)
TRANSFERRIN SERPL-MCNC: 274 MG/DL (ref 200–375)

## 2023-12-04 PROCEDURE — 99999 PR PBB SHADOW E&M-EST. PATIENT-LVL III: ICD-10-PCS | Mod: PBBFAC,,, | Performed by: INTERNAL MEDICINE

## 2023-12-04 PROCEDURE — 99213 OFFICE O/P EST LOW 20 MIN: CPT | Mod: S$PBB,,, | Performed by: INTERNAL MEDICINE

## 2023-12-04 PROCEDURE — 99213 PR OFFICE/OUTPT VISIT, EST, LEVL III, 20-29 MIN: ICD-10-PCS | Mod: S$PBB,,, | Performed by: INTERNAL MEDICINE

## 2023-12-04 PROCEDURE — 99999 PR PBB SHADOW E&M-EST. PATIENT-LVL III: CPT | Mod: PBBFAC,,, | Performed by: INTERNAL MEDICINE

## 2023-12-04 PROCEDURE — 85610 PROTHROMBIN TIME: CPT | Performed by: INTERNAL MEDICINE

## 2023-12-04 PROCEDURE — 86038 ANTINUCLEAR ANTIBODIES: CPT | Performed by: INTERNAL MEDICINE

## 2023-12-04 PROCEDURE — 86039 ANTINUCLEAR ANTIBODIES (ANA): CPT | Performed by: INTERNAL MEDICINE

## 2023-12-04 PROCEDURE — 82784 ASSAY IGA/IGD/IGG/IGM EACH: CPT | Mod: 59 | Performed by: INTERNAL MEDICINE

## 2023-12-04 PROCEDURE — 86381 MITOCHONDRIAL ANTIBODY EACH: CPT | Performed by: INTERNAL MEDICINE

## 2023-12-04 PROCEDURE — 83540 ASSAY OF IRON: CPT | Performed by: INTERNAL MEDICINE

## 2023-12-04 PROCEDURE — 99213 OFFICE O/P EST LOW 20 MIN: CPT | Mod: PBBFAC | Performed by: INTERNAL MEDICINE

## 2023-12-04 PROCEDURE — 86235 NUCLEAR ANTIGEN ANTIBODY: CPT | Mod: 59 | Performed by: INTERNAL MEDICINE

## 2023-12-04 PROCEDURE — 80321 ALCOHOLS BIOMARKERS 1OR 2: CPT | Performed by: INTERNAL MEDICINE

## 2023-12-04 PROCEDURE — 36415 COLL VENOUS BLD VENIPUNCTURE: CPT | Performed by: INTERNAL MEDICINE

## 2023-12-04 PROCEDURE — 80076 HEPATIC FUNCTION PANEL: CPT | Performed by: INTERNAL MEDICINE

## 2023-12-04 PROCEDURE — 84466 ASSAY OF TRANSFERRIN: CPT | Performed by: INTERNAL MEDICINE

## 2023-12-04 PROCEDURE — 82728 ASSAY OF FERRITIN: CPT | Performed by: INTERNAL MEDICINE

## 2023-12-04 RX ORDER — OXYCODONE AND ACETAMINOPHEN 7.5; 3 MG/1; MG/1
1 TABLET ORAL EVERY 4 HOURS PRN
COMMUNITY

## 2023-12-05 LAB
ANA PATTERN 1: NORMAL
ANA SER QL IF: POSITIVE
ANA TITR SER IF: NORMAL {TITER}
MITOCHONDRIA AB TITR SER IF: NORMAL {TITER}

## 2023-12-06 LAB
CLINICAL BIOCHEMIST REVIEW: NORMAL
PLPETH BLD-MCNC: <10 NG/ML
POPETH BLD-MCNC: <10 NG/ML

## 2023-12-07 LAB
A1AT PHENOTYP SERPL-IMP: NORMAL BANDS
A1AT SERPL NEPH-MCNC: 154 MG/DL (ref 100–190)
ANTI SM ANTIBODY: 0.25 RATIO (ref 0–0.99)
ANTI SM/RNP ANTIBODY: 0.25 RATIO (ref 0–0.99)
ANTI-SM INTERPRETATION: NEGATIVE
ANTI-SM/RNP INTERPRETATION: NEGATIVE
ANTI-SSA ANTIBODY: 0.14 RATIO (ref 0–0.99)
ANTI-SSA INTERPRETATION: NEGATIVE
ANTI-SSB ANTIBODY: 0.11 RATIO (ref 0–0.99)
ANTI-SSB INTERPRETATION: NEGATIVE
DSDNA AB SER-ACNC: NORMAL [IU]/ML

## 2023-12-13 ENCOUNTER — HOSPITAL ENCOUNTER (OUTPATIENT)
Dept: RADIOLOGY | Facility: HOSPITAL | Age: 88
Discharge: HOME | End: 2023-12-13
Attending: INTERNAL MEDICINE
Payer: MEDICARE

## 2023-12-13 DIAGNOSIS — R74.8 ABNORMAL LIVER ENZYMES: Primary | ICD-10-CM

## 2023-12-13 DIAGNOSIS — R74.8 ABNORMAL LIVER ENZYMES: ICD-10-CM

## 2023-12-13 PROCEDURE — 76700 US EXAM ABDOM COMPLETE: CPT | Mod: 26,,, | Performed by: RADIOLOGY

## 2023-12-13 PROCEDURE — 76700 US ABDOMEN COMPLETE: ICD-10-PCS | Mod: 26,,, | Performed by: RADIOLOGY

## 2023-12-13 PROCEDURE — 76700 US EXAM ABDOM COMPLETE: CPT | Mod: TC

## 2024-02-15 ENCOUNTER — CLINICAL SUPPORT (OUTPATIENT)
Dept: CARDIOLOGY | Facility: HOSPITAL | Age: 89
End: 2024-02-15
Attending: INTERNAL MEDICINE
Payer: MEDICARE

## 2024-02-15 ENCOUNTER — CLINICAL SUPPORT (OUTPATIENT)
Dept: CARDIOLOGY | Facility: HOSPITAL | Age: 89
End: 2024-02-15
Payer: MEDICARE

## 2024-02-15 DIAGNOSIS — I44.2 ATRIOVENTRICULAR BLOCK, COMPLETE: ICD-10-CM

## 2024-02-15 PROCEDURE — 93294 REM INTERROG EVL PM/LDLS PM: CPT | Mod: ,,, | Performed by: INTERNAL MEDICINE

## 2024-02-27 DIAGNOSIS — Z00.00 ENCOUNTER FOR MEDICARE ANNUAL WELLNESS EXAM: ICD-10-CM

## 2024-02-28 LAB
OHS CV BIV PACING PERCENT: 99 %
OHS CV DC REMOTE DEVICE TYPE: NORMAL

## 2024-03-05 ENCOUNTER — OFFICE VISIT (OUTPATIENT)
Dept: INTERNAL MEDICINE | Facility: CLINIC | Age: 89
End: 2024-03-05
Payer: MEDICARE

## 2024-03-05 VITALS
OXYGEN SATURATION: 98 % | DIASTOLIC BLOOD PRESSURE: 70 MMHG | HEIGHT: 70 IN | SYSTOLIC BLOOD PRESSURE: 136 MMHG | BODY MASS INDEX: 28.28 KG/M2 | WEIGHT: 197.56 LBS | HEART RATE: 60 BPM | TEMPERATURE: 98 F

## 2024-03-05 DIAGNOSIS — I50.22 CHRONIC SYSTOLIC CONGESTIVE HEART FAILURE: ICD-10-CM

## 2024-03-05 DIAGNOSIS — R10.9 ABDOMINAL PAIN, UNSPECIFIED ABDOMINAL LOCATION: Primary | ICD-10-CM

## 2024-03-05 DIAGNOSIS — I70.0 AORTIC ATHEROSCLEROSIS: ICD-10-CM

## 2024-03-05 DIAGNOSIS — I44.2 COMPLETE AV BLOCK: ICD-10-CM

## 2024-03-05 PROCEDURE — 99999 PR PBB SHADOW E&M-EST. PATIENT-LVL III: CPT | Mod: PBBFAC,,, | Performed by: FAMILY MEDICINE

## 2024-03-05 PROCEDURE — 99214 OFFICE O/P EST MOD 30 MIN: CPT | Mod: S$PBB,,, | Performed by: FAMILY MEDICINE

## 2024-03-05 PROCEDURE — 99213 OFFICE O/P EST LOW 20 MIN: CPT | Mod: PBBFAC | Performed by: FAMILY MEDICINE

## 2024-03-05 NOTE — PROGRESS NOTES
Subjective:       Patient ID: Artemio Colon Sr. is a 89 y.o. male.    Chief Complaint: Abdominal Pain    Hypertension  This is a new problem. The current episode started in the past 7 days. The problem is unchanged. The problem is resistant. Associated symptoms include anxiety, headaches, malaise/fatigue, orthopnea and shortness of breath. Pertinent negatives include no blurred vision, chest pain, neck pain, palpitations, peripheral edema, PND or sweats. There are no associated agents to hypertension. Risk factors for coronary artery disease include family history. Past treatments include nothing. The current treatment provides no improvement. There are no compliance problems.      Artemio Colon Sr. is a 89 y.o. male PMHx JAEL, Afib, S/p Watchman, S/p Pacemaker, HF, Chronic back pain, PreDM for prob visit.     C/o abd pain x a few days. Initial feeling was very bad pain x 30min, Belly button and down/across. Since then, not as bad. Crampy type of pain.  Seems to be having more bm's than usual, stool consistency not changed. Maybe feels better after eating.   Initial bm when having worst pain 2-3d ago, had difficult constipated type of stool.     #Cards: CAD, HF, Afib, S/p Pacemaker, S/p Watchman  - est w/ Dr. Adler (),  9/2023 - rtc 1yr  - est w/ Dr. Gutiérrez,  2/2022 -- f/u 1yr  - reg: ASA qd  - issues with recurrent GI bleeding not on anticoagulation (previously tried xarelto in 5030-1532 for symptomatic AF and had issues with excessive bleeding then). He was hospitalized for a diverticular bleed in March of 2019.  - s/p Watchman implant performed on 12/3/2020.     #Endo: PreDM (A1c 5/2021 = 5.8)     #Hep: Elev LFTs, Elev alk phos and ggt  - est w/ Dr. Pereyra,  12/2023 - upcoming 3/2024  - u/s 1/2023     #Neuro: Neuropathy, Lumbar radiculopathy  - est w/ Dr. Hylton,  10/2022     #Ortho: Chronic back pain  - est w/ [place] [facility], sees q3m  - taking Norco prn (usually every 3d) and uses  Fentanyl patches  -  reviewed     #Uro: BPH, H/o Nephrolithiasis  - reg: Flomax 0.4 qd with relief     #Sleep med: JAEL on cpap  - est w/ Dr. Amador, lv 3/2022     #BMI 27       Review of Systems   Constitutional:  Positive for malaise/fatigue. Negative for chills and fever.   Eyes:  Negative for blurred vision.   Respiratory:  Positive for shortness of breath.    Cardiovascular:  Positive for orthopnea. Negative for chest pain, palpitations and PND.   Gastrointestinal:  Positive for abdominal pain and nausea. Negative for anal bleeding, blood in stool, constipation, diarrhea and vomiting.   Genitourinary:  Negative for difficulty urinating and dysuria.   Musculoskeletal:  Negative for neck pain.   Neurological:  Positive for headaches.         Past Medical History:   Diagnosis Date    AV block     BPH (benign prostatic hyperplasia)     Cancer     Chronic rhinitis     Coronary artery disease     Erectile dysfunction     Glaucoma     Kidney cysts     Neuropathy 8/22/2017    JAEL (obstructive sleep apnea)     Pacemaker     Symptomatic bradycardia     s/p pacemaker        Prior to Admission medications    Medication Sig Start Date End Date Taking? Authorizing Provider   aspirin 81 MG Chew Take 81 mg by mouth once daily.    Provider, Historical   diclofenac sodium (VOLTAREN) 1 % Gel Use to affected joints up to 4 times a day 10/22/21   Javed Courtney MD   fentaNYL (DURAGESIC) 25 mcg/hr Place 1 patch onto the skin every 72 hours.    Provider, Historical   hydrocodone-acetaminophen 10-325mg (NORCO)  mg Tab Take by mouth every 4 (four) hours as needed for Pain.    Provider, Historical   oxyCODONE-acetaminophen (LYNOX) 7.5-300 mg per tablet Take 1 tablet by mouth every 4 (four) hours as needed for Pain.    Provider, Historical   timolol maleate 0.5% (TIMOPTIC) 0.5 % Drop timolol 0.5 % eye drops   INSTILL 1 DROP INTO AFFECTED EYE(S) BY OPHTHALMIC ROUTE 2 TIMES PER DAY    Provider, Historical        Past medical  "history, surgical history, and family medical history reviewed and updated as appropriate.    Medications and allergies reviewed.     Objective:          Vitals:    03/05/24 1424 03/05/24 1429   BP: 138/70 136/70   BP Location: Left arm    Patient Position: Sitting    BP Method: Medium (Manual)    Pulse: 60    Temp: 98.2 °F (36.8 °C)    SpO2: 98%    Weight: 89.6 kg (197 lb 8.5 oz)    Height: 5' 10" (1.778 m)      Body mass index is 28.34 kg/m².  Physical Exam  Vitals and nursing note reviewed.   Constitutional:       General: He is not in acute distress.  Pulmonary:      Effort: Pulmonary effort is normal. No respiratory distress.   Abdominal:      General: Bowel sounds are normal. There is no distension.      Palpations: Abdomen is soft. There is no mass.      Tenderness: There is no abdominal tenderness. There is no guarding or rebound.   Neurological:      Mental Status: He is alert and oriented to person, place, and time.   Psychiatric:         Mood and Affect: Mood normal.         Behavior: Behavior normal.         Lab Results   Component Value Date    WBC 7.45 10/23/2023    HGB 15.2 10/23/2023    HCT 47.3 10/23/2023     10/23/2023    CHOL 168 10/23/2023    TRIG 60 10/23/2023    HDL 62 10/23/2023    ALT 43 12/04/2023    AST 54 (H) 12/04/2023     10/23/2023    K 4.3 10/23/2023     10/23/2023    CREATININE 1.1 10/23/2023    BUN 20 10/23/2023    CO2 24 10/23/2023    TSH 1.999 07/02/2019    INR 1.1 12/04/2023    HGBA1C 5.6 10/23/2023       Assessment:       1. Abdominal pain, unspecified abdominal location    2. Chronic systolic congestive heart failure    3. Complete AV block    4. Aortic atherosclerosis          Plan:   1. Abdominal pain, unspecified abdominal location    2. Chronic systolic congestive heart failure  Overview:  Reviewed cardiology records      3. Complete AV block  Overview:  Reviewed cardiology records.      4. Aortic atherosclerosis  Overview:  Imaging " reviewed.        ?diverticular dz leading to issue, will observe for now. Viral bug? Referred pain from bile duct issue being followed?     Follow up should symptoms persist or worsen. If symptoms become severe, please report immediately to urgent care or emergency room for further evaluation. Patient voiced understanding.      No follow-ups on file.    Patel Loyd MD  Family Medicine / Primary Care  [facility]  3/5/2024

## 2024-03-19 ENCOUNTER — LAB VISIT (OUTPATIENT)
Dept: LAB | Facility: HOSPITAL | Age: 89
End: 2024-03-19
Attending: INTERNAL MEDICINE
Payer: MEDICARE

## 2024-03-19 ENCOUNTER — OFFICE VISIT (OUTPATIENT)
Dept: HEPATOLOGY | Facility: CLINIC | Age: 89
End: 2024-03-19
Payer: MEDICARE

## 2024-03-19 VITALS
OXYGEN SATURATION: 96 % | HEART RATE: 78 BPM | DIASTOLIC BLOOD PRESSURE: 73 MMHG | HEIGHT: 70 IN | BODY MASS INDEX: 28.28 KG/M2 | WEIGHT: 197.56 LBS | SYSTOLIC BLOOD PRESSURE: 147 MMHG

## 2024-03-19 DIAGNOSIS — K76.9 LIVER DISEASE, UNSPECIFIED: ICD-10-CM

## 2024-03-19 DIAGNOSIS — R74.8 ABNORMAL LIVER ENZYMES: ICD-10-CM

## 2024-03-19 DIAGNOSIS — K83.8 DILATION OF BILIARY TRACT: ICD-10-CM

## 2024-03-19 LAB
ALBUMIN SERPL BCP-MCNC: 4.1 G/DL (ref 3.5–5.2)
ALP SERPL-CCNC: 104 U/L (ref 55–135)
ALT SERPL W/O P-5'-P-CCNC: 16 U/L (ref 10–44)
AST SERPL-CCNC: 22 U/L (ref 10–40)
BILIRUB DIRECT SERPL-MCNC: 0.2 MG/DL (ref 0.1–0.3)
BILIRUB SERPL-MCNC: 0.5 MG/DL (ref 0.1–1)
INR PPP: 1 (ref 0.8–1.2)
PROT SERPL-MCNC: 7.5 G/DL (ref 6–8.4)
PROTHROMBIN TIME: 10.9 SEC (ref 9–12.5)

## 2024-03-19 PROCEDURE — 80076 HEPATIC FUNCTION PANEL: CPT | Performed by: INTERNAL MEDICINE

## 2024-03-19 PROCEDURE — 99213 OFFICE O/P EST LOW 20 MIN: CPT | Mod: S$PBB,,, | Performed by: INTERNAL MEDICINE

## 2024-03-19 PROCEDURE — 85610 PROTHROMBIN TIME: CPT | Performed by: INTERNAL MEDICINE

## 2024-03-19 PROCEDURE — 36415 COLL VENOUS BLD VENIPUNCTURE: CPT | Performed by: INTERNAL MEDICINE

## 2024-03-19 PROCEDURE — 99999 PR PBB SHADOW E&M-EST. PATIENT-LVL III: CPT | Mod: PBBFAC,,, | Performed by: INTERNAL MEDICINE

## 2024-03-19 PROCEDURE — 99213 OFFICE O/P EST LOW 20 MIN: CPT | Mod: PBBFAC | Performed by: INTERNAL MEDICINE

## 2024-03-19 RX ORDER — FLUOCINONIDE TOPICAL SOLUTION USP, 0.05% 0.5 MG/ML
SOLUTION TOPICAL
COMMUNITY
Start: 2024-01-18

## 2024-03-19 NOTE — PROGRESS NOTES
Hepatology Follow Up Note    Referring provider: Dr. Rebeca Pereyra  PCP: Patel Loyd MD    Chief complaint: follow up abnormal liver enzymes     Last seen in clinic on: 12/4/2023    Brief HPI:  Artemio Colon Sr. is a 89 y.o. male with abnormal liver enzymes, cholecystectomy, CAD, PPM placement, JAEL, who presents for scheduled follow up.    Interval Events:  - Feels well. Accompanied by his wife, Yaneth.  - Thinks RUQ abdominal pain is resolved. Stools have been brown. He has not seen stefany colored stools in many months.   - Denies recent fevers, chills.   - Denies appetite changes. Denies unintentional weight loss.  - This is the extent of the patient's complaints at this time.      Past Medical History:   Diagnosis Date    AV block     BPH (benign prostatic hyperplasia)     Cancer     Chronic rhinitis     Coronary artery disease     Erectile dysfunction     Glaucoma     Kidney cysts     Neuropathy 8/22/2017    JAEL (obstructive sleep apnea)     Pacemaker     Symptomatic bradycardia     s/p pacemaker       Past Surgical History:   Procedure Laterality Date    ADENOIDECTOMY  1939    unsure    CATARACT EXTRACTION W/  INTRAOCULAR LENS IMPLANT Bilateral 2012    done in [place]    CHOLECYSTECTOMY      CLOSURE OF LEFT ATRIAL APPENDAGE USING DEVICE N/A 12/3/2020    Procedure: Left atrial appendage closure device;  Surgeon: Tawanda Adler MD;  Location: [facility] EP LAB;  Service: Cardiology;  Laterality: N/A;  AF, GAIL, Watchman Implant, BSci, Gen, NV/MB, 3 Prep    COLECTOMY      cecum    COSMETIC SURGERY  1967    rhinoplasty    EYE SURGERY      FRACTURE SURGERY  wrist    1939    HERNIA REPAIR      INSERT / REPLACE / REMOVE PACEMAKER  2012    changed    PROSTATE SURGERY      TONSILLECTOMY  1941    VASECTOMY  1975       Family History   Problem Relation Age of Onset    Arthritis Father     Cancer Father     Kidney disease Father     Cancer Mother     Hearing loss Mother     Mental illness Mother     Alcohol abuse  "Brother     Cancer Brother     COPD Brother     Mental illness Brother     Cancer Paternal Uncle     Depression Brother     Early death Sister         accidental    Heart disease Brother     Mental illness Sister     Amblyopia Neg Hx     Blindness Neg Hx     Cataracts Neg Hx     Glaucoma Neg Hx     Macular degeneration Neg Hx     Retinal detachment Neg Hx     Strabismus Neg Hx        Social History     Tobacco Use    Smoking status: Never    Smokeless tobacco: Never   Substance Use Topics    Alcohol use: Not Currently     Alcohol/week: 1.0 standard drink of alcohol     Types: 1 Glasses of wine per week     Comment: weekly with meal    Drug use: No       Current Outpatient Medications   Medication Sig Dispense Refill    aspirin 81 MG Chew Take 81 mg by mouth once daily.      diclofenac sodium (VOLTAREN) 1 % Gel Use to affected joints up to 4 times a day 100 g 4    fentaNYL (DURAGESIC) 25 mcg/hr Place 1 patch onto the skin every 72 hours.      fluocinonide (LIDEX) 0.05 % external solution Apply topically.      oxyCODONE-acetaminophen (LYNOX) 7.5-300 mg per tablet Take 1 tablet by mouth every 4 (four) hours as needed for Pain.      timolol maleate 0.5% (TIMOPTIC) 0.5 % Drop timolol 0.5 % eye drops   INSTILL 1 DROP INTO AFFECTED EYE(S) BY OPHTHALMIC ROUTE 2 TIMES PER DAY      hydrocodone-acetaminophen 10-325mg (NORCO)  mg Tab Take by mouth every 4 (four) hours as needed for Pain.       No current facility-administered medications for this visit.       Review of patient's allergies indicates:   Allergen Reactions    Ketamine Hallucinations     Had dysphoric reaction.     Penicillins Hives            Vitals:    03/19/24 1425   BP: (!) 147/73   Pulse: 78   SpO2: 96%   Weight: 89.6 kg (197 lb 8.5 oz)   Height: 5' 10" (1.778 m)   Body mass index is 28.34 kg/m².    Physical Exam  Constitutional:       General: He is not in acute distress.  Eyes:      General: No scleral icterus.  Cardiovascular:      Rate and Rhythm: " "Normal rate.   Pulmonary:      Effort: Pulmonary effort is normal.   Abdominal:      General: Abdomen is flat. There is no distension.      Palpations: Abdomen is soft. There is no fluid wave, hepatomegaly or splenomegaly.      Tenderness: There is no abdominal tenderness.   Musculoskeletal:      Right lower leg: No edema.      Left lower leg: No edema.   Skin:     General: Skin is warm.      Coloration: Skin is not jaundiced.   Neurological:      General: No focal deficit present.      Mental Status: He is alert and oriented to person, place, and time.   Psychiatric:         Behavior: Behavior is cooperative.         Thought Content: Thought content normal.         LABS: I personally reviewed pertinent laboratory findings.    Lab Results   Component Value Date    WBC 7.45 10/23/2023    HGB 15.2 10/23/2023    HCT 47.3 10/23/2023    MCV 98 10/23/2023     10/23/2023       Lab Results   Component Value Date     10/23/2023    K 4.3 10/23/2023     10/23/2023    CO2 24 10/23/2023    BUN 20 10/23/2023    CREATININE 1.1 10/23/2023    CALCIUM 9.7 10/23/2023    ANIONGAP 10 10/23/2023    ESTGFRAFRICA >60.0 05/06/2021    EGFRNONAA >60.0 05/06/2021       Lab Results   Component Value Date    ALT 16 03/19/2024    AST 22 03/19/2024     (H) 01/25/2023    ALKPHOS 104 03/19/2024    BILITOT 0.5 03/19/2024       Lab Results   Component Value Date    HEPAIGM Negative 09/26/2019    HEPBIGM Negative 09/26/2019    HEPBCAB Non-reactive 01/25/2023    HEPCAB Non-reactive 01/25/2023       No results found for: "AURELIA", "MITOAB", "SMOOTHMUSCAB", "IGG", "CERULOPLSM"     Computed MELD 3.0 unavailable. Necessary lab results were not found in the last year.  Computed MELD-Na unavailable. Necessary lab results were not found in the last year.       IMAGING: I personally reviewed imaging studies.    US Abdomen Complete 12/13/2023  FINDINGS:  Complete scan of the patient's abdomen was obtained.  The liver is notenlarged.  It " measures 15.3 cm.  No mass lesions noted in the liver.  The HR I index is 1.1 suggesting less than 5% steatosis.  The spleen is not enlarged.  It measures 9 by 3 cm.  No focal defects are seen in the spleen.  The aorta tapers normally. The proximal inferior vena cava is unremarkable.   No para-aortic adenopathy is seen.  The visualized pancreas is not enlarged.  The tail i and parts of head are obscured by gas however.  The gallbladder has been surgically removed..  The common bile duct appears to be dilated and measures 19 mm.  This is similar to the patient's previous examination of January 2023.  Further assessment with an ERCP or MRCP should be considered.  No hydronephrosis or significant mass lesion is noted.  There appear to be bilateral renal cysts of bilateral renal calculi.  Better assessment could be obtained with dedicated retroperitoneal examination.  No ascites is noted.     Impression:     Dilated common bile duct relatively similar to previous examination but further assessment with an MRCP or ERCP is recommended     Bilateral renal cysts and renal calculi which could be better assessed with a dedicated retroperitoneal examination    Assessment:  Artemio Colon . is a 89 y.o. male with abnormal liver enzymes, cholecystectomy, CAD, PPM placement, JAEL, who presents for scheduled follow up. Per chart review, liver enzymes have been intermittently elevated in a mixed/cholestatic pattern dating back to 2020. Bilirubin has been intermittently elevated. Albumin and platelet count are normal. Chronic liver disease workup up is unrevealing. On ultrasound (Dec 2023), the liver is normal in size and echotexture, with extra- and intrahepatic biliary dilation.     Discussed with the patient and his wife that I suspect the liver enzymes abnormality are related to biliary source. He is unable to have MRI given PPM, so I suggested seeing a GI / Biliary team to discuss the possibility of EUS +/- ERCP. I  explained there are some risks associated with these invasive procedures and I would like for him to discuss this further with a specialist. However, the patient would like to defer further evaluation as he is currently asymptomatic. He understands that there could be a stone / sludge / mass in the biliary tree, but would like to monitor for symptoms to recur before pursuing evaluation. This seems reasonable given his age and medical comorbidities.    1. Abnormal liver enzymes    2. Liver disease, unspecified    3. Dilation of biliary tract      Recommendations:  - LFTs, INR  - Per patient's preference, defer referral to GI / Biliary   - Consider empiric UDCA     All questions were answered.    Return to clinic in 6 months.    Rebeca Pereyra MD  Staff Physician  Hepatology and Liver Transplant  [facility]  [facility]

## 2024-04-25 NOTE — PROGRESS NOTES
Subjective:       Patient ID: Artemio Colon Sr. is a 89 y.o. male.    Chief Complaint: Follow-up    HPI    Artemio Colon Sr. is a 89 y.o. male PMHx JAEL, Afib, S/p Watchman, S/p Pacemaker, HF, Chronic back pain, PreDM for checkup.      #Cards: CAD, HF, Afib, S/p Pacemaker, S/p Watchman  - est w/ Dr. Adler (EP),  9/2023 - rtc 1yr  - est w/ Dr. Gutiérrez,  2/2022 -- f/u 1yr  - reg: ASA qd  - issues with recurrent GI bleeding not on anticoagulation (previously tried xarelto in 7914-4700 for symptomatic AF and had issues with excessive bleeding then). He was hospitalized for a diverticular bleed in March of 2019.  - s/p Watchman implant performed on 12/3/2020.     #Endo: PreDM (A1c 5/2021 = 5.8)     #Hep: Elev LFTs, Elev alk phos and ggt  - est w/ Dr. Pereyra,  3/2024 - rtc 6m  - u/s 1/2023     #Neuro: Neuropathy, Lumbar radiculopathy  - est w/ Dr. Hylton,  10/2022     #Ortho: Chronic back pain  - est w/ [place] [facility], sees q3m  - taking Norco prn (usually every 3d) and uses Fentanyl patches  -  reviewed     #Uro: BPH, H/o Nephrolithiasis  - reg: Flomax 0.4 qd with relief     #Sleep med: JAEL on cpap  - est w/ Dr. Amador,  3/2022     #BMI 28    Review of Systems   Constitutional:  Negative for activity change and unexpected weight change.   HENT:  Negative for hearing loss, rhinorrhea and trouble swallowing.    Eyes:  Negative for discharge and visual disturbance.   Respiratory:  Negative for chest tightness and wheezing.    Cardiovascular:  Negative for chest pain and palpitations.   Gastrointestinal:  Negative for blood in stool, constipation, diarrhea and vomiting.   Endocrine: Negative for polydipsia and polyuria.   Genitourinary:  Negative for difficulty urinating, hematuria and urgency.   Musculoskeletal:  Negative for arthralgias, joint swelling and neck pain.   Neurological:  Negative for weakness and headaches.   Psychiatric/Behavioral:  Negative for confusion and dysphoric mood.      "     Past Medical History:   Diagnosis Date    AV block     BPH (benign prostatic hyperplasia)     Cancer     Chronic rhinitis     Coronary artery disease     Erectile dysfunction     Glaucoma     Kidney cysts     Neuropathy 8/22/2017    JAEL (obstructive sleep apnea)     Pacemaker     Symptomatic bradycardia     s/p pacemaker        Prior to Admission medications    Medication Sig Start Date End Date Taking? Authorizing Provider   aspirin 81 MG Chew Take 81 mg by mouth once daily.    Provider, Historical   diclofenac sodium (VOLTAREN) 1 % Gel Use to affected joints up to 4 times a day 10/22/21   Javed Courtney MD   fentaNYL (DURAGESIC) 25 mcg/hr Place 1 patch onto the skin every 72 hours.    Provider, Historical   fluocinonide (LIDEX) 0.05 % external solution Apply topically. 1/18/24   Provider, Historical   oxyCODONE-acetaminophen (LYNOX) 7.5-300 mg per tablet Take 1 tablet by mouth every 4 (four) hours as needed for Pain.    Provider, Historical   timolol maleate 0.5% (TIMOPTIC) 0.5 % Drop timolol 0.5 % eye drops   INSTILL 1 DROP INTO AFFECTED EYE(S) BY OPHTHALMIC ROUTE 2 TIMES PER DAY    Provider, Historical        Past medical history, surgical history, and family medical history reviewed and updated as appropriate.    Medications and allergies reviewed.     Objective:          Vitals:    04/29/24 0903   BP: 138/82   BP Location: Left arm   Patient Position: Sitting   BP Method: Small (Manual)   Pulse: 89   SpO2: 95%   Weight: 88.9 kg (195 lb 15.8 oz)   Height: 5' 10" (1.778 m)     Body mass index is 28.12 kg/m².  Physical Exam  Vitals and nursing note reviewed.   Constitutional:       General: He is not in acute distress.     Appearance: Normal appearance. He is well-developed.   Eyes:      Extraocular Movements: Extraocular movements intact.   Cardiovascular:      Rate and Rhythm: Normal rate and regular rhythm.      Pulses: Normal pulses.      Heart sounds: Normal heart sounds. No murmur heard.  Pulmonary: "      Effort: Pulmonary effort is normal. No respiratory distress.      Breath sounds: Normal breath sounds. No wheezing.   Neurological:      Mental Status: He is alert and oriented to person, place, and time.   Psychiatric:         Mood and Affect: Mood normal.         Behavior: Behavior normal.         Lab Results   Component Value Date    WBC 7.45 10/23/2023    HGB 15.2 10/23/2023    HCT 47.3 10/23/2023     10/23/2023    CHOL 168 10/23/2023    TRIG 60 10/23/2023    HDL 62 10/23/2023    ALT 16 03/19/2024    AST 22 03/19/2024     10/23/2023    K 4.3 10/23/2023     10/23/2023    CREATININE 1.1 10/23/2023    BUN 20 10/23/2023    CO2 24 10/23/2023    TSH 1.999 07/02/2019    INR 1.0 03/19/2024    HGBA1C 5.6 10/23/2023       Assessment:       1. Encounter for long-term (current) use of other medications    2. Overweight (BMI 25.0-29.9)    3. Cardiac pacemaker in situ    4. Aortic atherosclerosis -- seen CT 02/2019    5. Neuropathy    6. Benign prostatic hyperplasia without lower urinary tract symptoms          Plan:   1. Encounter for long-term (current) use of other medications  -     Comprehensive Metabolic Panel; Future; Expected date: 04/29/2024  -     Lipid Panel; Future; Expected date: 04/29/2024  -     Hemoglobin A1C; Future; Expected date: 04/29/2024    2. Overweight (BMI 25.0-29.9)    3. Cardiac pacemaker in situ  Overview:  - underwent single-chamber pacemaker implantation in 2003 with upgrade to dual-chamber device in 2011      4. Aortic atherosclerosis -- seen CT 02/2019  Overview:  Imaging reviewed.      5. Neuropathy    6. Benign prostatic hyperplasia without lower urinary tract symptoms        Health maintenance reviewed with patient.     As this patient's primary care physician, I am actively managing and/or treating his/her chronic medical conditions now and in the future. This includes, but is not limited to, medication management, coordination of care, documentation review from  his/her specialists, and labs/imaging review that I have performed to prepare for this visit as well as will do so in the future as part of my care continuity for this patient.      Follow up in about 6 months (around 10/29/2024) for Annual.    Patel Loyd MD  Family Medicine / Primary Care  [facility]  4/29/2024

## 2024-04-29 ENCOUNTER — OFFICE VISIT (OUTPATIENT)
Dept: INTERNAL MEDICINE | Facility: CLINIC | Age: 89
End: 2024-04-29
Payer: MEDICARE

## 2024-04-29 VITALS
WEIGHT: 196 LBS | DIASTOLIC BLOOD PRESSURE: 82 MMHG | HEIGHT: 70 IN | HEART RATE: 89 BPM | BODY MASS INDEX: 28.06 KG/M2 | OXYGEN SATURATION: 95 % | SYSTOLIC BLOOD PRESSURE: 138 MMHG

## 2024-04-29 DIAGNOSIS — Z95.0 CARDIAC PACEMAKER IN SITU: ICD-10-CM

## 2024-04-29 DIAGNOSIS — I70.0 AORTIC ATHEROSCLEROSIS: ICD-10-CM

## 2024-04-29 DIAGNOSIS — G62.9 NEUROPATHY: ICD-10-CM

## 2024-04-29 DIAGNOSIS — E66.3 OVERWEIGHT (BMI 25.0-29.9): ICD-10-CM

## 2024-04-29 DIAGNOSIS — Z79.899 ENCOUNTER FOR LONG-TERM (CURRENT) USE OF OTHER MEDICATIONS: Primary | ICD-10-CM

## 2024-04-29 DIAGNOSIS — N40.0 BENIGN PROSTATIC HYPERPLASIA WITHOUT LOWER URINARY TRACT SYMPTOMS: ICD-10-CM

## 2024-04-29 PROCEDURE — 99214 OFFICE O/P EST MOD 30 MIN: CPT | Mod: PBBFAC | Performed by: FAMILY MEDICINE

## 2024-04-29 PROCEDURE — G2211 COMPLEX E/M VISIT ADD ON: HCPCS | Mod: S$PBB,,, | Performed by: FAMILY MEDICINE

## 2024-04-29 PROCEDURE — 99214 OFFICE O/P EST MOD 30 MIN: CPT | Mod: S$PBB,,, | Performed by: FAMILY MEDICINE

## 2024-04-29 PROCEDURE — 99999 PR PBB SHADOW E&M-EST. PATIENT-LVL IV: CPT | Mod: PBBFAC,,, | Performed by: FAMILY MEDICINE

## 2024-04-29 RX ORDER — OXYCODONE AND ACETAMINOPHEN 7.5; 325 MG/1; MG/1
1 TABLET ORAL 2 TIMES DAILY PRN
COMMUNITY
Start: 2024-04-25

## 2024-04-29 RX ORDER — DORZOLAMIDE HYDROCHLORIDE AND TIMOLOL MALEATE 20; 5 MG/ML; MG/ML
1 SOLUTION/ DROPS OPHTHALMIC 2 TIMES DAILY
COMMUNITY
Start: 2024-03-28

## 2024-05-16 ENCOUNTER — CLINICAL SUPPORT (OUTPATIENT)
Dept: CARDIOLOGY | Facility: HOSPITAL | Age: 89
End: 2024-05-16
Payer: MEDICARE

## 2024-05-16 ENCOUNTER — CLINICAL SUPPORT (OUTPATIENT)
Dept: CARDIOLOGY | Facility: HOSPITAL | Age: 89
End: 2024-05-16
Attending: INTERNAL MEDICINE
Payer: MEDICARE

## 2024-05-16 DIAGNOSIS — I44.2 ATRIOVENTRICULAR BLOCK, COMPLETE: ICD-10-CM

## 2024-05-16 PROCEDURE — 93294 REM INTERROG EVL PM/LDLS PM: CPT | Mod: ,,, | Performed by: INTERNAL MEDICINE

## 2024-05-30 LAB
OHS CV BIV PACING PERCENT: 99 %
OHS CV DC REMOTE DEVICE TYPE: NORMAL

## 2024-07-06 ENCOUNTER — HOSPITAL ENCOUNTER (INPATIENT)
Facility: HOSPITAL | Age: 89
LOS: 1 days | Discharge: HOME | DRG: 178 | End: 2024-07-08
Attending: STUDENT IN AN ORGANIZED HEALTH CARE EDUCATION/TRAINING PROGRAM | Admitting: EMERGENCY MEDICINE
Payer: MEDICARE

## 2024-07-06 DIAGNOSIS — U07.1 COVID-19: Primary | ICD-10-CM

## 2024-07-06 DIAGNOSIS — J96.01 ACUTE HYPOXEMIC RESPIRATORY FAILURE: ICD-10-CM

## 2024-07-06 DIAGNOSIS — R07.9 CHEST PAIN: ICD-10-CM

## 2024-07-06 DIAGNOSIS — J06.9 UPPER RESPIRATORY INFECTION: ICD-10-CM

## 2024-07-06 DIAGNOSIS — R53.83 FATIGUE: ICD-10-CM

## 2024-07-06 LAB
ALBUMIN SERPL BCP-MCNC: 3.7 G/DL (ref 3.5–5.2)
ALP SERPL-CCNC: 106 U/L (ref 55–135)
ALT SERPL W/O P-5'-P-CCNC: 19 U/L (ref 10–44)
ANION GAP SERPL CALC-SCNC: 10 MMOL/L (ref 8–16)
AST SERPL-CCNC: 27 U/L (ref 10–40)
BASOPHILS # BLD AUTO: 0.04 K/UL (ref 0–0.2)
BASOPHILS NFR BLD: 0.6 % (ref 0–1.9)
BILIRUB SERPL-MCNC: 0.5 MG/DL (ref 0.1–1)
BUN SERPL-MCNC: 17 MG/DL (ref 10–30)
CALCIUM SERPL-MCNC: 9.2 MG/DL (ref 8.7–10.5)
CHLORIDE SERPL-SCNC: 108 MMOL/L (ref 95–110)
CO2 SERPL-SCNC: 22 MMOL/L (ref 23–29)
CREAT SERPL-MCNC: 1 MG/DL (ref 0.5–1.4)
DIFFERENTIAL METHOD BLD: ABNORMAL
EOSINOPHIL # BLD AUTO: 0 K/UL (ref 0–0.5)
EOSINOPHIL NFR BLD: 0.2 % (ref 0–8)
ERYTHROCYTE [DISTWIDTH] IN BLOOD BY AUTOMATED COUNT: 13.6 % (ref 11.5–14.5)
EST. GFR  (NO RACE VARIABLE): >60 ML/MIN/1.73 M^2
GLUCOSE SERPL-MCNC: 99 MG/DL (ref 70–110)
HCT VFR BLD AUTO: 43.8 % (ref 40–54)
HCV AB SERPL QL IA: NORMAL
HGB BLD-MCNC: 14.2 G/DL (ref 14–18)
HIV 1+2 AB+HIV1 P24 AG SERPL QL IA: NORMAL
IMM GRANULOCYTES # BLD AUTO: 0.02 K/UL (ref 0–0.04)
IMM GRANULOCYTES NFR BLD AUTO: 0.3 % (ref 0–0.5)
INFLUENZA A, MOLECULAR: NEGATIVE
INFLUENZA B, MOLECULAR: NEGATIVE
LYMPHOCYTES # BLD AUTO: 0.8 K/UL (ref 1–4.8)
LYMPHOCYTES NFR BLD: 12.7 % (ref 18–48)
MCH RBC QN AUTO: 31.6 PG (ref 27–31)
MCHC RBC AUTO-ENTMCNC: 32.4 G/DL (ref 32–36)
MCV RBC AUTO: 98 FL (ref 82–98)
MONOCYTES # BLD AUTO: 1.4 K/UL (ref 0.3–1)
MONOCYTES NFR BLD: 22.5 % (ref 4–15)
NEUTROPHILS # BLD AUTO: 4.1 K/UL (ref 1.8–7.7)
NEUTROPHILS NFR BLD: 63.7 % (ref 38–73)
NRBC BLD-RTO: 0 /100 WBC
PLATELET # BLD AUTO: 203 K/UL (ref 150–450)
PMV BLD AUTO: 10 FL (ref 9.2–12.9)
POTASSIUM SERPL-SCNC: 3.5 MMOL/L (ref 3.5–5.1)
PROT SERPL-MCNC: 6.9 G/DL (ref 6–8.4)
RBC # BLD AUTO: 4.49 M/UL (ref 4.6–6.2)
SARS-COV-2 RDRP RESP QL NAA+PROBE: POSITIVE
SODIUM SERPL-SCNC: 140 MMOL/L (ref 136–145)
SPECIMEN SOURCE: NORMAL
WBC # BLD AUTO: 6.39 K/UL (ref 3.9–12.7)

## 2024-07-06 PROCEDURE — 93010 ELECTROCARDIOGRAM REPORT: CPT | Mod: ,,, | Performed by: INTERNAL MEDICINE

## 2024-07-06 PROCEDURE — 80053 COMPREHEN METABOLIC PANEL: CPT

## 2024-07-06 PROCEDURE — 25000003 PHARM REV CODE 250

## 2024-07-06 PROCEDURE — 81001 URINALYSIS AUTO W/SCOPE: CPT

## 2024-07-06 PROCEDURE — U0002 COVID-19 LAB TEST NON-CDC: HCPCS

## 2024-07-06 PROCEDURE — 99285 EMERGENCY DEPT VISIT HI MDM: CPT | Mod: 25

## 2024-07-06 PROCEDURE — 86803 HEPATITIS C AB TEST: CPT | Performed by: PHYSICIAN ASSISTANT

## 2024-07-06 PROCEDURE — 93005 ELECTROCARDIOGRAM TRACING: CPT

## 2024-07-06 PROCEDURE — 85025 COMPLETE CBC W/AUTO DIFF WBC: CPT

## 2024-07-06 PROCEDURE — 87502 INFLUENZA DNA AMP PROBE: CPT

## 2024-07-06 PROCEDURE — 87389 HIV-1 AG W/HIV-1&-2 AB AG IA: CPT | Performed by: PHYSICIAN ASSISTANT

## 2024-07-06 PROCEDURE — 96360 HYDRATION IV INFUSION INIT: CPT

## 2024-07-06 RX ORDER — ACETAMINOPHEN 500 MG
1000 TABLET ORAL
Status: COMPLETED | OUTPATIENT
Start: 2024-07-06 | End: 2024-07-06

## 2024-07-06 RX ADMIN — ACETAMINOPHEN 1000 MG: 500 TABLET ORAL at 10:07

## 2024-07-06 RX ADMIN — SODIUM CHLORIDE 500 ML: 9 INJECTION, SOLUTION INTRAVENOUS at 11:07

## 2024-07-07 PROBLEM — R41.0 DELIRIUM: Status: ACTIVE | Noted: 2024-07-07

## 2024-07-07 PROBLEM — R09.02 HYPOXIA: Status: ACTIVE | Noted: 2024-07-07

## 2024-07-07 PROBLEM — U07.1 COVID-19: Status: ACTIVE | Noted: 2024-07-07

## 2024-07-07 LAB
ALBUMIN SERPL BCP-MCNC: 3.4 G/DL (ref 3.5–5.2)
ALP SERPL-CCNC: 96 U/L (ref 55–135)
ALT SERPL W/O P-5'-P-CCNC: 17 U/L (ref 10–44)
ANION GAP SERPL CALC-SCNC: 11 MMOL/L (ref 8–16)
APTT PPP: 36.5 SEC (ref 21–32)
AST SERPL-CCNC: 26 U/L (ref 10–40)
BASOPHILS # BLD AUTO: 0.04 K/UL (ref 0–0.2)
BASOPHILS NFR BLD: 0.7 % (ref 0–1.9)
BILIRUB SERPL-MCNC: 0.4 MG/DL (ref 0.1–1)
BILIRUB UR QL STRIP: NEGATIVE
BNP SERPL-MCNC: 250 PG/ML (ref 0–99)
BUN SERPL-MCNC: 18 MG/DL (ref 10–30)
CALCIUM SERPL-MCNC: 8.6 MG/DL (ref 8.7–10.5)
CHLORIDE SERPL-SCNC: 108 MMOL/L (ref 95–110)
CK SERPL-CCNC: 94 U/L (ref 20–200)
CLARITY UR REFRACT.AUTO: CLEAR
CO2 SERPL-SCNC: 22 MMOL/L (ref 23–29)
COLOR UR AUTO: YELLOW
CREAT SERPL-MCNC: 1 MG/DL (ref 0.5–1.4)
D DIMER PPP IA.FEU-MCNC: 1.12 MG/L FEU
DIFFERENTIAL METHOD BLD: ABNORMAL
EOSINOPHIL # BLD AUTO: 0 K/UL (ref 0–0.5)
EOSINOPHIL NFR BLD: 0.2 % (ref 0–8)
ERYTHROCYTE [DISTWIDTH] IN BLOOD BY AUTOMATED COUNT: 13.7 % (ref 11.5–14.5)
ERYTHROCYTE [SEDIMENTATION RATE] IN BLOOD BY PHOTOMETRIC METHOD: 13 MM/HR (ref 0–23)
EST. GFR  (NO RACE VARIABLE): >60 ML/MIN/1.73 M^2
FERRITIN SERPL-MCNC: 137 NG/ML (ref 20–300)
GLUCOSE SERPL-MCNC: 97 MG/DL (ref 70–110)
GLUCOSE UR QL STRIP: NEGATIVE
HCT VFR BLD AUTO: 36.2 % (ref 40–54)
HGB BLD-MCNC: 12.3 G/DL (ref 14–18)
HGB UR QL STRIP: ABNORMAL
IMM GRANULOCYTES # BLD AUTO: 0.02 K/UL (ref 0–0.04)
IMM GRANULOCYTES NFR BLD AUTO: 0.4 % (ref 0–0.5)
INR PPP: 1.2 (ref 0.8–1.2)
KETONES UR QL STRIP: NEGATIVE
LACTATE SERPL-SCNC: 0.9 MMOL/L (ref 0.5–2.2)
LDH SERPL L TO P-CCNC: 220 U/L (ref 110–260)
LEUKOCYTE ESTERASE UR QL STRIP: NEGATIVE
LYMPHOCYTES # BLD AUTO: 1.1 K/UL (ref 1–4.8)
LYMPHOCYTES NFR BLD: 19.8 % (ref 18–48)
MAGNESIUM SERPL-MCNC: 1.8 MG/DL (ref 1.6–2.6)
MCH RBC QN AUTO: 32.7 PG (ref 27–31)
MCHC RBC AUTO-ENTMCNC: 34 G/DL (ref 32–36)
MCV RBC AUTO: 96 FL (ref 82–98)
MICROSCOPIC COMMENT: ABNORMAL
MONOCYTES # BLD AUTO: 1.3 K/UL (ref 0.3–1)
MONOCYTES NFR BLD: 23.5 % (ref 4–15)
NEUTROPHILS # BLD AUTO: 3.1 K/UL (ref 1.8–7.7)
NEUTROPHILS NFR BLD: 55.4 % (ref 38–73)
NITRITE UR QL STRIP: NEGATIVE
NRBC BLD-RTO: 0 /100 WBC
OHS QRS DURATION: 148 MS
OHS QTC CALCULATION: 432 MS
PH UR STRIP: 6 [PH] (ref 5–8)
PHOSPHATE SERPL-MCNC: 3.1 MG/DL (ref 2.7–4.5)
PLATELET # BLD AUTO: 174 K/UL (ref 150–450)
PMV BLD AUTO: 10.2 FL (ref 9.2–12.9)
POCT GLUCOSE: 146 MG/DL (ref 70–110)
POTASSIUM SERPL-SCNC: 3.3 MMOL/L (ref 3.5–5.1)
PROT SERPL-MCNC: 6.2 G/DL (ref 6–8.4)
PROT UR QL STRIP: ABNORMAL
PROTHROMBIN TIME: 12.8 SEC (ref 9–12.5)
RBC # BLD AUTO: 3.76 M/UL (ref 4.6–6.2)
RBC #/AREA URNS AUTO: 43 /HPF (ref 0–4)
SODIUM SERPL-SCNC: 141 MMOL/L (ref 136–145)
SP GR UR STRIP: 1.02 (ref 1–1.03)
SQUAMOUS #/AREA URNS AUTO: 0 /HPF
UNSPECIFIED CRY UR QL COMP ASSIST: 1
URN SPEC COLLECT METH UR: ABNORMAL
WBC # BLD AUTO: 5.5 K/UL (ref 3.9–12.7)
WBC #/AREA URNS AUTO: 1 /HPF (ref 0–5)

## 2024-07-07 PROCEDURE — 25000003 PHARM REV CODE 250: Performed by: HOSPITALIST

## 2024-07-07 PROCEDURE — 27000207 HC ISOLATION

## 2024-07-07 PROCEDURE — 20600001 HC STEP DOWN PRIVATE ROOM

## 2024-07-07 PROCEDURE — 82728 ASSAY OF FERRITIN: CPT

## 2024-07-07 PROCEDURE — 85610 PROTHROMBIN TIME: CPT

## 2024-07-07 PROCEDURE — 83615 LACTATE (LD) (LDH) ENZYME: CPT

## 2024-07-07 PROCEDURE — 84100 ASSAY OF PHOSPHORUS: CPT

## 2024-07-07 PROCEDURE — 85025 COMPLETE CBC W/AUTO DIFF WBC: CPT

## 2024-07-07 PROCEDURE — 85652 RBC SED RATE AUTOMATED: CPT

## 2024-07-07 PROCEDURE — 85379 FIBRIN DEGRADATION QUANT: CPT

## 2024-07-07 PROCEDURE — 27000221 HC OXYGEN, UP TO 24 HOURS

## 2024-07-07 PROCEDURE — 83605 ASSAY OF LACTIC ACID: CPT

## 2024-07-07 PROCEDURE — 25000003 PHARM REV CODE 250

## 2024-07-07 PROCEDURE — XW033E5 INTRODUCTION OF REMDESIVIR ANTI-INFECTIVE INTO PERIPHERAL VEIN, PERCUTANEOUS APPROACH, NEW TECHNOLOGY GROUP 5: ICD-10-PCS | Performed by: STUDENT IN AN ORGANIZED HEALTH CARE EDUCATION/TRAINING PROGRAM

## 2024-07-07 PROCEDURE — 80053 COMPREHEN METABOLIC PANEL: CPT

## 2024-07-07 PROCEDURE — 94761 N-INVAS EAR/PLS OXIMETRY MLT: CPT

## 2024-07-07 PROCEDURE — 63600175 PHARM REV CODE 636 W HCPCS

## 2024-07-07 PROCEDURE — 82550 ASSAY OF CK (CPK): CPT

## 2024-07-07 PROCEDURE — 83735 ASSAY OF MAGNESIUM: CPT

## 2024-07-07 PROCEDURE — 83880 ASSAY OF NATRIURETIC PEPTIDE: CPT

## 2024-07-07 PROCEDURE — 36415 COLL VENOUS BLD VENIPUNCTURE: CPT

## 2024-07-07 PROCEDURE — 85730 THROMBOPLASTIN TIME PARTIAL: CPT

## 2024-07-07 PROCEDURE — 99900035 HC TECH TIME PER 15 MIN (STAT)

## 2024-07-07 PROCEDURE — 63600175 PHARM REV CODE 636 W HCPCS: Mod: JZ,TB

## 2024-07-07 RX ORDER — OXYCODONE AND ACETAMINOPHEN 7.5; 325 MG/1; MG/1
1 TABLET ORAL EVERY 4 HOURS PRN
Status: DISCONTINUED | OUTPATIENT
Start: 2024-07-07 | End: 2024-07-08 | Stop reason: HOSPADM

## 2024-07-07 RX ORDER — GLUCAGON 1 MG
1 KIT INJECTION
Status: DISCONTINUED | OUTPATIENT
Start: 2024-07-07 | End: 2024-07-08 | Stop reason: HOSPADM

## 2024-07-07 RX ORDER — ACETAMINOPHEN 325 MG/1
650 TABLET ORAL EVERY 6 HOURS PRN
Status: DISCONTINUED | OUTPATIENT
Start: 2024-07-07 | End: 2024-07-08 | Stop reason: HOSPADM

## 2024-07-07 RX ORDER — INSULIN ASPART 100 [IU]/ML
0-5 INJECTION, SOLUTION INTRAVENOUS; SUBCUTANEOUS
Status: DISCONTINUED | OUTPATIENT
Start: 2024-07-07 | End: 2024-07-08 | Stop reason: HOSPADM

## 2024-07-07 RX ORDER — IBUPROFEN 200 MG
16 TABLET ORAL
Status: DISCONTINUED | OUTPATIENT
Start: 2024-07-07 | End: 2024-07-08 | Stop reason: HOSPADM

## 2024-07-07 RX ORDER — AMOXICILLIN 250 MG
2 CAPSULE ORAL 2 TIMES DAILY PRN
Status: DISCONTINUED | OUTPATIENT
Start: 2024-07-07 | End: 2024-07-08 | Stop reason: HOSPADM

## 2024-07-07 RX ORDER — POLYETHYLENE GLYCOL 3350 17 G/17G
17 POWDER, FOR SOLUTION ORAL DAILY
Status: DISCONTINUED | OUTPATIENT
Start: 2024-07-07 | End: 2024-07-08 | Stop reason: HOSPADM

## 2024-07-07 RX ORDER — ASCORBIC ACID 500 MG
500 TABLET ORAL 2 TIMES DAILY
Status: DISCONTINUED | OUTPATIENT
Start: 2024-07-07 | End: 2024-07-08 | Stop reason: HOSPADM

## 2024-07-07 RX ORDER — POTASSIUM CHLORIDE 20 MEQ/1
40 TABLET, EXTENDED RELEASE ORAL
Status: COMPLETED | OUTPATIENT
Start: 2024-07-07 | End: 2024-07-07

## 2024-07-07 RX ORDER — ALBUTEROL SULFATE 90 UG/1
2 AEROSOL, METERED RESPIRATORY (INHALATION) EVERY 6 HOURS PRN
Status: DISCONTINUED | OUTPATIENT
Start: 2024-07-07 | End: 2024-07-08 | Stop reason: HOSPADM

## 2024-07-07 RX ORDER — IBUPROFEN 200 MG
24 TABLET ORAL
Status: DISCONTINUED | OUTPATIENT
Start: 2024-07-07 | End: 2024-07-07

## 2024-07-07 RX ORDER — BENZONATATE 100 MG/1
100 CAPSULE ORAL 3 TIMES DAILY PRN
Status: DISCONTINUED | OUTPATIENT
Start: 2024-07-07 | End: 2024-07-08

## 2024-07-07 RX ORDER — ENOXAPARIN SODIUM 100 MG/ML
40 INJECTION SUBCUTANEOUS EVERY 24 HOURS
Status: DISCONTINUED | OUTPATIENT
Start: 2024-07-07 | End: 2024-07-07

## 2024-07-07 RX ORDER — GLUCAGON 1 MG
1 KIT INJECTION
Status: DISCONTINUED | OUTPATIENT
Start: 2024-07-07 | End: 2024-07-07

## 2024-07-07 RX ORDER — IBUPROFEN 200 MG
24 TABLET ORAL
Status: DISCONTINUED | OUTPATIENT
Start: 2024-07-07 | End: 2024-07-08 | Stop reason: HOSPADM

## 2024-07-07 RX ORDER — SODIUM CHLORIDE 0.9 % (FLUSH) 0.9 %
10 SYRINGE (ML) INJECTION EVERY 12 HOURS PRN
Status: DISCONTINUED | OUTPATIENT
Start: 2024-07-07 | End: 2024-07-08 | Stop reason: HOSPADM

## 2024-07-07 RX ORDER — FENTANYL 25 UG/1
1 PATCH TRANSDERMAL
Status: DISCONTINUED | OUTPATIENT
Start: 2024-07-07 | End: 2024-07-08 | Stop reason: HOSPADM

## 2024-07-07 RX ORDER — DICLOFENAC SODIUM 10 MG/G
2 GEL TOPICAL DAILY
Status: DISCONTINUED | OUTPATIENT
Start: 2024-07-07 | End: 2024-07-08 | Stop reason: HOSPADM

## 2024-07-07 RX ORDER — IBUPROFEN 200 MG
16 TABLET ORAL
Status: DISCONTINUED | OUTPATIENT
Start: 2024-07-07 | End: 2024-07-07

## 2024-07-07 RX ORDER — NALOXONE HCL 0.4 MG/ML
0.02 VIAL (ML) INJECTION
Status: DISCONTINUED | OUTPATIENT
Start: 2024-07-07 | End: 2024-07-08 | Stop reason: HOSPADM

## 2024-07-07 RX ORDER — ENOXAPARIN SODIUM 100 MG/ML
40 INJECTION SUBCUTANEOUS EVERY 24 HOURS
Status: DISCONTINUED | OUTPATIENT
Start: 2024-07-07 | End: 2024-07-08 | Stop reason: HOSPADM

## 2024-07-07 RX ORDER — SODIUM CHLORIDE 0.9 % (FLUSH) 0.9 %
10 SYRINGE (ML) INJECTION
Status: DISCONTINUED | OUTPATIENT
Start: 2024-07-07 | End: 2024-07-07

## 2024-07-07 RX ADMIN — POTASSIUM CHLORIDE 40 MEQ: 1500 TABLET, EXTENDED RELEASE ORAL at 09:07

## 2024-07-07 RX ADMIN — FENTANYL 1 PATCH: 25 PATCH TRANSDERMAL at 10:07

## 2024-07-07 RX ADMIN — OXYCODONE HYDROCHLORIDE AND ACETAMINOPHEN 500 MG: 500 TABLET ORAL at 09:07

## 2024-07-07 RX ADMIN — THERA TABS 1 TABLET: TAB at 09:07

## 2024-07-07 RX ADMIN — DEXAMETHASONE 6 MG: 0.5 TABLET ORAL at 09:07

## 2024-07-07 RX ADMIN — POTASSIUM CHLORIDE 40 MEQ: 1500 TABLET, EXTENDED RELEASE ORAL at 01:07

## 2024-07-07 RX ADMIN — GUAIFENESIN AND DEXTROMETHORPHAN HYDROBROMIDE 1 TABLET: 600; 30 TABLET, EXTENDED RELEASE ORAL at 09:07

## 2024-07-07 RX ADMIN — REMDESIVIR 200 MG: 100 INJECTION, POWDER, LYOPHILIZED, FOR SOLUTION INTRAVENOUS at 05:07

## 2024-07-07 RX ADMIN — DICLOFENAC SODIUM 2 G: 10 GEL TOPICAL at 09:07

## 2024-07-07 RX ADMIN — ENOXAPARIN SODIUM 40 MG: 40 INJECTION SUBCUTANEOUS at 05:07

## 2024-07-07 RX ADMIN — POLYETHYLENE GLYCOL 3350 17 G: 17 POWDER, FOR SOLUTION ORAL at 09:07

## 2024-07-07 NOTE — ED PROVIDER NOTES
Encounter Date: 7/6/2024       History     Chief Complaint   Patient presents with    Fatigue     Pt c/o cough, generalized weakness. -N/V, fever.     89 yo with complete heart block and a pacemaker with chief complaint of fatigue, fever, myalgia, nonproductive cough, rhinorrhea, some ear fullness. All symptoms have been present since this afternoon. Has not taken any mediaction in attempt to treat his symptoms at this time. Denies SOB, sore throat, chest pain. Abdominal pain, diarrhea, N/V. Wife reports some confusion earlier, but is not confused at this point in time. She has also been sick for a number of days. Has a complete heart block with a pacemaker and reports the only medication that he takes is aspirin.            Review of patient's allergies indicates:   Allergen Reactions    Ketamine Hallucinations     Had dysphoric reaction.     Penicillins Hives     Past Medical History:   Diagnosis Date    AV block     BPH (benign prostatic hyperplasia)     Cancer     Chronic rhinitis     Coronary artery disease     Erectile dysfunction     Glaucoma     Kidney cysts     Neuropathy 8/22/2017    JAEL (obstructive sleep apnea)     Pacemaker     Symptomatic bradycardia     s/p pacemaker     Past Surgical History:   Procedure Laterality Date    ADENOIDECTOMY  1939    unsure    CATARACT EXTRACTION W/  INTRAOCULAR LENS IMPLANT Bilateral 2012    done in [place]    CHOLECYSTECTOMY      CLOSURE OF LEFT ATRIAL APPENDAGE USING DEVICE N/A 12/3/2020    Procedure: Left atrial appendage closure device;  Surgeon: Tawanda Adler MD;  Location: [facility];  Service: Cardiology;  Laterality: N/A;  AF, GAIL, Watchman Implant, BSci, Gen, WI/MB, 3 Prep    COLECTOMY      cecum    COSMETIC SURGERY  1967    rhinoplasty    EYE SURGERY      FRACTURE SURGERY  wrist    1939    HERNIA REPAIR      INSERT / REPLACE / REMOVE PACEMAKER  2012    changed    PROSTATE SURGERY      TONSILLECTOMY  1941    VASECTOMY  1975     Family History   Problem  Relation Name Age of Onset    Arthritis Father father     Cancer Father father     Kidney disease Father father     Cancer Mother martha     Hearing loss Mother martha     Mental illness Mother martha     Alcohol abuse Brother rakel     Cancer Brother rakel     COPD Brother rakel     Mental illness Brother rakel     Cancer Paternal Uncle justo     Depression Brother rakel brother     Early death Sister abebe         accidental    Heart disease Brother gerson     Mental illness Sister adolph     Amblyopia Neg Hx      Blindness Neg Hx      Cataracts Neg Hx      Glaucoma Neg Hx      Macular degeneration Neg Hx      Retinal detachment Neg Hx      Strabismus Neg Hx       Social History     Tobacco Use    Smoking status: Never    Smokeless tobacco: Never   Substance Use Topics    Alcohol use: Not Currently     Alcohol/week: 1.0 standard drink of alcohol     Types: 1 Glasses of wine per week     Comment: weekly with meal    Drug use: No     Review of Systems    Physical Exam     Initial Vitals [07/06/24 2115]   BP Pulse Resp Temp SpO2   (!) 169/74 61 16 (!) 101.1 °F (38.4 °C) (!) 94 %      MAP       --         Physical Exam    Constitutional: He appears well-developed and well-nourished.   HENT:   Head: Normocephalic and atraumatic.   Right Ear: External ear normal.   Left Ear: External ear normal.   Eyes: Conjunctivae and EOM are normal. Pupils are equal, round, and reactive to light.   Neck: Neck supple.   Normal range of motion.  Cardiovascular:  Normal rate, regular rhythm and normal heart sounds.           Pulmonary/Chest: Breath sounds normal. No respiratory distress. He has no wheezes.   Abdominal: Abdomen is soft. He exhibits no distension. There is no abdominal tenderness.   Musculoskeletal:      Cervical back: Normal range of motion and neck supple.     Neurological: He is alert and oriented to person, place, and time. He has normal strength. GCS score is 15. GCS eye subscore is 4. GCS verbal subscore is 5.  GCS motor subscore is 6.   Alert to self and place, stated the year was 2021   Psychiatric: He has a normal mood and affect. His behavior is normal. Judgment and thought content normal.         ED Course   Procedures  Labs Reviewed   CBC W/ AUTO DIFFERENTIAL - Abnormal; Notable for the following components:       Result Value    RBC 4.49 (*)     MCH 31.6 (*)     Lymph # 0.8 (*)     Mono # 1.4 (*)     Lymph % 12.7 (*)     Mono % 22.5 (*)     All other components within normal limits   COMPREHENSIVE METABOLIC PANEL - Abnormal; Notable for the following components:    CO2 22 (*)     All other components within normal limits   SARS-COV-2 RNA AMPLIFICATION, QUAL - Abnormal; Notable for the following components:    SARS-CoV-2 RNA, Amplification, Qual Positive (*)     All other components within normal limits   URINALYSIS, REFLEX TO URINE CULTURE - Abnormal; Notable for the following components:    Protein, UA Trace (*)     Occult Blood UA 2+ (*)     All other components within normal limits    Narrative:     Specimen Source->Urine   URINALYSIS MICROSCOPIC - Abnormal; Notable for the following components:    RBC, UA 43 (*)     All other components within normal limits    Narrative:     Specimen Source->Urine   INFLUENZA A & B BY MOLECULAR   HIV 1 / 2 ANTIBODY    Narrative:     Release to patient->Immediate   HEPATITIS C ANTIBODY    Narrative:     Release to patient->Immediate          Imaging Results              X-Ray Chest AP Portable (Final result)  Result time 07/06/24 23:15:35      Final result by Peter Santiago DO (07/06/24 23:15:35)                   Impression:      No acute abnormality.      Electronically signed by: Peter Santiago  Date:    07/06/2024  Time:    23:15               Narrative:    EXAMINATION:  XR CHEST AP PORTABLE    CLINICAL HISTORY:  Acute upper respiratory infection, unspecified    TECHNIQUE:  Single frontal view of the chest was performed.    COMPARISON:  02/16/2018.    FINDINGS:  There is a  cardiac pacer, unchanged.  The lungs are well expanded and clear. No focal opacities are seen. The pleural spaces are clear. The cardiac silhouette is enlarged.  The visualized osseous structures are intact.                                       Medications   acetaminophen tablet 1,000 mg (1,000 mg Oral Given 7/6/24 8499)   sodium chloride 0.9% bolus 500 mL 500 mL (0 mLs Intravenous Stopped 7/6/24 8439)     Medical Decision Making  89 yo PMH of heart block with pacemaker presents with myalgias, fatigue, rhinorrhea. Hypoxic to 94% on admission, but without other respiratory symptoms. Most likely a viral syndrome but Ddx: include pneumonia, bacteremia, and UTI. Workup without findings of white count, infiltrates concerning for pneumonia. Found to be COVID+ and continuing to be hypoxic in the ED. Admiting to medicine.     Amount and/or Complexity of Data Reviewed  Labs: ordered. Decision-making details documented in ED Course.  Radiology: ordered.    Risk  OTC drugs.  Decision regarding hospitalization.               ED Course as of 07/07/24 0142   Sun Jul 07, 2024   0038 Urinalysis, Reflex to Urine Culture Urine, Clean Catch [MR]   0056 SpO2(!): 88 % [MR]   0101 Urinalysis, Reflex to Urine Culture Urine, Clean Catch [MR]   0103 RBC, UA(!): 43 [MR]   0107 eGFR: >60.0 [MR]   0108 Protein, UA(!): Trace [MR]      ED Course User Index  [MR] Clinton Hansen MD                             Clinical Impression:  Final diagnoses:  [R53.83] Fatigue  [J06.9] Upper respiratory infection  [U07.1] COVID-19 (Primary)          ED Disposition Condition    Admit Stable                Clinton Hansen MD  Resident  07/07/24 0142       Clinton Hansen MD  Resident  07/07/24 0202

## 2024-07-07 NOTE — PLAN OF CARE
Problem: Adult Inpatient Plan of Care  Goal: Plan of Care Review  Outcome: Progressing  Goal: Patient-Specific Goal (Individualized)  Outcome: Progressing  Goal: Absence of Hospital-Acquired Illness or Injury  Outcome: Progressing  Goal: Optimal Comfort and Wellbeing  Outcome: Progressing  Goal: Readiness for Transition of Care  Outcome: Progressing     Problem: Fall Injury Risk  Goal: Absence of Fall and Fall-Related Injury  Outcome: Progressing     Problem: Infection  Goal: Absence of Infection Signs and Symptoms  Outcome: Progressing    Patient alert and oriented. Sometimes forgetful. Cooperative with care. Medicated as ordered. Oob to bedside recliner. Assisted to bathroom by staff. Good appetite. No noted fever. Mild cough

## 2024-07-07 NOTE — HOSPITAL COURSE
Patient admitted to  for management of AHRF due to COVID PNA. Treated with remdesivir and decadron. Deferred full dose AC due to history of GI bleeds, opted for prophylactic lovenox. TTE WNL, normal systolic functionl. O2 quickly weaned after one day of therapy. Patient stable to discharge to home on room air. Declined HH. Return precautions provided.       Constitutional:       General: alert, no apparent distress  HENT:      Head: Normocephalic and atraumatic.   Cardiovascular:      Rate and Rhythm: Normal rate and regular rhythm.      Heart sounds: Normal heart sounds.   Pulmonary:      Effort: Pulmonary effort is normal.      Breath sounds: Normal breath sounds.   Abdominal:      General: Abdomen is flat.     Palpations: Abdomen is soft.   Musculoskeletal:      Cervical back: Normal range of motion.   Skin:     General: Skin is warm and dry   Neurological:      Mental Status: alert and oriented to person, place, and time.   Psychiatric:         Mood and Affect: Mood normal.         Behavior: Behavior normal.

## 2024-07-07 NOTE — H&P
[facility] ([address] [facility]  History & Physical    Patient Name: Artemio Colon Sr.  MRN: [id number]  Patient Class: IP- Inpatient  Admission Date: 2024  Attending Physician: Ant Lu MD  Primary Care Provider: Patel Loyd MD         Patient information was obtained from patient and ER records.     Subjective:     Principal Problem:COVID-19    Chief Complaint:   Chief Complaint   Patient presents with    Fatigue     Pt c/o cough, generalized weakness. -N/V, fever.        HPI: 89M with complete AV block s/p pacemaker, AF s/p Watchman, JAEL on CPAP, prediabetes, and spinal stenosis on prn opioids presents with AMS. Per wife patient had episodes of confusion characterized by unresponsiveness yesterday afternoon. He also felt feverish and described having a dry cough and runny nose. Wife has had cold-like symptoms for the last few days and believes she got him sick. They had just returned from a . He is vaccinated against COVID but never sick from it before. Patient denies chest pain, SOB, abdominal pain, or changes in urination or bowel movements. He denies dizziness or lightheadedness.    Past Medical History:   Diagnosis Date    AV block     BPH (benign prostatic hyperplasia)     Cancer     Chronic rhinitis     Coronary artery disease     Erectile dysfunction     Glaucoma     Kidney cysts     Neuropathy 2017    JAEL (obstructive sleep apnea)     Pacemaker     Symptomatic bradycardia     s/p pacemaker       Past Surgical History:   Procedure Laterality Date    ADENOIDECTOMY      unsure    CATARACT EXTRACTION W/  INTRAOCULAR LENS IMPLANT Bilateral 2012    done in [place]    CHOLECYSTECTOMY      CLOSURE OF LEFT ATRIAL APPENDAGE USING DEVICE N/A 12/3/2020    Procedure: Left atrial appendage closure device;  Surgeon: Tawanda Adler MD;  Location: [facility];  Service: Cardiology;  Laterality: N/A;  AF, GAIL, Watchman Implant, BSci, Gen, MI/MB, 3 Prep     COLECTOMY      cecum    COSMETIC SURGERY  1967    rhinoplasty    EYE SURGERY      FRACTURE SURGERY  wrist    1939    HERNIA REPAIR      INSERT / REPLACE / REMOVE PACEMAKER  2012    changed    PROSTATE SURGERY      TONSILLECTOMY  1941    VASECTOMY  1975       Review of patient's allergies indicates:   Allergen Reactions    Ketamine Hallucinations     Had dysphoric reaction.     Penicillins Hives       No current facility-administered medications on file prior to encounter.     Current Outpatient Medications on File Prior to Encounter   Medication Sig    aspirin 81 MG Chew Take 81 mg by mouth once daily.    diclofenac sodium (VOLTAREN) 1 % Gel Use to affected joints up to 4 times a day    dorzolamide-timolol 2-0.5% (COSOPT) 22.3-6.8 mg/mL ophthalmic solution Place 1 drop into both eyes 2 (two) times daily.    fentaNYL (DURAGESIC) 25 mcg/hr Place 1 patch onto the skin every 72 hours.    oxyCODONE-acetaminophen (LYNOX) 7.5-300 mg per tablet Take 1 tablet by mouth every 4 (four) hours as needed for Pain.    oxyCODONE-acetaminophen (PERCOCET) 7.5-325 mg per tablet Take 1 tablet by mouth 2 (two) times daily as needed.    timolol maleate 0.5% (TIMOPTIC) 0.5 % Drop timolol 0.5 % eye drops   INSTILL 1 DROP INTO AFFECTED EYE(S) BY OPHTHALMIC ROUTE 2 TIMES PER DAY    fluocinonide (LIDEX) 0.05 % external solution Apply topically.     Family History       Problem Relation (Age of Onset)    Alcohol abuse Brother    Arthritis Father    COPD Brother    Cancer Father, Mother, Brother, Paternal Uncle    Depression Brother    Early death Sister    Hearing loss Mother    Heart disease Brother    Kidney disease Father    Mental illness Mother, Brother, Sister          Tobacco Use    Smoking status: Never    Smokeless tobacco: Never   Substance and Sexual Activity    Alcohol use: Not Currently     Alcohol/week: 1.0 standard drink of alcohol     Types: 1 Glasses of wine per week     Comment: weekly with meal    Drug use: No    Sexual  activity: Yes     Partners: Female     Birth control/protection: Condom     Review of Systems  Objective:     Vital Signs (Most Recent):  Temp: 99.4 °F (37.4 °C) (07/07/24 0345)  Pulse: 60 (07/07/24 0345)  Resp: 20 (07/07/24 0345)  BP: (!) 178/85 (07/07/24 0345)  SpO2: 96 % (07/07/24 0345) Vital Signs (24h Range):  Temp:  [98.6 °F (37 °C)-101.1 °F (38.4 °C)] 99.4 °F (37.4 °C)  Pulse:  [60-61] 60  Resp:  [16-20] 20  SpO2:  [88 %-96 %] 96 %  BP: (145-178)/(70-85) 178/85      Weight: 88.5 kg (195 lb)  Body mass index is 27.98 kg/m².  No intake or output data in the 24 hours ending 07/07/24 0410      Physical Exam  Constitutional:       Appearance: He is not ill-appearing or diaphoretic.   HENT:      Head: Normocephalic and atraumatic.      Nose: Rhinorrhea present.      Mouth/Throat:      Mouth: Mucous membranes are moist.   Eyes:      General: No scleral icterus.  Cardiovascular:      Rate and Rhythm: Normal rate.      Pulses: Normal pulses.      Heart sounds: Normal heart sounds.   Pulmonary:      Effort: Pulmonary effort is normal.      Breath sounds: Normal breath sounds.   Abdominal:      General: Abdomen is flat.      Palpations: Abdomen is soft.      Tenderness: There is no abdominal tenderness.   Musculoskeletal:      Cervical back: No rigidity.      Right lower leg: Edema present.      Left lower leg: Edema present.      Comments: +2 pitting edema to the knees bilaterally   Skin:     General: Skin is warm and dry.   Neurological:      Mental Status: He is alert and oriented to person, place, and time. Mental status is at baseline.   Psychiatric:         Mood and Affect: Mood normal.         Behavior: Behavior normal.                Significant Labs: All pertinent labs within the past 24 hours have been reviewed.  CBC:        Recent Labs   Lab 07/06/24 2251 07/07/24  0324   WBC 6.39 5.50   HGB 14.2 12.3*   HCT 43.8 36.2*    174      CMP:        Recent Labs   Lab 07/06/24 2251 07/07/24  0324     141   K 3.5 3.3*    108   CO2 22* 22*   GLU 99 97   BUN 17 18   CREATININE 1.0 1.0   CALCIUM 9.2 8.6*   PROT 6.9 6.2   ALBUMIN 3.7 3.4*   BILITOT 0.5 0.4   ALKPHOS 106 96   AST 27 26   ALT 19 17   ANIONGAP 10 11      Urine Studies:       Recent Labs   Lab 07/06/24  2351   COLORU Yellow   APPEARANCEUA Clear   PHUR 6.0   SPECGRAV 1.020   PROTEINUA Trace*   GLUCUA Negative   KETONESU Negative   BILIRUBINUA Negative   OCCULTUA 2+*   NITRITE Negative   LEUKOCYTESUR Negative   RBCUA 43*   WBCUA 1   SQUAMEPITHEL 0         Significant Imaging: I have reviewed all pertinent imaging results/findings within the past 24 hours.    Assessment/Plan:     * COVID-19  Patient is identified as Severe COVID-19 based on hypoxemia with O2 saturations <94% on room air or on ambulation   Initiate standard COVID protocols; COVID-19 testing ,Infection Control notification and isolation- respiratory, contact and droplet per protocol    Management:   - Start on Remdesivir 200 mg IV x1, then 100 mg IV daily for up to 4 days  - Start Dexamethasone 6mg PO for up to 10 days    Delirium  - AMS yesterday but has resolved upon arrival to the ED  - Delirium precautions in place    Obstructive sleep apnea on CPAP  - Continue home CPAP      Open-angle glaucoma of right eye, indeterminate stage  - Continue home eye drops as needed      Lumbar spinal stenosis  - Takes Percocet and Lynox prn at home with fentanyl patch q72h for chronic back pain  - Put on a new fentanyl patch a day ago  - Oxycodone prn      Permanent atrial fibrillation  - S/p pacemaker, currently in sinus rhythm  - [id number] Score: 2. HASBLED Score: 2.  - On enoxaparin for VTE prophylaxis      VTE Risk Mitigation (From admission, onward)           Ordered     enoxaparin injection 40 mg  Every 24 hours         07/07/24 0945     IP VTE HIGH RISK PATIENT  Once         07/07/24 0432     Place sequential compression device  Until discontinued         07/07/24 2053                                [facility] ([place] [address])  [facility] Medicine  Progress Note    Patient Name: Artemio Colon Sr.  MRN: [id number]  Patient Class: IP- Inpatient   Admission Date: 2024  Length of Stay: 0 days  Attending Physician: Ant Lu MD  Primary Care Provider: Patel Loyd MD        Subjective:     Principal Problem:COVID-19    HPI:  89M with complete AV block s/p pacemaker, AF s/p Watchman, JAEL on CPAP, prediabetes, and spinal stenosis on prn opioids presents with AMS. Per wife patient had episodes of confusion characterized by unresponsiveness yesterday afternoon. He also felt feverish and described having a dry cough and runny nose. Wife has had cold-like symptoms for the last few days and believes she got him sick. They had just returned from a . He is vaccinated against COVID but never sick from it before. Patient denies chest pain, SOB, abdominal pain, or changes in urination or bowel movements. He denies dizziness or lightheadedness.    Overview/Hospital Course:  ED course: Tmax 101.1 that downtrended after 1 dose acetaminophen 1000mg PO. O2 sat 88-94% on RA that responded to 2L NC. CBC and CMP unremarkable. UA negative. CXR negative. Positive for COVID-19. Negative for influenza.     Interval History:     Review of Systems   Constitutional:  Positive for fever.   HENT:  Positive for rhinorrhea.    Eyes:  Negative for visual disturbance.   Respiratory:  Positive for cough. Negative for shortness of breath.    Cardiovascular:  Negative for chest pain.   Gastrointestinal:  Negative for abdominal pain, nausea and vomiting.   Musculoskeletal:  Positive for back pain.   Skin:  Negative for wound.   Neurological:  Negative for dizziness, syncope, weakness and light-headedness.   Psychiatric/Behavioral:  Negative for confusion. The patient is not nervous/anxious.      Objective:     Vital Signs (Most Recent):  Temp: 99.4 °F (37.4 °C) (24 0345)  Pulse: 60 (24  0345)  Resp: 20 (07/07/24 0345)  BP: (!) 178/85 (07/07/24 0345)  SpO2: 96 % (07/07/24 0345) Vital Signs (24h Range):  Temp:  [98.6 °F (37 °C)-101.1 °F (38.4 °C)] 99.4 °F (37.4 °C)  Pulse:  [60-61] 60  Resp:  [16-20] 20  SpO2:  [88 %-96 %] 96 %  BP: (145-178)/(70-85) 178/85     Weight: 88.5 kg (195 lb)  Body mass index is 27.98 kg/m².  No intake or output data in the 24 hours ending 07/07/24 0410      Physical Exam  Constitutional:       Appearance: He is not ill-appearing or diaphoretic.   HENT:      Head: Normocephalic and atraumatic.      Nose: Rhinorrhea present.      Mouth/Throat:      Mouth: Mucous membranes are moist.   Eyes:      General: No scleral icterus.  Cardiovascular:      Rate and Rhythm: Normal rate.      Pulses: Normal pulses.      Heart sounds: Normal heart sounds.   Pulmonary:      Effort: Pulmonary effort is normal.      Breath sounds: Normal breath sounds.   Abdominal:      General: Abdomen is flat.      Palpations: Abdomen is soft.      Tenderness: There is no abdominal tenderness.   Musculoskeletal:      Cervical back: No rigidity.      Right lower leg: Edema present.      Left lower leg: Edema present.      Comments: +2 pitting edema to the knees bilaterally   Skin:     General: Skin is warm and dry.   Neurological:      Mental Status: He is alert and oriented to person, place, and time. Mental status is at baseline.   Psychiatric:         Mood and Affect: Mood normal.         Behavior: Behavior normal.             Significant Labs: All pertinent labs within the past 24 hours have been reviewed.  CBC:   Recent Labs   Lab 07/06/24 2251 07/07/24 0324   WBC 6.39 5.50   HGB 14.2 12.3*   HCT 43.8 36.2*    174     CMP:   Recent Labs   Lab 07/06/24 2251 07/07/24 0324    141   K 3.5 3.3*    108   CO2 22* 22*   GLU 99 97   BUN 17 18   CREATININE 1.0 1.0   CALCIUM 9.2 8.6*   PROT 6.9 6.2   ALBUMIN 3.7 3.4*   BILITOT 0.5 0.4   ALKPHOS 106 96   AST 27 26   ALT 19 17   ANIONGAP 10  11     Urine Studies:   Recent Labs   Lab 07/06/24  2351   COLORU Yellow   APPEARANCEUA Clear   PHUR 6.0   SPECGRAV 1.020   PROTEINUA Trace*   GLUCUA Negative   KETONESU Negative   BILIRUBINUA Negative   OCCULTUA 2+*   NITRITE Negative   LEUKOCYTESUR Negative   RBCUA 43*   WBCUA 1   SQUAMEPITHEL 0       Significant Imaging: I have reviewed all pertinent imaging results/findings within the past 24 hours.    Assessment/Plan:      * COVID-19  Patient is identified as Severe COVID-19 based on hypoxemia with O2 saturations <94% on room air or on ambulation   Initiate standard COVID protocols; COVID-19 testing ,Infection Control notification and isolation- respiratory, contact and droplet per protocol    Management:   - Start on Remdesivir 200 mg IV x1, then 100 mg IV daily for up to 4 days  - Start Dexamethasone 6mg PO for up to 10 days    Delirium  - AMS yesterday but has resolved upon arrival to the ED  - Delirium precautions in place    Obstructive sleep apnea on CPAP  - Continue home CPAP      Open-angle glaucoma of right eye, indeterminate stage  - Continue home eye drops as needed      Lumbar spinal stenosis  - Takes Percocet and Lynox prn at home with fentanyl patch q72h for chronic back pain  - Put on a new fentanyl patch a day ago  - Oxycodone prn      Permanent atrial fibrillation  - S/p pacemaker, currently in sinus rhythm  - [id number] Score: 2. HASBLED Score: 2.  - On enoxaparin for VTE prophylaxis      VTE Risk Mitigation (From admission, onward)           Ordered     enoxaparin injection 40 mg  Daily         07/07/24 0432     IP VTE HIGH RISK PATIENT  Once         07/07/24 0432     Place sequential compression device  Until discontinued         07/07/24 0258                    Discharge Planning   MELY:      Code Status: DNR   Is the patient medically ready for discharge?:     Reason for patient still in hospital (select all that apply): Patient new problem           Messi Caro MD  Department of Hospital  Medicine, PGY-1    Attestation signed by Cb Garza DO at 7/7/2024  6:51 AM:  I have seen the patient, reviewed the Resident's history and physical, assessment, and plan. I have personally interviewed and examined the patient at bedside and agree with the findings.    Patient is a 89 year old male with PMH of CHB s/p PPM, afib s/p watchman, glaucoma, lumbar DJD and stenosis with chronic back pain (opioid dependence), gait imbalance who presented to ED for evaluation of confusion in setting of recent URI symptoms and positive sick contact (wife recently had URI). Febrile in ED with Tmax 101 and COVID+ >>> Delirium. CXR w/o infiltrate or effusion. He is mildly hypoxic with sat in low 90s which resolved on 2 liter supplemental oxygen. Patient received tylenol 1000 gm and 500 cc NS bolus with clinical improvement. He is not in distress and afebrile during my interview. Neurovascular intact. Will start on remdesivir + dexamethasone per COVID protocol. Continue isolation and supportive care. Patient is DNR.       Cb Garza DO  Hospitalist  [facility] ([place])    Ant Lu MD  Department of Hospital Medicine  [facility] ([place])

## 2024-07-07 NOTE — SUBJECTIVE & OBJECTIVE
Interval History:     Review of Systems   Constitutional:  Positive for fever.   HENT:  Positive for rhinorrhea.    Eyes:  Negative for visual disturbance.   Respiratory:  Positive for cough. Negative for shortness of breath.    Cardiovascular:  Negative for chest pain.   Gastrointestinal:  Negative for abdominal pain, nausea and vomiting.   Musculoskeletal:  Positive for back pain.   Skin:  Negative for wound.   Neurological:  Negative for dizziness, syncope, weakness and light-headedness.   Psychiatric/Behavioral:  Negative for confusion. The patient is not nervous/anxious.      Objective:     Vital Signs (Most Recent):  Temp: 99.4 °F (37.4 °C) (07/07/24 0345)  Pulse: 60 (07/07/24 0345)  Resp: 20 (07/07/24 0345)  BP: (!) 178/85 (07/07/24 0345)  SpO2: 96 % (07/07/24 0345) Vital Signs (24h Range):  Temp:  [98.6 °F (37 °C)-101.1 °F (38.4 °C)] 99.4 °F (37.4 °C)  Pulse:  [60-61] 60  Resp:  [16-20] 20  SpO2:  [88 %-96 %] 96 %  BP: (145-178)/(70-85) 178/85     Weight: 88.5 kg (195 lb)  Body mass index is 27.98 kg/m².  No intake or output data in the 24 hours ending 07/07/24 0410      Physical Exam  Constitutional:       Appearance: He is not ill-appearing or diaphoretic.   HENT:      Head: Normocephalic and atraumatic.      Nose: Rhinorrhea present.      Mouth/Throat:      Mouth: Mucous membranes are moist.   Eyes:      General: No scleral icterus.  Cardiovascular:      Rate and Rhythm: Normal rate.      Pulses: Normal pulses.      Heart sounds: Normal heart sounds.   Pulmonary:      Effort: Pulmonary effort is normal.      Breath sounds: Normal breath sounds.   Abdominal:      General: Abdomen is flat.      Palpations: Abdomen is soft.      Tenderness: There is no abdominal tenderness.   Musculoskeletal:      Cervical back: No rigidity.      Right lower leg: Edema present.      Left lower leg: Edema present.      Comments: +2 pitting edema to the knees bilaterally   Skin:     General: Skin is warm and dry.    Neurological:      Mental Status: He is alert and oriented to person, place, and time. Mental status is at baseline.   Psychiatric:         Mood and Affect: Mood normal.         Behavior: Behavior normal.             Significant Labs: All pertinent labs within the past 24 hours have been reviewed.  CBC:   Recent Labs   Lab 07/06/24 2251 07/07/24  0324   WBC 6.39 5.50   HGB 14.2 12.3*   HCT 43.8 36.2*    174     CMP:   Recent Labs   Lab 07/06/24 2251 07/07/24  0324    141   K 3.5 3.3*    108   CO2 22* 22*   GLU 99 97   BUN 17 18   CREATININE 1.0 1.0   CALCIUM 9.2 8.6*   PROT 6.9 6.2   ALBUMIN 3.7 3.4*   BILITOT 0.5 0.4   ALKPHOS 106 96   AST 27 26   ALT 19 17   ANIONGAP 10 11     Urine Studies:   Recent Labs   Lab 07/06/24  2351   COLORU Yellow   APPEARANCEUA Clear   PHUR 6.0   SPECGRAV 1.020   PROTEINUA Trace*   GLUCUA Negative   KETONESU Negative   BILIRUBINUA Negative   OCCULTUA 2+*   NITRITE Negative   LEUKOCYTESUR Negative   RBCUA 43*   WBCUA 1   SQUAMEPITHEL 0       Significant Imaging: I have reviewed all pertinent imaging results/findings within the past 24 hours.

## 2024-07-07 NOTE — PLAN OF CARE
[facility] ([place])  Initial Discharge Assessment      Discharge Plan A and Plan B have been determined by review of patient's clinical status, future medical and therapeutic needs, and coverage/benefits for post-acute care in coordination with multidisciplinary team members.      Primary Care Provider: Patel Loyd MD    Admission Diagnosis: Fatigue [R53.83]  Upper respiratory infection [J06.9]  Chest pain [R07.9]  COVID-19 [U07.1]    Admission Date: 7/6/2024  Expected Discharge Date: 7/10/2024    Transition of Care Barriers: Mobility    Payor: MEDICARE / Plan: MEDICARE PART A & B / Product Type: Government /     Extended Emergency Contact Information  Primary Emergency Contact: AlexandralaiYaneth  Address: [address] [place] [address]           [place] [address] [facility]  Home Phone: [phone]  Mobile Phone: [phone]  Relation: Spouse    Discharge Plan A: Home with family  Discharge Plan B: Home Health      [id number] - [place], [place] - [address]  [address] [facility]  [place] [address]  Phone: [phone] Fax: [phone]    [id number] - [place], [place] - [address]  [address]  [place] [address]  Phone: [phone] Fax: [phone]      Initial Assessment (most recent)       Adult Discharge Assessment - 07/07/24 0900          Discharge Assessment    Assessment Type Discharge Planning Assessment     Confirmed/corrected address, phone number and insurance Yes     Confirmed Demographics Correct on Facesheet     Source of Information patient;family     If unable to respond/provide information was family/caregiver contacted? Yes     Contact Name/Number Yaneth Colon (Spouse)  [phone] (Home Phone)     When was your last doctors appointment? 05/14/24   Available  Patel Loyd MD    Reason For Admission COVID-19     People in Home spouse     Facility Arrived From: home     Prior to hospitilization cognitive status:  Alert/Oriented     Current cognitive status: Alert/Oriented     Home Accessibility wheelchair accessible     Equipment Currently Used at Home CPAP   Dx of JAEL    Readmission within 30 days? No     Patient currently being followed by outpatient case management? No     Do you currently have service(s) that help you manage your care at home? No     Do you take prescription medications? Yes     Do you have prescription coverage? Yes     Coverage Medicare A& B     Do you have any problems affording any of your prescribed medications? No     If yes, what medications? NA     Is the patient taking medications as prescribed? yes     Who is going to help you get home at discharge? Yaneth Colon (Spouse)  [phone] (Home Phone)     How do you get to doctors appointments? family or friend will provide;health plan transportation     Discharge Plan A Home with family     Discharge Plan B Home Health     DME Needed Upon Discharge  other (see comments)   TBD    Discharge Plan discussed with: Patient   Yaneth Colon (Spouse)  [phone] (Home Phone)    Transition of Care Barriers Mobility        Physical Activity    On average, how many days per week do you engage in moderate to strenuous exercise (like a brisk walk)? 3 days     On average, how many minutes do you engage in exercise at this level? 20 min        Financial Resource Strain    How hard is it for you to pay for the very basics like food, housing, medical care, and heating? Not hard at all        Housing Stability    In the last 12 months, was there a time when you were not able to pay the mortgage or rent on time? No     At any time in the past 12 months, were you homeless or living in a shelter (including now)? No        Transportation Needs    Has the lack of transportation kept you from medical appointments, meetings, work or from getting things needed for daily living? No        Food Insecurity    Within the past 12 months, you worried that your food would  run out before you got the money to buy more. Never true     Within the past 12 months, the food you bought just didn't last and you didn't have money to get more. Never true        Social Isolation    How often do you feel lonely or isolated from those around you?  Never        Alcohol Use    Q1: How often do you have a drink containing alcohol? Never     Q2: How many drinks containing alcohol do you have on a typical day when you are drinking? Patient does not drink     Q3: How often do you have six or more drinks on one occasion? Never        [employer]    In the past 12 months has the electric, gas, oil, or water company threatened to shut off services in your home? No        Health Literacy    How often do you need to have someone help you when you read instructions, pamphlets, or other written material from your doctor or pharmacy? Never                          CM met with patient at bedside to complete discharge planning assessment.  Patient alert and oriented xs 4.  Patient verified all demographic information on facesheet is correct.  Patient verified PCP is Patel Burk MD    Patient verified primary health insurance is  MEDICARE - MEDICARE PART A & B Patient  is agreeable with bedside medication delivery.   Patient is not  active with home health and has listed DME.  Patient with NO POA or Living Will.  Patient not on dialysis or medication coumadin.  Patient with no 30 day admission.  Patient with no financial issues at this time.  Patients spouse  will provide transportation upon discharge from facility.  Patient will have assistance from his wife upon discharge Patient independent with ADLs,. All questions answered regarding Case Management Discharge Planning, patient verbalized understanding.  Discharge booklet with CM's contact information given to patient.          Della Garcia RN  Case Management  [facility]  [phone]

## 2024-07-07 NOTE — ASSESSMENT & PLAN NOTE
- S/p pacemaker, currently in sinus rhythm  - [id number] Score: 2. HASBLED Score: 2.  - On enoxaparin for VTE prophylaxis

## 2024-07-07 NOTE — ASSESSMENT & PLAN NOTE
- Takes Percocet and Lynox prn at home with fentanyl patch q72h for chronic back pain  - Put on a new fentanyl patch a day ago  - Oxycodone prn

## 2024-07-07 NOTE — HPI
89M with complete AV block s/p pacemaker, AF s/p Watchman, JAEL on CPAP, prediabetes, and spinal stenosis on prn opioids presents with AMS. Per wife patient had episodes of confusion characterized by unresponsiveness yesterday afternoon. He also felt feverish and described having a dry cough and runny nose. Wife has had cold-like symptoms for the last few days and believes she got him sick. They had just returned from a . He is vaccinated against COVID but never sick from it before. Patient denies chest pain, SOB, abdominal pain, or changes in urination or bowel movements. He denies dizziness or lightheadedness.

## 2024-07-07 NOTE — NURSING
.Nurses Note -- 4 Eyes      7/7/2024   4:00 AM      Skin assessed during: Admit      [x] No Altered Skin Integrity Present    [x]Prevention Measures Documented      [] Yes- Altered Skin Integrity Present or Discovered   [] LDA Added if Not in Epic (Describe Wound)   [] New Altered Skin Integrity was Present on Admit and Documented in LDA   [] Wound Image Taken    Wound Care Consulted? No    Attending Nurse:  Mehnaz Quiroz RN/Staff Member:   Kelly

## 2024-07-07 NOTE — DISCHARGE INSTRUCTIONS
Ensure adequate rest and hydration, and take prescribed medications as directed. Follow isolation guidelines to prevent the spread of COVID-19, avoiding close contact with others in the household. Eat a balanced diet to support your immune system. Monitor your temperature regularly and use over-the-counter fever reducers like acetaminophen if needed. Return to the ER if you experience difficulty breathing, chest pain, severe fatigue, confusion, inability to stay awake, or any worsening of symptoms. Keep in touch with your primary care physician for further guidance and follow-up care.

## 2024-07-07 NOTE — ASSESSMENT & PLAN NOTE
Patient is identified as Severe COVID-19 based on hypoxemia with O2 saturations <94% on room air or on ambulation   Initiate standard COVID protocols; COVID-19 testing ,Infection Control notification and isolation- respiratory, contact and droplet per protocol    Management:   - Start on Remdesivir 200 mg IV x1, then 100 mg IV daily for up to 4 days  - Start Dexamethasone 6mg PO for up to 10 days

## 2024-07-07 NOTE — ED TRIAGE NOTES
Artemio Colon Sr., a 89 y.o. male presents to the ED w/ complaint of fatigue and fever with weakness for 1 day.    Triage note:  Chief Complaint   Patient presents with    Fatigue     Pt c/o cough, generalized weakness. -N/V, fever.     Review of patient's allergies indicates:   Allergen Reactions    Ketamine Hallucinations     Had dysphoric reaction.     Penicillins Hives     Past Medical History:   Diagnosis Date    AV block     BPH (benign prostatic hyperplasia)     Cancer     Chronic rhinitis     Coronary artery disease     Erectile dysfunction     Glaucoma     Kidney cysts     Neuropathy 8/22/2017    JAEL (obstructive sleep apnea)     Pacemaker     Symptomatic bradycardia     s/p pacemaker

## 2024-07-07 NOTE — PLAN OF CARE
Patient on 2LNC without distress. Aox 4. Call light in reach. Bed in low position.   Airborne Droplet precautions initiated.    Problem: Adult Inpatient Plan of Care  Goal: Plan of Care Review  Outcome: Progressing  Goal: Patient-Specific Goal (Individualized)  Outcome: Progressing  Goal: Absence of Hospital-Acquired Illness or Injury  Outcome: Progressing  Goal: Optimal Comfort and Wellbeing  Outcome: Progressing  Goal: Readiness for Transition of Care  Outcome: Progressing     Problem: Fall Injury Risk  Goal: Absence of Fall and Fall-Related Injury  Outcome: Progressing     Problem: Infection  Goal: Absence of Infection Signs and Symptoms  Outcome: Progressing

## 2024-07-07 NOTE — ED NOTES
Telemetry Verification   Patient placed on Telemetry Box  Verified with War Room  Tech War room   Box #    Rate 60   Rhythm AFIB

## 2024-07-07 NOTE — SUBJECTIVE & OBJECTIVE
Past Medical History:   Diagnosis Date    AV block     BPH (benign prostatic hyperplasia)     Cancer     Chronic rhinitis     Coronary artery disease     Erectile dysfunction     Glaucoma     Kidney cysts     Neuropathy 8/22/2017    JAEL (obstructive sleep apnea)     Pacemaker     Symptomatic bradycardia     s/p pacemaker       Past Surgical History:   Procedure Laterality Date    ADENOIDECTOMY  1939    unsure    CATARACT EXTRACTION W/  INTRAOCULAR LENS IMPLANT Bilateral 2012    done in [place]    CHOLECYSTECTOMY      CLOSURE OF LEFT ATRIAL APPENDAGE USING DEVICE N/A 12/3/2020    Procedure: Left atrial appendage closure device;  Surgeon: Tawanda Adler MD;  [facility];  Service: Cardiology;  Laterality: N/A;  AF, GAIL, Watchman Implant, BSci, Gen, [place]/MB, 3 Prep    COLECTOMY      cecum    COSMETIC SURGERY  1967    rhinoplasty    EYE SURGERY      FRACTURE SURGERY  wrist    1939    HERNIA REPAIR      INSERT / REPLACE / REMOVE PACEMAKER  2012    changed    PROSTATE SURGERY      TONSILLECTOMY  1941    VASECTOMY  1975       Review of patient's allergies indicates:   Allergen Reactions    Ketamine Hallucinations     Had dysphoric reaction.     Penicillins Hives       No current facility-administered medications on file prior to encounter.     Current Outpatient Medications on File Prior to Encounter   Medication Sig    aspirin 81 MG Chew Take 81 mg by mouth once daily.    diclofenac sodium (VOLTAREN) 1 % Gel Use to affected joints up to 4 times a day    dorzolamide-timolol 2-0.5% (COSOPT) 22.3-6.8 mg/mL ophthalmic solution Place 1 drop into both eyes 2 (two) times daily.    fentaNYL (DURAGESIC) 25 mcg/hr Place 1 patch onto the skin every 72 hours.    oxyCODONE-acetaminophen (LYNOX) 7.5-300 mg per tablet Take 1 tablet by mouth every 4 (four) hours as needed for Pain.    oxyCODONE-acetaminophen (PERCOCET) 7.5-325 mg per tablet Take 1 tablet by mouth 2 (two) times daily as needed.    timolol maleate 0.5% (TIMOPTIC)  0.5 % Drop timolol 0.5 % eye drops   INSTILL 1 DROP INTO AFFECTED EYE(S) BY OPHTHALMIC ROUTE 2 TIMES PER DAY    fluocinonide (LIDEX) 0.05 % external solution Apply topically.     Family History       Problem Relation (Age of Onset)    Alcohol abuse Brother    Arthritis Father    COPD Brother    Cancer Father, Mother, Brother, Paternal Uncle    Depression Brother    Early death Sister    Hearing loss Mother    Heart disease Brother    Kidney disease Father    Mental illness Mother, Brother, Sister          Tobacco Use    Smoking status: Never    Smokeless tobacco: Never   Substance and Sexual Activity    Alcohol use: Not Currently     Alcohol/week: 1.0 standard drink of alcohol     Types: 1 Glasses of wine per week     Comment: weekly with meal    Drug use: No    Sexual activity: Yes     Partners: Female     Birth control/protection: Condom     Review of Systems  Objective:     Vital Signs (Most Recent):  Temp: 98.5 °F (36.9 °C) (07/07/24 1115)  Pulse: 60 (07/07/24 1159)  Resp: 17 (07/07/24 1115)  BP: (!) 156/73 (07/07/24 1115)  SpO2: (!) 91 % (07/07/24 1159) Vital Signs (24h Range):  Temp:  [98.5 °F (36.9 °C)-101.1 °F (38.4 °C)] 98.5 °F (36.9 °C)  Pulse:  [60-61] 60  Resp:  [16-20] 17  SpO2:  [88 %-97 %] 91 %  BP: (145-178)/(70-85) 156/73     Weight: 88.5 kg (195 lb)  Body mass index is 27.98 kg/m².     Physical Exam           Significant Labs: All pertinent labs within the past 24 hours have been reviewed.    Significant Imaging: I have reviewed all pertinent imaging results/findings within the past 24 hours.

## 2024-07-07 NOTE — NURSING
Patient received from ER via stretcher. Aox 4 . Wife accompanies. No distress voiced or noted on 2L NC. Oriented to room, call light, fall precautions and unit policies.

## 2024-07-07 NOTE — PROGRESS NOTES
[facility] ([place] [address])  [facility]  Progress Note    Patient Name: Artemio Colon Sr.  MRN: [id number]  Patient Class: IP- Inpatient   Admission Date: 2024  Length of Stay: 0 days  Attending Physician: Ant Lu MD  Primary Care Provider: Patel Loyd MD        Subjective:     Principal Problem:COVID-19    HPI:  89M with complete AV block s/p pacemaker, AF s/p Watchman, JAEL on CPAP, prediabetes, and spinal stenosis on prn opioids presents with AMS. Per wife patient had episodes of confusion characterized by unresponsiveness yesterday afternoon. He also felt feverish and described having a dry cough and runny nose. Wife has had cold-like symptoms for the last few days and believes she got him sick. They had just returned from a . He is vaccinated against COVID but never sick from it before. Patient denies chest pain, SOB, abdominal pain, or changes in urination or bowel movements. He denies dizziness or lightheadedness.    Overview/Hospital Course:  ED course: Tmax 101.1 that downtrended after 1 dose acetaminophen 1000mg PO. O2 sat 88-94% on RA that responded to 2L NC. CBC and CMP unremarkable. UA negative. CXR negative. Positive for COVID-19. Negative for influenza.     Interval History:     Review of Systems   Constitutional:  Positive for fever.   HENT:  Positive for rhinorrhea.    Eyes:  Negative for visual disturbance.   Respiratory:  Positive for cough. Negative for shortness of breath.    Cardiovascular:  Negative for chest pain.   Gastrointestinal:  Negative for abdominal pain, nausea and vomiting.   Musculoskeletal:  Positive for back pain.   Skin:  Negative for wound.   Neurological:  Negative for dizziness, syncope, weakness and light-headedness.   Psychiatric/Behavioral:  Negative for confusion. The patient is not nervous/anxious.      Objective:     Vital Signs (Most Recent):  Temp: 99.4 °F (37.4 °C) (24)  Pulse: 60 (24)  Resp: 20  (07/07/24 0345)  BP: (!) 178/85 (07/07/24 0345)  SpO2: 96 % (07/07/24 0345) Vital Signs (24h Range):  Temp:  [98.6 °F (37 °C)-101.1 °F (38.4 °C)] 99.4 °F (37.4 °C)  Pulse:  [60-61] 60  Resp:  [16-20] 20  SpO2:  [88 %-96 %] 96 %  BP: (145-178)/(70-85) 178/85     Weight: 88.5 kg (195 lb)  Body mass index is 27.98 kg/m².  No intake or output data in the 24 hours ending 07/07/24 0410      Physical Exam  Constitutional:       Appearance: He is not ill-appearing or diaphoretic.   HENT:      Head: Normocephalic and atraumatic.      Nose: Rhinorrhea present.      Mouth/Throat:      Mouth: Mucous membranes are moist.   Eyes:      General: No scleral icterus.  Cardiovascular:      Rate and Rhythm: Normal rate.      Pulses: Normal pulses.      Heart sounds: Normal heart sounds.   Pulmonary:      Effort: Pulmonary effort is normal.      Breath sounds: Normal breath sounds.   Abdominal:      General: Abdomen is flat.      Palpations: Abdomen is soft.      Tenderness: There is no abdominal tenderness.   Musculoskeletal:      Cervical back: No rigidity.      Right lower leg: Edema present.      Left lower leg: Edema present.      Comments: +2 pitting edema to the knees bilaterally   Skin:     General: Skin is warm and dry.   Neurological:      Mental Status: He is alert and oriented to person, place, and time. Mental status is at baseline.   Psychiatric:         Mood and Affect: Mood normal.         Behavior: Behavior normal.             Significant Labs: All pertinent labs within the past 24 hours have been reviewed.  CBC:   Recent Labs   Lab 07/06/24 2251 07/07/24  0324   WBC 6.39 5.50   HGB 14.2 12.3*   HCT 43.8 36.2*    174     CMP:   Recent Labs   Lab 07/06/24 2251 07/07/24  0324    141   K 3.5 3.3*    108   CO2 22* 22*   GLU 99 97   BUN 17 18   CREATININE 1.0 1.0   CALCIUM 9.2 8.6*   PROT 6.9 6.2   ALBUMIN 3.7 3.4*   BILITOT 0.5 0.4   ALKPHOS 106 96   AST 27 26   ALT 19 17   ANIONGAP 10 11     Urine  Studies:   Recent Labs   Lab 07/06/24  2351   COLORU Yellow   APPEARANCEUA Clear   PHUR 6.0   SPECGRAV 1.020   PROTEINUA Trace*   GLUCUA Negative   KETONESU Negative   BILIRUBINUA Negative   OCCULTUA 2+*   NITRITE Negative   LEUKOCYTESUR Negative   RBCUA 43*   WBCUA 1   SQUAMEPITHEL 0       Significant Imaging: I have reviewed all pertinent imaging results/findings within the past 24 hours.    Assessment/Plan:      * COVID-19  Patient is identified as Severe COVID-19 based on hypoxemia with O2 saturations <94% on room air or on ambulation   Initiate standard COVID protocols; COVID-19 testing ,Infection Control notification and isolation- respiratory, contact and droplet per protocol    Management:   - Start on Remdesivir 200 mg IV x1, then 100 mg IV daily for up to 4 days  - Start Dexamethasone 6mg PO for up to 10 days    Delirium  - AMS yesterday but has resolved upon arrival to the ED  - Delirium precautions in place    Obstructive sleep apnea on CPAP  - Continue home CPAP      Open-angle glaucoma of right eye, indeterminate stage  - Continue home eye drops as needed      Lumbar spinal stenosis  - Takes Percocet and Lynox prn at home with fentanyl patch q72h for chronic back pain  - Put on a new fentanyl patch a day ago  - Oxycodone prn      Permanent atrial fibrillation  - S/p pacemaker, currently in sinus rhythm  - [id number] Score: 2. HASBLED Score: 2.  - On enoxaparin for VTE prophylaxis      VTE Risk Mitigation (From admission, onward)           Ordered     enoxaparin injection 40 mg  Daily         07/07/24 0432     IP VTE HIGH RISK PATIENT  Once         07/07/24 0432     Place sequential compression device  Until discontinued         07/07/24 0258                    Discharge Planning   MELY:      Code Status: DNR   Is the patient medically ready for discharge?:     Reason for patient still in hospital (select all that apply): Patient new problem           Messi Caro MD  Department of Hospital Medicine,  PGY-1

## 2024-07-08 VITALS
SYSTOLIC BLOOD PRESSURE: 154 MMHG | TEMPERATURE: 98 F | DIASTOLIC BLOOD PRESSURE: 74 MMHG | HEART RATE: 60 BPM | HEIGHT: 70 IN | BODY MASS INDEX: 27.92 KG/M2 | OXYGEN SATURATION: 95 % | WEIGHT: 195 LBS | RESPIRATION RATE: 18 BRPM

## 2024-07-08 DIAGNOSIS — U07.1 COVID-19 VIRUS DETECTED: ICD-10-CM

## 2024-07-08 LAB
ALBUMIN SERPL BCP-MCNC: 3.2 G/DL (ref 3.5–5.2)
ALP SERPL-CCNC: 90 U/L (ref 55–135)
ALT SERPL W/O P-5'-P-CCNC: 18 U/L (ref 10–44)
ANION GAP SERPL CALC-SCNC: 8 MMOL/L (ref 8–16)
ASCENDING AORTA: 3.62 CM
AST SERPL-CCNC: 29 U/L (ref 10–40)
AV INDEX (PROSTH): 0.71
AV MEAN GRADIENT: 2 MMHG
AV PEAK GRADIENT: 4 MMHG
AV VALVE AREA BY VELOCITY RATIO: 2.32 CM²
AV VALVE AREA: 2.62 CM²
AV VELOCITY RATIO: 0.63
BASOPHILS # BLD AUTO: 0.02 K/UL (ref 0–0.2)
BASOPHILS NFR BLD: 0.4 % (ref 0–1.9)
BILIRUB SERPL-MCNC: 0.3 MG/DL (ref 0.1–1)
BSA FOR ECHO PROCEDURE: 2.09 M2
BUN SERPL-MCNC: 16 MG/DL (ref 10–30)
CALCIUM SERPL-MCNC: 8.5 MG/DL (ref 8.7–10.5)
CHLORIDE SERPL-SCNC: 110 MMOL/L (ref 95–110)
CO2 SERPL-SCNC: 20 MMOL/L (ref 23–29)
CREAT SERPL-MCNC: 0.8 MG/DL (ref 0.5–1.4)
CV ECHO LV RWT: 0.35 CM
DIFFERENTIAL METHOD BLD: ABNORMAL
DOP CALC AO PEAK VEL: 1.02 M/S
DOP CALC AO VTI: 20.79 CM
DOP CALC LVOT AREA: 3.7 CM2
DOP CALC LVOT DIAMETER: 2.17 CM
DOP CALC LVOT PEAK VEL: 0.64 M/S
DOP CALC LVOT STROKE VOLUME: 54.49 CM3
DOP CALCLVOT PEAK VEL VTI: 14.74 CM
E WAVE DECELERATION TIME: 253.26 MSEC
E/A RATIO: 2.95
E/E' RATIO: 7.29 M/S
ECHO LV POSTERIOR WALL: 0.81 CM (ref 0.6–1.1)
EOSINOPHIL # BLD AUTO: 0 K/UL (ref 0–0.5)
EOSINOPHIL NFR BLD: 0.2 % (ref 0–8)
ERYTHROCYTE [DISTWIDTH] IN BLOOD BY AUTOMATED COUNT: 13.5 % (ref 11.5–14.5)
EST. GFR  (NO RACE VARIABLE): >60 ML/MIN/1.73 M^2
FRACTIONAL SHORTENING: 40 % (ref 28–44)
GLUCOSE SERPL-MCNC: 96 MG/DL (ref 70–110)
HCT VFR BLD AUTO: 40.8 % (ref 40–54)
HGB BLD-MCNC: 13.3 G/DL (ref 14–18)
IMM GRANULOCYTES # BLD AUTO: 0.02 K/UL (ref 0–0.04)
IMM GRANULOCYTES NFR BLD AUTO: 0.4 % (ref 0–0.5)
INTERVENTRICULAR SEPTUM: 0.78 CM (ref 0.6–1.1)
LA MAJOR: 6.81 CM
LA MINOR: 6.66 CM
LA WIDTH: 4.19 CM
LEFT ATRIUM SIZE: 4.97 CM
LEFT ATRIUM VOLUME INDEX: 57.9 ML/M2
LEFT ATRIUM VOLUME: 119.2 CM3
LEFT INTERNAL DIMENSION IN SYSTOLE: 2.73 CM (ref 2.1–4)
LEFT VENTRICLE DIASTOLIC VOLUME INDEX: 46.45 ML/M2
LEFT VENTRICLE DIASTOLIC VOLUME: 95.69 ML
LEFT VENTRICLE MASS INDEX: 56 G/M2
LEFT VENTRICLE SYSTOLIC VOLUME INDEX: 13.5 ML/M2
LEFT VENTRICLE SYSTOLIC VOLUME: 27.78 ML
LEFT VENTRICULAR INTERNAL DIMENSION IN DIASTOLE: 4.57 CM (ref 3.5–6)
LEFT VENTRICULAR MASS: 115.67 G
LV LATERAL E/E' RATIO: 5.64 M/S
LV SEPTAL E/E' RATIO: 10.33 M/S
LYMPHOCYTES # BLD AUTO: 1.3 K/UL (ref 1–4.8)
LYMPHOCYTES NFR BLD: 26.3 % (ref 18–48)
MAGNESIUM SERPL-MCNC: 1.7 MG/DL (ref 1.6–2.6)
MCH RBC QN AUTO: 31.8 PG (ref 27–31)
MCHC RBC AUTO-ENTMCNC: 32.6 G/DL (ref 32–36)
MCV RBC AUTO: 98 FL (ref 82–98)
MONOCYTES # BLD AUTO: 0.9 K/UL (ref 0.3–1)
MONOCYTES NFR BLD: 19.4 % (ref 4–15)
MV PEAK A VEL: 0.21 M/S
MV PEAK E VEL: 0.62 M/S
MV STENOSIS PRESSURE HALF TIME: 73.44 MS
MV VALVE AREA P 1/2 METHOD: 3 CM2
NEUTROPHILS # BLD AUTO: 2.6 K/UL (ref 1.8–7.7)
NEUTROPHILS NFR BLD: 53.3 % (ref 38–73)
NRBC BLD-RTO: 0 /100 WBC
PHOSPHATE SERPL-MCNC: 2.7 MG/DL (ref 2.7–4.5)
PISA TR MAX VEL: 2.31 M/S
PLATELET # BLD AUTO: 162 K/UL (ref 150–450)
PMV BLD AUTO: 10.8 FL (ref 9.2–12.9)
POCT GLUCOSE: 142 MG/DL (ref 70–110)
POTASSIUM SERPL-SCNC: 4.4 MMOL/L (ref 3.5–5.1)
PROT SERPL-MCNC: 6 G/DL (ref 6–8.4)
RA MAJOR: 5.97 CM
RA PRESSURE ESTIMATED: 3 MMHG
RA WIDTH: 4.36 CM
RBC # BLD AUTO: 4.18 M/UL (ref 4.6–6.2)
RIGHT VENTRICLE DIASTOLIC BASEL DIMENSION: 3.6 CM
RV TB RVSP: 5 MMHG
SINUS: 3.46 CM
SODIUM SERPL-SCNC: 138 MMOL/L (ref 136–145)
STJ: 3 CM
TDI LATERAL: 0.11 M/S
TDI SEPTAL: 0.06 M/S
TDI: 0.09 M/S
TR MAX PG: 21 MMHG
TRICUSPID ANNULAR PLANE SYSTOLIC EXCURSION: 1.95 CM
TV REST PULMONARY ARTERY PRESSURE: 24 MMHG
WBC # BLD AUTO: 4.8 K/UL (ref 3.9–12.7)
Z-SCORE OF LEFT VENTRICULAR DIMENSION IN END DIASTOLE: -3.08
Z-SCORE OF LEFT VENTRICULAR DIMENSION IN END SYSTOLE: -2.63

## 2024-07-08 PROCEDURE — 25000003 PHARM REV CODE 250

## 2024-07-08 PROCEDURE — 80053 COMPREHEN METABOLIC PANEL: CPT

## 2024-07-08 PROCEDURE — 83735 ASSAY OF MAGNESIUM: CPT

## 2024-07-08 PROCEDURE — 84100 ASSAY OF PHOSPHORUS: CPT

## 2024-07-08 PROCEDURE — 85025 COMPLETE CBC W/AUTO DIFF WBC: CPT

## 2024-07-08 PROCEDURE — 36415 COLL VENOUS BLD VENIPUNCTURE: CPT

## 2024-07-08 PROCEDURE — 63600175 PHARM REV CODE 636 W HCPCS: Mod: JZ,TB

## 2024-07-08 PROCEDURE — 25000003 PHARM REV CODE 250: Performed by: HOSPITALIST

## 2024-07-08 PROCEDURE — 63600175 PHARM REV CODE 636 W HCPCS

## 2024-07-08 RX ORDER — LATANOPROST 50 UG/ML
1 SOLUTION/ DROPS OPHTHALMIC NIGHTLY
COMMUNITY

## 2024-07-08 RX ORDER — LATANOPROST 50 UG/ML
1 SOLUTION/ DROPS OPHTHALMIC NIGHTLY
Status: DISCONTINUED | OUTPATIENT
Start: 2024-07-08 | End: 2024-07-08 | Stop reason: HOSPADM

## 2024-07-08 RX ORDER — DORZOLAMIDE HYDROCHLORIDE AND TIMOLOL MALEATE 20; 5 MG/ML; MG/ML
1 SOLUTION/ DROPS OPHTHALMIC 2 TIMES DAILY
Status: DISCONTINUED | OUTPATIENT
Start: 2024-07-08 | End: 2024-07-08 | Stop reason: HOSPADM

## 2024-07-08 RX ORDER — MAGNESIUM SULFATE HEPTAHYDRATE 40 MG/ML
2 INJECTION, SOLUTION INTRAVENOUS ONCE
Status: COMPLETED | OUTPATIENT
Start: 2024-07-08 | End: 2024-07-08

## 2024-07-08 RX ADMIN — GUAIFENESIN AND DEXTROMETHORPHAN HYDROBROMIDE 1 TABLET: 600; 30 TABLET, EXTENDED RELEASE ORAL at 09:07

## 2024-07-08 RX ADMIN — REMDESIVIR 100 MG: 100 INJECTION, POWDER, LYOPHILIZED, FOR SOLUTION INTRAVENOUS at 09:07

## 2024-07-08 RX ADMIN — MAGNESIUM SULFATE HEPTAHYDRATE 2 G: 40 INJECTION, SOLUTION INTRAVENOUS at 09:07

## 2024-07-08 RX ADMIN — THERA TABS 1 TABLET: TAB at 09:07

## 2024-07-08 RX ADMIN — DEXAMETHASONE 6 MG: 0.5 TABLET ORAL at 09:07

## 2024-07-08 RX ADMIN — DICLOFENAC SODIUM 2 G: 10 GEL TOPICAL at 09:07

## 2024-07-08 RX ADMIN — OXYCODONE HYDROCHLORIDE AND ACETAMINOPHEN 500 MG: 500 TABLET ORAL at 09:07

## 2024-07-08 RX ADMIN — POLYETHYLENE GLYCOL 3350 17 G: 17 POWDER, FOR SOLUTION ORAL at 09:07

## 2024-07-08 NOTE — PLAN OF CARE
[facility] ([place])  Discharge Final Note    Primary Care Provider: Patel Loyd MD    Expected Discharge Date: 7/8/2024  Discharge Plan A and Plan B have been determined by review of patient's clinical status, future medical and therapeutic needs, and coverage/benefits for post-acute care in coordination with multidisciplinary team members.     Final Discharge Note (most recent)       Final Note - 07/08/24 1545          Final Note    Assessment Type Final Discharge Note     Anticipated Discharge Disposition Home or Self Care     What phone number can be called within the next 1-3 days to see how you are doing after discharge? [phone]     Hospital Resources/Appts/Education Provided Appointments scheduled and added to AVS        Post-Acute Status    Post-Acute Authorization Other     Home Health Status Set-up Complete/Auth obtained     Coverage MEDICARE - MEDICARE PART A & B -     Other Status No Post-Acute Service Needs     Discharge Delays None known at this time                   Future Appointments   Date Time Provider Department Center   7/12/2024 10:00 AM [facility]   10/25/2024  8:00 AM [facility]   10/29/2024  9:40 AM Patel Loyd MD [facility]                     CM met with patient  and discussed discharge plans, patient to [place] home with spouse No  medications delivered to bedside. No   HME/DME  recommended and follow up appointment[s] scheduled.   Transportation provided by spouse via private vehicle.       Della Garcia RN  Case Management  [facility]  [phone]

## 2024-07-08 NOTE — ACP (ADVANCE CARE PLANNING)
Today (7/8/2024) a meeting regarding ACP & GOC took place at bedside     Pt Participation: Full/Self     Attendees (Name & Relationship to Pt): Spouse (wife)     Staff Attendees (Name & Role): Messi Caro MD (resident physician)     ACP Conversation (General): I engaged the pt & family about pt's preferences in the event their heart were to stop &/or they were to code during this hospitalization.  They expressed their desire to have a peaceful & natural death, & to not have CPR, chest compressions, or intubation/mechanical ventilation performed in order to prolong their life.  They expressed their desire to be made DNR in accordance to their wishes.      Code Status: DNR     GOC: Pt &/or family expressed their desire to improve/maintain quality of life as a priority.    Messi Caro MD  [facility] Medicine, PGY-1

## 2024-07-08 NOTE — CARE UPDATE
Attempted to call patient's wife via number listed. Patient answered the phone, stated they must have swapped cell phones. He recommended I call his phone as she likely has it. No answer.     Patient declines home health per SW.    Cecily Schaffer, DO PGY3

## 2024-07-08 NOTE — PLAN OF CARE
[facility] - [facility] ([place] [address])      HOME HEALTH ORDERS  FACE TO FACE ENCOUNTER    Patient Name: Artemio Colon Sr.  YOB: 1929    PCP: Patel Loyd MD   PCP Address: [address] [address]  PCP Phone Number: [phone]  PCP Fax: [phone]    Encounter Date: 7/6/24    Admit to Home Health    Diagnoses:  Active Hospital Problems    Diagnosis  POA    *COVID-19 [U07.1]  Yes    Hypoxia [R09.02]  Yes    Delirium [R41.0]  Yes    Obstructive sleep apnea on CPAP [G47.33]  Yes     -currently on apap 8-12 cm H20.  Doing well with cpap.  Currently with resmed.  -70%>4 hours.  Residual ahi of 29.  High leak value 23 lpm.  Currently with ffm.  Will swith to nasal pillow in hope of improving leakage and residual ahi.      Open-angle glaucoma of right eye, indeterminate stage [H40.10X4]  Yes    Lumbar spinal stenosis [M48.061]  Yes    Permanent atrial fibrillation [I48.21]  Yes     S/P ablation [place]        Resolved Hospital Problems   No resolved problems to display.       Follow Up Appointments:  Future Appointments   Date Time Provider Department Center   10/25/2024  8:00 AM LAB, APPOINTMENT [facility] IM [facility]   10/29/2024  9:40 AM Patel Loyd MD [facility]       Allergies:  Review of patient's allergies indicates:   Allergen Reactions    Ketamine Hallucinations     Had dysphoric reaction.     Penicillins Hives       Medications: Review discharge medications with patient and family and provide education.    Current Facility-Administered Medications   Medication Dose Route Frequency Provider Last Rate Last Admin    acetaminophen tablet 650 mg  650 mg Oral Q6H PRN Messi Caro MD        albuterol inhaler 2 puff  2 puff Inhalation Q6H PRN Cb Garza,         ascorbic acid (vitamin C) tablet 500 mg  500 mg Oral BID Messi Caro MD   500 mg at 07/08/24 0947    dexAMETHasone tablet 6 mg  6 mg Oral Daily Messi Caro MD   6 mg at  07/08/24 0947    dextrose 10% bolus 125 mL 125 mL  12.5 g Intravenous PRN Messi Caro MD        dextrose 10% bolus 250 mL 250 mL  25 g Intravenous PRN Messi Caro MD        diclofenac sodium 1 % gel 2 g  2 g Topical (Top) Daily Mitali Villavicencio MD   2 g at 07/08/24 0950    dorzolamide-timolol 2-0.5% ophthalmic solution 1 drop  1 drop Both Eyes BID Cecily Schaffer,         enoxaparin injection 40 mg  40 mg Subcutaneous Q24H (prophylaxis, 1700) Alok Mejia MD   40 mg at 07/07/24 1734    fentaNYL 25 mcg/hr 1 patch  1 patch Transdermal Q72H Cecily Schaffer,    1 patch at 07/07/24 1015    glucagon (human recombinant) injection 1 mg  1 mg Intramuscular PRN Mitali Villavicencio MD        glucose chewable tablet 16 g  16 g Oral PRN Mitali Villavicencio MD        glucose chewable tablet 24 g  24 g Oral PRN Mitali Villavicencio MD        insulin aspart U-100 pen 0-5 Units  0-5 Units Subcutaneous QID (AC + HS) PRN Cecily Schaffer DO        latanoprost 0.005 % ophthalmic solution 1 drop  1 drop Both Eyes QHS Cecily Schaffer, DO        multivitamin tablet  1 tablet Oral Daily Messi Caro MD   1 tablet at 07/08/24 0947    naloxone 0.4 mg/mL injection 0.02 mg  0.02 mg Intravenous PRN Mitali Villavicencio MD        oxyCODONE-acetaminophen 7.5-325 mg per tablet 1 tablet  1 tablet Oral Q4H PRN Mitali Villavicencio MD        polyethylene glycol packet 17 g  17 g Oral Daily Cb Garza, DO   17 g at 07/08/24 0947    remdesivir 100 mg in 0.9% NaCl 250 mL infusion  100 mg Intravenous Daily Messi Caro MD   Stopped at 07/08/24 1028    senna-docusate 8.6-50 mg per tablet 2 tablet  2 tablet Oral BID PRN Cb Garza, DO        sodium chloride 0.9% flush 10 mL  10 mL Intravenous Q12H PRN Mitali Villavicencio MD         Current Discharge Medication List        CONTINUE these medications which have NOT CHANGED    Details   aspirin 81 MG Chew Take 81 mg by mouth once daily.      diclofenac sodium (VOLTAREN) 1 % Gel Use to affected joints up to 4 times  a day  Qty: 100 g, Refills: 4    Associated Diagnoses: Primary osteoarthritis of both hands      dorzolamide-timolol 2-0.5% (COSOPT) 22.3-6.8 mg/mL ophthalmic solution Place 1 drop into both eyes 2 (two) times daily.      fentaNYL (DURAGESIC) 25 mcg/hr Place 1 patch onto the skin every 72 hours.      latanoprost 0.005 % ophthalmic solution Place 1 drop into both eyes every evening.      oxyCODONE-acetaminophen (PERCOCET) 7.5-325 mg per tablet Take 1 tablet by mouth 2 (two) times daily as needed.           STOP taking these medications       oxyCODONE-acetaminophen (LYNOX) 7.5-300 mg per tablet Comments:   Reason for Stopping:                 I have seen and examined this patient within the last 30 days. My clinical findings that support the need for the home health skilled services and home bound status are the following:no   Weakness/numbness causing balance and gait disturbance due to Weakness/Debility making it taxing to leave home.     Diet:   regular diet      Referrals/ Consults  Physical Therapy to evaluate and treat. Evaluate for home safety and equipment needs; Establish/upgrade home exercise program. Perform / instruct on therapeutic exercises, gait training, transfer training, and Range of Motion.  Occupational Therapy to evaluate and treat. Evaluate home environment for safety and equipment needs. Perform/Instruct on transfers, ADL training, ROM, and therapeutic exercises.  Aide to provide assistance with personal care, ADLs, and vital signs.    Activities:   activity as tolerated    Nursing:   Agency to admit patient within 24 hours of hospital discharge unless specified on physician order or at patient request    SN to complete comprehensive assessment including routine vital signs. Instruct on disease process and s/s of complications to report to MD. Review/verify medication list sent home with the patient at time of discharge  and instruct patient/caregiver as needed. Frequency may be adjusted  depending on start of care date.     Skilled nurse to perform up to 3 visits PRN for symptoms related to diagnosis    Notify MD if SBP > 160 or < 90; DBP > 90 or < 50; HR > 120 or < 50; Temp > 101; O2 < 88%;     Ok to schedule additional visits based on staff availability and patient request on consecutive days within the home health episode.    When multiple disciplines ordered:    Start of Care occurs on Sunday - Wednesday schedule remaining discipline evaluations as ordered on separate consecutive days following the start of care.    Thursday SOC -schedule subsequent evaluations Friday and Monday the following week.     Friday - Saturday SOC - schedule subsequent discipline evaluations on consecutive days starting Monday of the following week.    For all post-discharge communication and subsequent orders please contact patient's primary care physician.       Home Health Aide:  Physical Therapy Three times weekly, Occupational Therapy Three times weekly, and Home Health Aide Three times weekly    Wound Care Orders  no    I certify that this patient is confined to his home and needs intermittent skilled nursing care, physical therapy, and occupational therapy.    Cecily Schaffer, DO PGY2

## 2024-07-08 NOTE — PLAN OF CARE
Goals met adequate for discharge.     Problem: Adult Inpatient Plan of Care  Goal: Plan of Care Review  Outcome: Met  Goal: Patient-Specific Goal (Individualized)  Outcome: Met  Goal: Absence of Hospital-Acquired Illness or Injury  Outcome: Met  Goal: Optimal Comfort and Wellbeing  Outcome: Met  Goal: Readiness for Transition of Care  Outcome: Met     Problem: Infection  Goal: Absence of Infection Signs and Symptoms  Outcome: Met     Problem: Fall Injury Risk  Goal: Absence of Fall and Fall-Related Injury  Outcome: Met

## 2024-07-08 NOTE — PLAN OF CARE
"Discharge Plan A and Plan B have been determined by review of patient's clinical status, future medical and therapeutic needs, and coverage/benefits for post-acute care in coordination with multidisciplinary team members.    07/08/24 1318   Post-Acute Status   Post-Acute Authorization Home Health   Home Health Status Patient declined/refused   Coverage MEDICARE - MEDICARE PART A & B -   Hospital Resources/Appts/Education Provided Appointments scheduled and added to AVS   Discharge Delays None known at this time   Discharge Plan   Discharge Plan A Home with family   Discharge Plan B Home     CM spoke with patient and patients declines  services. "My wife can help me if I need it."   updated medical team and will cont to monitor for any needs. Patient discharge pending  6  MWT.  MELY 7/8/24      TREATMENT PLAN      Della Garcia RN  Case Management  [facility]  [phone]    "

## 2024-07-08 NOTE — DISCHARGE SUMMARY
[facility] - Stepdown Flex ([place] [id number])  [facility] Medicine  Discharge Summary      Patient Name: Artemio Colon Sr.  MRN: [id number]  RIYA: [id number]  Patient Class: IP- Inpatient  Admission Date: 2024  Hospital Length of Stay: 1 days  Discharge Date and Time:  2024 2:32 PM  Attending Physician: Ant Lu MD   Discharging Provider: Cecily Schaffer DO  Primary Care Provider: Patel Loyd MD  [facility] Medicine Team: [facility] MED 2 Cecily Schaffer DO  Primary Care Team: [facility] HOS[facility] MED 2    HPI:   89M with complete AV block s/p pacemaker, AF s/p Watchman, JAEL on CPAP, prediabetes, and spinal stenosis on prn opioids presents with AMS. Per wife patient had episodes of confusion characterized by unresponsiveness yesterday afternoon. He also felt feverish and described having a dry cough and runny nose. Wife has had cold-like symptoms for the last few days and believes she got him sick. They had just returned from a . He is vaccinated against COVID but never sick from it before. Patient denies chest pain, SOB, abdominal pain, or changes in urination or bowel movements. He denies dizziness or lightheadedness.    * No surgery found *      Hospital Course:   Patient admitted to  for management of AHRF due to COVID. Treated with remdesivir and decadron. Deferred full dose AC due to history of GI bleeds, opted for prophylactic lovenox. TTE WNL, normal systolic function. O2 quickly weaned after one day of therapy. Patient stable to discharge to home on room air. Declined HH. Return precautions provided.       Constitutional:       General: alert, no apparent distress  HENT:      Head: Normocephalic and atraumatic.   Cardiovascular:      Rate and Rhythm: Normal rate and regular rhythm.      Heart sounds: Normal heart sounds.   Pulmonary:      Effort: Pulmonary effort is normal.      Breath sounds: Normal breath sounds.   Abdominal:      General: Abdomen is flat.     Palpations: Abdomen is soft.    Musculoskeletal:      Cervical back: Normal range of motion.   Skin:     General: Skin is warm and dry   Neurological:      Mental Status: alert and oriented to person, place, and time.   Psychiatric:         Mood and Affect: Mood normal.         Behavior: Behavior normal.           Goals of Care Treatment Preferences:  Code Status: DNR      Consults:     No new Assessment & Plan notes have been filed under this hospital service since the last note was generated.  Service: Hospital Medicine    Final Active Diagnoses:    Diagnosis Date Noted POA    PRINCIPAL PROBLEM:  COVID-19 [U07.1] 07/07/2024 Yes    Hypoxia [R09.02] 07/07/2024 Yes    Delirium [R41.0] 07/07/2024 Yes    Obstructive sleep apnea on CPAP [G47.33] 01/16/2018 Yes    Open-angle glaucoma of right eye, indeterminate stage [H40.10X4] 08/21/2017 Yes    Lumbar spinal stenosis [M48.061] 08/21/2017 Yes    Permanent atrial fibrillation [I48.21] 06/20/2017 Yes      Problems Resolved During this Admission:       Discharged Condition: stable    Disposition: Home or Self Care    Follow Up:    Patient Instructions:   No discharge procedures on file.    Significant Diagnostic Studies: N/A    Pending Diagnostic Studies:       None           Medications:  Reconciled Home Medications:      Medication List        CONTINUE taking these medications      aspirin 81 MG Chew  Take 81 mg by mouth once daily.     diclofenac sodium 1 % Gel  Commonly known as: VOLTAREN  Use to affected joints up to 4 times a day     dorzolamide-timolol 2-0.5% 22.3-6.8 mg/mL ophthalmic solution  Commonly known as: COSOPT  Place 1 drop into both eyes 2 (two) times daily.     fentaNYL 25 mcg/hr  Commonly known as: DURAGESIC  Place 1 patch onto the skin every 72 hours.     latanoprost 0.005 % ophthalmic solution  Place 1 drop into both eyes every evening.     oxyCODONE-acetaminophen 7.5-325 mg per tablet  Commonly known as: PERCOCET  Take 1 tablet by mouth 2 (two) times daily as needed.               Indwelling Lines/Drains at time of discharge:   Lines/Drains/Airways       None                   Time spent on the discharge of patient: 35 minutes         Cecily Schaffer DO  Department of Hospital Medicine  [facility] - Stepdown Flex ([place]-14)

## 2024-07-08 NOTE — NURSING
Patient alert and oriented.VSS. Room air. No complaint of pain or discomfort. Iv abx complete. Patient discharge ordered. IV removed. Precautions maintained. Discharge paperwork provided and reviewed with patient/ spouse. Patient discharging home with spouse, wheelchair assistance offered, patient and spouse dec lined; will walk to parking garage with roller aid.

## 2024-07-08 NOTE — PLAN OF CARE
Pt without complaints. Infrequent cough noted. No distress on 2L NC. Afebrile.     Problem: Adult Inpatient Plan of Care  Goal: Plan of Care Review  Outcome: Progressing  Goal: Patient-Specific Goal (Individualized)  Outcome: Progressing  Goal: Absence of Hospital-Acquired Illness or Injury  Outcome: Progressing  Goal: Optimal Comfort and Wellbeing  Outcome: Progressing  Goal: Readiness for Transition of Care  Outcome: Progressing     Problem: Fall Injury Risk  Goal: Absence of Fall and Fall-Related Injury  Outcome: Progressing     Problem: Infection  Goal: Absence of Infection Signs and Symptoms  Outcome: Progressing

## 2024-07-08 NOTE — NURSING
Home Oxygen Evaluation    Date Performed: 2024    1) Patient's Home O2 Sat on room air, while at rest: 95%        If O2 sats on room air at rest are 88% or below, patient qualifies. No additional testing needed. Document N/A in steps 2 and 3. If 89% or above, complete steps 2.      2) Patient's O2 Sat on room air while exercisin%        If O2 sats on room air while exercising remain 89% or above patient does not qualify, no further testing needed Document N/A in step 3. If O2 sats on room air while exercising are 88% or below, continue to step 3.      3) Patient's O2 Sat while exercising on O2: N/A at N/A LPM         (Must show improvement from #2 for patients to qualify)    If O2 sats improve on oxygen, patient qualifies for portable oxygen. If not, the patient does not qualify.

## 2024-07-12 ENCOUNTER — PATIENT OUTREACH (OUTPATIENT)
Dept: ADMINISTRATIVE | Facility: CLINIC | Age: 89
End: 2024-07-12
Payer: MEDICARE

## 2024-07-12 ENCOUNTER — OFFICE VISIT (OUTPATIENT)
Dept: INTERNAL MEDICINE | Facility: CLINIC | Age: 89
End: 2024-07-12
Payer: MEDICARE

## 2024-07-12 VITALS
DIASTOLIC BLOOD PRESSURE: 80 MMHG | SYSTOLIC BLOOD PRESSURE: 116 MMHG | WEIGHT: 191.56 LBS | BODY MASS INDEX: 27.42 KG/M2 | HEART RATE: 71 BPM | HEIGHT: 70 IN | OXYGEN SATURATION: 97 %

## 2024-07-12 DIAGNOSIS — U07.1 COVID-19: ICD-10-CM

## 2024-07-12 DIAGNOSIS — Z09 HOSPITAL DISCHARGE FOLLOW-UP: Primary | ICD-10-CM

## 2024-07-12 PROBLEM — R09.02 HYPOXIA: Status: RESOLVED | Noted: 2024-07-07 | Resolved: 2024-07-12

## 2024-07-12 PROBLEM — R41.0 DELIRIUM: Status: RESOLVED | Noted: 2024-07-07 | Resolved: 2024-07-12

## 2024-07-12 PROCEDURE — 99213 OFFICE O/P EST LOW 20 MIN: CPT | Mod: PBBFAC | Performed by: PHYSICIAN ASSISTANT

## 2024-07-12 PROCEDURE — 99999 PR PBB SHADOW E&M-EST. PATIENT-LVL III: CPT | Mod: PBBFAC,,, | Performed by: PHYSICIAN ASSISTANT

## 2024-07-12 NOTE — PROGRESS NOTES
Subjective     Patient ID: Artemio Colon . is a 89 y.o. male with PMH of complete AV block s/p pacemaker, AF s/p Watchman, JAEL on CPAP, prediabetes, and spinal stenosis on prn opioids presents     Chief Complaint: Follow-up (HOSFU)    HPI    Established pt of Patel Loyd MD (new to me)    Here for hospital discharge follow up after admission for weakness, cough, AMS. He was found to have COVID19        Admission Date: 7/6/2024  Hospital Length of Stay: 1 days  Discharge Date and Time:  07/08/2024   Hospital Course:   Patient admitted to  for management of AHRF due to COVID. Treated with remdesivir and decadron. Deferred full dose AC due to history of GI bleeds, opted for prophylactic lovenox. TTE WNL, normal systolic function. O2 quickly weaned after one day of therapy. Patient stable to discharge to home on room air. Declined HH. Return precautions provided.       Today pt is attended by wife.   He is feeling better, occ fatigue, occ cough  No cp, sob or fevers.     Past Medical History:   Diagnosis Date    AV block     BPH (benign prostatic hyperplasia)     Cancer     Chronic rhinitis     Coronary artery disease     Erectile dysfunction     Glaucoma     Kidney cysts     Neuropathy 8/22/2017    JAEL (obstructive sleep apnea)     Pacemaker     Symptomatic bradycardia     s/p pacemaker     Social History     Tobacco Use    Smoking status: Never    Smokeless tobacco: Never   Substance Use Topics    Alcohol use: Not Currently     Alcohol/week: 1.0 standard drink of alcohol     Types: 1 Glasses of wine per week     Comment: weekly with meal    Drug use: No     Review of patient's allergies indicates:   Allergen Reactions    Ketamine Hallucinations     Had dysphoric reaction.     Penicillins Hives         Review of Systems   Constitutional:  Positive for fatigue. Negative for chills, fever and unexpected weight change.   Respiratory:  Positive for cough. Negative for shortness of breath.   "  Cardiovascular:  Negative for chest pain and leg swelling.   Gastrointestinal:  Negative for abdominal pain and vomiting.   Integumentary:  Negative for rash.   Neurological:  Negative for weakness and headaches.          Objective  /80 (BP Location: Left arm, Patient Position: Sitting, BP Method: Medium (Manual))   Pulse 71   Ht 5' 10" (1.778 m)   Wt 86.9 kg (191 lb 9.3 oz)   SpO2 97%   BMI 27.49 kg/m²       Physical Exam  Vitals reviewed.   Constitutional:       General: He is not in acute distress.     Appearance: He is well-developed. He is not ill-appearing.   HENT:      Head: Normocephalic and atraumatic.   Cardiovascular:      Rate and Rhythm: Normal rate and regular rhythm.      Heart sounds: No murmur heard.  Pulmonary:      Effort: Pulmonary effort is normal.      Breath sounds: Normal breath sounds. No wheezing or rales.   Musculoskeletal:      Comments: Trace BLE edema  Ambulating with rolling walker   Skin:     General: Skin is warm and dry.      Findings: No rash.   Neurological:      Mental Status: He is alert and oriented to person, place, and time.            Assessment and Plan     1. Hospital discharge follow-up    2. COVID-19      Discharge summary, notes, lab/imaging reviewed  Pt improving  VSS  Symptomatic care, adequate, hydration and nutrition discussed  RTC prn      Future Appointments   Date Time Provider Department Center   7/12/2024 10:00 AM Julia Kohler PA-C [facility]   10/25/2024  8:00 AM LAB, APPOINTMENT [facility] DONNA [facility]  [facility]   10/29/2024  9:40 AM Patel Loyd MD [facility]       "

## 2024-07-12 NOTE — PROGRESS NOTES
C3 nurse spoke with Artemio Colon Sr. for a TCC post hospital discharge follow up call. The patient has a scheduled HOS appointment with Julia Kohler on 07/12/24 @ 1000.

## 2024-08-15 ENCOUNTER — CLINICAL SUPPORT (OUTPATIENT)
Dept: CARDIOLOGY | Facility: HOSPITAL | Age: 89
End: 2024-08-15
Attending: INTERNAL MEDICINE
Payer: MEDICARE

## 2024-08-15 ENCOUNTER — CLINICAL SUPPORT (OUTPATIENT)
Dept: CARDIOLOGY | Facility: HOSPITAL | Age: 89
End: 2024-08-15
Payer: MEDICARE

## 2024-08-15 DIAGNOSIS — I44.2 ATRIOVENTRICULAR BLOCK, COMPLETE: ICD-10-CM

## 2024-08-15 DIAGNOSIS — Z95.0 PRESENCE OF CARDIAC PACEMAKER: ICD-10-CM

## 2024-08-15 PROCEDURE — 93294 REM INTERROG EVL PM/LDLS PM: CPT | Mod: ,,, | Performed by: INTERNAL MEDICINE

## 2024-08-20 ENCOUNTER — TELEPHONE (OUTPATIENT)
Dept: ELECTROPHYSIOLOGY | Facility: CLINIC | Age: 89
End: 2024-08-20
Payer: MEDICARE

## 2024-08-20 DIAGNOSIS — I44.2 COMPLETE AV BLOCK: Primary | ICD-10-CM

## 2024-08-29 LAB
OHS CV BIV PACING PERCENT: 99 %
OHS CV DC REMOTE DEVICE TYPE: NORMAL

## 2024-09-23 NOTE — PROGRESS NOTES
Mr. Colon is a patient of Dr. Adler and was last seen in clinic 9/21/2023.      Subjective:   Patient ID:  Artemio Colon Sr. is a 89 y.o. male who presents for follow up of CRT-P  .     HPI:    Mr. Colon is a 89 y.o. male with AF (ablation 2011), syncope, CHB, carotid sinus sensitivity, PPM (single chamber 2003, dual chamber 2011, CRT-P upgrade with RV lead revision 2018), CAD, watchman (12/2020) here for annual follow up.     Background:    Mr. Colon initially presented to the electrophysiology clinic for follow-up for his pacemaker and for his atrial fibrillation.  He recently moved here from [place] and established care in the cardiology clinic.  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 He has CAD (cath 2012 rx with medical therapy) and preserved EF (60-65% 1/2015). I do not know if he had a PVI or AVN ablation as he has complete AV block.  He feels well, exercises regularly.  He denies any chest pain, shortness of breath, palpitations, orthopnea or PND.  He is not on anticoagulation and when I brought it up as a discussion he stated he does not want to be on it and rathered not discuss it any further.     Mr. Colon presented 1/2018 noting shortness of breath and low energy. Recent remote interrogation noted <0.1% AMS burden with longest episode 3 min and 52 seconds. No AF noted. He wass 61% A and 100% V paced. His V-threshold was 1.75 @ 0.4 on first visit with me. Recent remote auto-capture threshold was 2.25 @ 0.4. It has slowly increased over the past year and a half with stable impedance. Checked in clinic with bipolar threshold of 1.5 @ 0.4 sitting up and unipolar threshold of 1.25@ 0.4 also sitting up. Lying down his capture threshold  "was 2.25 @ 0.4. ECHO noted LVEF of 40-45% and stress test was negative. We discussed upgrade to CRT-P which he underwent 2/2018.    Mr. Colon presents for post upgrade check 4/2018. At time of implant RV lead capture threshold was even higher so we elected to cap it and implant a new RV lead. Device interrogation noted no AF, sinus rhythm/kashif with PACs and complete AV block, 6 sec of AMS x 2, stable lead parameters. Noted rare phrenic nerve stim that is treated with position movement. He notes that he had a hard time mentally coming out of anesthesia during his recent procedure.    ECHO 8/2018 noted LVEF was 60-65%.     Mr. Colon presented for follow-up 11/2020 to discuss anticoagulation. He recently began having issues with recurrent pAF. He has had episodes lasting up to 9 hours. He noted he felt a little "off". He feels well without complaints otherwise. He has had issues with recurrent GI bleeding not on anticoagulation (previously tried xarelto in 1164-6055 for symptomatic AF and had issues with excessive bleeding then). He was hospitalized for a diverticular bleed in March of 2019. We discussed pros and cons of Watchman implant which he elected to have which was performed on 12/3/2020.     Mr. Colon returned for follow-up 1/2021. His 6 week post-watchman implant GAIL noted no JAN leaks. Eliquis was stopped. He is on DAPT now. He recently had his LV lead reprogrammed due to phrenic nerve stim. He noted some fatigue that he thinks is from stopping testosterone.    Mr. Isaiah Clark returned for 6 month clinic follow-up 7/2021. He completed 6 months of DAPT and is now on aspirin 81mg daily only. He feels great. He has no complaints.    8/2022: Mr. Isaiah Clark returned for yearly device follow-up. He felt well. He is dieting to try to lose weight. Notes some occasional phrenic stim but not too bothersome.    9/21/2023: Mr. Isaiah Clark returns for yearly follow-up. No complaints.  In clinic device " check notes shows 100% BiV pacing with stable lead parameters, 100% AF burden, and ~2yrs of estimated battery longevity.  ECG is AF with BiV pacing (QRSd 150ms)  In summary, Mr. 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 He is in complete AV block.  He also has JAEL, low testosterone, and CAD with preserved LVEF by ECHO in 2015. He is now s/p upgrade to CRT-P and RV lead revision for increasing RV lead threshold and pacing induced CMP with normalization of his EF. He is having recurrent long episodes of pAF and has a [id number] score of 3. He has had recurrent GI bleeding from diverticular disease and for this reason has not been on long-term anticoagulation and is now s/p successful Watchman implantation with 6 week GAIL noting no JAN leaks. He completed 6 months of DAPT and is now on 81mg aspirin daily alone. He is doing great. Device is functioning normally.       Update (09/26/2024):    7/11/2024: Patient admitted to  for management of AHRF due to COVID. Echo showed normal LVEF.    Today he says he continues to feel. Only complaint is a little insomnia. No BOYLE, CP, palps, LH, syncope reported.    He is on ASA s/p watchman.     Device Interrogation (9/26/2024) reveals an intrinsic AF with CHB with stable lead and device function. Chronic AF. No ventricular arrhythmias.  He paces 100% in the BiV. Estimated battery longevity 11.8 months.     I have personally reviewed the patient's EKG today, which shows AFVP at 60bpm. QRS is 148. QT is 448.    Relevant Cardiac Test Results:    2D Echo (7/8/2024):    Left Ventricle: The left ventricle is normal in size. Ventricular mass is normal. Normal wall thickness. Normal wall motion. There is normal systolic function with a visually  estimated ejection fraction of 60 - 65%. There is indeterminate diastolic function.    Right Ventricle: Normal right ventricular cavity size. Wall thickness is normal. Right ventricle wall motion  is normal. Systolic function is normal.    Left Atrium: Normal left atrial size.    Right Atrium: Normal right atrial size. Lead present in the right atrium.    Aortic Valve: The aortic valve is a trileaflet valve. There is mild aortic valve sclerosis. There is mild annular calcification present.    Tricuspid Valve: There is mild to moderate regurgitation.    Aorta: Aortic root is normal in size measuring 3.46 cm. Ascending aorta is normal measuring 3.62 cm.    Pulmonary Artery: The estimated pulmonary artery systolic pressure is 24 mmHg.    IVC/SVC: Normal venous pressure at 3 mmHg.    Current Outpatient Medications   Medication Sig    aspirin 81 MG Chew Take 81 mg by mouth once daily.    diclofenac sodium (VOLTAREN) 1 % Gel Use to affected joints up to 4 times a day    dorzolamide-timolol 2-0.5% (COSOPT) 22.3-6.8 mg/mL ophthalmic solution Place 1 drop into both eyes 2 (two) times daily.    fentaNYL (DURAGESIC) 25 mcg/hr Place 1 patch onto the skin every 72 hours.    latanoprost 0.005 % ophthalmic solution Place 1 drop into both eyes every evening.    oxyCODONE-acetaminophen (PERCOCET) 7.5-325 mg per tablet Take 1 tablet by mouth 2 (two) times daily as needed.     No current facility-administered medications for this visit.       Review of Systems   Constitutional: Negative for malaise/fatigue.   Cardiovascular:  Negative for chest pain, dyspnea on exertion, irregular heartbeat, leg swelling and palpitations.   Respiratory:  Negative for shortness of breath.    Hematologic/Lymphatic: Negative for bleeding problem.   Skin:  Negative for rash.   Musculoskeletal:  Negative for myalgias.   Gastrointestinal:  Negative for hematemesis, hematochezia and nausea.   Genitourinary:  Negative for hematuria.   Neurological:  Negative  "for light-headedness.   Psychiatric/Behavioral:  Negative for altered mental status. The patient has insomnia.    Allergic/Immunologic: Negative for persistent infections.       Objective:          /78   Pulse 60   Ht 5' 10" (1.778 m)   Wt 85.5 kg (188 lb 7.9 oz)   BMI 27.05 kg/m²     Physical Exam  Vitals and nursing note reviewed.   Constitutional:       Appearance: Normal appearance. He is well-developed.   HENT:      Head: Normocephalic.      Nose: Nose normal.   Eyes:      Pupils: Pupils are equal, round, and reactive to light.   Cardiovascular:      Rate and Rhythm: Normal rate and regular rhythm.   Pulmonary:      Effort: No respiratory distress.   Chest:      Comments: Device to LUCW. Incision and pocket in good repair.    Musculoskeletal:         General: Normal range of motion.   Skin:     General: Skin is warm and dry.      Findings: No erythema.   Neurological:      Mental Status: He is alert and oriented to person, place, and time.   Psychiatric:         Speech: Speech normal.         Behavior: Behavior normal.           Lab Results   Component Value Date     07/08/2024    K 4.4 07/08/2024    MG 1.7 07/08/2024    BUN 16 07/08/2024    CREATININE 0.8 07/08/2024    ALT 18 07/08/2024    AST 29 07/08/2024    HGB 13.3 (L) 07/08/2024    HCT 40.8 07/08/2024    HCT 63 (H) 02/14/2019    TSH 1.999 07/02/2019    LDLCALC 94.0 10/23/2023       Recent Labs   Lab 12/04/23  1645 03/19/24  1512 07/07/24  0532   INR 1.1 1.0 1.2       Assessment:     1. Cardiac resynchronization therapy pacemaker (CRT-P) in place    2. Complete AV block    3. Permanent atrial fibrillation    4. Chronic systolic congestive heart failure, pacing induced now s/p upgrade to CRT-P 2/2018    5. Presence of Watchman left atrial appendage closure device      Plan:     In summary, Mr. Colon is a 89 y.o. male with AF (ablation 2011), syncope, CHB, carotid sinus sensitivity, PPM (single chamber 2003, dual chamber 2011, CRT-P " upgrade with RV lead revision 2018), CAD, watchman (12/2020) here for annual follow up.   Mr. Colon is doing well from a device perspective with stable lead and device function. Chronic AF. On ASA s/p watchman. CHB with 100% biv pacing. No CHF symptoms. Echo 7/2024 while hospitalized with covid showed preserved EF 60%. 11.8 months to [facility]. Discussed generator change. He is doing very well.     Continue current medication regimen and device settings.   Follow up in device clinic as scheduled.   Follow up in EP clinic in 1 year or after generator change, sooner as needed.     *A copy of this note has been sent to Dr. Adler*    Follow up in about 1 year (around 9/26/2025), or or after generator change.    ------------------------------------------------------------------    ÁNGEL Landin, NP-C  Cardiac Electrophysiology

## 2024-09-26 ENCOUNTER — HOSPITAL ENCOUNTER (OUTPATIENT)
Dept: CARDIOLOGY | Facility: CLINIC | Age: 89
Discharge: HOME | End: 2024-09-26
Payer: MEDICARE

## 2024-09-26 ENCOUNTER — OFFICE VISIT (OUTPATIENT)
Dept: ELECTROPHYSIOLOGY | Facility: CLINIC | Age: 89
End: 2024-09-26
Payer: MEDICARE

## 2024-09-26 ENCOUNTER — CLINICAL SUPPORT (OUTPATIENT)
Dept: CARDIOLOGY | Facility: HOSPITAL | Age: 89
End: 2024-09-26
Attending: INTERNAL MEDICINE
Payer: MEDICARE

## 2024-09-26 VITALS
BODY MASS INDEX: 26.99 KG/M2 | HEART RATE: 60 BPM | WEIGHT: 188.5 LBS | HEIGHT: 70 IN | DIASTOLIC BLOOD PRESSURE: 78 MMHG | SYSTOLIC BLOOD PRESSURE: 136 MMHG

## 2024-09-26 DIAGNOSIS — I44.2 COMPLETE AV BLOCK: ICD-10-CM

## 2024-09-26 DIAGNOSIS — Z95.818 PRESENCE OF WATCHMAN LEFT ATRIAL APPENDAGE CLOSURE DEVICE: ICD-10-CM

## 2024-09-26 DIAGNOSIS — Z95.0 CARDIAC RESYNCHRONIZATION THERAPY PACEMAKER (CRT-P) IN PLACE: Primary | ICD-10-CM

## 2024-09-26 DIAGNOSIS — I50.22 CHRONIC SYSTOLIC CONGESTIVE HEART FAILURE: ICD-10-CM

## 2024-09-26 DIAGNOSIS — I48.21 PERMANENT ATRIAL FIBRILLATION: ICD-10-CM

## 2024-09-26 LAB
OHS QRS DURATION: 148 MS
OHS QTC CALCULATION: 448 MS

## 2024-09-26 PROCEDURE — 99214 OFFICE O/P EST MOD 30 MIN: CPT | Mod: S$PBB,,, | Performed by: NURSE PRACTITIONER

## 2024-09-26 PROCEDURE — 99213 OFFICE O/P EST LOW 20 MIN: CPT | Mod: PBBFAC,25 | Performed by: NURSE PRACTITIONER

## 2024-09-26 PROCEDURE — 93281 PM DEVICE PROGR EVAL MULTI: CPT

## 2024-09-26 PROCEDURE — 93010 ELECTROCARDIOGRAM REPORT: CPT | Mod: S$PBB,,, | Performed by: INTERNAL MEDICINE

## 2024-09-26 PROCEDURE — 99999 PR PBB SHADOW E&M-EST. PATIENT-LVL III: CPT | Mod: PBBFAC,,, | Performed by: NURSE PRACTITIONER

## 2024-09-26 PROCEDURE — 93005 ELECTROCARDIOGRAM TRACING: CPT | Mod: PBBFAC | Performed by: INTERNAL MEDICINE

## 2024-10-25 ENCOUNTER — LAB VISIT (OUTPATIENT)
Dept: LAB | Facility: HOSPITAL | Age: 89
End: 2024-10-25
Attending: FAMILY MEDICINE
Payer: MEDICARE

## 2024-10-25 DIAGNOSIS — Z79.899 ENCOUNTER FOR LONG-TERM (CURRENT) USE OF OTHER MEDICATIONS: ICD-10-CM

## 2024-10-25 LAB
ALBUMIN SERPL BCP-MCNC: 3.9 G/DL (ref 3.5–5.2)
ALP SERPL-CCNC: 108 U/L (ref 40–150)
ALT SERPL W/O P-5'-P-CCNC: 12 U/L (ref 10–44)
ANION GAP SERPL CALC-SCNC: 9 MMOL/L (ref 8–16)
AST SERPL-CCNC: 23 U/L (ref 10–40)
BILIRUB SERPL-MCNC: 0.8 MG/DL (ref 0.1–1)
BUN SERPL-MCNC: 17 MG/DL (ref 10–30)
CALCIUM SERPL-MCNC: 9.6 MG/DL (ref 8.7–10.5)
CHLORIDE SERPL-SCNC: 109 MMOL/L (ref 95–110)
CHOLEST SERPL-MCNC: 173 MG/DL (ref 120–199)
CHOLEST/HDLC SERPL: 3.4 {RATIO} (ref 2–5)
CO2 SERPL-SCNC: 26 MMOL/L (ref 23–29)
CREAT SERPL-MCNC: 1 MG/DL (ref 0.5–1.4)
EST. GFR  (NO RACE VARIABLE): >60 ML/MIN/1.73 M^2
ESTIMATED AVG GLUCOSE: 117 MG/DL (ref 68–131)
GLUCOSE SERPL-MCNC: 108 MG/DL (ref 70–110)
HBA1C MFR BLD: 5.7 % (ref 4–5.6)
HDLC SERPL-MCNC: 51 MG/DL (ref 40–75)
HDLC SERPL: 29.5 % (ref 20–50)
LDLC SERPL CALC-MCNC: 102.8 MG/DL (ref 63–159)
NONHDLC SERPL-MCNC: 122 MG/DL
POTASSIUM SERPL-SCNC: 4.4 MMOL/L (ref 3.5–5.1)
PROT SERPL-MCNC: 7.3 G/DL (ref 6–8.4)
SODIUM SERPL-SCNC: 144 MMOL/L (ref 136–145)
TRIGL SERPL-MCNC: 96 MG/DL (ref 30–150)

## 2024-10-25 PROCEDURE — 80061 LIPID PANEL: CPT | Performed by: FAMILY MEDICINE

## 2024-10-25 PROCEDURE — 36415 COLL VENOUS BLD VENIPUNCTURE: CPT | Performed by: FAMILY MEDICINE

## 2024-10-25 PROCEDURE — 80053 COMPREHEN METABOLIC PANEL: CPT | Performed by: FAMILY MEDICINE

## 2024-10-25 PROCEDURE — 83036 HEMOGLOBIN GLYCOSYLATED A1C: CPT | Performed by: FAMILY MEDICINE

## 2024-10-28 ENCOUNTER — OFFICE VISIT (OUTPATIENT)
Dept: UROLOGY | Facility: CLINIC | Age: 89
End: 2024-10-28
Payer: MEDICARE

## 2024-10-28 VITALS
WEIGHT: 192 LBS | DIASTOLIC BLOOD PRESSURE: 74 MMHG | SYSTOLIC BLOOD PRESSURE: 148 MMHG | HEART RATE: 64 BPM | HEIGHT: 70 IN | BODY MASS INDEX: 27.49 KG/M2

## 2024-10-28 DIAGNOSIS — R31.9 HEMATURIA, UNSPECIFIED TYPE: Primary | ICD-10-CM

## 2024-10-28 PROCEDURE — 99999 PR PBB SHADOW E&M-EST. PATIENT-LVL II: CPT | Mod: PBBFAC,,, | Performed by: STUDENT IN AN ORGANIZED HEALTH CARE EDUCATION/TRAINING PROGRAM

## 2024-10-28 PROCEDURE — 99212 OFFICE O/P EST SF 10 MIN: CPT | Mod: PBBFAC | Performed by: STUDENT IN AN ORGANIZED HEALTH CARE EDUCATION/TRAINING PROGRAM

## 2024-10-28 PROCEDURE — 99204 OFFICE O/P NEW MOD 45 MIN: CPT | Mod: S$PBB,,, | Performed by: STUDENT IN AN ORGANIZED HEALTH CARE EDUCATION/TRAINING PROGRAM

## 2024-10-28 RX ORDER — HYDROCODONE BITARTRATE AND ACETAMINOPHEN 10; 325 MG/1; MG/1
1 TABLET ORAL
COMMUNITY
Start: 2024-10-04

## 2024-10-29 ENCOUNTER — OFFICE VISIT (OUTPATIENT)
Dept: INTERNAL MEDICINE | Facility: CLINIC | Age: 89
End: 2024-10-29
Payer: MEDICARE

## 2024-10-29 VITALS
WEIGHT: 190.69 LBS | BODY MASS INDEX: 27.3 KG/M2 | HEART RATE: 60 BPM | DIASTOLIC BLOOD PRESSURE: 68 MMHG | HEIGHT: 70 IN | SYSTOLIC BLOOD PRESSURE: 132 MMHG | OXYGEN SATURATION: 96 %

## 2024-10-29 DIAGNOSIS — Z79.899 DRUG THERAPY: ICD-10-CM

## 2024-10-29 DIAGNOSIS — Z00.00 ANNUAL PHYSICAL EXAM: Primary | ICD-10-CM

## 2024-10-29 PROBLEM — R10.9 ABDOMINAL CRAMPING: Status: RESOLVED | Noted: 2023-01-24 | Resolved: 2024-10-29

## 2024-10-29 PROBLEM — R74.8 ELEVATED SERUM GGT LEVEL: Status: RESOLVED | Noted: 2023-10-27 | Resolved: 2024-10-29

## 2024-10-29 PROBLEM — R79.89 ELEVATED LFTS: Status: RESOLVED | Noted: 2023-10-27 | Resolved: 2024-10-29

## 2024-10-29 PROBLEM — Z87.19 HISTORY OF GI BLEED: Status: ACTIVE | Noted: 2019-02-27

## 2024-10-29 PROBLEM — U07.1 COVID-19: Status: RESOLVED | Noted: 2024-07-07 | Resolved: 2024-10-29

## 2024-10-29 PROCEDURE — 99999 PR PBB SHADOW E&M-EST. PATIENT-LVL IV: CPT | Mod: PBBFAC,,, | Performed by: FAMILY MEDICINE

## 2024-10-29 PROCEDURE — 99214 OFFICE O/P EST MOD 30 MIN: CPT | Mod: PBBFAC | Performed by: FAMILY MEDICINE

## 2024-10-29 PROCEDURE — 99397 PER PM REEVAL EST PAT 65+ YR: CPT | Mod: GZ,S$PBB,, | Performed by: FAMILY MEDICINE

## 2024-11-01 ENCOUNTER — TELEPHONE (OUTPATIENT)
Dept: UROLOGY | Facility: CLINIC | Age: 89
End: 2024-11-01
Payer: MEDICARE

## 2024-11-01 ENCOUNTER — HOSPITAL ENCOUNTER (OUTPATIENT)
Dept: RADIOLOGY | Facility: HOSPITAL | Age: 89
Discharge: HOME | End: 2024-11-01
Attending: STUDENT IN AN ORGANIZED HEALTH CARE EDUCATION/TRAINING PROGRAM
Payer: MEDICARE

## 2024-11-01 DIAGNOSIS — R31.9 HEMATURIA, UNSPECIFIED TYPE: ICD-10-CM

## 2024-11-01 PROCEDURE — 74178 CT ABD&PLV WO CNTR FLWD CNTR: CPT | Mod: TC

## 2024-11-01 PROCEDURE — 74178 CT ABD&PLV WO CNTR FLWD CNTR: CPT | Mod: 26,,, | Performed by: RADIOLOGY

## 2024-11-01 PROCEDURE — 25500020 PHARM REV CODE 255: Performed by: STUDENT IN AN ORGANIZED HEALTH CARE EDUCATION/TRAINING PROGRAM

## 2024-11-01 RX ADMIN — IOHEXOL 100 ML: 350 INJECTION, SOLUTION INTRAVENOUS at 01:11

## 2024-11-08 ENCOUNTER — TELEPHONE (OUTPATIENT)
Dept: ELECTROPHYSIOLOGY | Facility: CLINIC | Age: 89
End: 2024-11-08
Payer: MEDICARE

## 2024-11-13 ENCOUNTER — TELEPHONE (OUTPATIENT)
Dept: UROLOGY | Facility: CLINIC | Age: 89
End: 2024-11-13
Payer: MEDICARE

## 2024-11-18 ENCOUNTER — CLINICAL SUPPORT (OUTPATIENT)
Dept: CARDIOLOGY | Facility: HOSPITAL | Age: 89
End: 2024-11-18
Attending: INTERNAL MEDICINE
Payer: MEDICARE

## 2024-11-18 ENCOUNTER — CLINICAL SUPPORT (OUTPATIENT)
Dept: CARDIOLOGY | Facility: HOSPITAL | Age: 89
End: 2024-11-18
Payer: MEDICARE

## 2024-11-18 DIAGNOSIS — Z95.0 PRESENCE OF CARDIAC PACEMAKER: ICD-10-CM

## 2024-11-18 DIAGNOSIS — I44.2 ATRIOVENTRICULAR BLOCK, COMPLETE: ICD-10-CM

## 2024-11-18 PROCEDURE — 93294 REM INTERROG EVL PM/LDLS PM: CPT | Mod: ,,, | Performed by: INTERNAL MEDICINE

## 2024-12-13 LAB
OHS CV BIV PACING PERCENT: 99 %
OHS CV DC REMOTE DEVICE TYPE: NORMAL

## 2024-12-27 ENCOUNTER — OFFICE VISIT (OUTPATIENT)
Dept: URGENT CARE | Facility: CLINIC | Age: 89
End: 2024-12-27
Payer: MEDICARE

## 2024-12-27 VITALS
TEMPERATURE: 97 F | DIASTOLIC BLOOD PRESSURE: 81 MMHG | RESPIRATION RATE: 18 BRPM | BODY MASS INDEX: 27.3 KG/M2 | WEIGHT: 190.69 LBS | HEART RATE: 60 BPM | OXYGEN SATURATION: 95 % | SYSTOLIC BLOOD PRESSURE: 146 MMHG | HEIGHT: 70 IN

## 2024-12-27 DIAGNOSIS — B02.9 HERPES ZOSTER WITHOUT COMPLICATION: Primary | ICD-10-CM

## 2024-12-27 RX ORDER — VALACYCLOVIR HYDROCHLORIDE 1 G/1
1000 TABLET, FILM COATED ORAL 3 TIMES DAILY
Qty: 21 TABLET | Refills: 0 | Status: SHIPPED | OUTPATIENT
Start: 2024-12-27 | End: 2025-01-03

## 2024-12-27 NOTE — PROGRESS NOTES
"Subjective:      Patient ID: Artemio Colon Sr. is a 89 y.o. male.    Vitals:  height is 5' 10" (1.778 m) and weight is 86.5 kg (190 lb 11.2 oz). His oral temperature is 97.1 °F (36.2 °C). His blood pressure is 146/81 (abnormal) and his pulse is 60. His respiration is 18 and oxygen saturation is 95%.     Chief Complaint: Rash    This is a 89 y.o. male who presents today with a chief complaint of a rash that started about 3 days ago, possibly shingles.    Rash  This is a new problem. The current episode started in the past 7 days. The problem has been gradually worsening since onset. The affected locations include the back. The rash is characterized by blistering, redness and pain. It is unknown if there was an exposure to a precipitant. Pertinent negatives include no fever. Past treatments include antibiotic cream.       Constitution: Negative for chills and fever.   Musculoskeletal:  Positive for pain. Negative for trauma.   Skin:  Positive for rash.   Neurological:  Negative for numbness and tingling.      Past Medical History:   Diagnosis Date    Abdominal cramping 01/24/2023    AV block     BPH (benign prostatic hyperplasia)     Cancer     Chronic rhinitis     Coronary artery disease     COVID-19 07/07/2024    Elevated LFTs 10/27/2023    Elevated serum GGT level 10/27/2023    Erectile dysfunction     Glaucoma     Kidney cysts     Neuropathy 08/22/2017    JAEL (obstructive sleep apnea)     Pacemaker     Symptomatic bradycardia     s/p pacemaker       Past Surgical History:   Procedure Laterality Date    ADENOIDECTOMY  1939    unsure    CATARACT EXTRACTION W/  INTRAOCULAR LENS IMPLANT Bilateral 2012    done in [place]    CHOLECYSTECTOMY      CLOSURE OF LEFT ATRIAL APPENDAGE USING DEVICE N/A 12/3/2020    Procedure: Left atrial appendage closure device;  Surgeon: Tawanda Adler MD;  Location: [facility] EP LAB;  Service: Cardiology;  Laterality: N/A;  AF, GAIL, Watchman Implant, BSci, Gen, NV/MB, 3 Prep    COLECTOMY "      cecum    COSMETIC SURGERY  1967    rhinoplasty    EYE SURGERY      FRACTURE SURGERY  wrist    1939    HERNIA REPAIR      INSERT / REPLACE / REMOVE PACEMAKER  2012    changed    PROSTATE SURGERY      TONSILLECTOMY  1941    VASECTOMY  1975       Family History   Problem Relation Name Age of Onset    Arthritis Father father     Cancer Father father     Kidney disease Father father     Cancer Mother martha     Hearing loss Mother martha     Mental illness Mother martha     Alcohol abuse Brother rakel     Cancer Brother rakel     COPD Brother rakel     Mental illness Brother rakel     Cancer Paternal Uncle justo     Depression Brother rakel brother     Early death Sister abebe         accidental    Heart disease Brother gerson     Mental illness Sister adolph     Amblyopia Neg Hx      Blindness Neg Hx      Cataracts Neg Hx      Glaucoma Neg Hx      Macular degeneration Neg Hx      Retinal detachment Neg Hx      Strabismus Neg Hx         Social History     Socioeconomic History    Marital status:    Tobacco Use    Smoking status: Never    Smokeless tobacco: Never   Substance and Sexual Activity    Alcohol use: Not Currently     Alcohol/week: 1.0 standard drink of alcohol     Types: 1 Glasses of wine per week     Comment: weekly with meal    Drug use: No    Sexual activity: Yes     Partners: Female     Birth control/protection: Condom   Social History Narrative    Lives with wife (younger 72), healthy. 1 dtr and 1 son, and older son by prev marriage in 70s. 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     Social Drivers of Health     Financial Resource Strain: Low Risk  (7/8/2024)    Overall Financial Resource Strain (CARDIA)     Difficulty of Paying Living Expenses: Not hard at all   Food Insecurity: No Food Insecurity (7/8/2024)    Hunger Vital Sign     Worried About Running Out of Food in the Last Year: Never true     Ran Out of Food in the Last Year: Never true   Transportation Needs: No  Transportation Needs (7/8/2024)    TRANSPORTATION NEEDS     Transportation : No   Physical Activity: Insufficiently Active (7/7/2024)    Exercise Vital Sign     Days of Exercise per Week: 3 days     Minutes of Exercise per Session: 20 min   Stress: No Stress Concern Present (7/8/2024)    [employer] - Occupational Stress Questionnaire     Feeling of Stress : Not at all   Housing Stability: Low Risk  (7/8/2024)    Housing Stability Vital Sign     Unable to Pay for Housing in the Last Year: No     Homeless in the Last Year: No       Current Outpatient Medications   Medication Sig Dispense Refill    aspirin 81 MG Chew Take 81 mg by mouth once daily.      diclofenac sodium (VOLTAREN) 1 % Gel Use to affected joints up to 4 times a day 100 g 4    dorzolamide-timolol 2-0.5% (COSOPT) 22.3-6.8 mg/mL ophthalmic solution Place 1 drop into both eyes 2 (two) times daily.      fentaNYL (DURAGESIC) 25 mcg/hr Place 1 patch onto the skin every 72 hours.      HYDROcodone-acetaminophen (NORCO)  mg per tablet 1 tablet.      latanoprost 0.005 % ophthalmic solution Place 1 drop into both eyes every evening.      oxyCODONE-acetaminophen (PERCOCET) 7.5-325 mg per tablet Take 1 tablet by mouth 2 (two) times daily as needed.      valACYclovir (VALTREX) 1000 MG tablet Take 1 tablet (1,000 mg total) by mouth 3 (three) times daily. for 7 days 21 tablet 0     No current facility-administered medications for this visit.       Review of patient's allergies indicates:   Allergen Reactions    Ketamine Hallucinations     Had dysphoric reaction.     Penicillins Hives       Objective:     Physical Exam   Constitutional:  Non-toxic appearance. He does not appear ill. No distress.   Pulmonary/Chest: Effort normal. No respiratory distress.   Abdominal: Normal appearance.   Neurological: He is alert.   Skin: Skin is warm, dry, not diaphoretic and rash.        Psychiatric: His behavior is normal. Mood normal.   Nursing  note and vitals reviewed.            Assessment:     1. Herpes zoster without complication        Plan:       Herpes zoster without complication  -     valACYclovir (VALTREX) 1000 MG tablet; Take 1 tablet (1,000 mg total) by mouth 3 (three) times daily. for 7 days  Dispense: 21 tablet; Refill: 0           I have reviewed the patient chart and pertinent past imaging/labs.       Patient Instructions   Take Valtrex as prescribed with food.  Rotate Advil/Tylenol with food every 4 hours as needed for pain relief.  Wash the wound with warm soap and water, do not wash with hydrogen peroxide, alcohol or Neosporin.  You were contagious until the wound has crusted over.  If symptoms do not improve please follow up with urgent care or primary care provider.

## 2024-12-27 NOTE — PATIENT INSTRUCTIONS
Take Valtrex as prescribed with food.  Rotate Advil/Tylenol with food every 4 hours as needed for pain relief.  Wash the wound with warm soap and water, do not wash with hydrogen peroxide, alcohol or Neosporin.  You were contagious until the wound has crusted over.  If symptoms do not improve please follow up with urgent care or primary care provider.

## 2025-01-13 DIAGNOSIS — Z00.00 ENCOUNTER FOR MEDICARE ANNUAL WELLNESS EXAM: ICD-10-CM

## 2025-02-19 ENCOUNTER — CLINICAL SUPPORT (OUTPATIENT)
Dept: CARDIOLOGY | Facility: HOSPITAL | Age: OVER 89
End: 2025-02-19
Attending: INTERNAL MEDICINE
Payer: MEDICARE

## 2025-02-19 ENCOUNTER — CLINICAL SUPPORT (OUTPATIENT)
Dept: CARDIOLOGY | Facility: HOSPITAL | Age: OVER 89
End: 2025-02-19
Payer: MEDICARE

## 2025-02-19 DIAGNOSIS — Z95.0 PRESENCE OF CARDIAC PACEMAKER: ICD-10-CM

## 2025-02-19 DIAGNOSIS — I44.2 ATRIOVENTRICULAR BLOCK, COMPLETE: ICD-10-CM

## 2025-02-19 PROCEDURE — 93294 REM INTERROG EVL PM/LDLS PM: CPT | Mod: ,,, | Performed by: INTERNAL MEDICINE

## 2025-02-24 LAB
OHS CV BIV PACING PERCENT: 99 %
OHS CV DC REMOTE DEVICE TYPE: NORMAL

## 2025-04-23 ENCOUNTER — PATIENT MESSAGE (OUTPATIENT)
Dept: INTERNAL MEDICINE | Facility: CLINIC | Age: OVER 89
End: 2025-04-23
Payer: MEDICARE

## 2025-04-30 ENCOUNTER — OFFICE VISIT (OUTPATIENT)
Dept: INTERNAL MEDICINE | Facility: CLINIC | Age: OVER 89
End: 2025-04-30
Payer: MEDICARE

## 2025-04-30 VITALS
DIASTOLIC BLOOD PRESSURE: 78 MMHG | WEIGHT: 186.5 LBS | BODY MASS INDEX: 26.7 KG/M2 | HEART RATE: 66 BPM | SYSTOLIC BLOOD PRESSURE: 132 MMHG | OXYGEN SATURATION: 95 % | HEIGHT: 70 IN

## 2025-04-30 DIAGNOSIS — I50.22 CHRONIC SYSTOLIC CONGESTIVE HEART FAILURE: ICD-10-CM

## 2025-04-30 DIAGNOSIS — I70.0 AORTIC ATHEROSCLEROSIS: ICD-10-CM

## 2025-04-30 DIAGNOSIS — Z79.899 DRUG THERAPY: ICD-10-CM

## 2025-04-30 DIAGNOSIS — I44.2 COMPLETE AV BLOCK: ICD-10-CM

## 2025-04-30 DIAGNOSIS — N40.0 BENIGN PROSTATIC HYPERPLASIA WITHOUT LOWER URINARY TRACT SYMPTOMS: Primary | ICD-10-CM

## 2025-04-30 PROCEDURE — G2211 COMPLEX E/M VISIT ADD ON: HCPCS | Mod: S$PBB,,, | Performed by: FAMILY MEDICINE

## 2025-04-30 PROCEDURE — 99213 OFFICE O/P EST LOW 20 MIN: CPT | Mod: PBBFAC | Performed by: FAMILY MEDICINE

## 2025-04-30 PROCEDURE — 99999 PR PBB SHADOW E&M-EST. PATIENT-LVL III: CPT | Mod: PBBFAC,,, | Performed by: FAMILY MEDICINE

## 2025-04-30 PROCEDURE — 99214 OFFICE O/P EST MOD 30 MIN: CPT | Mod: S$PBB,,, | Performed by: FAMILY MEDICINE

## 2025-04-30 NOTE — PROGRESS NOTES
Subjective:       Patient ID: Artemio Colon Sr. is a 89 y.o. male.    Chief Complaint: Follow-up    HPI    Artemio Colon Sr. is a 89 y.o. male PMHx JAEL, Afib, S/p Watchman, S/p Pacemaker, HF, Chronic back pain, PreDM for checkup     #Cards: CAD, HF, Afib, S/p Pacemaker, S/p Watchman  - est w/ NP [facility] / Dr. Adler (EP),  9/2024 - f/u 1yr  - est w/ Dr. Gutiérrez,  2/2022 -- f/u 1yr  - reg: ASA qd  - issues with recurrent GI bleeding not on anticoagulation (previously tried xarelto in 4492-7301 for symptomatic AF and had issues with excessive bleeding then). He was hospitalized for a diverticular bleed in March of 2019.  - s/p Watchman implant performed on 12/3/2020.     #Endo: PreDM (A1c 10/2024 = 5.7)     #Hep: Elev LFTs, Elev alk phos and ggt  - est w/ Dr. Pereyra,  3/2024 - rtc 6m  - u/s 1/2023     #Neuro: Neuropathy, Lumbar radiculopathy  - est w/ Dr. Hylton,  10/2022     #Ortho: Chronic back pain  - est w/ [place] [facility], sees q3m  - taking Norco prn (usually every 3d) and uses Fentanyl patches  -  reviewed     #Uro: BPH, H/o Nephrolithiasis  - est w/ Dr. Chávez,  10/2024  - reg: Flomax 0.4 qd with relief     #Sleep med: JAEL on cpap  - est w/ Dr. Amador,  3/2022     #BMI 26    Review of Systems      Past Medical History:   Diagnosis Date    Abdominal cramping 01/24/2023    AV block     BPH (benign prostatic hyperplasia)     Cancer     Chronic rhinitis     Coronary artery disease     COVID-19 07/07/2024    Elevated LFTs 10/27/2023    Elevated serum GGT level 10/27/2023    Erectile dysfunction     Glaucoma     Kidney cysts     Neuropathy 08/22/2017    JAEL (obstructive sleep apnea)     Pacemaker     Symptomatic bradycardia     s/p pacemaker        Prior to Admission medications    Medication Sig Start Date End Date Taking? Authorizing Provider   aspirin 81 MG Chew Take 81 mg by mouth once daily.   Yes Provider, Historical   diclofenac sodium (VOLTAREN) 1 % Gel Use to affected joints  "up to 4 times a day 10/22/21  Yes Javed Courtney MD   dorzolamide-timolol 2-0.5% (COSOPT) 22.3-6.8 mg/mL ophthalmic solution Place 1 drop into both eyes 2 (two) times daily. 3/28/24  Yes Provider, Historical   fentaNYL (DURAGESIC) 25 mcg/hr Place 1 patch onto the skin every 72 hours.   Yes Provider, Historical   latanoprost 0.005 % ophthalmic solution Place 1 drop into both eyes every evening.   Yes Provider, Historical   oxyCODONE-acetaminophen (PERCOCET) 7.5-325 mg per tablet Take 1 tablet by mouth 2 (two) times daily as needed. 4/25/24  Yes Provider, Historical   HYDROcodone-acetaminophen (NORCO)  mg per tablet 1 tablet.  Patient not taking: Reported on 4/30/2025 10/4/24   Provider, Historical   valACYclovir (VALTREX) 1000 MG tablet Take 1 tablet (1,000 mg total) by mouth 3 (three) times daily. for 7 days  Patient not taking: Reported on 4/30/2025 12/27/24 4/30/25  Mireya Estrella PA-C        Past medical history, surgical history, and family medical history reviewed and updated as appropriate.    Medications and allergies reviewed.     Objective:          Vitals:    04/30/25 0954   BP: 132/78   BP Location: Right arm   Patient Position: Sitting   Pulse: 66   SpO2: 95%   Weight: 84.6 kg (186 lb 8.2 oz)   Height: 5' 10" (1.778 m)     Body mass index is 26.76 kg/m².  Physical Exam    Lab Results   Component Value Date    WBC 4.80 07/08/2024    HGB 13.3 (L) 07/08/2024    HCT 40.8 07/08/2024     07/08/2024    CHOL 173 10/25/2024    TRIG 96 10/25/2024    HDL 51 10/25/2024    ALT 12 10/25/2024    AST 23 10/25/2024     10/25/2024    K 4.4 10/25/2024     10/25/2024    CREATININE 1.0 10/25/2024    BUN 17 10/25/2024    CO2 26 10/25/2024    TSH 1.999 07/02/2019    INR 1.2 07/07/2024    HGBA1C 5.7 (H) 10/25/2024       Assessment:       1. Benign prostatic hyperplasia without lower urinary tract symptoms    2. Drug therapy    3. Chronic systolic congestive heart failure    4. Complete AV block  "   5. Aortic atherosclerosis          Plan:   1. Benign prostatic hyperplasia without lower urinary tract symptoms    2. Drug therapy  -     Comprehensive Metabolic Panel; Future; Expected date: 04/30/2025  -     CBC Without Differential; Future; Expected date: 04/30/2025  -     Lipid Panel; Future; Expected date: 04/30/2025  -     Hemoglobin A1C; Future; Expected date: 04/30/2025  -     TSH; Future; Expected date: 04/30/2025    3. Chronic systolic congestive heart failure  Overview:  Reviewed cardiology records  - cont asa and following with specialists      4. Complete AV block  Overview:  Reviewed cardiology records.  - pacemaker in place, being monitored by cards      5. Aortic atherosclerosis  Overview:  Imaging reviewed.  - cont asa          Health maintenance reviewed with patient.     No follow-ups on file.    As this patient's primary care physician, I am actively managing and/or treating his/her chronic medical conditions now and in the future. This includes, but is not limited to, medication management, coordination of care, documentation review from his/her specialists, and labs/imaging review that I have performed to prepare for this visit as well as will do so in the future as part of my care continuity for this patient.    Patel Loyd MD  Family Medicine / Primary Care  [facility]  4/30/2025

## 2025-05-23 ENCOUNTER — CLINICAL SUPPORT (OUTPATIENT)
Dept: CARDIOLOGY | Facility: HOSPITAL | Age: OVER 89
End: 2025-05-23
Payer: MEDICARE

## 2025-05-23 ENCOUNTER — CLINICAL SUPPORT (OUTPATIENT)
Dept: CARDIOLOGY | Facility: HOSPITAL | Age: OVER 89
End: 2025-05-23
Attending: INTERNAL MEDICINE
Payer: MEDICARE

## 2025-05-23 DIAGNOSIS — Z95.0 PRESENCE OF CARDIAC PACEMAKER: ICD-10-CM

## 2025-05-23 DIAGNOSIS — I44.2 ATRIOVENTRICULAR BLOCK, COMPLETE: Primary | ICD-10-CM

## 2025-05-23 DIAGNOSIS — I44.2 ATRIOVENTRICULAR BLOCK, COMPLETE: ICD-10-CM

## 2025-05-23 PROCEDURE — 93294 REM INTERROG EVL PM/LDLS PM: CPT | Mod: ,,, | Performed by: INTERNAL MEDICINE

## 2025-05-29 LAB
OHS CV BIV PACING PERCENT: 99 %
OHS CV DC REMOTE DEVICE TYPE: NORMAL

## 2025-06-03 ENCOUNTER — TELEPHONE (OUTPATIENT)
Dept: ELECTROPHYSIOLOGY | Facility: CLINIC | Age: OVER 89
End: 2025-06-03
Payer: MEDICARE

## 2025-06-06 NOTE — PROGRESS NOTES
Subjective:       Patient ID: Artemio Colon Sr. is a 89 y.o. male.    Chief Complaint: Urinary Frequency/Urinary Urgency    HPI    Artemio Colon Sr. is a 89 y.o. male with PMHx of BPH and ED who presents today for management and evaluation of urinary frequency and urgency. He has a strong stream, empties his bladder, and experiences more than his usual dysuria. Previously had an episode of prostatitis and treated with Cipro. He takes Flomax.     Past Medical History:   Diagnosis Date    AV block     BPH (benign prostatic hyperplasia)     Chronic rhinitis     Coronary artery disease     Erectile dysfunction     Glaucoma     Neuropathy 8/22/2017    JAEL (obstructive sleep apnea)     Pacemaker     Symptomatic bradycardia     s/p pacemaker       Past Surgical History:   Procedure Laterality Date    CATARACT EXTRACTION W/  INTRAOCULAR LENS IMPLANT Bilateral 2012    done in [place]    CHOLECYSTECTOMY      COLECTOMY      cecum    INSERT / REPLACE / REMOVE PACEMAKER  2012    changed    [id number] N/A 2/16/2018    [facility]    VENOGRAM N/A 2/2/2018    Per[facility] by [facility] at [facility] CATH LAB       Family History   Problem Relation Age of Onset    Amblyopia Neg Hx     Blindness Neg Hx     Cataracts Neg Hx     Glaucoma Neg Hx     Macular degeneration Neg Hx     Retinal detachment Neg Hx     Strabismus Neg Hx        Social History     Socioeconomic History    Marital status:      Spouse name: Not on file    Number of children: Not on file    Years of education: Not on file    Highest education level: Not on file   Occupational History    Not on file   Social Needs    Financial resource strain: Not on file    Food insecurity:     Worry: Not on file     Inability: Not on file    Transportation needs:     Medical: Not on file     Non-medical: Not on file   Tobacco Use    Smoking status: Never Smoker     Smokeless tobacco: Never Used   Substance and Sexual Activity    Alcohol use: Yes     Alcohol/week: 0.6 oz     Types: 1 Glasses of wine per week     Comment: weekly with meal    Drug use: No    Sexual activity: Yes   Lifestyle    Physical activity:     Days per week: Not on file     Minutes per session: Not on file    Stress: Not on file   Relationships    Social connections:     Talks on phone: Not on file     Gets together: Not on file     Attends Confucianist service: Not on file     Active member of club or organization: Not on file     Attends meetings of clubs or organizations: Not on file     Relationship status: Not on file   Other Topics Concern    Not on file   Social History Narrative    Not on file       Allergies:  Pcn [penicillins]    Medications:    Current Outpatient Medications:     aspirin 81 MG Chew, Take 81 mg by mouth once daily., Disp: , Rfl:     brimonidine 0.2% (ALPHAGAN) 0.2 % Drop, Place 1 drop into both eyes 2 (two) times daily., Disp: 30 mL, Rfl: 3    diclofenac sodium (VOLTAREN) 1 % Gel, Use to affected joints up to 4 times a day, Disp: 100 g, Rfl: 4    efinaconazole 10 % Werner, Apply to affected toenails once daily, Disp: 8 mL, Rfl: 11    fentaNYL (DURAGESIC) 25 mcg/hr, Place 1 patch onto the skin every 72 hours., Disp: , Rfl:     fluticasone (FLONASE) 50 mcg/actuation nasal spray, 2 sprays by Each Nare route once daily., Disp: 1 Bottle, Rfl: 6    gabapentin (NEURONTIN) 600 MG tablet, Take 1 tablet (600 mg total) by mouth once daily. (Patient taking differently: Take 600 mg by mouth every evening. ), Disp: 30 tablet, Rfl: 3    hydrocodone-acetaminophen 10-325mg (NORCO)  mg Tab, Take by mouth every 4 (four) hours as needed for Pain., Disp: , Rfl:     tamsulosin (FLOMAX) 0.4 mg Cap, Take 1 capsule (0.4 mg total) by mouth once daily., Disp: 90 capsule, Rfl: 3    timolol maleate 0.5% (TIMOPTIC) 0.5 % Drop, Place 1 drop into both eyes 2 (two) times daily. (Patient taking  differently: Place 1 drop into both eyes once daily. ), Disp: 15 mL, Rfl: 3    travoprost (TRAVATAN Z) 0.004 % Drop, Place 1 drop into both eyes every evening. (Patient taking differently: Place 1 drop into both eyes 2 (two) times daily. ), Disp: 7.5 mL, Rfl: 3    ciprofloxacin HCl (CIPRO) 500 MG tablet, Take 1 tablet (500 mg total) by mouth 2 (two) times daily., Disp: 60 tablet, Rfl: 0    pravastatin (PRAVACHOL) 40 MG tablet, Take 1 tablet (40 mg total) by mouth once daily., Disp: 90 tablet, Rfl: 3    Review of Systems   Constitutional: Negative for activity change, appetite change, chills, diaphoresis, fatigue, fever and unexpected weight change.   HENT: Negative for congestion, dental problem, hearing loss, mouth sores, postnasal drip, rhinorrhea, sinus pressure and trouble swallowing.    Eyes: Negative for pain, discharge and itching.   Respiratory: Negative for apnea, cough, choking, chest tightness, shortness of breath and wheezing.    Cardiovascular: Negative for chest pain, palpitations and leg swelling.   Gastrointestinal: Negative for abdominal distention, abdominal pain, anal bleeding, blood in stool, constipation, diarrhea, nausea, rectal pain and vomiting.   Endocrine: Negative for polydipsia and polyuria.   Genitourinary: Positive for dysuria, frequency and urgency. Negative for decreased urine volume, difficulty urinating, discharge, enuresis, flank pain, genital sores, hematuria, penile pain, penile swelling and scrotal swelling.   Musculoskeletal: Negative for arthralgias, back pain and myalgias.   Skin: Negative for color change, rash and wound.   Neurological: Negative for dizziness, syncope, speech difficulty, light-headedness and headaches.   Hematological: Negative for adenopathy. Does not bruise/bleed easily.   Psychiatric/Behavioral: Negative for behavioral problems, confusion and sleep disturbance.       Objective:      Physical Exam   Constitutional: He appears well-developed.   HENT:    Head: Normocephalic.   Neck: Neck supple.   Cardiovascular: Normal rate.    Pulmonary/Chest: Effort normal.   Abdominal: Soft.   Genitourinary:   Genitourinary Comments: Prostate was a little firm on the right side. 35 grams. Normal perineum.   Neurological: He is alert.   Skin: Skin is warm.     Psychiatric: He has a normal mood and affect.       Assessment:       1. Chronic prostatitis        Plan:       Artemio was seen today for urinary frequency/urinary urgency.    Diagnoses and all orders for this visit:    Chronic prostatitis    Other orders  -     ciprofloxacin HCl (CIPRO) 500 MG tablet; Take 1 tablet (500 mg total) by mouth 2 (two) times daily.          Continue Flomax.  Take Cipro for 30 days.  RTC prn.    IJuan, am acting as a scribe on this patient encounter in the presence and under the supervision of Dr. Castañeda.    05/09/2019 2:18 PM    I, Dr. Castañeda, personally performed the services described in this documentation.   All medical record entries made by the scribe were at my direction and in my presence.   I have reviewed the chart and agree that the record is accurate and complete.   Russel Castañeda MD.  2:22 PM 05/09/2019        Attending Attestation (For Attendings USE Only)... Admitted

## 2025-06-18 ENCOUNTER — PATIENT MESSAGE (OUTPATIENT)
Dept: ELECTROPHYSIOLOGY | Facility: CLINIC | Age: OVER 89
End: 2025-06-18
Payer: MEDICARE

## 2025-06-19 ENCOUNTER — TELEPHONE (OUTPATIENT)
Dept: ELECTROPHYSIOLOGY | Facility: CLINIC | Age: OVER 89
End: 2025-06-19
Payer: MEDICARE

## 2025-08-22 ENCOUNTER — CLINICAL SUPPORT (OUTPATIENT)
Dept: CARDIOLOGY | Facility: HOSPITAL | Age: OVER 89
End: 2025-08-22
Attending: INTERNAL MEDICINE
Payer: MEDICARE

## 2025-08-22 ENCOUNTER — CLINICAL SUPPORT (OUTPATIENT)
Dept: CARDIOLOGY | Facility: HOSPITAL | Age: OVER 89
End: 2025-08-22
Payer: MEDICARE

## 2025-08-22 DIAGNOSIS — Z95.0 PRESENCE OF CARDIAC PACEMAKER: ICD-10-CM

## 2025-08-22 DIAGNOSIS — I44.2 ATRIOVENTRICULAR BLOCK, COMPLETE: ICD-10-CM

## 2025-08-22 PROCEDURE — 93294 REM INTERROG EVL PM/LDLS PM: CPT | Mod: ,,, | Performed by: INTERNAL MEDICINE

## 2025-08-28 LAB
OHS CV BIV PACING PERCENT: 99 %
OHS CV DC REMOTE DEVICE TYPE: NORMAL

## 2025-09-02 ENCOUNTER — OFFICE VISIT (OUTPATIENT)
Dept: URGENT CARE | Facility: CLINIC | Age: OVER 89
End: 2025-09-02
Payer: MEDICARE

## 2025-09-02 VITALS
SYSTOLIC BLOOD PRESSURE: 153 MMHG | TEMPERATURE: 98 F | HEIGHT: 70 IN | DIASTOLIC BLOOD PRESSURE: 83 MMHG | OXYGEN SATURATION: 97 % | WEIGHT: 184 LBS | HEART RATE: 65 BPM | BODY MASS INDEX: 26.34 KG/M2 | RESPIRATION RATE: 20 BRPM

## 2025-09-02 DIAGNOSIS — L03.113 CELLULITIS OF RIGHT UPPER EXTREMITY: Primary | ICD-10-CM

## 2025-09-02 PROCEDURE — 99214 OFFICE O/P EST MOD 30 MIN: CPT | Mod: S$GLB,,, | Performed by: NURSE PRACTITIONER

## 2025-09-02 RX ORDER — MUPIROCIN 20 MG/G
OINTMENT TOPICAL 3 TIMES DAILY
Qty: 30 G | Refills: 0 | Status: SHIPPED | OUTPATIENT
Start: 2025-09-02 | End: 2025-09-09

## 2025-09-02 RX ORDER — SULFAMETHOXAZOLE AND TRIMETHOPRIM 800; 160 MG/1; MG/1
1 TABLET ORAL 2 TIMES DAILY
Qty: 10 TABLET | Refills: 0 | Status: SHIPPED | OUTPATIENT
Start: 2025-09-02 | End: 2025-09-07

## 2025-09-03 ENCOUNTER — CLINICAL SUPPORT (OUTPATIENT)
Dept: CARDIOLOGY | Facility: HOSPITAL | Age: OVER 89
End: 2025-09-03
Attending: INTERNAL MEDICINE
Payer: MEDICARE

## 2025-09-03 ENCOUNTER — HOSPITAL ENCOUNTER (OUTPATIENT)
Dept: CARDIOLOGY | Facility: CLINIC | Age: OVER 89
Discharge: HOME | End: 2025-09-03
Payer: MEDICARE

## 2025-09-03 ENCOUNTER — OFFICE VISIT (OUTPATIENT)
Dept: ELECTROPHYSIOLOGY | Facility: CLINIC | Age: OVER 89
End: 2025-09-03
Payer: MEDICARE

## 2025-09-03 VITALS
HEIGHT: 70 IN | WEIGHT: 185 LBS | HEART RATE: 60 BPM | SYSTOLIC BLOOD PRESSURE: 146 MMHG | BODY MASS INDEX: 26.48 KG/M2 | DIASTOLIC BLOOD PRESSURE: 84 MMHG

## 2025-09-03 DIAGNOSIS — Z95.0 PRESENCE OF CARDIAC PACEMAKER: ICD-10-CM

## 2025-09-03 DIAGNOSIS — I44.2 ATRIOVENTRICULAR BLOCK, COMPLETE: ICD-10-CM

## 2025-09-03 DIAGNOSIS — I25.10 CORONARY ARTERY DISEASE INVOLVING NATIVE CORONARY ARTERY OF NATIVE HEART WITHOUT ANGINA PECTORIS: ICD-10-CM

## 2025-09-03 DIAGNOSIS — I48.21 PERMANENT ATRIAL FIBRILLATION: ICD-10-CM

## 2025-09-03 DIAGNOSIS — G47.33 OBSTRUCTIVE SLEEP APNEA ON CPAP: ICD-10-CM

## 2025-09-03 DIAGNOSIS — Z95.0 CARDIAC RESYNCHRONIZATION THERAPY PACEMAKER (CRT-P) IN PLACE: Primary | ICD-10-CM

## 2025-09-03 DIAGNOSIS — Z95.818 PRESENCE OF WATCHMAN LEFT ATRIAL APPENDAGE CLOSURE DEVICE: ICD-10-CM

## 2025-09-03 DIAGNOSIS — I50.22 CHRONIC SYSTOLIC CONGESTIVE HEART FAILURE: ICD-10-CM

## 2025-09-03 LAB
OHS QRS DURATION: 148 MS
OHS QTC CALCULATION: 444 MS

## 2025-09-03 PROCEDURE — 99213 OFFICE O/P EST LOW 20 MIN: CPT | Mod: PBBFAC,25 | Performed by: INTERNAL MEDICINE

## 2025-09-03 PROCEDURE — G2211 COMPLEX E/M VISIT ADD ON: HCPCS | Mod: ,,, | Performed by: INTERNAL MEDICINE

## 2025-09-03 PROCEDURE — 99999 PR PBB SHADOW E&M-EST. PATIENT-LVL III: CPT | Mod: PBBFAC,,, | Performed by: INTERNAL MEDICINE

## 2025-09-03 PROCEDURE — 93005 ELECTROCARDIOGRAM TRACING: CPT | Mod: PBBFAC | Performed by: INTERNAL MEDICINE

## 2025-09-03 PROCEDURE — 99214 OFFICE O/P EST MOD 30 MIN: CPT | Mod: S$PBB,,, | Performed by: INTERNAL MEDICINE

## 2025-09-03 PROCEDURE — 93010 ELECTROCARDIOGRAM REPORT: CPT | Mod: S$PBB,,, | Performed by: INTERNAL MEDICINE

## 2025-09-03 PROCEDURE — 93281 PM DEVICE PROGR EVAL MULTI: CPT

## 2025-09-03 PROCEDURE — 93281 PM DEVICE PROGR EVAL MULTI: CPT | Mod: 26,,, | Performed by: INTERNAL MEDICINE

## 2025-09-04 LAB
OHS CV BIV PACING PERCENT: 99
OHS CV DC REMOTE DEVICE TYPE: NORMAL

## (undated) DEVICE — NDL TRANSEPTAL ADULT 71.0

## (undated) DEVICE — KIT CUSTOM MANIFOLD

## (undated) DEVICE — GUIDEWIRE AMPLATZ .035INX180X7

## (undated) DEVICE — SET INTRO PERFRMR SHTH 16FR

## (undated) DEVICE — PACK EP DRAPE

## (undated) DEVICE — KIT PROBE COVER WITH GEL

## (undated) DEVICE — CATH ANGIO DIAG PIG 6FX110

## (undated) DEVICE — DIALATOR COONS TAPER 12F 20CM

## (undated) DEVICE — ELECTRODE REM PLYHSV RETURN 9

## (undated) DEVICE — SYS WATCHMAN FXD CURVE DBL US

## (undated) DEVICE — SPIKE CONTRAST CONTROLLER

## (undated) DEVICE — BOWL FLUID - BACK STOP

## (undated) DEVICE — INTRODUCER HEMOSTASIS 7.5F

## (undated) DEVICE — SEE MEDLINE ITEM 107746

## (undated) DEVICE — PAD DEFIB CADENCE ADULT R2

## (undated) DEVICE — INTRO FAST-CATH SL1 8.5FR 63CM